# Patient Record
Sex: FEMALE | Race: WHITE | NOT HISPANIC OR LATINO | Employment: OTHER | URBAN - METROPOLITAN AREA
[De-identification: names, ages, dates, MRNs, and addresses within clinical notes are randomized per-mention and may not be internally consistent; named-entity substitution may affect disease eponyms.]

---

## 2017-03-07 ENCOUNTER — OFFICE VISIT (OUTPATIENT)
Dept: CARDIOLOGY | Facility: MEDICAL CENTER | Age: 77
End: 2017-03-07
Payer: MEDICARE

## 2017-03-07 VITALS
SYSTOLIC BLOOD PRESSURE: 110 MMHG | WEIGHT: 116 LBS | DIASTOLIC BLOOD PRESSURE: 70 MMHG | HEART RATE: 60 BPM | HEIGHT: 64 IN | OXYGEN SATURATION: 94 % | BODY MASS INDEX: 19.81 KG/M2

## 2017-03-07 DIAGNOSIS — I25.2 MI, OLD: Chronic | ICD-10-CM

## 2017-03-07 DIAGNOSIS — I48.20 CHRONIC ATRIAL FIBRILLATION (HCC): ICD-10-CM

## 2017-03-07 DIAGNOSIS — Z95.1 STATUS POST CORONARY ARTERY BYPASS GRAFTS X 5: ICD-10-CM

## 2017-03-07 DIAGNOSIS — I20.89 EFFORT ANGINA: ICD-10-CM

## 2017-03-07 DIAGNOSIS — I50.20 SYSTOLIC CHF WITH REDUCED LEFT VENTRICULAR FUNCTION, NYHA CLASS 3 (HCC): ICD-10-CM

## 2017-03-07 PROCEDURE — 1036F TOBACCO NON-USER: CPT | Performed by: INTERNAL MEDICINE

## 2017-03-07 PROCEDURE — 3017F COLORECTAL CA SCREEN DOC REV: CPT | Mod: 8P | Performed by: INTERNAL MEDICINE

## 2017-03-07 PROCEDURE — G8419 CALC BMI OUT NRM PARAM NOF/U: HCPCS | Performed by: INTERNAL MEDICINE

## 2017-03-07 PROCEDURE — 3014F SCREEN MAMMO DOC REV: CPT | Mod: 8P | Performed by: INTERNAL MEDICINE

## 2017-03-07 PROCEDURE — 99214 OFFICE O/P EST MOD 30 MIN: CPT | Performed by: INTERNAL MEDICINE

## 2017-03-07 PROCEDURE — 4040F PNEUMOC VAC/ADMIN/RCVD: CPT | Performed by: INTERNAL MEDICINE

## 2017-03-07 PROCEDURE — G8598 ASA/ANTIPLAT THER USED: HCPCS | Performed by: INTERNAL MEDICINE

## 2017-03-07 PROCEDURE — 1101F PT FALLS ASSESS-DOCD LE1/YR: CPT | Mod: 8P | Performed by: INTERNAL MEDICINE

## 2017-03-07 PROCEDURE — G8484 FLU IMMUNIZE NO ADMIN: HCPCS | Performed by: INTERNAL MEDICINE

## 2017-03-07 PROCEDURE — G8432 DEP SCR NOT DOC, RNG: HCPCS | Performed by: INTERNAL MEDICINE

## 2017-03-07 RX ORDER — LISINOPRIL 10 MG/1
10 TABLET ORAL 2 TIMES DAILY
COMMUNITY
End: 2017-06-06 | Stop reason: SDUPTHER

## 2017-03-07 ASSESSMENT — ENCOUNTER SYMPTOMS
CARDIOVASCULAR NEGATIVE: 1
HEADACHES: 1
BACK PAIN: 1
SHORTNESS OF BREATH: 1

## 2017-03-07 NOTE — PROGRESS NOTES
Subjective:   Daya Woods is a 76-year-old woman with a history of prior MI and 5-vessel CABG in 2002 now with 2/5 grafts open by cardiac catheterization 8/26/16 (LIMA-diag, SVG-OM patent all others occluded), severe disease of her native LAD status post PCI with drug-eluting stents at that time, and an occluded native right coronary artery filling via collaterals. She has atrial fibrillation, hypertension, dyslipidemia, and systolic congestive heart failure with EF now depressed at 35% as well as at moderate mitral regurgitation.    She is struggling a bit at this point. Her  recently had a stroke and she is still having quite a bit of difficulty adjusting to that with some adjustment disorder/depression related to it. She presently is on lisinopril at what sounds to be 10 mg by mouth twice a day increased from 5 mg by mouth twice a day at our last visit. She continues on Coreg 12.5 mg by mouth twice a day, and 12.5 mg of spironolactone with good control of her blood pressure.    She does relate some midsternal chest discomfort that sounds consistent with angina. It is infrequent, and does not seem to limit her ability to be physically active at this time.    She is having some bandlike headaches that sound tension in nature.    Again, she is having some depression related to the health of her /adjustment disorder.     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 Cardiac catheterization 12/2009  LIMA to D3 patent. Saphenous vein graft to the distal left anterior  "descending is occluded. Saphenous vein graft to D1 is occluded. Saphenous vein graft to obtuse marginal patent with distal to graft stenosis  Saphenous vein graft to right ventricular free wall branch patent filling Distal right coronary ar   • Spinal stenosis 8/20/2015   • Chronic atrial fibrillation (CMS-HCC) 2/24/2014     Managed with rate control and warfarin for anticoagulation    • Anesthesia      ponv   • Arthritis      osteo   • MI, old 2002   • Dental disorder      upper and lower dentures   • Cataract      denzel iols   • Pain      right knee, back     Past Surgical History   Procedure Laterality Date   • Appendectomy     • Multiple coronary artery bypass       2009 CABG x 5   • Tonsillectomy and adenoidectomy     • Tubal ligation     • Other Bilateral 08/2014     cataract extraction with iols   • Recovery  8/26/2016     Procedure: CATH LAB-DEVON W/LHC W/ENRIQUE/GRINSELL-LARGE GROUP;  Surgeon: Recoveryonly Surgery;  Location: SURGERY PRE-POST PROC UNIT [facility];  Service:      Family History   Problem Relation Age of Onset   • Heart Attack Brother 55     heart attack     History   Smoking status   • Former Smoker -- 0.50 packs/day for 40 years   • Quit date: 02/24/2002   Smokeless tobacco   • Never Used     Allergies   Allergen Reactions   • Codeine Unspecified     \"I just don't like it. It does weird things to me.\"   • Hydrochlorothiazide Unspecified     Unsure of reaction   • Isosorbide Mononitrate Rash     Rash   • Keflex Rash     Rash   • Sulfa Drugs Rash     rash   • Tape Unspecified     Tears skin off   • Xarelto [Rivaroxaban] Rash     rash     Outpatient Encounter Prescriptions as of 3/7/2017   Medication Sig Dispense Refill   • lisinopril (PRINIVIL) 10 MG Tab Take 10 mg by mouth every day.     • atorvastatin (LIPITOR) 40 MG Tab Take 1 Tab by mouth every day. 30 Tab 11   • spironolactone (ALDACTONE) 25 MG Tab Take 0.5 Tabs by mouth every day. 45 Tab 3   • levothyroxine (SYNTHROID) 75 MCG Tab   0 " "  • warfarin (COUMADIN) 5 MG Tab Per pt, pt takes 7 mg by mouth every Monday, Wednesday, and Friday.  5 mg rest of week.  0   • clopidogrel (PLAVIX) 75 MG TABS Take 1 Tab by mouth every day. 90 Tab 3   • furosemide (LASIX) 20 MG TABS Take 1 Tab by mouth every day. 90 Tab 3   • carvedilol (COREG) 25 MG Tab Take 0.5 Tabs by mouth 2 times a day, with meals. 180 Tab 3   • lisinopril (PRINIVIL) 5 MG Tab Take 1 Tab by mouth 2 times a day. (Patient not taking: Reported on 3/7/2017) 60 Tab 3   • aspirin (ASA) 81 MG Chew Tab chewable tablet Take 1 Tab by mouth every day. (Patient not taking: Reported on 11/30/2016) 100 Tab 11     No facility-administered encounter medications on file as of 3/7/2017.     Review of Systems   Respiratory: Positive for shortness of breath (minimal, infrequent VILLEGAS).    Cardiovascular: Negative.    Musculoskeletal: Positive for back pain (prior steroid injections were not effective in treating her spinal stenosis).   Neurological: Positive for headaches.   All other systems reviewed and are negative.       Objective:   /70 mmHg  Pulse 60  Ht 1.626 m (5' 4.02\")  Wt 52.617 kg (116 lb)  BMI 19.90 kg/m2  SpO2 94%  LMP  (LMP Unknown)    Physical Exam   Constitutional: She is oriented to person, place, and time. She appears well-developed and well-nourished. No distress.   Very pleasant elderly woman in no distress   HENT:   Head: Normocephalic and atraumatic.   Eyes: Conjunctivae and EOM are normal. Pupils are equal, round, and reactive to light. No scleral icterus.   Neck: Neck supple. No JVD present. No tracheal deviation present.   Cardiovascular: Normal rate, normal heart sounds and intact distal pulses.  An irregularly irregular rhythm present. Exam reveals no gallop and no friction rub.    No murmur heard.  Pulses:       Dorsalis pedis pulses are 2+ on the right side, and 2+ on the left side.   No carotid bruits. Prior healed sternotomy scar noted, unchanged.   Pulmonary/Chest: Effort " normal and breath sounds normal. No stridor. No respiratory distress. She has no wheezes. She has no rales.   Abdominal: Soft. Bowel sounds are normal. She exhibits no distension.   Musculoskeletal: She exhibits no edema.   Neurological: She is alert and oriented to person, place, and time.   Skin: Skin is warm and dry. No rash noted. She is not diaphoretic. No erythema. No pallor.   Psychiatric: She has a normal mood and affect. Judgment and thought content normal.   Vitals reviewed.    Labs, 5/3/2016.   CBC: WBC 4.7, hemoglobin 14.7, platelets 186  Chemistry panel: Sodium 132, potassium 4.6, chloride 97, CO2 30, BUN 8, creatinine 0.8, glucose 88, calcium 9.3, total protein 6.7, albumin 4.1, AST 28, ALT 22, alkaline phosphatase 70, total bilirubin 0.7.  Lipid panel: LDL 67, HDL 56, triglycerides 65, total cholesterol 131  TSH 1.97    Chem panel 9/12/16: Na 128 roughly unchanged from 5/2016, Cr normal, otherwise unremarkable    Assessment:     1. Systolic CHF with reduced left ventricular function, NYHA class 3 (CMS-[facility])  lisinopril (PRINIVIL) 10 MG Tab    Echocardiogram LTD w/o Cont   2. Chronic atrial fibrillation (CMS-[facility])     3. MI, old     4. Status post coronary artery bypass grafts x 5     5. Effort angina (CMS-HCC)         Medical Decision Making:  Today's Assessment / Status / Plan:     Medical Decision Making:    Her blood pressure is well controlled today at 110/70 mmHg on her heart failure therapy including lisinopril twice a day, Coreg 12.5 mg by mouth twice a day, and spironolactone 12.5 mg by mouth daily. I think that is probably as much as we can optimize her heart failure therapy, and I have repeated an echocardiogram to reevaluate her left ventricular ejection fraction, which previously was 35%. She continues on warfarin for stroke prevention, and atorvastatin for her dyslipidemia neither of which I have changed.    She is having a bit of depression/adjustment disorder, and I discussed with her  briefly the possibility of an SSRI, which I certainly will defer to the primary care setting.    Rah Anderson MD  Cardiologist, [facility] [place]

## 2017-03-07 NOTE — MR AVS SNAPSHOT
"        Daya Rodriguez Woods   3/7/2017 10:45 AM   Office Visit   MRN: [id number]    Department:  [facility]   Dept Phone:  [phone]    Description:  Female : 1940   Provider:  Rah Anderson M.D.           Reason for Visit     Follow-Up           Allergies as of 3/7/2017     Allergen Noted Reactions    Codeine 2016   Unspecified    \"I just don't like it. It does weird things to me.\"    Hydrochlorothiazide 2009   Unspecified    Unsure of reaction    Isosorbide Mononitrate 2009   Rash    Rash    Keflex 09/10/2009   Rash    Rash    Sulfa Drugs 2016   Rash    rash    Tape 2016   Unspecified    Tears skin off    Xarelto [Rivaroxaban] 2014   Rash    rash      You were diagnosed with     Systolic CHF with reduced left ventricular function, NYHA class 3 (CMS-HCC)   [0081265]       Chronic atrial fibrillation (CMS-HCC)   [337718]       MI, old   [274992]       Status post coronary artery bypass grafts x 5   [868694]       Effort angina (CMS-HCC)   [709428]         Vital Signs     Blood Pressure Pulse Height Weight Body Mass Index Oxygen Saturation    110/70 mmHg 60 1.626 m (5' 4.02\") 52.617 kg (116 lb) 19.90 kg/m2 94%    Last Menstrual Period Smoking Status                (LMP Unknown) Former Smoker          Basic Information     Date Of Birth Sex Race Ethnicity Preferred Language    1940 Female White Non- English      Problem List              ICD-10-CM Priority Class Noted - Resolved    Raynaud's disease (Chronic) I73.00   2012 - Present    MI, old (Chronic) I25.2   2012 - Present    Coronary atherosclerosis of native coronary artery (Chronic) I25.10   2012 - Present    Hypercholesteremia E78.00   2012 - Present    HTN (hypertension) (Chronic) I10   2012 - Present    Discoid lupus (Chronic) L93.0   2012 - Present    Stress (Chronic) F43.9   2012 - Present    Chronic atrial fibrillation (CMS-HCC) I48.2 Medium  2014 - " Present    Spinal stenosis M48.00   8/20/2015 - Present    Status post coronary artery bypass grafts x 5 Z95.1   7/8/2016 - Present    Coronary artery disease involving coronary bypass graft of native heart without angina pectoris I25.810   7/8/2016 - Present    Abnormal cardiac CT angiography R93.1   8/26/2016 - Present      Health Maintenance        Date Due Completion Dates    IMM DTaP/Tdap/Td Vaccine (1 - Tdap) 4/17/1961 ---    PAP SMEAR 4/17/1963 ---    MAMMOGRAM 4/17/1982 ---    COLONOSCOPY 4/17/1992 ---    IMM ZOSTER VACCINE 4/17/2002 ---    BONE DENSITY 4/17/2007 ---    IMM INFLUENZA (1) 9/1/2016 10/26/2015            Current Immunizations     13-VALENT PCV PREVNAR 10/26/2015    Influenza Vaccine Adult HD 10/26/2015    Pneumococcal polysaccharide vaccine (PPSV-23) 2/24/2012      Below and/or attached are the medications your provider expects you to take. Review all of your home medications and newly ordered medications with your provider and/or pharmacist. Follow medication instructions as directed by your provider and/or pharmacist. Please keep your medication list with you and share with your provider. Update the information when medications are discontinued, doses are changed, or new medications (including over-the-counter products) are added; and carry medication information at all times in the event of emergency situations     Allergies:  CODEINE - Unspecified     HYDROCHLOROTHIAZIDE - Unspecified     ISOSORBIDE MONONITRATE - Rash     KEFLEX - Rash     SULFA DRUGS - Rash     TAPE - Unspecified     XARELTO - Rash               Medications  Valid as of: March 07, 2017 - 11:19 AM    Generic Name Brand Name Tablet Size Instructions for use    Aspirin (Chew Tab) ASA 81 MG Take 1 Tab by mouth every day.        Atorvastatin Calcium (Tab) LIPITOR 40 MG Take 1 Tab by mouth every day.        Carvedilol (Tab) COREG 25 MG Take 0.5 Tabs by mouth 2 times a day, with meals.        Clopidogrel Bisulfate (Tab) PLAVIX 75  MG Take 1 Tab by mouth every day.        Furosemide (Tab) LASIX 20 MG Take 1 Tab by mouth every day.        Levothyroxine Sodium (Tab) SYNTHROID 75 MCG         Lisinopril (Tab) PRINIVIL 5 MG Take 1 Tab by mouth 2 times a day.        Lisinopril (Tab) PRINIVIL 10 MG Take 10 mg by mouth every day.        Spironolactone (Tab) ALDACTONE 25 MG Take 0.5 Tabs by mouth every day.        Warfarin Sodium (Tab) COUMADIN 5 MG Per pt, pt takes 7 mg by mouth every Monday, Wednesday, and Friday.  5 mg rest of week.        .                 Medicines prescribed today were sent to:     [address] [address]    Phone: [phone] Fax: [phone]    Open 24 Hours?: No      Medication refill instructions:       If your prescription bottle indicates you have medication refills left, it is not necessary to call your provider’s office. Please contact your pharmacy and they will refill your medication.    If your prescription bottle indicates you do not have any refills left, you may request refills at any time through one of the following ways: The online [employer] system (except Urgent Care), by calling your provider’s office, or by asking your pharmacy to contact your provider’s office with a refill request. Medication refills are processed only during regular business hours and may not be available until the next business day. Your provider may request additional information or to have a follow-up visit with you prior to refilling your medication.   *Please Note: Medication refills are assigned a new Rx number when refilled electronically. Your pharmacy may indicate that no refills were authorized even though a new prescription for the same medication is available at the pharmacy. Please request the medicine by name with the pharmacy before contacting your provider for a refill.        Your To Do List     Future Labs/Procedures Complete By Expires     Echocardiogram LTD w/o Cont  As directed 3/7/2018    Scheduling Instructions:    Evaluate LVEF on medical therapy         MyChart Access Code: Activation code not generated  Current MyChart Status: Active

## 2017-03-07 NOTE — PROGRESS NOTES
Name:          Daya Woods   YOB: 1940  Date:     3/7/2017      Sourav Swift M.D.  [address] [facility] [address]     Rah Anderson MD  [address] [facility], [facility] [address]  [place] [address]  Phone: [phone]  Back Line: [phone]  Fax: [phone]  E-mail: [email or web address]   Dear Dr. Swift,    We had the pleasure of seeing your patient, Daya Woods, in Cardiology Clinic at [facility] today.    As you know, she is a 76-year-old woman with a history of prior MI and 5-vessel CABG in 2002 now with 2/5 grafts open by cardiac catheterization 8/26/16 (LIMA-diag, SVG-OM patent all others occluded), severe disease of her native LAD status post PCI with drug-eluting stents at that time, and an occluded native right coronary artery filling via collaterals. She has atrial fibrillation, hypertension, dyslipidemia, and systolic congestive heart failure with EF now depressed at 35% as well as at moderate mitral regurgitation.    Her blood pressure is well controlled today at 110/70 mmHg on her heart failure therapy including lisinopril twice a day, Coreg 12.5 mg by mouth twice a day, and spironolactone 12.5 mg by mouth daily. I think that is probably as much as we can optimize her heart failure therapy, and I have repeated an echocardiogram to reevaluate her left ventricular ejection fraction, which previously was 35%. She continues on warfarin for stroke prevention, and atorvastatin for her dyslipidemia neither of which I have changed.    She is having a bit of depression/adjustment disorder, and I discussed with her briefly the possibility of an SSRI, which I certainly will defer to the primary care setting.    We will have the patient follow-up in 3 months.    Thank you for the referral and please do not hesitate to contact me at any time. My contact information is listed above.    This note was dictated using Dragon speech recognition software.     A full note including my  physical examination and a full list of rectified medications is available in our medical record, and can be faxed as well.    Rah Anderson MD  Cardiologist  [facility]

## 2017-03-07 NOTE — Clinical Note
Name:          Daya Woods   YOB: 1940  Date:     3/7/2017      Sourav Swift M.D.  [address] [facility] [address]     Rah Anderson MD  [address] [facility], [facility] [address]  [place] [address]  Phone: [phone]  Back Line: [phone]  Fax: [phone]  E-mail: [email or web address]   Dear Dr. Swift,    We had the pleasure of seeing your patient, Daya Woods, in Cardiology Clinic at [facility] today.    As you know, she is a 76-year-old woman with a history of prior MI and 5-vessel CABG in 2002 now with 2/5 grafts open by cardiac catheterization 8/26/16 (LIMA-diag, SVG-OM patent all others occluded), severe disease of her native LAD status post PCI with drug-eluting stents at that time, and an occluded native right coronary artery filling via collaterals. She has atrial fibrillation, hypertension, dyslipidemia, and systolic congestive heart failure with EF now depressed at 35% as well as at moderate mitral regurgitation.    Her blood pressure is well controlled today at 110/70 mmHg on her heart failure therapy including lisinopril twice a day, Coreg 12.5 mg by mouth twice a day, and spironolactone 12.5 mg by mouth daily. I think that is probably as much as we can optimize her heart failure therapy, and I have repeated an echocardiogram to reevaluate her left ventricular ejection fraction, which previously was 35%. She continues on warfarin for stroke prevention, and atorvastatin for her dyslipidemia neither of which I have changed.    She is having a bit of depression/adjustment disorder, and I discussed with her briefly the possibility of an SSRI, which I certainly will defer to the primary care setting.    We will have the patient follow-up in 3 months.    Thank you for the referral and please do not hesitate to contact me at any time. My contact information is listed above.    This note was dictated using Dragon speech recognition software.     A full note including my  physical examination and a full list of rectified medications is available in our medical record, and can be faxed as well.    Rah Anderson MD  Cardiologist  [facility]

## 2017-04-04 ENCOUNTER — HOSPITAL ENCOUNTER (OUTPATIENT)
Dept: CARDIOLOGY | Facility: MEDICAL CENTER | Age: 77
End: 2017-04-04
Attending: INTERNAL MEDICINE
Payer: MEDICARE

## 2017-04-04 DIAGNOSIS — I50.20 SYSTOLIC CHF WITH REDUCED LEFT VENTRICULAR FUNCTION, NYHA CLASS 3 (HCC): ICD-10-CM

## 2017-04-04 LAB
LV EJECT FRACT  99904: 35
LV EJECT FRACT MOD 2C 99903: 9.31
LV EJECT FRACT MOD 4C 99902: 38.71
LV EJECT FRACT MOD BP 99901: 26.39

## 2017-04-04 PROCEDURE — 93321 DOPPLER ECHO F-UP/LMTD STD: CPT

## 2017-04-04 PROCEDURE — 93325 DOPPLER ECHO COLOR FLOW MAPG: CPT

## 2017-04-04 PROCEDURE — 93308 TTE F-UP OR LMTD: CPT

## 2017-04-04 PROCEDURE — 93308 TTE F-UP OR LMTD: CPT | Mod: 26 | Performed by: INTERNAL MEDICINE

## 2017-06-06 ENCOUNTER — OFFICE VISIT (OUTPATIENT)
Dept: CARDIOLOGY | Facility: MEDICAL CENTER | Age: 77
End: 2017-06-06
Payer: MEDICARE

## 2017-06-06 VITALS
WEIGHT: 115 LBS | HEIGHT: 64 IN | DIASTOLIC BLOOD PRESSURE: 80 MMHG | OXYGEN SATURATION: 96 % | HEART RATE: 62 BPM | BODY MASS INDEX: 19.63 KG/M2 | SYSTOLIC BLOOD PRESSURE: 130 MMHG

## 2017-06-06 DIAGNOSIS — I25.2 MI, OLD: Chronic | ICD-10-CM

## 2017-06-06 DIAGNOSIS — I10 ESSENTIAL HYPERTENSION: ICD-10-CM

## 2017-06-06 DIAGNOSIS — I50.20 SYSTOLIC CHF WITH REDUCED LEFT VENTRICULAR FUNCTION, NYHA CLASS 2 (HCC): Primary | ICD-10-CM

## 2017-06-06 DIAGNOSIS — Z66 DNR (DO NOT RESUSCITATE): ICD-10-CM

## 2017-06-06 DIAGNOSIS — Z95.1 STATUS POST CORONARY ARTERY BYPASS GRAFTS X 5: ICD-10-CM

## 2017-06-06 PROCEDURE — G8432 DEP SCR NOT DOC, RNG: HCPCS | Performed by: INTERNAL MEDICINE

## 2017-06-06 PROCEDURE — 4040F PNEUMOC VAC/ADMIN/RCVD: CPT | Performed by: INTERNAL MEDICINE

## 2017-06-06 PROCEDURE — G8420 CALC BMI NORM PARAMETERS: HCPCS | Performed by: INTERNAL MEDICINE

## 2017-06-06 PROCEDURE — 1036F TOBACCO NON-USER: CPT | Performed by: INTERNAL MEDICINE

## 2017-06-06 PROCEDURE — 3017F COLORECTAL CA SCREEN DOC REV: CPT | Mod: 8P | Performed by: INTERNAL MEDICINE

## 2017-06-06 PROCEDURE — 1101F PT FALLS ASSESS-DOCD LE1/YR: CPT | Mod: 8P | Performed by: INTERNAL MEDICINE

## 2017-06-06 PROCEDURE — G8598 ASA/ANTIPLAT THER USED: HCPCS | Performed by: INTERNAL MEDICINE

## 2017-06-06 PROCEDURE — 99214 OFFICE O/P EST MOD 30 MIN: CPT | Performed by: INTERNAL MEDICINE

## 2017-06-06 RX ORDER — LISINOPRIL 10 MG/1
10 TABLET ORAL 2 TIMES DAILY
Qty: 180 TAB | Refills: 3 | Status: SHIPPED | OUTPATIENT
Start: 2017-06-06 | End: 2018-04-10

## 2017-06-06 RX ORDER — CARVEDILOL 25 MG/1
12.5 TABLET ORAL 2 TIMES DAILY WITH MEALS
Qty: 180 TAB | Refills: 3 | Status: SHIPPED | OUTPATIENT
Start: 2017-06-06 | End: 2018-09-04 | Stop reason: SDUPTHER

## 2017-06-06 ASSESSMENT — ENCOUNTER SYMPTOMS
CARDIOVASCULAR NEGATIVE: 1
BACK PAIN: 1
SHORTNESS OF BREATH: 1
HEADACHES: 1

## 2017-06-06 NOTE — MR AVS SNAPSHOT
"        Daya Woods   2017 10:15 AM   Office Visit   MRN: [id number]    Department:  [facility]   Dept Phone:  [phone]    Description:  Female : 1940   Provider:  Rah Anderson M.D.           Reason for Visit     Follow-Up           Allergies as of 2017     Allergen Noted Reactions    Codeine 2016   Unspecified    \"I just don't like it. It does weird things to me.\"    Hydrochlorothiazide 2009   Unspecified    Unsure of reaction    Isosorbide Mononitrate 2009   Rash    Rash    Keflex 09/10/2009   Rash    Rash    Sulfa Drugs 2016   Rash    rash    Tape 2016   Unspecified    Tears skin off    Xarelto [Rivaroxaban] 2014   Rash    rash      You were diagnosed with     Systolic CHF with reduced left ventricular function, NYHA class 3 (CMS-[facility])   [4237245]       Essential hypertension   [7314139]       Systolic CHF with reduced left ventricular function, NYHA class 2 (CMS-[facility])   [829939]       DNR (do not resuscitate)   [452532]         Vital Signs     Blood Pressure Pulse Height Weight Body Mass Index Oxygen Saturation    130/80 mmHg 62 1.626 m (5' 4.02\") 52.164 kg (115 lb) 19.73 kg/m2 96%    Last Menstrual Period Smoking Status                (LMP Unknown) Former Smoker          Basic Information     Date Of Birth Sex Race Ethnicity Preferred Language    1940 Female White Non- English      Problem List              ICD-10-CM Priority Class Noted - Resolved    Raynaud's disease (Chronic) I73.00   2012 - Present    MI, old (Chronic) I25.2   2012 - Present    Coronary atherosclerosis of native coronary artery (Chronic) I25.10   2012 - Present    Hypercholesteremia E78.00   2012 - Present    HTN (hypertension) (Chronic) I10   2012 - Present    Discoid lupus (Chronic) L93.0   2012 - Present    Stress (Chronic) F43.9   2012 - Present    Chronic atrial fibrillation (CMS-[facility]) I48.2 Medium  2014 - Present   " Spinal stenosis M48.00   8/20/2015 - Present    Status post coronary artery bypass grafts x 5 Z95.1   7/8/2016 - Present    Coronary artery disease involving coronary bypass graft of native heart without angina pectoris I25.810   7/8/2016 - Present    Abnormal cardiac CT angiography R93.1   8/26/2016 - Present    DNR (do not resuscitate) Z66   6/6/2017 - Present      Health Maintenance        Date Due Completion Dates    IMM DTaP/Tdap/Td Vaccine (1 - Tdap) 4/17/1961 ---    PAP SMEAR 4/17/1963 ---    MAMMOGRAM 4/17/1982 ---    COLONOSCOPY 4/17/1992 ---    IMM ZOSTER VACCINE 4/17/2002 ---    BONE DENSITY 4/17/2007 ---            Current Immunizations     13-VALENT PCV PREVNAR 10/26/2015    Influenza Vaccine Adult HD 10/26/2015    Pneumococcal polysaccharide vaccine (PPSV-23) 2/24/2012      Below and/or attached are the medications your provider expects you to take. Review all of your home medications and newly ordered medications with your provider and/or pharmacist. Follow medication instructions as directed by your provider and/or pharmacist. Please keep your medication list with you and share with your provider. Update the information when medications are discontinued, doses are changed, or new medications (including over-the-counter products) are added; and carry medication information at all times in the event of emergency situations     Allergies:  CODEINE - Unspecified     HYDROCHLOROTHIAZIDE - Unspecified     ISOSORBIDE MONONITRATE - Rash     KEFLEX - Rash     SULFA DRUGS - Rash     TAPE - Unspecified     XARELTO - Rash               Medications  Valid as of: June 06, 2017 - 11:05 AM    Generic Name Brand Name Tablet Size Instructions for use    Aspirin (Chew Tab) ASA 81 MG Take 1 Tab by mouth every day.        Atorvastatin Calcium (Tab) LIPITOR 40 MG Take 1 Tab by mouth every day.        Carvedilol (Tab) COREG 25 MG Take 0.5 Tabs by mouth 2 times a day, with meals.        Clopidogrel Bisulfate (Tab) PLAVIX 75  MG Take 1 Tab by mouth every day.        Furosemide (Tab) LASIX 20 MG Take 1 Tab by mouth every day.        Levothyroxine Sodium (Tab) SYNTHROID 75 MCG         Lisinopril (Tab) PRINIVIL 10 MG Take 1 Tab by mouth 2 times a day.        Spironolactone (Tab) ALDACTONE 25 MG Take 0.5 Tabs by mouth every day.        Warfarin Sodium (Tab) COUMADIN 5 MG Per pt, pt takes 7 mg by mouth every Monday, Wednesday, and Friday.  5 mg rest of week.        .                 Medicines prescribed today were sent to:     [address] [address]    Phone: [phone] Fax: [phone]    Open 24 Hours?: No      Medication refill instructions:       If your prescription bottle indicates you have medication refills left, it is not necessary to call your provider’s office. Please contact your pharmacy and they will refill your medication.    If your prescription bottle indicates you do not have any refills left, you may request refills at any time through one of the following ways: The online [employer] system (except Urgent Care), by calling your provider’s office, or by asking your pharmacy to contact your provider’s office with a refill request. Medication refills are processed only during regular business hours and may not be available until the next business day. Your provider may request additional information or to have a follow-up visit with you prior to refilling your medication.   *Please Note: Medication refills are assigned a new Rx number when refilled electronically. Your pharmacy may indicate that no refills were authorized even though a new prescription for the same medication is available at the pharmacy. Please request the medicine by name with the pharmacy before contacting your provider for a refill.           [employer] Access Code: Activation code not generated  Current [employer] Status: Active

## 2017-06-06 NOTE — PROGRESS NOTES
Subjective:   Daya Woods is a 77 -year-old woman with a history of prior MI and 5-vessel CABG in 2002 now with 2/5 grafts open by cardiac catheterization 8/26/16 (LIMA-diag, SVG-OM patent all others occluded), severe disease of her native LAD status post PCI with drug-eluting stents at that time, and an occluded native right coronary artery filling via collaterals. She has atrial fibrillation, hypertension, dyslipidemia, and systolic congestive heart failure with EF now depressed at 35% as well as at moderate mitral regurgitation.    She has no cardiovascular complaints today, and but does continue to suffer quite a bit related to the functional dependence of her . She comes in with her daughter as usual. She is tolerating her medications well.    She reminds me that she had previously been on lisinopril 20 mg by mouth twice a day, and I cut that to 5 mg by mouth twice a day related to hypotension in the past. She then had elevation in her blood pressure related to which her primary care physician put her on lisinopril 10 mg by mouth twice a day, which she seems to be tolerating fairly well at this time.    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 Cardiac catheterization 12/2009  LIMA to D3 patent. Saphenous vein graft to the distal left anterior descending is occluded. Saphenous vein graft to D1 is occluded. Saphenous vein graft to obtuse marginal patent with distal to graft stenosis  Saphenous vein graft to right ventricular free wall branch patent filling Distal  "right coronary ar   • Spinal stenosis 8/20/2015   • Chronic atrial fibrillation (CMS-HCC) 2/24/2014     Managed with rate control and warfarin for anticoagulation    • Anesthesia      ponv   • Arthritis      osteo   • MI, old 2002   • Dental disorder      upper and lower dentures   • Cataract      denzel iols   • Pain      right knee, back     Past Surgical History   Procedure Laterality Date   • Appendectomy     • Multiple coronary artery bypass       2009 CABG x 5   • Tonsillectomy and adenoidectomy     • Tubal ligation     • Other Bilateral 08/2014     cataract extraction with iols   • Recovery  8/26/2016     Procedure: CATH LAB-DEVON W/LHC W/MARLEY-JO-ANN/GRINSELL-LARGE GROUP;  Surgeon: Recoveryonly Surgery;  [place] [facility]  [place]:      Family History   Problem Relation Age of Onset   • Heart Attack Brother 55     heart attack     History   Smoking status   • Former Smoker -- 0.50 packs/day for 40 years   • Quit date: 02/24/2002   Smokeless tobacco   • Never Used     Allergies   Allergen Reactions   • Codeine Unspecified     \"I just don't like it. It does weird things to me.\"   • Hydrochlorothiazide Unspecified     Unsure of reaction   • Isosorbide Mononitrate Rash     Rash   • Keflex Rash     Rash   • Sulfa Drugs Rash     rash   • Tape Unspecified     Tears skin off   • Xarelto [Rivaroxaban] Rash     rash     Outpatient Encounter Prescriptions as of 6/6/2017   Medication Sig Dispense Refill   • lisinopril (PRINIVIL) 10 MG Tab Take 1 Tab by mouth 2 times a day. 180 Tab 3   • carvedilol (COREG) 25 MG Tab Take 0.5 Tabs by mouth 2 times a day, with meals. 180 Tab 3   • atorvastatin (LIPITOR) 40 MG Tab Take 1 Tab by mouth every day. 30 Tab 11   • spironolactone (ALDACTONE) 25 MG Tab Take 0.5 Tabs by mouth every day. 45 Tab 3   • levothyroxine (SYNTHROID) 75 MCG Tab   0   • warfarin (COUMADIN) 5 MG Tab Per pt, pt takes 7 mg by mouth every Monday, Wednesday, and Friday.  5 mg rest of " "week.  0   • clopidogrel (PLAVIX) 75 MG TABS Take 1 Tab by mouth every day. 90 Tab 3   • furosemide (LASIX) 20 MG TABS Take 1 Tab by mouth every day. 90 Tab 3   • [DISCONTINUED] lisinopril (PRINIVIL) 10 MG Tab Take 10 mg by mouth 2 times a day.     • [DISCONTINUED] carvedilol (COREG) 25 MG Tab Take 0.5 Tabs by mouth 2 times a day, with meals. 180 Tab 3   • [DISCONTINUED] lisinopril (PRINIVIL) 5 MG Tab Take 1 Tab by mouth 2 times a day. (Patient not taking: Reported on 3/7/2017) 60 Tab 3   • aspirin (ASA) 81 MG Chew Tab chewable tablet Take 1 Tab by mouth every day. (Patient not taking: Reported on 11/30/2016) 100 Tab 11     No facility-administered encounter medications on file as of 6/6/2017.     Review of Systems   Respiratory: Positive for shortness of breath (minimal, infrequent VILLEGAS).    Cardiovascular: Negative.    Musculoskeletal: Positive for back pain (prior steroid injections were not effective in treating her spinal stenosis).   Neurological: Positive for headaches.   All other systems reviewed and are negative.       Objective:   /80 mmHg  Pulse 62  Ht 1.626 m (5' 4.02\")  Wt 52.164 kg (115 lb)  BMI 19.73 kg/m2  SpO2 96%  LMP  (LMP Unknown)    Physical Exam   Constitutional: She is oriented to person, place, and time. She appears well-developed and well-nourished. No distress.   Very pleasant elderly woman in no distress   HENT:   Head: Normocephalic and atraumatic.   Eyes: Conjunctivae and EOM are normal. Pupils are equal, round, and reactive to light. No scleral icterus.   Neck: Neck supple. No JVD present. No tracheal deviation present.   Cardiovascular: Normal rate, normal heart sounds and intact distal pulses.  An irregularly irregular rhythm present. Exam reveals no gallop and no friction rub.    No murmur heard.  Pulses:       Dorsalis pedis pulses are 2+ on the right side, and 2+ on the left side.   No carotid bruits. Prior healed sternotomy scar noted, unchanged.   Pulmonary/Chest: " "Effort normal and breath sounds normal. No stridor. No respiratory distress. She has no wheezes. She has no rales.   Abdominal: Soft. Bowel sounds are normal. She exhibits no distension.   Musculoskeletal: She exhibits no edema.   Neurological: She is alert and oriented to person, place, and time.   Skin: Skin is warm and dry. No rash noted. She is not diaphoretic. No erythema. No pallor.   Psychiatric: She has a normal mood and affect. Judgment and thought content normal.   Vitals reviewed.    Labs, 5/3/2016.   CBC: WBC 4.7, hemoglobin 14.7, platelets 186  Chemistry panel: Sodium 132, potassium 4.6, chloride 97, CO2 30, BUN 8, creatinine 0.8, glucose 88, calcium 9.3, total protein 6.7, albumin 4.1, AST 28, ALT 22, alkaline phosphatase 70, total bilirubin 0.7.  Lipid panel: LDL 67, HDL 56, triglycerides 65, total cholesterol 131  TSH 1.97    Lab Results   Component Value Date/Time    SODIUM 132* 08/27/2016 04:45 AM    POTASSIUM 3.4* 08/27/2016 04:45 AM    CHLORIDE 100 08/27/2016 04:45 AM    CO2 24 08/27/2016 04:45 AM    GLUCOSE 123* 08/27/2016 04:45 AM    BUN 9 08/27/2016 04:45 AM    CREATININE 0.68 08/27/2016 04:45 AM      Echocardiogram, 4/4/2017:  \"CONCLUSIONS  Moderately reduced left ventricular systolic function.  Akinetic apex with apical aneurysm.  Moderate mitral regurgitation.  Compared to the images of the prior study done  7/27/16, ejection   fraction is unchanged.  Apical aneurysm is now present\"    Assessment:     1. Systolic CHF with reduced left ventricular function, NYHA class 2 (CMS-[facility])  carvedilol (COREG) 25 MG Tab   2. Essential hypertension  carvedilol (COREG) 25 MG Tab   3. DNR (do not resuscitate)     4. Status post coronary artery bypass grafts x 5     5. MI, old         Medical Decision Making:  Today's Assessment / Status / Plan:     Medical Decision Making:    She has no cardiovascular complaints today, and does seem to be tolerating fairly well lisinopril at an intermediate dose of 10 mg " by mouth twice a day recently increased from the primary care setting. I reviewed with her that I do not think she will tolerate additional heart failure therapy, and did her persistently depressed left ventricular ejection fraction of 35% does put her at risk for sudden cardiac death. I do not think that we can improve that with revascularization. In relation to that, I did recommend consideration of an implantable defibrillator. She and her daughter review with me that she in the hospital previously had a DO NOT RESUSCITATE order, which I have added to her problem list in the medical record. She also is struggling quite a bit with her  who seems quite functionally dependent. She feels as though the pacemaker that was implanted in him with what sounds to have been a bradycardic indication is keeping him alive. I reviewed with her again the risk of sudden cardiac death from ventricular fibrillation or tachycardia. We will defer implantation of a defibrillator course for now.     Rah Anderson MD  Cardiologist, [facility]

## 2017-06-06 NOTE — Clinical Note
Name:          Daya Woods   YOB: 1940  Date:     6/6/2017      Sourav Swift M.D.  [address] [address]     Rah Anderson MD  [address] [facility] [address]  [place] [address]  Phone: [phone]  Back Line: [phone]  Fax: [phone]  E-mail: [email or web address]   Dear Dr. Swift,    We had the pleasure of seeing your patient, Daya Woods, in Cardiology Clinic at [facility] today.    As you know, she is a 77-year-old woman with a history of prior MI and 5-vessel CABG in 2002 now with 2/5 grafts open by cardiac catheterization 8/26/16 (LIMA-diag, SVG-OM patent all others occluded), severe disease of her native LAD status post PCI with drug-eluting stents at that time, and an occluded native right coronary artery filling via collaterals. She has atrial fibrillation, hypertension, dyslipidemia, and systolic congestive heart failure with EF now depressed at 35% as well as at moderate mitral regurgitation.    She has no cardiovascular complaints today, and does seem to be tolerating fairly well lisinopril at an intermediate dose of 10 mg by mouth twice a day recently increased from the primary care setting. I reviewed with her that I do not think she will tolerate additional heart failure therapy, and did her persistently depressed left ventricular ejection fraction of 35% does put her at risk for sudden cardiac death. I do not think that we can improve that with revascularization. In relation to that, I did recommend consideration of an implantable defibrillator. She and her daughter review with me that she in the hospital previously had a DO NOT RESUSCITATE order, which I have added to her problem list in the medical record. She also is struggling quite a bit with her  who seems quite functionally dependent. She feels as though the pacemaker that was implanted in him with what sounds to have been a bradycardic indication is keeping him alive. I reviewed  with her again the risk of sudden cardiac death from ventricular fibrillation or tachycardia. We will defer implantation of a defibrillator course for now.    We will have the patient follow-up in 6 months.    Thank you for the referral and please do not hesitate to contact me at any time. My contact information is listed above.    This note was dictated using Dragon speech recognition software.     A full note including my physical examination and a full list of rectified medications is available in our medical record, and can be faxed as well.    Rah Anderson MD  Cardiologist  [facility]

## 2017-06-06 NOTE — PROGRESS NOTES
Name:          Daya Woods   YOB: 1940  Date:     6/6/2017      Sourav Swift M.D.  [address] [facility] [address]     Rah Anderson MD  [address] [facility] [address]  [place] [address]  Phone: [phone]  Back Line: [phone]  Fax: [phone]  E-mail: [email or web address]   Dear Dr. Swift,    We had the pleasure of seeing your patient, Daya Woods, in Cardiology Clinic at [facility] today.    As you know, she is a 77-year-old woman with a history of prior MI and 5-vessel CABG in 2002 now with 2/5 grafts open by cardiac catheterization 8/26/16 (LIMA-diag, SVG-OM patent all others occluded), severe disease of her native LAD status post PCI with drug-eluting stents at that time, and an occluded native right coronary artery filling via collaterals. She has atrial fibrillation, hypertension, dyslipidemia, and systolic congestive heart failure with EF now depressed at 35% as well as at moderate mitral regurgitation.    She has no cardiovascular complaints today, and does seem to be tolerating fairly well lisinopril at an intermediate dose of 10 mg by mouth twice a day recently increased from the primary care setting. I reviewed with her that I do not think she will tolerate additional heart failure therapy, and did her persistently depressed left ventricular ejection fraction of 35% does put her at risk for sudden cardiac death. I do not think that we can improve that with revascularization. In relation to that, I did recommend consideration of an implantable defibrillator. She and her daughter review with me that she in the hospital previously had a DO NOT RESUSCITATE order, which I have added to her problem list in the medical record. She also is struggling quite a bit with her  who seems quite functionally dependent. She feels as though the pacemaker that was implanted in him with what sounds to have been a bradycardic indication is keeping him alive. I reviewed  with her again the risk of sudden cardiac death from ventricular fibrillation or tachycardia. We will defer implantation of a defibrillator course for now.    We will have the patient follow-up in 6 months.    Thank you for the referral and please do not hesitate to contact me at any time. My contact information is listed above.    This note was dictated using Dragon speech recognition software.     A full note including my physical examination and a full list of rectified medications is available in our medical record, and can be faxed as well.    Rah Anderson MD  Cardiologist  [facility]

## 2017-10-19 ENCOUNTER — PATIENT MESSAGE (OUTPATIENT)
Dept: HEALTH INFORMATION MANAGEMENT | Facility: OTHER | Age: 77
End: 2017-10-19

## 2017-12-05 ENCOUNTER — OFFICE VISIT (OUTPATIENT)
Dept: CARDIOLOGY | Facility: MEDICAL CENTER | Age: 77
End: 2017-12-05
Payer: MEDICARE

## 2017-12-05 VITALS
WEIGHT: 119 LBS | DIASTOLIC BLOOD PRESSURE: 80 MMHG | SYSTOLIC BLOOD PRESSURE: 150 MMHG | HEART RATE: 64 BPM | OXYGEN SATURATION: 97 % | HEIGHT: 64 IN | BODY MASS INDEX: 20.32 KG/M2

## 2017-12-05 DIAGNOSIS — E78.5 DYSLIPIDEMIA: ICD-10-CM

## 2017-12-05 DIAGNOSIS — I50.20 SYSTOLIC CHF WITH REDUCED LEFT VENTRICULAR FUNCTION, NYHA CLASS 2 (HCC): Primary | ICD-10-CM

## 2017-12-05 DIAGNOSIS — I48.20 CHRONIC ATRIAL FIBRILLATION (HCC): ICD-10-CM

## 2017-12-05 DIAGNOSIS — I10 ESSENTIAL HYPERTENSION: ICD-10-CM

## 2017-12-05 DIAGNOSIS — I25.810 CORONARY ARTERY DISEASE INVOLVING CORONARY BYPASS GRAFT OF NATIVE HEART WITHOUT ANGINA PECTORIS: ICD-10-CM

## 2017-12-05 DIAGNOSIS — Z79.01 CHRONIC ANTICOAGULATION: ICD-10-CM

## 2017-12-05 DIAGNOSIS — R04.0 EPISTAXIS: ICD-10-CM

## 2017-12-05 DIAGNOSIS — I25.2 MI, OLD: Chronic | ICD-10-CM

## 2017-12-05 PROCEDURE — 99214 OFFICE O/P EST MOD 30 MIN: CPT | Performed by: INTERNAL MEDICINE

## 2017-12-05 RX ORDER — SPIRONOLACTONE 25 MG/1
25 TABLET ORAL DAILY
Qty: 90 TAB | Refills: 3 | Status: SHIPPED | OUTPATIENT
Start: 2017-12-05 | End: 2018-10-05

## 2017-12-05 NOTE — LETTER
Name:          Daya Woods   YOB: 1940  Date:     12/5/2017      Sourav Swift M.D.  [address] [facility] [address]     Rah Anderson MD  [address] [facility] [address]  [place] [address]  Phone: [phone]  Back Line: [phone]  Fax: [phone]  E-mail: [email or web address]   Dear Dr. Swift,    We had the pleasure of seeing your patient, Daya Woods, in Cardiology Clinic at [facility] today.    As you know, she is a 77-year-old woman with a history of prior MI and 5-vessel CABG in 2002 now with 2/5 grafts open by cardiac catheterization 8/26/16 (LIMA-diag, SVG-OM patent all others occluded), severe disease of her native LAD status post PCI with drug-eluting stents at that time, and an occluded native right coronary artery filling via collaterals. She has atrial fibrillation, hypertension, dyslipidemia, and systolic congestive heart failure with EF 35%, moderate mitral regurgitation and chronic atrial fibrillation.    She is overall doing well today. I do think she has additional room to titrate her heart failure therapy though I have only made the small step of increasing her spironolactone from 12.5 mg by mouth daily up to 25 mg by mouth daily. She is euvolemic on physical examination. Again, her other heart failure therapy includes lisinopril 10 mg by mouth twice a day, and Coreg 12.5 mg by mouth twice a day. She is on atorvastatin for her lipids. She is on warfarin, and Plavix in light of her previous stents though they are older than one year out. I have not change that, but may switch her to warfarin and aspirin in the future. After further consideration she continues to generally be against a defibrillator even if needed  including after review of the potential for fatal ventricular arrhythmias. I will plan to repeat her echocardiogram probably in 6 months. At that time I will likely change her Plavix to aspirin as well.    Return in about 6 months (around  6/5/2018).    Thank you for the referral and please do not hesitate to contact me at any time. My contact information is listed above.    This note was dictated using Dragon speech recognition software.     A full note including my physical examination and a full list of rectified medications is available in our medical record, and can be faxed as well.    Rah Anderson MD  Cardiologist  [facility]

## 2017-12-06 ASSESSMENT — ENCOUNTER SYMPTOMS
CARDIOVASCULAR NEGATIVE: 1
HEADACHES: 1
SHORTNESS OF BREATH: 1
BACK PAIN: 1

## 2017-12-06 NOTE — PROGRESS NOTES
Subjective:   Daya Woods is a 77 -year-old woman with a history of prior MI and 5-vessel CABG in 2002 now with 2/5 grafts open by cardiac catheterization 8/26/16 (LIMA-diag, SVG-OM patent all others occluded), severe disease of her native LAD status post PCI with drug-eluting stents at that time, and an occluded native right coronary artery filling via collaterals. She has atrial fibrillation, hypertension, dyslipidemia, and systolic congestive heart failure with EF 35%, moderate mitral regurgitation and chronic atrial fibrillation.    She is doing well today with no cardiovascular complaints, stable on her medical regimen.    She comes in with her daughter as usual. We reviewed again her 's steady decline with dementia and the fact that he previously had a pacemaker placed, which significantly impacts her decision as to whether to pursue a defibrillator. She still has quite a bit of emotional stress related to his health decline.    She tells me that at home her systolic blood pressures are typically in the one teens to 120s mmHg on her heart failure therapy regimen as below.    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 Cardiac catheterization 12/2009  LIMA to D3 patent. Saphenous vein graft to the distal left  "anterior descending is occluded. Saphenous vein graft to D1 is occluded. Saphenous vein graft to obtuse marginal patent with distal to graft stenosis  Saphenous vein graft to right ventricular free wall branch patent filling Distal right coronary ar   • Stress 1/12/2012   • Unstable angina (CMS-HCC) 2/24/2014     Past Surgical History:   Procedure Laterality Date   • APPENDECTOMY     • MULTIPLE CORONARY ARTERY BYPASS      2009 CABG x 5   • OTHER Bilateral 08/2014    cataract extraction with iols   • RECOVERY  8/26/2016    Procedure: CATH LAB-DEVON W/LHC W/MARLEY-JO-ANN/GRINSELL-LARGE GROUP;  Surgeon: Recoveryonly Surgery;  Location: SURGERY PRE-POST PROC UNIT [facility] [place];  Service:    • TONSILLECTOMY AND ADENOIDECTOMY     • TUBAL LIGATION       Family History   Problem Relation Age of Onset   • Heart Attack Brother 55     heart attack     History   Smoking Status   • Former Smoker   • Packs/day: 0.50   • Years: 40.00   • Quit date: 2/24/2002   Smokeless Tobacco   • Never Used     Allergies   Allergen Reactions   • Codeine Unspecified     \"I just don't like it. It does weird things to me.\"   • Hydrochlorothiazide Unspecified     Unsure of reaction   • Isosorbide Mononitrate Rash     Rash   • Keflex Rash     Rash   • Sulfa Drugs Rash     rash   • Tape Unspecified     Tears skin off   • Xarelto [Rivaroxaban] Rash     rash     Outpatient Encounter Prescriptions as of 12/5/2017   Medication Sig Dispense Refill   • spironolactone (ALDACTONE) 25 MG Tab Take 1 Tab by mouth every day. 90 Tab 3   • lisinopril (PRINIVIL) 10 MG Tab Take 1 Tab by mouth 2 times a day. 180 Tab 3   • carvedilol (COREG) 25 MG Tab Take 0.5 Tabs by mouth 2 times a day, with meals. 180 Tab 3   • atorvastatin (LIPITOR) 40 MG Tab Take 1 Tab by mouth every day. 30 Tab 11   • levothyroxine (SYNTHROID) 75 MCG Tab   0   • warfarin (COUMADIN) 5 MG Tab Per pt, pt takes 7 mg by mouth every Monday, Wednesday, and Friday.  5 mg rest of week.  0   • clopidogrel " "(PLAVIX) 75 MG TABS Take 1 Tab by mouth every day. 90 Tab 3   • furosemide (LASIX) 20 MG TABS Take 1 Tab by mouth every day. 90 Tab 3   • [DISCONTINUED] spironolactone (ALDACTONE) 25 MG Tab Take 0.5 Tabs by mouth every day. 45 Tab 3   • [DISCONTINUED] aspirin (ASA) 81 MG Chew Tab chewable tablet Take 1 Tab by mouth every day. (Patient not taking: Reported on 11/30/2016) 100 Tab 11     No facility-administered encounter medications on file as of 12/5/2017.      Review of Systems   Respiratory: Positive for shortness of breath (minimal, infrequent VILLEGAS).    Cardiovascular: Negative.    Musculoskeletal: Positive for back pain (prior steroid injections were not effective in treating her spinal stenosis).   Neurological: Positive for headaches.   All other systems reviewed and are negative.       Objective:   /80   Pulse 64   Ht 1.626 m (5' 4\")   Wt 54 kg (119 lb)   LMP  (LMP Unknown)   SpO2 97%   BMI 20.43 kg/m²     Physical Exam   Constitutional: She is oriented to person, place, and time. She appears well-developed and well-nourished. No distress.   Very pleasant elderly woman in no distress   HENT:   Head: Normocephalic and atraumatic.   Eyes: Conjunctivae and EOM are normal. Pupils are equal, round, and reactive to light. No scleral icterus.   Neck: Neck supple. No JVD present. No tracheal deviation present.   Cardiovascular: Normal rate, normal heart sounds and intact distal pulses.  An irregularly irregular rhythm present. Exam reveals no gallop and no friction rub.    No murmur heard.  Pulses:       Dorsalis pedis pulses are 2+ on the right side, and 2+ on the left side.   No carotid bruits. Prior healed sternotomy scar noted, unchanged.   Pulmonary/Chest: Effort normal and breath sounds normal. No stridor. No respiratory distress. She has no wheezes. She has no rales.   Abdominal: Soft. Bowel sounds are normal. She exhibits no distension.   Musculoskeletal: She exhibits no edema.   Neurological: She " is alert and oriented to person, place, and time.   Skin: Skin is warm and dry. No rash noted. She is not diaphoretic. No erythema. No pallor.   Psychiatric: She has a normal mood and affect. Judgment and thought content normal.   Vitals reviewed.    Lab Results   Component Value Date/Time    WBC 3.8 (L) 08/27/2016 04:45 AM    RBC 4.19 (L) 08/27/2016 04:45 AM    HEMOGLOBIN 13.6 08/27/2016 04:45 AM    HEMATOCRIT 41.1 08/27/2016 04:45 AM    MCV 98.1 (H) 08/27/2016 04:45 AM    MCH 32.5 08/27/2016 04:45 AM    MCHC 33.1 (L) 08/27/2016 04:45 AM    MPV 10.0 08/27/2016 04:45 AM        Lab Results   Component Value Date/Time    SODIUM 132 (L) 08/27/2016 04:45 AM    POTASSIUM 3.4 (L) 08/27/2016 04:45 AM    CHLORIDE 100 08/27/2016 04:45 AM    CO2 24 08/27/2016 04:45 AM    GLUCOSE 123 (H) 08/27/2016 04:45 AM    BUN 9 08/27/2016 04:45 AM    CREATININE 0.68 08/27/2016 04:45 AM        Lab Results   Component Value Date/Time    ASTSGOT 50 (H) 02/24/2014 03:05 AM    ALTSGPT 55 (H) 02/24/2014 03:05 AM        Lab Results   Component Value Date/Time    CHOLSTRLTOT 137 02/24/2014 03:05 AM    LDL 67 02/24/2014 03:05 AM    HDL 55 02/24/2014 03:05 AM    TRIGLYCERIDE 74 02/24/2014 03:05 AM       Labs, 5/3/2016.   CBC: WBC 4.7, hemoglobin 14.7, platelets 186  Chemistry panel: Sodium 132, potassium 4.6, chloride 97, CO2 30, BUN 8, creatinine 0.8, glucose 88, calcium 9.3, total protein 6.7, albumin 4.1, AST 28, ALT 22, alkaline phosphatase 70, total bilirubin 0.7.  Lipid panel: LDL 67, HDL 56, triglycerides 65, total cholesterol 131  TSH 1.97    Labs, 5/9/2017  CBC: To be BC 2.4, hemoglobin 14.0, platelets 173  Chemistry panel: Sodium 132, potassium 4.6, chloride 98, CO2 28, BUN 8, creatinine 0.9, glucose 89, calcium 8.9  LFTs: AST 36, ALT 38, alkaline phosphatase 61, albumin 4.3, total protein 6.5, globulins 2.2, total bilirubin 0.7  Lipids: LDL 45, HDL 58, triglycerides 52, total cholesterol 111  TSH: 1.31    Echocardiogram,  "4/4/2017:  \"CONCLUSIONS  Moderately reduced left ventricular systolic function.  Akinetic apex with apical aneurysm.  Moderate mitral regurgitation.  Compared to the images of the prior study done  7/27/16, ejection   fraction is unchanged.  Apical aneurysm is now present\"    Assessment:     1. Systolic CHF with reduced left ventricular function, NYHA class 2 (CMS-HCC)  spironolactone (ALDACTONE) 25 MG Tab   2. Essential hypertension  spironolactone (ALDACTONE) 25 MG Tab   3. Coronary artery disease involving coronary bypass graft of native heart without angina pectoris  spironolactone (ALDACTONE) 25 MG Tab   4. Chronic atrial fibrillation (CMS-HCC)  spironolactone (ALDACTONE) 25 MG Tab   5. Dyslipidemia     6. Chronic anticoagulation     7. Epistaxis     8. MI, old         Medical Decision Making:  Today's Assessment / Status / Plan:     She is overall doing well today. I do think she has additional room to titrate her heart failure therapy though I have only made the small step of increasing her spironolactone from 12.5 mg by mouth daily up to 25 mg by mouth daily. She is euvolemic on physical examination. Again, her other heart failure therapy includes lisinopril 10 mg by mouth twice a day, and Coreg 12.5 mg by mouth twice a day. She is on atorvastatin for her lipids. She is on warfarin, and Plavix in light of her previous stents though they are older than one year out. I have not change that, but may switch her to warfarin and aspirin in the future. After further consideration she continues to generally be against a defibrillator even if needed  including after review of the potential for fatal ventricular arrhythmias. I will plan to repeat her echocardiogram probably in 6 months. At that time I will likely change her Plavix to aspirin as well.    Rah Anderson MD  Cardiologist, [facility] [place]     Return in about 6 months (around 6/5/2018).    "

## 2018-03-19 ENCOUNTER — TELEPHONE (OUTPATIENT)
Dept: CARDIOLOGY | Facility: MEDICAL CENTER | Age: 78
End: 2018-03-19

## 2018-03-19 NOTE — TELEPHONE ENCOUNTER
----- Message from Carl Ayon, Med Ass't sent at 3/19/2018  1:35 PM PDT -----  Regarding: Patient Symptoms   ANT Lay pt is having chest heaviness with a history of stents. She did want to let IA know and to see if she should be watching out for anything or what she should do. I did schedule her for the soonest avail with him as she did not wish to sched with APN right now.     Thanks,  Carl [phone]    ===================================================================    Called pt, pt reports her BP last 2 weeks ranges between 140-167/90s, HR 80s, pt reports she's been having chest heaviness and pressure for the last 2 wks also, she called our office to schedule an appt with Dr Rah Anderson but no availability until 4/10/18, she is refusing to see APRN also, instructed pt to go ER at this time to be evaluated, pt agreed and verbalizes understanding     FYI to

## 2018-03-21 NOTE — TELEPHONE ENCOUNTER
Called pt, pt reports she did went to ER at [facility], per pt all test came back negative, they've adjusted her meds and was discharged, pt reports they've increased her Lisinopril from 10mg to 20mg, and she was started on Isosorbide 30mg. Pt reports BP been controlled w/ following BP readings:    133/66  104/51    Instructed pt to continue monitoring BP and report it back to us in 2 weeks, pt verbalizes understanding    Records from ER visit requested from [facility]     MARCOS to

## 2018-03-21 NOTE — TELEPHONE ENCOUNTER
Let's call the patient back to follow-up her trip to the emergency room. For blood pressures continue to be high, let's increase her Coreg to 25 mg by mouth twice a day and have her send us an additional blood pressures in 2 weeks.    Thanks so much!    MAXX MCLAUGHLIN

## 2018-04-10 ENCOUNTER — OFFICE VISIT (OUTPATIENT)
Dept: CARDIOLOGY | Facility: MEDICAL CENTER | Age: 78
End: 2018-04-10
Payer: MEDICARE

## 2018-04-10 VITALS
DIASTOLIC BLOOD PRESSURE: 82 MMHG | WEIGHT: 118 LBS | OXYGEN SATURATION: 95 % | SYSTOLIC BLOOD PRESSURE: 126 MMHG | HEART RATE: 54 BPM | HEIGHT: 64 IN | BODY MASS INDEX: 20.14 KG/M2

## 2018-04-10 DIAGNOSIS — Z79.01 CHRONIC ANTICOAGULATION: ICD-10-CM

## 2018-04-10 DIAGNOSIS — I50.20 SYSTOLIC CHF WITH REDUCED LEFT VENTRICULAR FUNCTION, NYHA CLASS 2 (HCC): ICD-10-CM

## 2018-04-10 DIAGNOSIS — I10 ESSENTIAL HYPERTENSION: ICD-10-CM

## 2018-04-10 DIAGNOSIS — Z95.1 STATUS POST CORONARY ARTERY BYPASS GRAFTS X 5: ICD-10-CM

## 2018-04-10 DIAGNOSIS — I25.810 CORONARY ARTERY DISEASE INVOLVING CORONARY BYPASS GRAFT OF NATIVE HEART WITHOUT ANGINA PECTORIS: Primary | ICD-10-CM

## 2018-04-10 PROCEDURE — 99214 OFFICE O/P EST MOD 30 MIN: CPT | Performed by: INTERNAL MEDICINE

## 2018-04-10 RX ORDER — LISINOPRIL 20 MG/1
20 TABLET ORAL 2 TIMES DAILY
Qty: 180 TAB | Refills: 3 | Status: SHIPPED | OUTPATIENT
Start: 2018-04-10 | End: 2018-07-17 | Stop reason: SDUPTHER

## 2018-04-10 RX ORDER — NITROGLYCERIN 0.4 MG/1
0.4 TABLET SUBLINGUAL PRN
Qty: 25 TAB | Refills: 11 | Status: ON HOLD | OUTPATIENT
Start: 2018-04-10 | End: 2018-10-15

## 2018-04-10 RX ORDER — ISOSORBIDE MONONITRATE 30 MG/1
30 TABLET, EXTENDED RELEASE ORAL EVERY MORNING
COMMUNITY
End: 2018-04-10

## 2018-04-10 ASSESSMENT — ENCOUNTER SYMPTOMS
SHORTNESS OF BREATH: 1
BACK PAIN: 1
HEADACHES: 1
CARDIOVASCULAR NEGATIVE: 1

## 2018-04-10 NOTE — PROGRESS NOTES
Subjective:   Daya Woods is a 77 -year-old woman with a history of prior MI and 5-vessel CABG in 2002 now with 2/5 grafts open by cardiac catheterization 8/26/16 (LIMA-diag, SVG-OM patent all others occluded), severe disease of her native LAD status post PCI with drug-eluting stents at that time, and an occluded native right coronary artery filling via collaterals. She has atrial fibrillation, hypertension, dyslipidemia, and systolic congestive heart failure with EF 35%, moderate mitral regurgitation and chronic atrial fibrillation.    She is doing much better and comes in the aftermath of recent hospitalization when she developed chest discomfort. She was ruled out for acute coronary syndrome with serial EKGs and biomarkers at [facility]. She was after discussing her case over the telephone with those increased in her dose of lisinopril from 10 mg by mouth daily up to 20 mg by mouth twice a day. In the setting of that, she has had resolution of her chest discomfort. She was also on a long-acting nitrate, but had drops in her blood pressure with some dizziness related to which she stopped her Imdur.    She has no cardiovascular complaints and minimal to no dyspnea that she notices with her current level of physical activity. She comes in with her daughter as usual, both are very pleasant.    She brings in her blood pressures, which are scanned into media. Again, the increase in her lisinopril her blood pressure has been in the 90s-110s mmHg range. She is not orthostatic with blood pressures in that range.    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 Cardiac catheterization 12/2009  LIMA to D3 patent. Saphenous vein graft to the distal left anterior descending is occluded. Saphenous vein graft to D1 is occluded. Saphenous vein graft to obtuse marginal patent with distal to graft stenosis  Saphenous vein graft to right ventricular free wall branch patent filling Distal right coronary ar   • Stress 1/12/2012   • Unstable angina (CMS-HCC) 2/24/2014     Past Surgical History:   Procedure Laterality Date   • APPENDECTOMY     • MULTIPLE CORONARY ARTERY BYPASS      2009 CABG x 5   • OTHER Bilateral 08/2014    cataract extraction with iols   • RECOVERY  8/26/2016    Procedure: CATH LAB-DEVNO W/LHC W/MARLEY-JO-ANN/GRINSELL-LARGE GROUP;  Surgeon: Recoveryonly Surgery;  Location: SURGERY PRE-POST PROC UNIT [facility] [place];  Service:    • TONSILLECTOMY AND ADENOIDECTOMY     • TUBAL LIGATION       Family History   Problem Relation Age of Onset   • Heart Attack Brother 55     heart attack     History   Smoking Status   • Former Smoker   • Packs/day: 0.50   • Years: 40.00   • Quit date: 2/24/2002   Smokeless Tobacco   • Never Used     Allergies   Allergen Reactions   • Codeine Unspecified     \"I just don't like it. It does weird things to me.\"   • Hydrochlorothiazide Unspecified     Unsure of reaction   • Isosorbide Mononitrate Rash     Rash   • Keflex Rash     Rash   • Sulfa Drugs Rash     rash   • Tape Unspecified     Tears skin off   • Xarelto [Rivaroxaban] Rash     rash     Outpatient Encounter Prescriptions as of 4/10/2018   Medication Sig Dispense Refill   • lisinopril (PRINIVIL) 20 MG Tab Take 1 Tab by mouth 2 times a day. 180 Tab 3   • " "nitroglycerin (NITROSTAT) 0.4 MG SL Tab Place 1 Tab under tongue as needed for Chest Pain. 25 Tab 11   • spironolactone (ALDACTONE) 25 MG Tab Take 1 Tab by mouth every day. 90 Tab 3   • carvedilol (COREG) 25 MG Tab Take 0.5 Tabs by mouth 2 times a day, with meals. 180 Tab 3   • atorvastatin (LIPITOR) 40 MG Tab Take 1 Tab by mouth every day. 30 Tab 11   • levothyroxine (SYNTHROID) 75 MCG Tab   0   • warfarin (COUMADIN) 5 MG Tab Per pt, pt takes 7 mg by mouth every Monday, Wednesday, and Friday.  5 mg rest of week.  0   • clopidogrel (PLAVIX) 75 MG TABS Take 1 Tab by mouth every day. 90 Tab 3   • furosemide (LASIX) 20 MG TABS Take 1 Tab by mouth every day. 90 Tab 3   • [DISCONTINUED] isosorbide mononitrate SR (IMDUR) 30 MG TABLET SR 24 HR Take 30 mg by mouth every morning.     • [DISCONTINUED] lisinopril (PRINIVIL) 10 MG Tab Take 1 Tab by mouth 2 times a day. (Patient taking differently: Take 20 mg by mouth 2 times a day.) 180 Tab 3     No facility-administered encounter medications on file as of 4/10/2018.      Review of Systems   Respiratory: Positive for shortness of breath (minimal, infrequent VILLEGAS).    Cardiovascular: Negative.    Musculoskeletal: Positive for back pain (prior steroid injections were not effective in treating her spinal stenosis).   Neurological: Positive for headaches.   All other systems reviewed and are negative.       Objective:   /82   Pulse (!) 54   Ht 1.626 m (5' 4\")   Wt 53.5 kg (118 lb)   LMP  (LMP Unknown)   SpO2 95%   BMI 20.25 kg/m²     Physical Exam   Constitutional: She is oriented to person, place, and time. She appears well-developed and well-nourished. No distress.   Very pleasant elderly woman in no distress   HENT:   Head: Normocephalic and atraumatic.   Eyes: Conjunctivae and EOM are normal. Pupils are equal, round, and reactive to light. No scleral icterus.   Neck: Neck supple. No JVD present. No tracheal deviation present.   Cardiovascular: Normal rate, normal " heart sounds and intact distal pulses.  An irregularly irregular rhythm present. Exam reveals no gallop and no friction rub.    No murmur heard.  Pulses:       Dorsalis pedis pulses are 2+ on the right side, and 2+ on the left side.   No carotid bruits. Prior healed sternotomy scar noted, unchanged.   Pulmonary/Chest: Effort normal and breath sounds normal. No stridor. No respiratory distress. She has no wheezes. She has no rales.   Abdominal: Soft. Bowel sounds are normal. She exhibits no distension.   Musculoskeletal: She exhibits no edema.   Neurological: She is alert and oriented to person, place, and time.   Skin: Skin is warm and dry. No rash noted. She is not diaphoretic. No erythema. No pallor.   Psychiatric: She has a normal mood and affect. Judgment and thought content normal.   Vitals reviewed.    Lab Results   Component Value Date/Time    WBC 3.8 (L) 08/27/2016 04:45 AM    RBC 4.19 (L) 08/27/2016 04:45 AM    HEMOGLOBIN 13.6 08/27/2016 04:45 AM    HEMATOCRIT 41.1 08/27/2016 04:45 AM    MCV 98.1 (H) 08/27/2016 04:45 AM    MCH 32.5 08/27/2016 04:45 AM    MCHC 33.1 (L) 08/27/2016 04:45 AM    MPV 10.0 08/27/2016 04:45 AM        Lab Results   Component Value Date/Time    SODIUM 132 (L) 08/27/2016 04:45 AM    POTASSIUM 3.4 (L) 08/27/2016 04:45 AM    CHLORIDE 100 08/27/2016 04:45 AM    CO2 24 08/27/2016 04:45 AM    GLUCOSE 123 (H) 08/27/2016 04:45 AM    BUN 9 08/27/2016 04:45 AM    CREATININE 0.68 08/27/2016 04:45 AM        Lab Results   Component Value Date/Time    ASTSGOT 50 (H) 02/24/2014 03:05 AM    ALTSGPT 55 (H) 02/24/2014 03:05 AM        Lab Results   Component Value Date/Time    CHOLSTRLTOT 137 02/24/2014 03:05 AM    LDL 67 02/24/2014 03:05 AM    HDL 55 02/24/2014 03:05 AM    TRIGLYCERIDE 74 02/24/2014 03:05 AM       Labs, 5/3/2016.   CBC: WBC 4.7, hemoglobin 14.7, platelets 186  Chemistry panel: Sodium 132, potassium 4.6, chloride 97, CO2 30, BUN 8, creatinine 0.8, glucose 88, calcium 9.3, total protein  "6.7, albumin 4.1, AST 28, ALT 22, alkaline phosphatase 70, total bilirubin 0.7.  Lipid panel: LDL 67, HDL 56, triglycerides 65, total cholesterol 131  TSH 1.97    Labs, 5/9/2017  CBC: WBC 2.4, hemoglobin 14.0, platelets 173  Chemistry panel: Sodium 132, potassium 4.6, chloride 98, CO2 28, BUN 8, creatinine 0.9, glucose 89, calcium 8.9  LFTs: AST 36, ALT 38, alkaline phosphatase 61, albumin 4.3, total protein 6.5, globulins 2.2, total bilirubin 0.7  Lipids: LDL 45, HDL 58, triglycerides 52, total cholesterol 111  TSH: 1.31    Echocardiogram, 4/4/2017:  \"CONCLUSIONS  Moderately reduced left ventricular systolic function.  Akinetic apex with apical aneurysm.  Moderate mitral regurgitation.  Compared to the images of the prior study done  7/27/16, ejection   fraction is unchanged.  Apical aneurysm is now present\"    Assessment:     1. Coronary artery disease involving coronary bypass graft of native heart without angina pectoris  nitroglycerin (NITROSTAT) 0.4 MG SL Tab   2. Status post coronary artery bypass grafts x 5     3. Essential hypertension     4. Systolic CHF with reduced left ventricular function, NYHA class 2 (CMS-[facility])  lisinopril (PRINIVIL) 20 MG Tab    Echocardiogram Comp w/o Cont   5. Chronic anticoagulation         Medical Decision Making:  Today's Assessment / Status / Plan:     She is improved today and has no chest discomfort consistent with angina after up titration of her antihypertensive regimen specifically increasing her lisinopril from 10 mg by mouth daily up to 20 mg by mouth twice a day. Her blood pressures are borderline, but she is tolerating that without orthostasis. Her other heart failure therapy includes Coreg 12.5 mg by mouth twice a day, and spironolactone 25 mg by mouth daily. She continues atorvastatin, warfarin, and Plavix. Plavix had been restarted with concern for an unstable plaque when she had presented to the outside hospital with chest discomfort. There is no evidence that she " is having unstable angina, but rather anginal chest pain from poor collateral flow related to which I think we can probably stop her Plavix. I have ordered a repeat echocardiogram in 3 months time. If her ejection fraction is improved or unchanged I will plan to stop her Plavix continuing only warfarin at that time.    We discussed again her risk for ventricular arrhythmias in the setting of her ischemic cardiomyopathy and systolic congestive heart failure. In conjunction with her decision to be DO NOT RESUSCITATE status she continues to be unchanged in her opinion to defer a defibrillator.    In light of her resolved angina, and the fact that she would be a very difficult revascularization candidate and I do not think she needs myocardial perfusion imaging at this time.    Rah Anderson MD  Cardiologist, [facility] [place]     Return in about 6 months (around 10/10/2018) for 3 months with NP, 6 months with me.    Physical Exam   Constitutional: She is oriented to person, place, and time. She appears well-developed and well-nourished. No distress.   Very pleasant elderly woman in no distress   HENT:   Head: Normocephalic and atraumatic.   Eyes: Conjunctivae and EOM are normal. Pupils are equal, round, and reactive to light. No scleral icterus.   Neck: Neck supple. No JVD present. No tracheal deviation present.   Cardiovascular: Normal rate, normal heart sounds and intact distal pulses.  An irregularly irregular rhythm present. Exam reveals no gallop and no friction rub.    No murmur heard.  Pulses:       Dorsalis pedis pulses are 2+ on the right side, and 2+ on the left side.   No carotid bruits. Prior healed sternotomy scar noted, unchanged.   Pulmonary/Chest: Effort normal and breath sounds normal. No stridor. No respiratory distress. She has no wheezes. She has no rales.   Abdominal: Soft. Bowel sounds are normal. She exhibits no distension.   Musculoskeletal: She exhibits no edema.    Neurological: She is alert and oriented to person, place, and time.   Skin: Skin is warm and dry. No rash noted. She is not diaphoretic. No erythema. No pallor.   Psychiatric: She has a normal mood and affect. Judgment and thought content normal.   Vitals reviewed.

## 2018-04-10 NOTE — LETTER
Name:          Daya Woods   YOB: 1940  Date:     04/10/2018      Sourav Swift M.D.  [address] [facility] [address]     Rah Anderson MD  [address] [facility] [address]  [place] [address]  Phone: [phone]  Back Line: [phone]  Fax: [phone]  E-mail: [email or web address]   Dear Dr. Swift,    We had the pleasure of seeing your patient, Daya Woods, in Cardiology Clinic at [facility] and Vascular today.    As you know, she is a 77-year-old woman with a history of prior MI and 5-vessel CABG in 2002 now with 2/5 grafts open by cardiac catheterization 8/26/16 (LIMA-diag, SVG-OM patent all others occluded), severe disease of her native LAD status post PCI with drug-eluting stents at that time, and an occluded native right coronary artery filling via collaterals. She has atrial fibrillation, hypertension, dyslipidemia, and systolic congestive heart failure with EF 35%, moderate mitral regurgitation and chronic atrial fibrillation.    She is improved today and has no chest discomfort consistent with angina after up titration of her antihypertensive regimen specifically increasing her lisinopril from 10 mg by mouth daily up to 20 mg by mouth twice a day. Her blood pressures are borderline, but she is tolerating that without orthostasis. Her other heart failure therapy includes Coreg 12.5 mg by mouth twice a day, and spironolactone 25 mg by mouth daily. She continues atorvastatin, warfarin, and Plavix. Plavix had been restarted with concern for an unstable plaque when she had presented to the outside hospital with chest discomfort. There is no evidence that she is having unstable angina, but rather anginal chest pain from poor collateral flow related to which I think we can probably stop her Plavix. I have ordered a repeat echocardiogram in 3 months time. If her ejection fraction is improved or unchanged I will plan to stop her Plavix continuing only warfarin at that time.    We  discussed again her risk for ventricular arrhythmias in the setting of her ischemic cardiomyopathy and systolic congestive heart failure. In conjunction with her decision to be DO NOT RESUSCITATE status she continues to be unchanged in her opinion to defer a defibrillator.    In light of her resolved angina, and the fact that she would be a very difficult revascularization candidate and I do not think she needs myocardial perfusion imaging at this time.    Return in about 6 months (around 10/10/2018) for 3 months with NP, 6 months with me.    Thank you for the referral and please do not hesitate to contact me at any time. My contact information is listed above.    This note was dictated using Dragon speech recognition software.     A full note including my physical examination and a full list of rectified medications is available in our medical record, and can be faxed as well.    Rah Anderson MD  Cardiologist  [facility]

## 2018-07-10 ENCOUNTER — HOSPITAL ENCOUNTER (OUTPATIENT)
Dept: CARDIOLOGY | Facility: MEDICAL CENTER | Age: 78
End: 2018-07-10
Attending: INTERNAL MEDICINE
Payer: MEDICARE

## 2018-07-10 DIAGNOSIS — I50.20 SYSTOLIC CHF WITH REDUCED LEFT VENTRICULAR FUNCTION, NYHA CLASS 2 (HCC): ICD-10-CM

## 2018-07-10 PROCEDURE — 93306 TTE W/DOPPLER COMPLETE: CPT

## 2018-07-12 LAB
LV EJECT FRACT  99904: 40
LV EJECT FRACT MOD 2C 99903: 40.32
LV EJECT FRACT MOD 4C 99902: 41.31
LV EJECT FRACT MOD BP 99901: 40.36

## 2018-07-16 ENCOUNTER — TELEPHONE (OUTPATIENT)
Dept: CARDIOLOGY | Facility: MEDICAL CENTER | Age: 78
End: 2018-07-16

## 2018-07-16 NOTE — TELEPHONE ENCOUNTER
----- Message from Rah Anderson M.D. sent at 7/16/2018 10:33 AM PDT -----  Mitral valve is now severely leaky.  This may improve with changes to her medications.  Certainly, this will need to be monitored.    IBA MD    =======================================================================    Called pt, s/w daughter (Lavinia), discussed Echo per  and his recommendations, daughter verbalizes understanding, pt have appt w/ AB tomorrow at 1040am they will discussed it further with her

## 2018-07-17 ENCOUNTER — TELEPHONE (OUTPATIENT)
Dept: CARDIOLOGY | Facility: MEDICAL CENTER | Age: 78
End: 2018-07-17

## 2018-07-17 ENCOUNTER — OFFICE VISIT (OUTPATIENT)
Dept: CARDIOLOGY | Facility: MEDICAL CENTER | Age: 78
End: 2018-07-17
Payer: MEDICARE

## 2018-07-17 VITALS
BODY MASS INDEX: 19.63 KG/M2 | OXYGEN SATURATION: 95 % | DIASTOLIC BLOOD PRESSURE: 72 MMHG | HEIGHT: 64 IN | WEIGHT: 115 LBS | SYSTOLIC BLOOD PRESSURE: 130 MMHG | HEART RATE: 88 BPM

## 2018-07-17 DIAGNOSIS — I25.810 CORONARY ARTERY DISEASE INVOLVING CORONARY BYPASS GRAFT OF NATIVE HEART WITHOUT ANGINA PECTORIS: ICD-10-CM

## 2018-07-17 DIAGNOSIS — Z95.1 STATUS POST CORONARY ARTERY BYPASS GRAFTS X 5: ICD-10-CM

## 2018-07-17 DIAGNOSIS — I50.20 SYSTOLIC CHF WITH REDUCED LEFT VENTRICULAR FUNCTION, NYHA CLASS 2 (HCC): ICD-10-CM

## 2018-07-17 DIAGNOSIS — I34.0 MITRAL VALVE INSUFFICIENCY, UNSPECIFIED ETIOLOGY: ICD-10-CM

## 2018-07-17 DIAGNOSIS — I10 ESSENTIAL HYPERTENSION: ICD-10-CM

## 2018-07-17 DIAGNOSIS — I25.5 ISCHEMIC CARDIOMYOPATHY: ICD-10-CM

## 2018-07-17 DIAGNOSIS — E78.5 DYSLIPIDEMIA: ICD-10-CM

## 2018-07-17 PROCEDURE — 99214 OFFICE O/P EST MOD 30 MIN: CPT | Performed by: NURSE PRACTITIONER

## 2018-07-17 RX ORDER — LISINOPRIL 20 MG/1
10 TABLET ORAL 2 TIMES DAILY
Qty: 30 TAB | Refills: 11 | Status: ON HOLD
Start: 2018-07-17 | End: 2018-10-15

## 2018-07-17 ASSESSMENT — ENCOUNTER SYMPTOMS
HEADACHES: 0
CHILLS: 0
LOSS OF CONSCIOUSNESS: 0
ORTHOPNEA: 0
SHORTNESS OF BREATH: 0
MYALGIAS: 0
ABDOMINAL PAIN: 0
PND: 0
DIZZINESS: 0
FEVER: 0
BRUISES/BLEEDS EASILY: 0
PALPITATIONS: 0

## 2018-07-17 NOTE — LETTER
[facility]   [address]   [address]   [place] [address]  Phone: [phone]  Fax: [phone]              Daya Woods  1940    Encounter Date: 7/17/2018    PHILLIP Hooper          PROGRESS NOTE:  Chief Complaint   Patient presents with   • Follow-Up   • Coronary Artery Disease   • Cardiomyopathy (Ischemic)   • CHF (Compensated)       Subjective:   Daya Woods is a 78 y.o. female who presents today for three month follow-up of CAD/ischemic cardiomyopathy, MR, chronic atrial fibrillation/chronic anticoagulation, hypertension and hyperlipidemia.    Daya is 78 year old female with history of CAD, status post remote CABG x 5 in 2002, with coronary angiogram in August 2016 showing 2/5 grafts patent (LIMA-D3 and SVG-OM), LVEF 35-40%, chronic atrial fibrillation, anticoagulation with Coumadin, hypertension and hyperlipidemia, normally followed by Dr. DANO Anderson, and last seen in April 2018.    At that time, her medications were titrated up, but she was not able to tolerate higher doses of Coreg or Lisinopril; she got very dizzy, lightheaded and fatigue, and BP dropped to 70-80 systolic. She is back to original doses, and feeling better.    She does have a lot of stress at home, taking care of her , who had a stroke in 2016, with limited mobility and mentation. Physically, she is stable: no chest pain, pressure or discomfort; no palpitations; no orthopnea or PND; no dizziness or syncope; no edema at all. She does get a little short of breath with moderate exertion. Weight is stable. BP has been stable since back on original doses of medications. She is tired a lot, because she doesn't sleep well.    Past Medical History:   Diagnosis Date   • Anesthesia     ponv   • Cataract     Bilateral IOL   • Chronic anticoagulation    • Chronic atrial fibrillation (HCC)     Managed with rate control and warfarin for  anticoagulation    • Coronary atherosclerosis of native coronary artery 12/2002    CABG x 5 (LIMA-D3, SVG-OM, SVG-RCA, SVG-distal LAD, SVG-D1). August 2016: LIMA-D3 and SVG-OM patent (interval occlusion of the SVG-RCA); chronic occluded SVG-LAD, SVG-D1.   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 Global hypokinesis. Grade III diastolic dysfunction.   • MI, old 2002   • Mitral regurgitation 07/2018    Echocardiogram with severe MR.   • Osteoarthritis    • Pain     right knee, back   • Raynaud's disease    • Spinal stenosis 8/20/2015   • Status post coronary artery bypass grafts x 5 08/2002    LIMA-D3, SVG-LAD, SVG-OM, SVG-RCA, SVG-distal LAD.    • Stress 1/12/2012   • Unstable angina (HCC) 2/24/2014     Past Surgical History:   Procedure Laterality Date   • RECOVERY  8/26/2016    Procedure: CATH LAB-DEVON W/LHC W/ENRIQUE/GRINSELL-LARGE GROUP;  Surgeon: Recoveryonly Surgery;  [place] [facility] [place]  [place]:    • OTHER Bilateral 08/2014    cataract extraction with iols   • APPENDECTOMY     • MULTIPLE CORONARY ARTERY BYPASS      2009 CABG x 5   • TONSILLECTOMY AND ADENOIDECTOMY     • TUBAL LIGATION       Family History   Problem Relation Age of Onset   • Heart Attack Brother 55     heart attack     Social History     Social History   • Marital status:      Spouse name: N/A   • Number of children: N/A   • Years of education: N/A     Occupational History   • Not on file.     Social History Main Topics   • Smoking status: Former Smoker     Packs/day: 0.50     Years: 40.00     Quit date: 2/24/2002   • Smokeless tobacco: Never Used   • Alcohol use Yes      Comment: varies   • Drug use: No   • Sexual activity: Not on file     Other Topics Concern   • Not on file     Social History Narrative   • No narrative on file  "    Allergies   Allergen Reactions   • Codeine Unspecified     \"I just don't like it. It does weird things to me.\"   • Hydrochlorothiazide Unspecified     Unsure of reaction   • Isosorbide Mononitrate Rash     Rash   • Keflex Rash     Rash   • Sulfa Drugs Rash     rash   • Tape Unspecified     Tears skin off   • Xarelto [Rivaroxaban] Rash     rash     Outpatient Encounter Prescriptions as of 7/17/2018   Medication Sig Dispense Refill   • lisinopril (PRINIVIL) 20 MG Tab Take 0.5 Tabs by mouth 2 times a day. 30 Tab 11   • nitroglycerin (NITROSTAT) 0.4 MG SL Tab Place 1 Tab under tongue as needed for Chest Pain. 25 Tab 11   • spironolactone (ALDACTONE) 25 MG Tab Take 1 Tab by mouth every day. 90 Tab 3   • carvedilol (COREG) 25 MG Tab Take 0.5 Tabs by mouth 2 times a day, with meals. 180 Tab 3   • atorvastatin (LIPITOR) 40 MG Tab Take 1 Tab by mouth every day. 30 Tab 11   • levothyroxine (SYNTHROID) 75 MCG Tab   0   • warfarin (COUMADIN) 5 MG Tab Per pt, pt takes 7 mg by mouth every Monday, Wednesday, and Friday.  5 mg rest of week.  0   • clopidogrel (PLAVIX) 75 MG TABS Take 1 Tab by mouth every day. 90 Tab 3   • furosemide (LASIX) 20 MG TABS Take 1 Tab by mouth every day. 90 Tab 3   • [DISCONTINUED] lisinopril (PRINIVIL) 20 MG Tab Take 1 Tab by mouth 2 times a day. (Patient taking differently: Take 10 mg by mouth 2 times a day.) 180 Tab 3     No facility-administered encounter medications on file as of 7/17/2018.      Review of Systems   Constitutional: Positive for malaise/fatigue. Negative for chills and fever.   Respiratory: Negative for shortness of breath.    Cardiovascular: Negative for chest pain, palpitations, orthopnea, leg swelling and PND.   Gastrointestinal: Negative for abdominal pain.   Musculoskeletal: Negative for myalgias.   Neurological: Negative for dizziness, loss of consciousness and headaches.   Endo/Heme/Allergies: Does not bruise/bleed easily.        Objective:   /72   Pulse 88   Ht " "1.626 m (5' 4\")   Wt 52.2 kg (115 lb)   LMP  (LMP Unknown)   SpO2 95%   BMI 19.74 kg/m²      Physical Exam   Constitutional: She is oriented to person, place, and time. She appears well-developed and well-nourished.   Petite, thin (BMI 19.74)   HENT:   Head: Normocephalic.   Eyes: EOM are normal.   Neck: Normal range of motion. Neck supple. No JVD present.   Cardiovascular: Normal rate.  An irregularly irregular rhythm present.   Murmur heard.   Systolic murmur is present with a grade of 3/6   Murmur at LLSB.   Pulmonary/Chest: Effort normal and breath sounds normal. No respiratory distress. She has no wheezes. She has no rales.   Well healed sternotomy scar.   Abdominal: Soft. Bowel sounds are normal. She exhibits no distension. There is no tenderness.   Musculoskeletal: Normal range of motion. She exhibits no edema.   Neurological: She is alert and oriented to person, place, and time.   Skin: Skin is warm and dry. No rash noted.   Psychiatric: She has a normal mood and affect.     CONCLUSIONS OF ECHOCARDIOGRAM OF 7/10/2018 - REVIEWED WITH PATIENT:  Moderately reduced left ventricular systolic function.  Mild concentric left ventricular hypertrophy.  Severely dilated left atrium.  Severe mitral regurgitation.  Estimated right ventricular systolic pressure  is 30 mmHg.  Compared to the images of the prior study done on 04/04/17, mitral   regurgitation is slightly worse and now severe, LVEF is unchanged.    Lab Results   Component Value Date/Time    CHOLSTRLTOT 137 02/24/2014 03:05 AM    LDL 67 02/24/2014 03:05 AM    HDL 55 02/24/2014 03:05 AM    TRIGLYCERIDE 74 02/24/2014 03:05 AM       Lab Results   Component Value Date/Time    SODIUM 132 (L) 08/27/2016 04:45 AM    POTASSIUM 3.4 (L) 08/27/2016 04:45 AM    CHLORIDE 100 08/27/2016 04:45 AM    CO2 24 08/27/2016 04:45 AM    GLUCOSE 123 (H) 08/27/2016 04:45 AM    BUN 9 08/27/2016 04:45 AM    CREATININE 0.68 08/27/2016 04:45 AM     Lab Results   Component Value " Date/Time    ALKPHOSPHAT 63 02/24/2014 03:05 AM    ASTSGOT 50 (H) 02/24/2014 03:05 AM    ALTSGPT 55 (H) 02/24/2014 03:05 AM    TBILIRUBIN 0.4 02/24/2014 03:05 AM        Assessment:     1. Mitral valve insufficiency, unspecified etiology  REFERRAL TO CARDIOTHORACIC SURGERY   2. Coronary artery disease involving coronary bypass graft of native heart without angina pectoris     3. Status post coronary artery bypass grafts x 5     4. Ischemic cardiomyopathy     5. Systolic CHF with reduced left ventricular function, NYHA class 2 (HCC)  lisinopril (PRINIVIL) 20 MG Tab   6. Essential hypertension     7. Dyslipidemia         Medical Decision Making:  Today's Assessment / Status / Plan:     1. Severe MR on echocardiogram, with minimal symptoms. Discussed continuing with medical therapy given LVEF 35-40% versus referral to surgeon to discuss options. She would like to see surgeon, just to discuss options for future decisions.    2. CAD/ischemic cardiomyopathy with LVEF 35-40%, on medical therapy including BB, ACEI, and diuretics. She is currently euvolemic.    3. Hypertension, treated and stable. BP is good today.    4. Hyperlipidemia, treated with Lipitor.    Plan as above, with same medications. Keep FU with Dr. DANO Anderson in October 2018; FU sooner if clinical condition changes.    Collaborating MD: Lacho Darden

## 2018-07-17 NOTE — PROGRESS NOTES
Chief Complaint   Patient presents with   • Follow-Up   • Coronary Artery Disease   • Cardiomyopathy (Ischemic)   • CHF (Compensated)       Subjective:   Daya Woods is a 78 y.o. female who presents today for three month follow-up of CAD/ischemic cardiomyopathy, MR, chronic atrial fibrillation/chronic anticoagulation, hypertension and hyperlipidemia.    Daya is 78 year old female with history of CAD, status post remote CABG x 5 in 2002, with coronary angiogram in August 2016 showing 2/5 grafts patent (LIMA-D3 and SVG-OM), LVEF 35-40%, chronic atrial fibrillation, anticoagulation with Coumadin, hypertension and hyperlipidemia, normally followed by Dr. DANO Anderson, and last seen in April 2018.    At that time, her medications were titrated up, but she was not able to tolerate higher doses of Coreg or Lisinopril; she got very dizzy, lightheaded and fatigue, and BP dropped to 70-80 systolic. She is back to original doses, and feeling better.    She does have a lot of stress at home, taking care of her , who had a stroke in 2016, with limited mobility and mentation. Physically, she is stable: no chest pain, pressure or discomfort; no palpitations; no orthopnea or PND; no dizziness or syncope; no edema at all. She does get a little short of breath with moderate exertion. Weight is stable. BP has been stable since back on original doses of medications. She is tired a lot, because she doesn't sleep well.    Past Medical History:   Diagnosis Date   • Anesthesia     ponv   • Cataract     Bilateral IOL   • Chronic anticoagulation    • Chronic atrial fibrillation (HCC)     Managed with rate control and warfarin for anticoagulation    • Coronary atherosclerosis of native coronary artery 12/2002    CABG x 5 (LIMA-D3, SVG-OM, SVG-RCA, SVG-distal LAD, SVG-D1). August 2016: LIMA-D3 and SVG-OM patent (interval occlusion of the SVG-RCA); chronic occluded SVG-LAD, SVG-D1.   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 Global hypokinesis. Grade III diastolic dysfunction.   • MI, old 2002   • Mitral regurgitation 07/2018    Echocardiogram with severe MR.   • Osteoarthritis    • Pain     right knee, back   • Raynaud's disease    • Spinal stenosis 8/20/2015   • Status post coronary artery bypass grafts x 5 08/2002    LIMA-D3, SVG-LAD, SVG-OM, SVG-RCA, SVG-distal LAD.    • Stress 1/12/2012   • Unstable angina (HCC) 2/24/2014     Past Surgical History:   Procedure Laterality Date   • RECOVERY  8/26/2016    Procedure: CATH LAB-DEVON W/LHC W/ENRIQUE/GRINSELL-LARGE GROUP;  Surgeon: Recoveryonly Surgery;  [place] [facility] [place]:    • OTHER Bilateral 08/2014    cataract extraction with iols   • APPENDECTOMY     • MULTIPLE CORONARY ARTERY BYPASS      2009 CABG x 5   • TONSILLECTOMY AND ADENOIDECTOMY     • TUBAL LIGATION       Family History   Problem Relation Age of Onset   • Heart Attack Brother 55     heart attack     Social History     Social History   • Marital status:      Spouse name: N/A   • Number of children: N/A   • Years of education: N/A     Occupational History   • Not on file.     Social History Main Topics   • Smoking status: Former Smoker     Packs/day: 0.50     Years: 40.00     Quit date: 2/24/2002   • Smokeless tobacco: Never Used   • Alcohol use Yes      Comment: varies   • Drug use: No   • Sexual activity: Not on file     Other Topics Concern   • Not on file     Social History Narrative   • No narrative on file     Allergies   Allergen Reactions   • Codeine Unspecified     \"I just don't like it. It does weird things to me.\"   • Hydrochlorothiazide Unspecified     Unsure of reaction   • Isosorbide Mononitrate Rash     Rash   • Keflex Rash     Rash   • Sulfa Drugs Rash     rash   • Tape Unspecified     Tears " "skin off   • Xarelto [Rivaroxaban] Rash     rash     Outpatient Encounter Prescriptions as of 7/17/2018   Medication Sig Dispense Refill   • lisinopril (PRINIVIL) 20 MG Tab Take 0.5 Tabs by mouth 2 times a day. 30 Tab 11   • nitroglycerin (NITROSTAT) 0.4 MG SL Tab Place 1 Tab under tongue as needed for Chest Pain. 25 Tab 11   • spironolactone (ALDACTONE) 25 MG Tab Take 1 Tab by mouth every day. 90 Tab 3   • carvedilol (COREG) 25 MG Tab Take 0.5 Tabs by mouth 2 times a day, with meals. 180 Tab 3   • atorvastatin (LIPITOR) 40 MG Tab Take 1 Tab by mouth every day. 30 Tab 11   • levothyroxine (SYNTHROID) 75 MCG Tab   0   • warfarin (COUMADIN) 5 MG Tab Per pt, pt takes 7 mg by mouth every Monday, Wednesday, and Friday.  5 mg rest of week.  0   • clopidogrel (PLAVIX) 75 MG TABS Take 1 Tab by mouth every day. 90 Tab 3   • furosemide (LASIX) 20 MG TABS Take 1 Tab by mouth every day. 90 Tab 3   • [DISCONTINUED] lisinopril (PRINIVIL) 20 MG Tab Take 1 Tab by mouth 2 times a day. (Patient taking differently: Take 10 mg by mouth 2 times a day.) 180 Tab 3     No facility-administered encounter medications on file as of 7/17/2018.      Review of Systems   Constitutional: Positive for malaise/fatigue. Negative for chills and fever.   Respiratory: Negative for shortness of breath.    Cardiovascular: Negative for chest pain, palpitations, orthopnea, leg swelling and PND.   Gastrointestinal: Negative for abdominal pain.   Musculoskeletal: Negative for myalgias.   Neurological: Negative for dizziness, loss of consciousness and headaches.   Endo/Heme/Allergies: Does not bruise/bleed easily.        Objective:   /72   Pulse 88   Ht 1.626 m (5' 4\")   Wt 52.2 kg (115 lb)   LMP  (LMP Unknown)   SpO2 95%   BMI 19.74 kg/m²     Physical Exam   Constitutional: She is oriented to person, place, and time. She appears well-developed and well-nourished.   Petite, thin (BMI 19.74)   HENT:   Head: Normocephalic.   Eyes: EOM are normal. "   Neck: Normal range of motion. Neck supple. No JVD present.   Cardiovascular: Normal rate.  An irregularly irregular rhythm present.   Murmur heard.   Systolic murmur is present with a grade of 3/6   Murmur at LLSB.   Pulmonary/Chest: Effort normal and breath sounds normal. No respiratory distress. She has no wheezes. She has no rales.   Well healed sternotomy scar.   Abdominal: Soft. Bowel sounds are normal. She exhibits no distension. There is no tenderness.   Musculoskeletal: Normal range of motion. She exhibits no edema.   Neurological: She is alert and oriented to person, place, and time.   Skin: Skin is warm and dry. No rash noted.   Psychiatric: She has a normal mood and affect.     CONCLUSIONS OF ECHOCARDIOGRAM OF 7/10/2018 - REVIEWED WITH PATIENT:  Moderately reduced left ventricular systolic function.  Mild concentric left ventricular hypertrophy.  Severely dilated left atrium.  Severe mitral regurgitation.  Estimated right ventricular systolic pressure  is 30 mmHg.  Compared to the images of the prior study done on 04/04/17, mitral   regurgitation is slightly worse and now severe, LVEF is unchanged.    Lab Results   Component Value Date/Time    CHOLSTRLTOT 137 02/24/2014 03:05 AM    LDL 67 02/24/2014 03:05 AM    HDL 55 02/24/2014 03:05 AM    TRIGLYCERIDE 74 02/24/2014 03:05 AM       Lab Results   Component Value Date/Time    SODIUM 132 (L) 08/27/2016 04:45 AM    POTASSIUM 3.4 (L) 08/27/2016 04:45 AM    CHLORIDE 100 08/27/2016 04:45 AM    CO2 24 08/27/2016 04:45 AM    GLUCOSE 123 (H) 08/27/2016 04:45 AM    BUN 9 08/27/2016 04:45 AM    CREATININE 0.68 08/27/2016 04:45 AM     Lab Results   Component Value Date/Time    ALKPHOSPHAT 63 02/24/2014 03:05 AM    ASTSGOT 50 (H) 02/24/2014 03:05 AM    ALTSGPT 55 (H) 02/24/2014 03:05 AM    TBILIRUBIN 0.4 02/24/2014 03:05 AM        Assessment:     1. Mitral valve insufficiency, unspecified etiology  REFERRAL TO CARDIOTHORACIC SURGERY   2. Coronary artery disease  involving coronary bypass graft of native heart without angina pectoris     3. Status post coronary artery bypass grafts x 5     4. Ischemic cardiomyopathy     5. Systolic CHF with reduced left ventricular function, NYHA class 2 (HCC)  lisinopril (PRINIVIL) 20 MG Tab   6. Essential hypertension     7. Dyslipidemia         Medical Decision Making:  Today's Assessment / Status / Plan:     1. Severe MR on echocardiogram, with minimal symptoms. Discussed continuing with medical therapy given LVEF 35-40% versus referral to surgeon to discuss options. She would like to see surgeon, just to discuss options for future decisions.    2. CAD/ischemic cardiomyopathy with LVEF 35-40%, on medical therapy including BB, ACEI, and diuretics. She is currently euvolemic.    3. Hypertension, treated and stable. BP is good today.    4. Hyperlipidemia, treated with Lipitor.    Plan as above, with same medications. Keep FU with Dr. DANO Anderson in October 2018; FU sooner if clinical condition changes.    Collaborating MD: Lacho

## 2018-07-17 NOTE — TELEPHONE ENCOUNTER
Noted       REFERRAL TO CARDIOTHORACIC SURGERY   Dayana Richardosn   Sent: Tue July 17, 2018 11:18 AM   To: Maddy Robles, EVIEP.R.N.; Luana Tabor R.N.                  Message     The referral to Cardiothoracic Surgery will be sent to [facility]. The contact number is [phone].

## 2018-07-23 DIAGNOSIS — I05.9 MITRAL VALVE DISORDER: ICD-10-CM

## 2018-07-31 ENCOUNTER — TELEPHONE (OUTPATIENT)
Dept: CARDIOLOGY | Facility: MEDICAL CENTER | Age: 78
End: 2018-07-31

## 2018-07-31 NOTE — TELEPHONE ENCOUNTER
CARLOS Carroll, Med Ass't; Susan Ba R.N. Cc: Luis Felipe Hays, Med Ass't; Bonilla Márquez, Med Ass't             VALVE NEW MITRAL     Name: Daya Woods   : [age] y/o F   MR: [id number]   Referring MD: Belinda CONTRERAS   Inpatient/Outpatient Referral: outpatient   Referral to Valve Program done: done   Referral to [facility] done: done   Echo Done: yes   Echo Reading: EF 35%, MD reading of regurgitation: severe MR   Symptoms of MR: dizzy, lightheaded, fatigued   Cardiology Consult: yes   Angiogram: not completed   Need to schedule angiogram/DEVON: needs angiogram and DEVON   Pertinent Hx: CAD with prior CABG X5, rEF of 35%, HTN, HLD, chronic afib, lupus   Allergy to metal or contrast: no   Cr level (within 30 days): no   Oral/IV hydration needed prior to CTA/cath: unknown   Social Concerns: lives in [place], [place]   Tentative Plan: MVR v. claudette clip?   STS: 2.6%, CCI of 5 , 21% 10 year survival     PATIENT CLASS: MVRepair/replacement v. Claudette clip   [facility] CONSULT WARRANTED AT THIS TIME: yes, please schedule this patient through valve program on  SLOT D     Malka, this patient will be on SLOT D for  for us only- [facility] will only see them on SLOT D on  since this patient is a claudette clip patient          Patient notified of all appointments and very thankful for the phone call.  I gave her directions to all appointments and I also sent her a detailed myBest[employer] message.

## 2018-08-13 DIAGNOSIS — R06.02 SHORTNESS OF BREATH: ICD-10-CM

## 2018-08-13 DIAGNOSIS — I34.0 NON-RHEUMATIC MITRAL REGURGITATION: ICD-10-CM

## 2018-08-13 DIAGNOSIS — R42 LIGHTHEADEDNESS: ICD-10-CM

## 2018-09-04 DIAGNOSIS — I50.20 SYSTOLIC CHF WITH REDUCED LEFT VENTRICULAR FUNCTION, NYHA CLASS 2 (HCC): ICD-10-CM

## 2018-09-04 DIAGNOSIS — I10 ESSENTIAL HYPERTENSION: ICD-10-CM

## 2018-09-04 RX ORDER — CARVEDILOL 25 MG/1
TABLET ORAL
Qty: 180 TAB | Refills: 3 | Status: ON HOLD | OUTPATIENT
Start: 2018-09-04 | End: 2018-10-16

## 2018-09-20 ENCOUNTER — HOSPITAL ENCOUNTER (OUTPATIENT)
Dept: RADIOLOGY | Facility: MEDICAL CENTER | Age: 78
End: 2018-09-20
Attending: INTERNAL MEDICINE
Payer: MEDICARE

## 2018-09-20 ENCOUNTER — HOSPITAL ENCOUNTER (OUTPATIENT)
Dept: CARDIOLOGY | Facility: MEDICAL CENTER | Age: 78
End: 2018-09-20
Attending: INTERNAL MEDICINE
Payer: MEDICARE

## 2018-09-20 DIAGNOSIS — R06.02 SHORTNESS OF BREATH: ICD-10-CM

## 2018-09-20 DIAGNOSIS — I35.0 SEVERE AORTIC STENOSIS: ICD-10-CM

## 2018-09-20 DIAGNOSIS — I34.0 NON-RHEUMATIC MITRAL REGURGITATION: ICD-10-CM

## 2018-09-20 DIAGNOSIS — Z01.810 PRE-OPERATIVE CARDIOVASCULAR EXAMINATION: ICD-10-CM

## 2018-09-20 DIAGNOSIS — R42 LIGHTHEADEDNESS: ICD-10-CM

## 2018-09-20 LAB
ANION GAP SERPL CALC-SCNC: 5 MMOL/L (ref 0–11.9)
BNP SERPL-MCNC: 561 PG/ML (ref 0–100)
BUN SERPL-MCNC: 12 MG/DL (ref 8–22)
CALCIUM SERPL-MCNC: 9.5 MG/DL (ref 8.5–10.5)
CHLORIDE SERPL-SCNC: 100 MMOL/L (ref 96–112)
CO2 SERPL-SCNC: 27 MMOL/L (ref 20–33)
CREAT SERPL-MCNC: 0.84 MG/DL (ref 0.5–1.4)
ERYTHROCYTE [DISTWIDTH] IN BLOOD BY AUTOMATED COUNT: 48.1 FL (ref 35.9–50)
GLUCOSE SERPL-MCNC: 90 MG/DL (ref 65–99)
HCT VFR BLD AUTO: 42.2 % (ref 37–47)
HGB BLD-MCNC: 13.7 G/DL (ref 12–16)
INR PPP: 2.17 (ref 0.87–1.13)
MCH RBC QN AUTO: 32.3 PG (ref 27–33)
MCHC RBC AUTO-ENTMCNC: 32.5 G/DL (ref 33.6–35)
MCV RBC AUTO: 99.5 FL (ref 81.4–97.8)
PLATELET # BLD AUTO: 150 K/UL (ref 164–446)
PMV BLD AUTO: 9.6 FL (ref 9–12.9)
POTASSIUM SERPL-SCNC: 3.9 MMOL/L (ref 3.6–5.5)
PROTHROMBIN TIME: 24.2 SEC (ref 12–14.6)
RBC # BLD AUTO: 4.24 M/UL (ref 4.2–5.4)
SODIUM SERPL-SCNC: 132 MMOL/L (ref 135–145)
WBC # BLD AUTO: 4.6 K/UL (ref 4.8–10.8)

## 2018-09-20 PROCEDURE — 93880 EXTRACRANIAL BILAT STUDY: CPT

## 2018-09-20 PROCEDURE — 85610 PROTHROMBIN TIME: CPT

## 2018-09-20 PROCEDURE — 83880 ASSAY OF NATRIURETIC PEPTIDE: CPT

## 2018-09-20 PROCEDURE — 36415 COLL VENOUS BLD VENIPUNCTURE: CPT

## 2018-09-20 PROCEDURE — 94010 BREATHING CAPACITY TEST: CPT

## 2018-09-20 PROCEDURE — 85027 COMPLETE CBC AUTOMATED: CPT

## 2018-09-20 PROCEDURE — 80048 BASIC METABOLIC PNL TOTAL CA: CPT

## 2018-09-20 RX ORDER — SODIUM CHLORIDE 9 MG/ML
INJECTION, SOLUTION INTRAVENOUS CONTINUOUS
Status: DISCONTINUED | OUTPATIENT
Start: 2018-09-20 | End: 2018-10-15

## 2018-09-20 ASSESSMENT — PULMONARY FUNCTION TESTS: PREDICTED_VC: 2.77L

## 2018-09-20 NOTE — RESPIRATORY CARE
Pulmonary Function Testing Data           Parameter Actual Predicted % Predicted Actual % Predicted % Change   Spirometry   FVC (L)  2.02L  2.77L  73%         FEV1 (L)  1.32L 2.08L 64%         FEV1/FVC (%)  65% 75% 87%                        Lung Volumes   Interpretation: Moderate Restriction

## 2018-09-27 ENCOUNTER — OFFICE VISIT (OUTPATIENT)
Dept: CARDIOLOGY | Facility: MEDICAL CENTER | Age: 78
End: 2018-09-27
Payer: MEDICARE

## 2018-09-27 VITALS
BODY MASS INDEX: 19.31 KG/M2 | DIASTOLIC BLOOD PRESSURE: 82 MMHG | HEIGHT: 64 IN | OXYGEN SATURATION: 96 % | SYSTOLIC BLOOD PRESSURE: 140 MMHG | HEART RATE: 66 BPM | WEIGHT: 113.1 LBS

## 2018-09-27 DIAGNOSIS — I25.5 ISCHEMIC CARDIOMYOPATHY: ICD-10-CM

## 2018-09-27 DIAGNOSIS — R06.02 SOB (SHORTNESS OF BREATH): ICD-10-CM

## 2018-09-27 DIAGNOSIS — Z95.1 STATUS POST CORONARY ARTERY BYPASS GRAFTS X 5: ICD-10-CM

## 2018-09-27 DIAGNOSIS — I25.810 CORONARY ARTERY DISEASE INVOLVING CORONARY BYPASS GRAFT OF NATIVE HEART WITHOUT ANGINA PECTORIS: ICD-10-CM

## 2018-09-27 DIAGNOSIS — I34.0 SEVERE MITRAL REGURGITATION: ICD-10-CM

## 2018-09-27 DIAGNOSIS — Z79.01 CHRONIC ANTICOAGULATION: ICD-10-CM

## 2018-09-27 DIAGNOSIS — I48.20 CHRONIC ATRIAL FIBRILLATION (HCC): ICD-10-CM

## 2018-09-27 DIAGNOSIS — I50.20 SYSTOLIC CHF WITH REDUCED LEFT VENTRICULAR FUNCTION, NYHA CLASS 2 (HCC): ICD-10-CM

## 2018-09-27 LAB — EKG IMPRESSION: NORMAL

## 2018-09-27 PROCEDURE — 93000 ELECTROCARDIOGRAM COMPLETE: CPT | Mod: 59 | Performed by: INTERNAL MEDICINE

## 2018-09-27 PROCEDURE — 94618 PULMONARY STRESS TESTING: CPT | Performed by: INTERNAL MEDICINE

## 2018-09-27 PROCEDURE — 99205 OFFICE O/P NEW HI 60 MIN: CPT | Mod: 25 | Performed by: INTERNAL MEDICINE

## 2018-09-27 ASSESSMENT — ENCOUNTER SYMPTOMS
NAUSEA: 0
CHILLS: 0
WEAKNESS: 0
FEVER: 0
BACK PAIN: 1
EYES NEGATIVE: 1
BLURRED VISION: 0
CONSTITUTIONAL NEGATIVE: 1
BRUISES/BLEEDS EASILY: 0
NEUROLOGICAL NEGATIVE: 1
HEADACHES: 0
COUGH: 0
CARDIOVASCULAR NEGATIVE: 1
SHORTNESS OF BREATH: 1
PALPITATIONS: 0
NERVOUS/ANXIOUS: 0
DEPRESSION: 0
MYALGIAS: 0
CLAUDICATION: 0
DIZZINESS: 0
WEIGHT LOSS: 0
PSYCHIATRIC NEGATIVE: 1
FOCAL WEAKNESS: 0
GASTROINTESTINAL NEGATIVE: 1
DOUBLE VISION: 0
ABDOMINAL PAIN: 0
VOMITING: 0

## 2018-09-27 NOTE — PROGRESS NOTES
APRN Psychosocial Assessment:    Mental Status Assessment:  Appearance: normal  Behavior: normal  Mood/Affect: normal  Thought process/content: normal  Cognition: normal  Functional ability: normal  Dental Concerns: full dentures, brushes teeth regularly, no aches in mouth  Dental Clearance warranted: no  Further mental assessment needed: no    Post-op Plan of Care:  Support systems: daughter  Patient understands discharge plan: yes, lives in [place], [place] (knows to stay in town until Friday)  DME: none  Home health warranted prior to procedure: no  Social concerns for discharge: no  PCP alerted of social concerns: n/a  POLST: completed in office  Advance directive: not present, will bring to DEVON to make copy  Flu/PNA vaccines completed: completed    Concerns prior to procedure: none    All patients questions and concerns were addressed during this visit. They understood pre-operative and post-operative plan of care.      Name: Daya Woods   : [age] y/o F   MR: [id number]   Referring MD: Belinda CONTRERAS   Inpatient/Outpatient Referral: outpatient   Referral to Valve Program done: done   Referral to [facility] done: done   Echo Done: yes   Echo Reading: EF 35%, MD reading of regurgitation: severe MR   Symptoms of MR: dizzy, lightheaded, fatigued   Cardiology Consult: yes   Angiogram: not completed   Need to schedule angiogram/DEVON: needs angiogram and DEVON   Pertinent Hx: CAD with prior CABG X5, rEF of 35%, HTN, HLD, chronic afib, lupus   Allergy to metal or contrast: no   Cr level (within 30 days): no   Oral/IV hydration needed prior to CTA/cath: unknown   Social Concerns: lives in [place], [place]   Tentative Plan: MVR v. claudette clip?   STS: 2.6%, CCI of 5 , 21% 10 year survival     PATIENT CLASS: MVRepair/replacement v. Claudette clip   [facility] CONSULT WARRANTED AT THIS TIME: yes, please schedule this patient through valve program on  SLOT D     Malka, this patient will be on SLOT D for  for  only- [facility]  will only see them on SLOT D on Sept 19th since this patient is a constance clip patient

## 2018-09-27 NOTE — LETTER
[facility]   [address], [address]  [place] [address]  Phone: [phone]  Fax: [phone]              Daya Woods  1940    Encounter Date: 9/27/2018    Moy Don M.D.          PROGRESS NOTE:  No chief complaint on file.      Subjective:   Daya Woods is a 78 y.o. female who presents today for interventional consult/MitraClip evaluation requested by Belinda Muñoz for severe symptomatic mitral regurgitation.    Thank you for allowing me to evaluate Mrs. Woods, who as you know is a 78 year old female with complex cardiovascular history including severe mitral regurgitation, congestive heart failure, ischemic cardiomyopathy, coronary artery disease with prior coronary artery bypass graft surgery, atrial fibrillation on chronic anticoagulation therapy.    Past Medical History:   Diagnosis Date   • Anesthesia     ponv   • Atrial fibrillation (HCC)    • CAD (coronary artery disease)    • Cataract     Bilateral IOL   • Chronic anticoagulation    • Chronic atrial fibrillation (HCC)     Managed with rate control and warfarin for anticoagulation    • Coronary atherosclerosis of native coronary artery 12/2002    CABG x 5 (LIMA-D3, SVG-OM, SVG-RCA, SVG-distal LAD, SVG-D1). August 2016: LIMA-D3 and SVG-OM patent (interval occlusion of the SVG-RCA); chronic occluded SVG-LAD, SVG-D1.   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 Global hypokinesis. Grade III diastolic dysfunction.   • MI, old 2002   • Mitral regurgitation 07/2018    Echocardiogram with severe MR.   • Osteoarthritis    • Pain     right knee, back   • Raynaud's disease    • Spinal stenosis 8/20/2015   • Status post coronary artery bypass grafts x 5 08/2002    LIMA-D3, SVG-LAD, SVG-OM, SVG-RCA, SVG-distal  "LAD.    • Stress 1/12/2012   • Unstable angina (HCC) 2/24/2014   • Valvular heart disease      Past Surgical History:   Procedure Laterality Date   • RECOVERY  8/26/2016    Procedure: CATH LAB-DEVON W/LHC W/MARLEY-JO-ANN/GAGAN-LARGE GROUP;  Surgeon: Recoveryonly Surgery;  [place] [facility] [place]  Service:    • OTHER Bilateral 08/2014    cataract extraction with iols   • APPENDECTOMY     • MULTIPLE CORONARY ARTERY BYPASS      2009 CABG x 5   • TONSILLECTOMY AND ADENOIDECTOMY     • TUBAL LIGATION       Family History   Problem Relation Age of Onset   • Heart Attack Brother 55        heart attack     Social History     Social History   • Marital status:      Spouse name: N/A   • Number of children: N/A   • Years of education: N/A     Occupational History   • Not on file.     Social History Main Topics   • Smoking status: Former Smoker     Packs/day: 0.50     Years: 40.00     Quit date: 2/24/2002   • Smokeless tobacco: Never Used   • Alcohol use Yes      Comment: varies   • Drug use: No   • Sexual activity: Not on file     Other Topics Concern   • Not on file     Social History Narrative   • No narrative on file     Allergies   Allergen Reactions   • Codeine Unspecified     \"I just don't like it. It does weird things to me.\"   • Hydrochlorothiazide Unspecified     Unsure of reaction   • Isosorbide Mononitrate Rash     Rash   • Keflex Rash     Rash   • Sulfa Drugs Rash     rash   • Tape Unspecified     Tears skin off   • Xarelto [Rivaroxaban] Rash     rash     Medications reviewed.    Outpatient Encounter Prescriptions as of 9/27/2018   Medication Sig Dispense Refill   • carvedilol (COREG) 25 MG Tab take 0.5 tablet by mouth twice a day with food 180 Tab 3   • lisinopril (PRINIVIL) 20 MG Tab Take 0.5 Tabs by mouth 2 times a day. 30 Tab 11   • nitroglycerin (NITROSTAT) 0.4 MG SL Tab Place 1 Tab under tongue as needed for Chest Pain. 25 Tab 11   • spironolactone (ALDACTONE) 25 MG Tab Take 1 " "Tab by mouth every day. 90 Tab 3   • atorvastatin (LIPITOR) 40 MG Tab Take 1 Tab by mouth every day. 30 Tab 11   • levothyroxine (SYNTHROID) 75 MCG Tab   0   • warfarin (COUMADIN) 5 MG Tab Per pt, pt takes 7 mg by mouth every Monday, Wednesday, and Friday.  5 mg rest of week.  0   • clopidogrel (PLAVIX) 75 MG TABS Take 1 Tab by mouth every day. 90 Tab 3   • furosemide (LASIX) 20 MG TABS Take 1 Tab by mouth every day. 90 Tab 3     Facility-Administered Encounter Medications as of 9/27/2018   Medication Dose Route Frequency Provider Last Rate Last Dose   • NS infusion   Intravenous Continuous Moy Don M.D.         Review of Systems   Constitutional: Negative.  Negative for chills, fever, malaise/fatigue and weight loss.   HENT: Negative.  Negative for hearing loss.    Eyes: Negative.  Negative for blurred vision and double vision.   Respiratory: Negative.  Negative for cough and shortness of breath.    Cardiovascular: Negative.  Negative for chest pain, palpitations, claudication and leg swelling.   Gastrointestinal: Negative.  Negative for abdominal pain, nausea and vomiting.   Genitourinary: Negative.  Negative for dysuria and urgency.   Musculoskeletal: Negative.  Negative for joint pain and myalgias.   Skin: Negative.  Negative for itching and rash.   Neurological: Negative.  Negative for dizziness, focal weakness, weakness and headaches.   Endo/Heme/Allergies: Negative.  Does not bruise/bleed easily.   Psychiatric/Behavioral: Negative.  Negative for depression. The patient is not nervous/anxious.         Objective:   /82 (BP Location: Left arm, Patient Position: Sitting, BP Cuff Size: Adult)   Pulse 66   Ht 1.626 m (5' 4\")   Wt 51.3 kg (113 lb 1.5 oz)   LMP  (LMP Unknown)   SpO2 96%   BMI 19.41 kg/m²      Physical Exam   Constitutional: She is oriented to person, place, and time. She appears well-developed and well-nourished.   HENT:   Head: Normocephalic and atraumatic.   Eyes: Pupils are " equal, round, and reactive to light. Conjunctivae are normal.   Neck: Normal range of motion. Neck supple.   Cardiovascular: Normal rate and regular rhythm.    Murmur heard.  Pulmonary/Chest: Effort normal and breath sounds normal.   Abdominal: Soft. Bowel sounds are normal.   Musculoskeletal: Normal range of motion.   Neurological: She is alert and oriented to person, place, and time.   Skin: Skin is warm and dry.   Psychiatric: She has a normal mood and affect.     CARDIAC STUDIES/PROCEDURES:    CARDIAC CATHETERIZATION CONCLUSIONS by Dr. Menard (08/26/16)  1. Complex heavily calcified high-grade disease of the proximal and mid left anterior descending.  2. Chronic total occlusion of the circumflex.  3. Codominant RCA with moderate native disease.  4. Totally occluded saphenous vein graft x3, (chronic).  5. Patent saphenous vein graft to an obtuse marginal with high-grade stenosis   of the distal anastomosis.  6. Patent left internal mammary artery to a diagonal vessel.  7. Technically difficult and demanding PCI with overlapping stent placement of the proximal and mid LAD.  (study result reviewed)    CAROTID ULTRASOUND (09/20/18)  Atherosclerosis without stenosis reaching 50% threshold.  (study result reviewed)    ECHOCARDIOGRAM CONCLUSIONS (07/10/18)  Moderately reduced left ventricular systolic function.  Mild concentric left ventricular hypertrophy.  Severely dilated left atrium.  Severe mitral regurgitation.  Estimated right ventricular systolic pressure  is 30 mmHg.  Compared to the images of the prior study done on 04/04/17, mitral   regurgitation is slightly worse and now severe, LVEF is unchanged.  (study result reviewed)    Laboratory results of (09/20/18) were reviewed. Bun of 12 mg/dl, creatinine levels of 0.84 mg/dl noted.    Assessment:     1. Severe mitral regurgitation  EKG   2. Systolic CHF with reduced left ventricular function, NYHA class 2 ([facility])     3. Ischemic cardiomyopathy     4. Coronary  artery disease involving coronary bypass graft of native heart without angina pectoris     5. Status post coronary artery bypass grafts x 5     6. Chronic atrial fibrillation (HCC)     7. Chronic anticoagulation         Medical Decision Making:  Today's Assessment / Status / Plan:     1. Mitral regurgitation: Her mitral regurgitation has reached severe status on her recent echocardiogram and she is symptomatic, NYHA class III. She is excessive risk per Dr. Stoner with STS. We will proceed with transesophageal echocardiogram, cardiac catheterization and follow up with MitraClip. She understands the risks and benefits and agrees with plan.  2. Ischemic cardiomyopathy with congestive heart failure: The overall volume status is elevated. We will start diuretic therapy.  3. Coronary artery disease with prior coronary bypass graft: Plan as above.  4. Atrial fibrillation on anticoagulation therapy (warfarin): She is doing well on anticoagulation and the ventricular rate is well controlled.         No Recipients

## 2018-09-27 NOTE — PROGRESS NOTES
No chief complaint on file.      Subjective:   Daya Woods is a 78 y.o. female who presents today for interventional consult/MitraClip evaluation requested by Belinda Muñoz for severe symptomatic mitral regurgitation.    Thank you for allowing me to evaluate Mrs. Woods, who as you know is a 78 year old female with complex cardiovascular history including severe mitral regurgitation, congestive heart failure, ischemic cardiomyopathy, coronary artery disease with prior coronary artery bypass graft surgery, atrial fibrillation on chronic anticoagulation therapy. She was well until 2 years ago when she began to experience mild shortness of breath and dyspnea on exertion She denies chest pain, palpitations, nausea/vomiting or diaphoresis.    Past Medical History:   Diagnosis Date   • Anesthesia     ponv   • Atrial fibrillation (HCC)    • CAD (coronary artery disease)    • Cataract     Bilateral IOL   • Chronic anticoagulation    • Chronic atrial fibrillation (HCC)     Managed with rate control and warfarin for anticoagulation    • Coronary atherosclerosis of native coronary artery 12/2002    CABG x 5 (LIMA-D3, SVG-OM, SVG-RCA, SVG-distal LAD, SVG-D1). August 2016: LIMA-D3 and SVG-OM patent (interval occlusion of the SVG-RCA); chronic occluded SVG-LAD, SVG-D1.   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 Global hypokinesis. Grade III diastolic dysfunction.   • MI, old 2002   • Mitral regurgitation 07/2018    Echocardiogram with severe MR.   • Osteoarthritis    • Pain     right knee, back   • Raynaud's disease    • Spinal stenosis 8/20/2015   • Status post coronary artery bypass grafts x 5 08/2002    LIMA-D3, SVG-LAD, SVG-OM, SVG-RCA, SVG-distal LAD.    • Stress 1/12/2012   • Unstable angina (HCC) 2/24/2014   • Valvular heart disease   "    Past Surgical History:   Procedure Laterality Date   • RECOVERY  8/26/2016    Procedure: CATH LAB-DEVON W/LHC W/POSSIBLE-JO-ANN/GAGAN-LARGE GROUP;  Surgeon: Recoveryonly Surgery;  [place] [facility] [place]:    • OTHER Bilateral 08/2014    cataract extraction with iols   • APPENDECTOMY     • MULTIPLE CORONARY ARTERY BYPASS      2009 CABG x 5   • TONSILLECTOMY AND ADENOIDECTOMY     • TUBAL LIGATION       Family History   Problem Relation Age of Onset   • Heart Attack Brother 55        heart attack     Social History     Social History   • Marital status:      Spouse name: N/A   • Number of children: N/A   • Years of education: N/A     Occupational History   • Not on file.     Social History Main Topics   • Smoking status: Former Smoker     Packs/day: 0.50     Years: 40.00     Quit date: 2/24/2002   • Smokeless tobacco: Never Used   • Alcohol use Yes      Comment: varies   • Drug use: No   • Sexual activity: Not on file     Other Topics Concern   • Not on file     Social History Narrative   • No narrative on file     Allergies   Allergen Reactions   • Codeine Unspecified     \"I just don't like it. It does weird things to me.\"   • Hydrochlorothiazide Unspecified     Unsure of reaction   • Isosorbide Mononitrate Rash     Rash   • Keflex Rash     Rash   • Sulfa Drugs Rash     rash   • Tape Unspecified     Tears skin off   • Xarelto [Rivaroxaban] Rash     rash     Medications reviewed.    Outpatient Encounter Prescriptions as of 9/27/2018   Medication Sig Dispense Refill   • carvedilol (COREG) 25 MG Tab take 0.5 tablet by mouth twice a day with food 180 Tab 3   • lisinopril (PRINIVIL) 20 MG Tab Take 0.5 Tabs by mouth 2 times a day. 30 Tab 11   • nitroglycerin (NITROSTAT) 0.4 MG SL Tab Place 1 Tab under tongue as needed for Chest Pain. 25 Tab 11   • spironolactone (ALDACTONE) 25 MG Tab Take 1 Tab by mouth every day. 90 Tab 3   • atorvastatin (LIPITOR) 40 MG Tab Take 1 Tab by mouth " "every day. 30 Tab 11   • levothyroxine (SYNTHROID) 75 MCG Tab   0   • warfarin (COUMADIN) 5 MG Tab Per pt, pt takes 7 mg by mouth every Monday, Wednesday, and Friday.  5 mg rest of week.  0   • clopidogrel (PLAVIX) 75 MG TABS Take 1 Tab by mouth every day. 90 Tab 3   • furosemide (LASIX) 20 MG TABS Take 1 Tab by mouth every day. 90 Tab 3     Facility-Administered Encounter Medications as of 9/27/2018   Medication Dose Route Frequency Provider Last Rate Last Dose   • NS infusion   Intravenous Continuous Moy Don M.D.         Review of Systems   Constitutional: Negative.  Negative for chills, fever, malaise/fatigue and weight loss.   HENT: Positive for nosebleeds. Negative for hearing loss.    Eyes: Negative.  Negative for blurred vision and double vision.   Respiratory: Positive for shortness of breath. Negative for cough.    Cardiovascular: Negative.  Negative for chest pain, palpitations, claudication and leg swelling.   Gastrointestinal: Negative.  Negative for abdominal pain, nausea and vomiting.   Genitourinary: Negative.  Negative for dysuria and urgency.   Musculoskeletal: Positive for back pain and joint pain. Negative for myalgias.   Skin: Positive for itching and rash.   Neurological: Negative.  Negative for dizziness, focal weakness, weakness and headaches.   Endo/Heme/Allergies: Negative.  Does not bruise/bleed easily.   Psychiatric/Behavioral: Negative.  Negative for depression. The patient is not nervous/anxious.         Objective:   /82 (BP Location: Left arm, Patient Position: Sitting, BP Cuff Size: Adult)   Pulse 66   Ht 1.626 m (5' 4\")   Wt 51.3 kg (113 lb 1.5 oz)   LMP  (LMP Unknown)   SpO2 96%   BMI 19.41 kg/m²     Physical Exam   Constitutional: She is oriented to person, place, and time. She appears well-developed and well-nourished.   HENT:   Head: Normocephalic and atraumatic.   Eyes: Pupils are equal, round, and reactive to light. Conjunctivae are normal.   Neck: Normal " range of motion. Neck supple.   Cardiovascular: Normal rate.  An irregularly irregular rhythm present.   Murmur heard.  Pulmonary/Chest: Effort normal and breath sounds normal.   Abdominal: Soft. Bowel sounds are normal.   Musculoskeletal: Normal range of motion.   Neurological: She is alert and oriented to person, place, and time.   Skin: Skin is warm and dry.   Psychiatric: She has a normal mood and affect.     CARDIAC STUDIES/PROCEDURES:    CARDIAC CATHETERIZATION CONCLUSIONS by Dr. Menard (08/26/16)  1. Complex heavily calcified high-grade disease of the proximal and mid left anterior descending.  2. Chronic total occlusion of the circumflex.  3. Codominant RCA with moderate native disease.  4. Totally occluded saphenous vein graft x3, (chronic).  5. Patent saphenous vein graft to an obtuse marginal with high-grade stenosis   of the distal anastomosis.  6. Patent left internal mammary artery to a diagonal vessel.  7. Technically difficult and demanding PCI with overlapping stent placement of the proximal and mid LAD with 3 overlapping stents were placed at the area of dissection and occlusion that is still heavily calcified those being 2.5x12, 2.5x12 and 2.5x8.   (study result reviewed)    CAROTID ULTRASOUND (09/20/18)  Atherosclerosis without stenosis reaching 50% threshold.  (study result reviewed)    ECHOCARDIOGRAM CONCLUSIONS (07/10/18)  Moderately reduced left ventricular systolic function.  Left ventricular ejection fraction is visually estimated to be 35-40%.   Mild concentric left ventricular hypertrophy.  Severely dilated left atrium.  Severe mitral regurgitation.  Estimated right ventricular systolic pressure  is 30 mmHg.  Compared to the images of the prior study done on 04/04/17, mitral   regurgitation is slightly worse and now severe, LVEF is unchanged.  (study result reviewed)    Laboratory results of (09/20/18) were reviewed. Bun of 12 mg/dl, creatinine levels of 0.84 mg/dl noted.    Assessment:      1. Severe mitral regurgitation  EKG   2. Systolic CHF with reduced left ventricular function, NYHA class 2 (HCC)     3. Ischemic cardiomyopathy     4. Coronary artery disease involving coronary bypass graft of native heart without angina pectoris     5. Status post coronary artery bypass grafts x 5     6. Chronic atrial fibrillation (HCC)     7. Chronic anticoagulation         Medical Decision Making:  Today's Assessment / Status / Plan:     1. Mitral regurgitation: Her mitral regurgitation has reached severe status on her recent echocardiogram and she is symptomatic, NYHA class III. She is excessive risk per Dr. Stoner with STS. We will proceed with transesophageal echocardiogram, cardiac catheterization and follow up with MitraClip. She understands the risks and benefits and agrees with plan.  2. Ischemic cardiomyopathy with congestive heart failure: The overall volume status is elevated. We will start diuretic therapy.  3. Coronary artery disease with prior coronary bypass graft (in 2002 and redo in 2016) and stent placements: Plan as above.  4. Atrial fibrillation on anticoagulation therapy (warfarin): She is doing well on anticoagulation and the ventricular rate is well controlled.  More than 45 minutes of time was spent to review all above information, discuss the option of cardiac catheterization and MitraClip including, alternative options, risk and benefits.    The risks, benefits, and alternatives to transesophageal echocardiogram (DEVON) with IV sedation were discussed with the patient in specific detail, including oropharyngeal and esophageal traumas including hoarseness and dysphagia after the procedure. Rare cases demonstrating serious or fatal complications associated with DEVON have been reported in the adult population, including cardiac, pulmonary and bleeding complications in less than 1% of people. Patients with an identified intracardiac thrombus are at increased risk for embolic events  (absolute risk uncertain, range 0%-38%), and this appears to be reduced with anticoagulation therapy (absolute risk reduction uncertain). The patient verbalized understanding about these possible complications, and wishes to proceed with this procedure.    The risks, benefits, and alternatives to coronary angiography with IV sedation were discussed in great detail. Specific risks mentioned include bleeding, infection, kidney damage, allergic reaction, cardiac perforation with possible tamponade requiring kriss-cardiocentesis or possible open heart surgery. In addition, we discussed that 10% of patients will experience small to moderate bruising at the side of the arterial puncture. Lastly the risks of heart attack, stroke, and death were discussed; the risks of major complications such as heart attack or stroke caused by the angiogram is less than 1%; the risk of death is approximately 1 in 1000. The patient verbalized understanding of these potential complications and wishes to proceed with this procedure.    The risks, benefits, and alternatives to MitraClip, general anesthesia and transesophageal echocardiogram were discussed in great detail. Specific risks mentioned include bleeding, infection, kidney damage, allergic reaction, cardiac perforation with possible tamponade requiring kriss-cardiocentesis or possible open heart surgery if the patient is a candidate. Lastly the risks of heart attack, stroke, and death were discussed; the risks of major complications including  all cause mortality of 2.2%, stroke 0.9%, major bleeding complication is 7.4, pericardial tamponade 1.9%, clip specific complications (embolization 0%, partial clip detachment was 1.9%). The patient verbalized understanding of these potential complications and wishes to proceed with this procedure. ([facility] registry 2015).  The procedure will be performed completely in collaboration with cardiac surgery.    We will follow up in 2 month in valve  clinic.    Thank you for this consult.    CC Rah Conti and Sourav Hogan

## 2018-10-01 ENCOUNTER — TELEPHONE (OUTPATIENT)
Dept: CARDIOLOGY | Facility: MEDICAL CENTER | Age: 78
End: 2018-10-01

## 2018-10-01 NOTE — TELEPHONE ENCOUNTER
----- Message from Susan Ba R.N. sent at 9/28/2018  1:55 PM PDT -----  Regarding: Yes pre mitral cath with DEVON  Ron Hinton,    We just got word back from Dr. Stoner. He does want the patient to have cath with her DEVON for pre-mitral clip work up. Please let me know if you have any questions.     Thanks,     Susan

## 2018-10-01 NOTE — TELEPHONE ENCOUNTER
Patient is scheduled on 10-9-18 for a DEVON w/ pre mitral clip R&L hrt cath w/poss with Dr. NINO Anderson to do DEVON and Dr. Don to do cath. Patient was told to hold coumadin for 5 days prior and to hold lasix am day of procedure.H&P was done on 9-27-18 by Dr. Don. Pre admit to call patient. Sandra with [facility] notified on 9-28-18.

## 2018-10-04 NOTE — TELEPHONE ENCOUNTER
Valve Program Functional Assessment: 18 initial assessment    KCCQ12   1a) Showering/bathin  1b) Walking 1 block on ground: 5  1c) Hurrying or jogging:3  2) Swelling: 3  3) Fatigue: 3  4) Shortness of breath: 3  5) Sleep sitting up: 5  6) Limited enjoyment of life: 3  7) Spend the rest of your life with HF: 3  8a) Hobbies, recreational activities: 2  8b) Working or doing household chores:2  8c) Visiting family or friends: 3    6 minute walk total distance: 329.2 meters     Strength   1) _15_____ kg  2) _15_____ kg  3) _15_____ kg    RESENDIZ ADLs  Patient independently preforms...   - Bathing: Yes   - Dressing: Yes   - Toileting: Yes   - Transferring: Yes   - Continence: Yes   - Feeding: Yes     Living Situation  Patient lives: with adult child     Mobility Aids   Patient uses:  none    Patients post operative goal:       Met with patient at TMVR consult with Dr. Don to preform procedure education. Provided education regarding advanced directives and the importance of having such on file to advocate patient's medical treatment preferences and/or dedicated medical  in fact. Verified status of advanced directives with patient, as it is our team's goal to assure each patient has advanced directives on file prior to any procedure or surgery. Reviewed TMVR patient guidelines packet as well as reviewed education/instucations for pre-procedural preparation. Patient is TENTATIVELY scheduled for TMVR 10/15/18 at this time. Provided printed pre-procedure instructions including medication instructions prior to TMVR specifically: stop coumadin x5 days prior to procedure, no eating or drinking including mediations after midnight prior to TMVR, no medications AM of TMVR, bring current list of medications to check in day and patient is to check in at 0530 [facility] Check-in. Patient states understanding and has no further questions at this time. Patient was provided with my contact information and  encouraged call with any questions or concerns. Walked patient  to check out to complete this encounter.

## 2018-10-05 ENCOUNTER — TELEPHONE (OUTPATIENT)
Dept: CARDIOLOGY | Facility: MEDICAL CENTER | Age: 78
End: 2018-10-05

## 2018-10-05 ENCOUNTER — APPOINTMENT (OUTPATIENT)
Dept: ADMISSIONS | Facility: MEDICAL CENTER | Age: 78
End: 2018-10-05
Attending: INTERNAL MEDICINE
Payer: MEDICARE

## 2018-10-05 RX ORDER — ATORVASTATIN CALCIUM 40 MG/1
40 TABLET, FILM COATED ORAL NIGHTLY
COMMUNITY
End: 2020-02-19

## 2018-10-09 ENCOUNTER — APPOINTMENT (OUTPATIENT)
Dept: RADIOLOGY | Facility: MEDICAL CENTER | Age: 78
End: 2018-10-09
Attending: INTERNAL MEDICINE
Payer: MEDICARE

## 2018-10-09 ENCOUNTER — HOSPITAL ENCOUNTER (OUTPATIENT)
Facility: MEDICAL CENTER | Age: 78
End: 2018-10-10
Attending: INTERNAL MEDICINE | Admitting: INTERNAL MEDICINE
Payer: MEDICARE

## 2018-10-09 ENCOUNTER — APPOINTMENT (OUTPATIENT)
Dept: CARDIOLOGY | Facility: MEDICAL CENTER | Age: 78
End: 2018-10-09
Attending: INTERNAL MEDICINE
Payer: MEDICARE

## 2018-10-09 LAB
ALBUMIN SERPL BCP-MCNC: 3.9 G/DL (ref 3.2–4.9)
ALBUMIN/GLOB SERPL: 1.5 G/DL
ALP SERPL-CCNC: 53 U/L (ref 30–99)
ALT SERPL-CCNC: 28 U/L (ref 2–50)
ANION GAP SERPL CALC-SCNC: 6 MMOL/L (ref 0–11.9)
ANION GAP SERPL CALC-SCNC: 6 MMOL/L (ref 0–11.9)
APTT PPP: 28.1 SEC (ref 24.7–36)
AST SERPL-CCNC: 28 U/L (ref 12–45)
BILIRUB SERPL-MCNC: 0.6 MG/DL (ref 0.1–1.5)
BUN SERPL-MCNC: 11 MG/DL (ref 8–22)
BUN SERPL-MCNC: 9 MG/DL (ref 8–22)
CALCIUM SERPL-MCNC: 9.1 MG/DL (ref 8.5–10.5)
CALCIUM SERPL-MCNC: 9.2 MG/DL (ref 8.5–10.5)
CHLORIDE SERPL-SCNC: 100 MMOL/L (ref 96–112)
CHLORIDE SERPL-SCNC: 100 MMOL/L (ref 96–112)
CO2 SERPL-SCNC: 25 MMOL/L (ref 20–33)
CO2 SERPL-SCNC: 28 MMOL/L (ref 20–33)
CREAT SERPL-MCNC: 0.72 MG/DL (ref 0.5–1.4)
CREAT SERPL-MCNC: 0.85 MG/DL (ref 0.5–1.4)
EKG IMPRESSION: NORMAL
ERYTHROCYTE [DISTWIDTH] IN BLOOD BY AUTOMATED COUNT: 45.1 FL (ref 35.9–50)
GLOBULIN SER CALC-MCNC: 2.6 G/DL (ref 1.9–3.5)
GLUCOSE SERPL-MCNC: 106 MG/DL (ref 65–99)
GLUCOSE SERPL-MCNC: 96 MG/DL (ref 65–99)
HCT VFR BLD AUTO: 40.6 % (ref 37–47)
HGB BLD-MCNC: 13.8 G/DL (ref 12–16)
INR PPP: 1.07 (ref 0.87–1.13)
LV EJECT FRACT  99904: 45
MAGNESIUM SERPL-MCNC: 1.8 MG/DL (ref 1.5–2.5)
MCH RBC QN AUTO: 33.1 PG (ref 27–33)
MCHC RBC AUTO-ENTMCNC: 34 G/DL (ref 33.6–35)
MCV RBC AUTO: 97.4 FL (ref 81.4–97.8)
PHOSPHATE SERPL-MCNC: 3.6 MG/DL (ref 2.5–4.5)
PLATELET # BLD AUTO: 132 K/UL (ref 164–446)
PMV BLD AUTO: 9.8 FL (ref 9–12.9)
POTASSIUM SERPL-SCNC: 3.6 MMOL/L (ref 3.6–5.5)
POTASSIUM SERPL-SCNC: 3.8 MMOL/L (ref 3.6–5.5)
PROT SERPL-MCNC: 6.5 G/DL (ref 6–8.2)
PROTHROMBIN TIME: 14 SEC (ref 12–14.6)
RBC # BLD AUTO: 4.17 M/UL (ref 4.2–5.4)
SODIUM SERPL-SCNC: 131 MMOL/L (ref 135–145)
SODIUM SERPL-SCNC: 134 MMOL/L (ref 135–145)
WBC # BLD AUTO: 2.9 K/UL (ref 4.8–10.8)

## 2018-10-09 PROCEDURE — 93325 DOPPLER ECHO COLOR FLOW MAPG: CPT | Mod: 26 | Performed by: INTERNAL MEDICINE

## 2018-10-09 PROCEDURE — 93005 ELECTROCARDIOGRAM TRACING: CPT | Performed by: INTERNAL MEDICINE

## 2018-10-09 PROCEDURE — 94760 N-INVAS EAR/PLS OXIMETRY 1: CPT | Mod: XU

## 2018-10-09 PROCEDURE — 85730 THROMBOPLASTIN TIME PARTIAL: CPT

## 2018-10-09 PROCEDURE — 93461 R&L HRT ART/VENTRICLE ANGIO: CPT | Mod: XU

## 2018-10-09 PROCEDURE — 700101 HCHG RX REV CODE 250

## 2018-10-09 PROCEDURE — 99153 MOD SED SAME PHYS/QHP EA: CPT

## 2018-10-09 PROCEDURE — 360979 HCHG DIAGNOSTIC CATH

## 2018-10-09 PROCEDURE — 99152 MOD SED SAME PHYS/QHP 5/>YRS: CPT

## 2018-10-09 PROCEDURE — 160002 HCHG RECOVERY MINUTES (STAT)

## 2018-10-09 PROCEDURE — 700117 HCHG RX CONTRAST REV CODE 255: Performed by: INTERNAL MEDICINE

## 2018-10-09 PROCEDURE — 80053 COMPREHEN METABOLIC PANEL: CPT

## 2018-10-09 PROCEDURE — C1894 INTRO/SHEATH, NON-LASER: HCPCS

## 2018-10-09 PROCEDURE — C1725 CATH, TRANSLUMIN NON-LASER: HCPCS

## 2018-10-09 PROCEDURE — 83735 ASSAY OF MAGNESIUM: CPT

## 2018-10-09 PROCEDURE — C1769 GUIDE WIRE: HCPCS

## 2018-10-09 PROCEDURE — 304952 HCHG R 2 PADS

## 2018-10-09 PROCEDURE — 71046 X-RAY EXAM CHEST 2 VIEWS: CPT

## 2018-10-09 PROCEDURE — 700111 HCHG RX REV CODE 636 W/ 250 OVERRIDE (IP)

## 2018-10-09 PROCEDURE — A9270 NON-COVERED ITEM OR SERVICE: HCPCS

## 2018-10-09 PROCEDURE — 92937 PRQ TRLUML REVSC CAB GRF 1: CPT | Mod: LC | Performed by: INTERNAL MEDICINE

## 2018-10-09 PROCEDURE — C1874 STENT, COATED/COV W/DEL SYS: HCPCS

## 2018-10-09 PROCEDURE — 93312 ECHO TRANSESOPHAGEAL: CPT | Mod: 26 | Performed by: INTERNAL MEDICINE

## 2018-10-09 PROCEDURE — 93010 ELECTROCARDIOGRAM REPORT: CPT | Performed by: INTERNAL MEDICINE

## 2018-10-09 PROCEDURE — C9604 PERC D-E COR REVASC T CABG S: HCPCS | Mod: LC

## 2018-10-09 PROCEDURE — 93461 R&L HRT ART/VENTRICLE ANGIO: CPT | Mod: 26,59 | Performed by: INTERNAL MEDICINE

## 2018-10-09 PROCEDURE — 84100 ASSAY OF PHOSPHORUS: CPT

## 2018-10-09 PROCEDURE — C1760 CLOSURE DEV, VASC: HCPCS

## 2018-10-09 PROCEDURE — G0378 HOSPITAL OBSERVATION PER HR: HCPCS

## 2018-10-09 PROCEDURE — 80048 BASIC METABOLIC PNL TOTAL CA: CPT

## 2018-10-09 PROCEDURE — C1887 CATHETER, GUIDING: HCPCS

## 2018-10-09 PROCEDURE — A9270 NON-COVERED ITEM OR SERVICE: HCPCS | Performed by: NURSE PRACTITIONER

## 2018-10-09 PROCEDURE — 93325 DOPPLER ECHO COLOR FLOW MAPG: CPT

## 2018-10-09 PROCEDURE — 305478 HCHG 7.5FR EDWARDS SWAN/THERMO

## 2018-10-09 PROCEDURE — 99152 MOD SED SAME PHYS/QHP 5/>YRS: CPT | Performed by: INTERNAL MEDICINE

## 2018-10-09 PROCEDURE — 36415 COLL VENOUS BLD VENIPUNCTURE: CPT

## 2018-10-09 PROCEDURE — 85610 PROTHROMBIN TIME: CPT

## 2018-10-09 PROCEDURE — 700102 HCHG RX REV CODE 250 W/ 637 OVERRIDE(OP): Performed by: NURSE PRACTITIONER

## 2018-10-09 PROCEDURE — 85027 COMPLETE CBC AUTOMATED: CPT

## 2018-10-09 PROCEDURE — 700102 HCHG RX REV CODE 250 W/ 637 OVERRIDE(OP)

## 2018-10-09 RX ORDER — HYDROMORPHONE HYDROCHLORIDE 2 MG/ML
0.1 INJECTION, SOLUTION INTRAMUSCULAR; INTRAVENOUS; SUBCUTANEOUS
Status: DISCONTINUED | OUTPATIENT
Start: 2018-10-09 | End: 2018-10-09 | Stop reason: HOSPADM

## 2018-10-09 RX ORDER — VALACYCLOVIR HYDROCHLORIDE 1 G/1
1000 TABLET, FILM COATED ORAL 3 TIMES DAILY
COMMUNITY
End: 2018-10-19

## 2018-10-09 RX ORDER — HYDROMORPHONE HYDROCHLORIDE 2 MG/ML
0.4 INJECTION, SOLUTION INTRAMUSCULAR; INTRAVENOUS; SUBCUTANEOUS
Status: DISCONTINUED | OUTPATIENT
Start: 2018-10-09 | End: 2018-10-09 | Stop reason: HOSPADM

## 2018-10-09 RX ORDER — BIVALIRUDIN 250 MG/5ML
INJECTION, POWDER, LYOPHILIZED, FOR SOLUTION INTRAVENOUS
Status: COMPLETED
Start: 2018-10-09 | End: 2018-10-09

## 2018-10-09 RX ORDER — WARFARIN SODIUM 5 MG/1
5 TABLET ORAL
Status: DISCONTINUED | OUTPATIENT
Start: 2018-10-10 | End: 2018-10-10 | Stop reason: HOSPADM

## 2018-10-09 RX ORDER — VALACYCLOVIR HYDROCHLORIDE 500 MG/1
1000 TABLET, FILM COATED ORAL 3 TIMES DAILY
Status: DISCONTINUED | OUTPATIENT
Start: 2018-10-09 | End: 2018-10-10 | Stop reason: HOSPADM

## 2018-10-09 RX ORDER — LISINOPRIL 10 MG/1
10 TABLET ORAL 2 TIMES DAILY
Status: DISCONTINUED | OUTPATIENT
Start: 2018-10-09 | End: 2018-10-10 | Stop reason: HOSPADM

## 2018-10-09 RX ORDER — POLYETHYLENE GLYCOL 3350 17 G/17G
1 POWDER, FOR SOLUTION ORAL
Status: DISCONTINUED | OUTPATIENT
Start: 2018-10-09 | End: 2018-10-10 | Stop reason: HOSPADM

## 2018-10-09 RX ORDER — ONDANSETRON 2 MG/ML
4 INJECTION INTRAMUSCULAR; INTRAVENOUS
Status: DISCONTINUED | OUTPATIENT
Start: 2018-10-09 | End: 2018-10-09 | Stop reason: HOSPADM

## 2018-10-09 RX ORDER — HYDROMORPHONE HYDROCHLORIDE 2 MG/ML
0.2 INJECTION, SOLUTION INTRAMUSCULAR; INTRAVENOUS; SUBCUTANEOUS
Status: DISCONTINUED | OUTPATIENT
Start: 2018-10-09 | End: 2018-10-09 | Stop reason: HOSPADM

## 2018-10-09 RX ORDER — HALOPERIDOL 5 MG/ML
1 INJECTION INTRAMUSCULAR
Status: DISCONTINUED | OUTPATIENT
Start: 2018-10-09 | End: 2018-10-09 | Stop reason: HOSPADM

## 2018-10-09 RX ORDER — SODIUM CHLORIDE 9 MG/ML
INJECTION, SOLUTION INTRAVENOUS CONTINUOUS
Status: DISCONTINUED | OUTPATIENT
Start: 2018-10-09 | End: 2018-10-09

## 2018-10-09 RX ORDER — SODIUM CHLORIDE 9 MG/ML
INJECTION, SOLUTION INTRAVENOUS CONTINUOUS
Status: CANCELLED | OUTPATIENT
Start: 2018-10-09 | End: 2018-10-09

## 2018-10-09 RX ORDER — AMOXICILLIN 250 MG
2 CAPSULE ORAL 2 TIMES DAILY
Status: DISCONTINUED | OUTPATIENT
Start: 2018-10-09 | End: 2018-10-10 | Stop reason: HOSPADM

## 2018-10-09 RX ORDER — BISACODYL 10 MG
10 SUPPOSITORY, RECTAL RECTAL
Status: DISCONTINUED | OUTPATIENT
Start: 2018-10-09 | End: 2018-10-10 | Stop reason: HOSPADM

## 2018-10-09 RX ORDER — ACETAMINOPHEN 325 MG/1
TABLET ORAL
Status: COMPLETED
Start: 2018-10-09 | End: 2018-10-09

## 2018-10-09 RX ORDER — ENALAPRILAT 1.25 MG/ML
1.25 INJECTION INTRAVENOUS EVERY 6 HOURS PRN
Status: DISCONTINUED | OUTPATIENT
Start: 2018-10-09 | End: 2018-10-10 | Stop reason: HOSPADM

## 2018-10-09 RX ORDER — CLOPIDOGREL BISULFATE 75 MG/1
75 TABLET ORAL DAILY
Status: CANCELLED | OUTPATIENT
Start: 2018-10-10

## 2018-10-09 RX ORDER — ACETAMINOPHEN 325 MG/1
650 TABLET ORAL EVERY 4 HOURS PRN
Status: DISCONTINUED | OUTPATIENT
Start: 2018-10-09 | End: 2018-10-10 | Stop reason: HOSPADM

## 2018-10-09 RX ORDER — LEVOTHYROXINE SODIUM 0.07 MG/1
75 TABLET ORAL
Status: DISCONTINUED | OUTPATIENT
Start: 2018-10-10 | End: 2018-10-10 | Stop reason: HOSPADM

## 2018-10-09 RX ORDER — WARFARIN SODIUM 10 MG/1
10 TABLET ORAL
Status: COMPLETED | OUTPATIENT
Start: 2018-10-09 | End: 2018-10-09

## 2018-10-09 RX ORDER — MEPERIDINE HYDROCHLORIDE 25 MG/ML
6.25 INJECTION INTRAMUSCULAR; INTRAVENOUS; SUBCUTANEOUS
Status: DISCONTINUED | OUTPATIENT
Start: 2018-10-09 | End: 2018-10-09 | Stop reason: HOSPADM

## 2018-10-09 RX ORDER — CARVEDILOL 12.5 MG/1
12.5 TABLET ORAL 2 TIMES DAILY WITH MEALS
Status: DISCONTINUED | OUTPATIENT
Start: 2018-10-09 | End: 2018-10-10 | Stop reason: HOSPADM

## 2018-10-09 RX ORDER — FUROSEMIDE 20 MG/1
20 TABLET ORAL DAILY
Status: DISCONTINUED | OUTPATIENT
Start: 2018-10-09 | End: 2018-10-10 | Stop reason: HOSPADM

## 2018-10-09 RX ORDER — ASPIRIN 81 MG/1
81 TABLET, CHEWABLE ORAL DAILY
Status: DISCONTINUED | OUTPATIENT
Start: 2018-10-10 | End: 2018-10-09

## 2018-10-09 RX ORDER — SODIUM CHLORIDE, SODIUM LACTATE, POTASSIUM CHLORIDE, CALCIUM CHLORIDE 600; 310; 30; 20 MG/100ML; MG/100ML; MG/100ML; MG/100ML
INJECTION, SOLUTION INTRAVENOUS CONTINUOUS
Status: DISCONTINUED | OUTPATIENT
Start: 2018-10-09 | End: 2018-10-09

## 2018-10-09 RX ORDER — CLOPIDOGREL 300 MG/1
TABLET, FILM COATED ORAL
Status: COMPLETED
Start: 2018-10-09 | End: 2018-10-09

## 2018-10-09 RX ORDER — CLOPIDOGREL BISULFATE 75 MG/1
75 TABLET ORAL DAILY
Status: DISCONTINUED | OUTPATIENT
Start: 2018-10-10 | End: 2018-10-10 | Stop reason: HOSPADM

## 2018-10-09 RX ORDER — NITROGLYCERIN 0.4 MG/1
0.4 TABLET SUBLINGUAL
Status: DISCONTINUED | OUTPATIENT
Start: 2018-10-09 | End: 2018-10-10 | Stop reason: HOSPADM

## 2018-10-09 RX ORDER — ASPIRIN 81 MG/1
81 TABLET, CHEWABLE ORAL DAILY
Status: DISCONTINUED | OUTPATIENT
Start: 2018-10-09 | End: 2018-10-10 | Stop reason: HOSPADM

## 2018-10-09 RX ORDER — MIDAZOLAM HYDROCHLORIDE 1 MG/ML
INJECTION INTRAMUSCULAR; INTRAVENOUS
Status: COMPLETED
Start: 2018-10-09 | End: 2018-10-09

## 2018-10-09 RX ORDER — LIDOCAINE HYDROCHLORIDE 10 MG/ML
INJECTION, SOLUTION INFILTRATION; PERINEURAL
Status: COMPLETED
Start: 2018-10-09 | End: 2018-10-09

## 2018-10-09 RX ORDER — WARFARIN SODIUM 5 MG/1
5 TABLET ORAL
Status: DISCONTINUED | OUTPATIENT
Start: 2018-10-09 | End: 2018-10-09

## 2018-10-09 RX ORDER — HEPARIN SODIUM,PORCINE 1000/ML
VIAL (ML) INJECTION
Status: COMPLETED
Start: 2018-10-09 | End: 2018-10-09

## 2018-10-09 RX ORDER — WARFARIN SODIUM 5 MG/1
5 TABLET ORAL
Status: DISCONTINUED | OUTPATIENT
Start: 2018-10-10 | End: 2018-10-09

## 2018-10-09 RX ORDER — ATORVASTATIN CALCIUM 40 MG/1
40 TABLET, FILM COATED ORAL NIGHTLY
Status: DISCONTINUED | OUTPATIENT
Start: 2018-10-09 | End: 2018-10-10 | Stop reason: HOSPADM

## 2018-10-09 RX ADMIN — FENTANYL CITRATE 100 MCG: 50 INJECTION, SOLUTION INTRAMUSCULAR; INTRAVENOUS at 10:38

## 2018-10-09 RX ADMIN — MIDAZOLAM HYDROCHLORIDE 2 MG: 1 INJECTION, SOLUTION INTRAMUSCULAR; INTRAVENOUS at 09:06

## 2018-10-09 RX ADMIN — WARFARIN SODIUM 10 MG: 10 TABLET ORAL at 17:58

## 2018-10-09 RX ADMIN — HEPARIN SODIUM 2000 UNITS: 200 INJECTION, SOLUTION INTRAVENOUS at 07:30

## 2018-10-09 RX ADMIN — ACETAMINOPHEN 650 MG: 325 TABLET ORAL at 12:25

## 2018-10-09 RX ADMIN — FENTANYL CITRATE 100 MCG: 50 INJECTION, SOLUTION INTRAMUSCULAR; INTRAVENOUS at 09:53

## 2018-10-09 RX ADMIN — LISINOPRIL 10 MG: 10 TABLET ORAL at 17:05

## 2018-10-09 RX ADMIN — CLOPIDOGREL BISULFATE 300 MG: 300 TABLET, FILM COATED ORAL at 10:40

## 2018-10-09 RX ADMIN — VALACYCLOVIR HYDROCHLORIDE 1000 MG: 500 TABLET, FILM COATED ORAL at 20:11

## 2018-10-09 RX ADMIN — HEPARIN SODIUM: 1000 INJECTION, SOLUTION INTRAVENOUS; SUBCUTANEOUS at 07:30

## 2018-10-09 RX ADMIN — BIVALIRUDIN 250 MG: 250 INJECTION, POWDER, LYOPHILIZED, FOR SOLUTION INTRAVENOUS at 09:22

## 2018-10-09 RX ADMIN — IOHEXOL 187 ML: 350 INJECTION, SOLUTION INTRAVENOUS at 10:39

## 2018-10-09 RX ADMIN — ATORVASTATIN CALCIUM 40 MG: 40 TABLET, FILM COATED ORAL at 20:11

## 2018-10-09 RX ADMIN — ACETAMINOPHEN 650 MG: 325 TABLET, FILM COATED ORAL at 12:25

## 2018-10-09 RX ADMIN — SODIUM CHLORIDE: 9 INJECTION, SOLUTION INTRAVENOUS at 06:32

## 2018-10-09 RX ADMIN — LIDOCAINE HYDROCHLORIDE: 10 INJECTION, SOLUTION INFILTRATION; PERINEURAL at 07:30

## 2018-10-09 RX ADMIN — CARVEDILOL 12.5 MG: 12.5 TABLET, FILM COATED ORAL at 17:04

## 2018-10-09 ASSESSMENT — COGNITIVE AND FUNCTIONAL STATUS - GENERAL
SUGGESTED CMS G CODE MODIFIER MOBILITY: CH
MOBILITY SCORE: 24
SUGGESTED CMS G CODE MODIFIER DAILY ACTIVITY: CH
DAILY ACTIVITIY SCORE: 24

## 2018-10-09 ASSESSMENT — COPD QUESTIONNAIRES
DURING THE PAST 4 WEEKS HOW MUCH DID YOU FEEL SHORT OF BREATH: SOME OF THE TIME
DO YOU EVER COUGH UP ANY MUCUS OR PHLEGM?: NO/ONLY WITH OCCASIONAL COLDS OR INFECTIONS
HAVE YOU SMOKED AT LEAST 100 CIGARETTES IN YOUR ENTIRE LIFE: YES
COPD SCREENING SCORE: 5

## 2018-10-09 ASSESSMENT — PAIN SCALES - GENERAL
PAINLEVEL_OUTOF10: 0
PAINLEVEL_OUTOF10: 5
PAINLEVEL_OUTOF10: 5
PAINLEVEL_OUTOF10: 3
PAINLEVEL_OUTOF10: 3
PAINLEVEL_OUTOF10: 0
PAINLEVEL_OUTOF10: 0
PAINLEVEL_OUTOF10: 3
PAINLEVEL_OUTOF10: 7
PAINLEVEL_OUTOF10: 0
PAINLEVEL_OUTOF10: 7
PAINLEVEL_OUTOF10: 0

## 2018-10-09 ASSESSMENT — PATIENT HEALTH QUESTIONNAIRE - PHQ9
2. FEELING DOWN, DEPRESSED, IRRITABLE, OR HOPELESS: NOT AT ALL
2. FEELING DOWN, DEPRESSED, IRRITABLE, OR HOPELESS: NOT AT ALL
SUM OF ALL RESPONSES TO PHQ9 QUESTIONS 1 AND 2: 0
SUM OF ALL RESPONSES TO PHQ9 QUESTIONS 1 AND 2: 0
1. LITTLE INTEREST OR PLEASURE IN DOING THINGS: NOT AT ALL
1. LITTLE INTEREST OR PLEASURE IN DOING THINGS: NOT AT ALL

## 2018-10-09 ASSESSMENT — LIFESTYLE VARIABLES
EVER_SMOKED: YES
TOTAL SCORE: 0
TOTAL SCORE: 0
HAVE PEOPLE ANNOYED YOU BY CRITICIZING YOUR DRINKING: NO
HOW MANY TIMES IN THE PAST YEAR HAVE YOU HAD 5 OR MORE DRINKS IN A DAY: 0
EVER FELT BAD OR GUILTY ABOUT YOUR DRINKING: NO
TOTAL SCORE: 0
CONSUMPTION TOTAL: NEGATIVE
EVER HAD A DRINK FIRST THING IN THE MORNING TO STEADY YOUR NERVES TO GET RID OF A HANGOVER: NO
HAVE YOU EVER FELT YOU SHOULD CUT DOWN ON YOUR DRINKING: NO
AVERAGE NUMBER OF DAYS PER WEEK YOU HAVE A DRINK CONTAINING ALCOHOL: 4
ON A TYPICAL DAY WHEN YOU DRINK ALCOHOL HOW MANY DRINKS DO YOU HAVE: 1
ALCOHOL_USE: YES
EVER_SMOKED: YES

## 2018-10-09 ASSESSMENT — CHA2DS2 SCORE
PRIOR STROKE OR TIA OR THROMBOEMBOLISM: NO
CHF OR LEFT VENTRICULAR DYSFUNCTION: YES
SEX: FEMALE
AGE 75 OR GREATER: YES
HYPERTENSION: YES
DIABETES: NO
AGE 65 TO 74: NO
CHA2DS2 VASC SCORE: 6
VASCULAR DISEASE: YES

## 2018-10-09 NOTE — OR NURSING
1101 Patient arrived to unit, awake and alert.  Right groin with small amount of sanguinous drainage, soft.  Patient denies pain at this time.  Updated on plan of care, verbalized understanding.  1130 Right groin unchanged.  1200 Shingles to patient's right side and back noted to be closed, charge RN on tele 7 notified.  1400 Patient head of bed elevated, no additional bleeding to groin.  1430 Patient states back pain has improved.  Right groin unchanged.  1450 Report to Mariann PATEL.  [facility] with RN.

## 2018-10-09 NOTE — PROGRESS NOTES
PT arrived to unit via gurney escorted by PPU RN. VSS Pt is in a-fib rate of 102. PT A&O x 4. Right groin cath site assessed with PPU RN. Dressing has dime sized drainage and bruising beneath dressing. Monitor leads in place. Monitor room notified. PT is orientated to the unit, call light, phone system, fall precautions in place, appropriate signs in place, bed in low and locked positions, bed alarm on. Pt educated regarded fall precautions and importance of calling staff for assistance. Pt denies further needs at the time. Will continue to monitor.

## 2018-10-09 NOTE — PROGRESS NOTES
Leonel done with anesthesia see cath lab report for all vitals, see anesthesia record for all medications

## 2018-10-09 NOTE — PROCEDURES
DATE OF SERVICE:  10/09/2018    REFERRING PHYSICIAN:  BASIA Arnold    PROCEDURES:  1.  Right heart catheterization.  2.  Left heart catheterization.  3.  Coronary angiography.  4.  Graft angiography.  5.  PTCA/stent placement of the saphenous vein graft to the obtuse marginal branch.  6.  Left ventriculogram.  7.  Ultrasound-guided arterial access.  8.  Angio-Seal closure.  9.  Monitor of conscious sedation.    PREPROCEDURE DIAGNOSIS:    1.  Severe mitral regurgitation under evaluation for MitraClip.    POSTPROCEDURE DIAGNOSES:  1.  3-4+ mitral regurgitation.  2.  Reduced left ventricular systolic function with ejection fraction of 35%,   mid to distal anterior and apical hypokinesis.  3.  Elevated left ventricular end-diastolic pressure.  4.  Coronary artery disease with known occluded saphenous vein graft to the   distal left anterior descending to the diagonal branch and to the right   coronary artery with patent left internal mammary artery to the mid to distal   left anterior descending artery, patent saphenous vein graft to a small obtuse   marginal branch with high-grade ostial proximal graft stenosis.  5.  Successful percutaneous transluminal coronary angioplasty/stent placement   of the ostial to proximal saphenous vein graft to the obtuse marginal branch   with 3.0x28 mm Synergy drug-eluting stent.  6.  Elevated left ventricular end-diastolic pressure.  7.  Elevated right heart pressure with 40 mmHg.    INDICATION:  The patient is a 78-year-old female with past medical history   significant for severe mitral regurgitation, chronic systolic congestive heart   failure, ischemic cardiomyopathy with ejection fraction of 35%, prior   5-vessel coronary bypass graft surgery in 2002 with redo in 2016 with known   occluded saphenous vein graft to the distal left anterior descending artery,   occluded saphenous vein graft to the diagonal branch and occluded saphenous   vein graft to the right coronary artery  with LAD stent placement by Dr. Yordy Menard on 04/26/2016 with 2.5x12, 2.5x12 and 2.5x8 mm stents from proximal   to mid portion in sequential manner, history of anticoagulation, on warfarin   therapy.  She is under consideration for MitraClip and was scheduled for   cardiac catheterization.    DESCRIPTION OF PROCEDURE:  After informed consent was signed by patient,   patient was brought to the cardiac catheterization laboratory.  She was   prepped and draped in the usual sterile manner.  The right inguinal area was   anesthetized with 2% Xylocaine using modified Seldinger technique under   ultrasound guidance.  A 4-Estonian pigtail catheter was positioned into the left   ventricle.  Left ventriculography was performed.  This was exchanged for a   JL4 catheter, which was positioned into the left main coronary artery.    Coronary angiography was performed.  This was exchanged for a JR4 catheter,   which was positioned into the right coronary artery.  Coronary angiography was   performed.  This catheter was directed into the saphenous vein graft to the   obtuse marginal and graft angiography was performed.  This catheter was   directed into the left internal mammary artery and graft angiography was   performed.  This catheter was removed and exchanged for an AL1 guide catheter,   which was positioned into the saphenous vein graft to the obtuse marginal   branch.   150 wire with the support of Iron Man wire and Guidezilla   catheter was used to cross the identified stenosis.  Stenosis was predilated   by 1.2x12 mm  balloon, 2.0x8 mm Emerge balloon, 2.5x20 mm Emerge   balloon, 3.0x8 mm Emerge balloon.  A 3.0x28 mm Synergy drug-eluting stent was   successfully positioned and deployed.  This stent was postdilated with 3.25x20   mm NC Emerge balloon.  The patient tolerated the procedure well.  At the end   of procedure, all wires, balloons, guide, and sheaths were removed.  The right   femoral arteriotomy  site was closed via an Angio-Seal system.  She was then   transferred to PPU in stable condition.    HEMODYNAMIC DATA:  Hemodynamic data shows right heart pressures of RA 8 mmHg,   RV 45/5 with RVEDP of 10 mmHg, PA 40/23 with mean of 30 mmHg, pulmonary wedge   20 mmHg.  Cardiac output by thermodilution 2.17, cardiac index 1.42.    Left-sided pressures, /90 with mean of 115 mmHg and /10 with LVEDP   of 20 mmHg.    LEFT VENTRICULOGRAM:  Ten mL of contrast was delivered for 33 seconds.    Ejection fraction was estimated to be 25%.  There was mid to distal anterior   and apical hypokinesis noted.    ANGIOGRAM:  Left main coronary artery:  Left main coronary artery is a short,   small-to-moderate caliber vessel free of disease.  Left anterior descending artery:  Left anterior descending artery is a long   moderate-caliber vessel, which wraps around the apex.  There is a proximal   concentric 40% stenosis prior to a patent stent.  Beyond this stent, it   becomes a small-caliber vessel with mid to distal small vessel high-grade   stenosis.  Left circumflex artery:  Left circumflex artery is a small-caliber vessel,   which is occluded at the mid portion.  Right coronary artery:  Right coronary artery is a dominant vessel, which is   occluded at the proximal portion.  There are no collaterals noted.    GRAFTS:  Left internal mammary artery to the diagonal branch patent, saphenous   vein graft to the distal left anterior descending artery known to be   occluded, saphenous vein graft to the diagonal branch known to be occluded,   saphenous vein graft to the obtuse marginal branch patent; however, with   ostial proximal complex high-grade stenosis of 99%.  Distally, there is a   small-caliber obtuse marginal branch noted, saphenous vein graft to the right   coronary artery known to be occluded.    PERCUTANEOUS INTERVENTION:  Ostial proximal saphenous vein graft to the obtuse   marginal branch, predilatation with  1.2x12 mm , 2.0x8 mm Emerge,   2.5x20 mm Emerge, 3.0x8 mm Emerge balloons.  Stent with 3.0x28 mm Synergy   drug-eluting stent, postdilatation with 3.0x25 mm NC Emerge balloon.    IMPRESSION:  1.  3-4+ mitral regurgitation.  2.  Reduced left ventricular systolic function with ejection fraction of 35%,   mid to distal anterior and apical hypokinesis.  3.  Elevated left ventricular end-diastolic pressure.  4.  Coronary artery disease with known occluded saphenous vein graft to the   distal left anterior descending to the diagonal branch and to the right   coronary artery with patent left internal mammary artery to the mid to distal   left anterior descending artery, patent saphenous vein graft to a small obtuse   marginal branch with high-grade ostial proximal graft stenosis.  5.  Successful percutaneous transluminal coronary angioplasty/stent placement   of the ostial to proximal saphenous vein graft to the obtuse marginal branch   with 3.0x28 mm Synergy drug-eluting stent.  6.  Elevated left ventricular end-diastolic pressure.  7.  Elevated right heart pressure with 40 mmHg.    RECOMMENDATIONS:  Recommend medical therapy with continuation of Plavix and   consider for MitraClip.    SEDATION: The patient's sedation was managed by myself with continuous   face to face time with the patient for 15 minutes from 08:35 to 08:50.       ____________________________________     MD REE CUNHA / IRAJ    DD:  10/09/2018 10:51:20  DT:  10/09/2018 11:36:32    D#:  [id number]  Job#:  [id number]

## 2018-10-09 NOTE — PROGRESS NOTES
Inpatient Anticoagulation Service Note    Date: 10/9/2018  Reason for Anticoagulation: Atrial Fibrillation   [id number] VASc Score: 6    Hemoglobin Value: 13.8  Hematocrit Value: 40.6  Lab Platelet Value: (!) 132  Target INR: 2.0 to 3.0    INR from last 7 days     Date/Time INR Value    10/09/18 0650  1.07        Dose from last 7 days     Date/Time Dose (mg)    10/09/18 1600  10        Average Dose (mg):  (Home dose: 7mg qMWF & 5mg AOD per medrec)  Significant Interactions: Aspirin, Antiplatelet Medications, Clopidogrel, Thyroid Medications, Statin  Bridge Therapy: No     Reversal Agent Administered: Not Applicable  Comments: Admit for scheduled heart cath. Continue home warfarin for A.fib. Home dose noted above. Give a one time bolus dose of 10mg followed by home dose. No bridge therapy. H/H appears stable with no indication of bleeding. DDI listed above. Pharmacy to [facility].     Plan:  Warfarin 10mg bolus x1 with INR check tomorrow  Education Material Provided?: No (Chronic tx)  Pharmacist suggested discharge dosing: Warfarin 7mg PO every Mon/Wed/Fri and 5mg on all other days with close f/u within 3 days         Bette Brasher, PharmD., BCPS

## 2018-10-10 ENCOUNTER — TELEPHONE (OUTPATIENT)
Dept: CARDIOLOGY | Facility: MEDICAL CENTER | Age: 78
End: 2018-10-10

## 2018-10-10 VITALS
TEMPERATURE: 98.9 F | OXYGEN SATURATION: 91 % | SYSTOLIC BLOOD PRESSURE: 121 MMHG | HEART RATE: 60 BPM | RESPIRATION RATE: 18 BRPM | HEIGHT: 64 IN | BODY MASS INDEX: 19.2 KG/M2 | WEIGHT: 112.43 LBS | DIASTOLIC BLOOD PRESSURE: 64 MMHG

## 2018-10-10 DIAGNOSIS — I49.3 PVC (PREMATURE VENTRICULAR CONTRACTION): ICD-10-CM

## 2018-10-10 DIAGNOSIS — I25.10 CORONARY ARTERY DISEASE INVOLVING NATIVE HEART WITHOUT ANGINA PECTORIS, UNSPECIFIED VESSEL OR LESION TYPE: ICD-10-CM

## 2018-10-10 PROBLEM — Z95.820 S/P ANGIOPLASTY WITH STENT: Status: ACTIVE | Noted: 2018-10-10

## 2018-10-10 LAB
ANION GAP SERPL CALC-SCNC: 8 MMOL/L (ref 0–11.9)
BUN SERPL-MCNC: 7 MG/DL (ref 8–22)
CALCIUM SERPL-MCNC: 8.5 MG/DL (ref 8.5–10.5)
CHLORIDE SERPL-SCNC: 101 MMOL/L (ref 96–112)
CO2 SERPL-SCNC: 24 MMOL/L (ref 20–33)
CREAT SERPL-MCNC: 0.67 MG/DL (ref 0.5–1.4)
ERYTHROCYTE [DISTWIDTH] IN BLOOD BY AUTOMATED COUNT: 44.5 FL (ref 35.9–50)
GLUCOSE SERPL-MCNC: 97 MG/DL (ref 65–99)
HCT VFR BLD AUTO: 36.2 % (ref 37–47)
HGB BLD-MCNC: 12.5 G/DL (ref 12–16)
INR PPP: 1.18 (ref 0.87–1.13)
MCH RBC QN AUTO: 33.2 PG (ref 27–33)
MCHC RBC AUTO-ENTMCNC: 34.5 G/DL (ref 33.6–35)
MCV RBC AUTO: 96 FL (ref 81.4–97.8)
PLATELET # BLD AUTO: 127 K/UL (ref 164–446)
PMV BLD AUTO: 10 FL (ref 9–12.9)
POTASSIUM SERPL-SCNC: 3.6 MMOL/L (ref 3.6–5.5)
PROTHROMBIN TIME: 15.1 SEC (ref 12–14.6)
RBC # BLD AUTO: 3.77 M/UL (ref 4.2–5.4)
SODIUM SERPL-SCNC: 133 MMOL/L (ref 135–145)
WBC # BLD AUTO: 5.4 K/UL (ref 4.8–10.8)

## 2018-10-10 PROCEDURE — 700102 HCHG RX REV CODE 250 W/ 637 OVERRIDE(OP): Performed by: NURSE PRACTITIONER

## 2018-10-10 PROCEDURE — 36415 COLL VENOUS BLD VENIPUNCTURE: CPT

## 2018-10-10 PROCEDURE — 85027 COMPLETE CBC AUTOMATED: CPT

## 2018-10-10 PROCEDURE — 85610 PROTHROMBIN TIME: CPT

## 2018-10-10 PROCEDURE — 96365 THER/PROPH/DIAG IV INF INIT: CPT

## 2018-10-10 PROCEDURE — G0378 HOSPITAL OBSERVATION PER HR: HCPCS

## 2018-10-10 PROCEDURE — A9270 NON-COVERED ITEM OR SERVICE: HCPCS | Performed by: NURSE PRACTITIONER

## 2018-10-10 PROCEDURE — 80048 BASIC METABOLIC PNL TOTAL CA: CPT

## 2018-10-10 PROCEDURE — 700111 HCHG RX REV CODE 636 W/ 250 OVERRIDE (IP): Performed by: NURSE PRACTITIONER

## 2018-10-10 RX ORDER — MAGNESIUM SULFATE 1 G/100ML
1 INJECTION INTRAVENOUS ONCE
Status: COMPLETED | OUTPATIENT
Start: 2018-10-10 | End: 2018-10-10

## 2018-10-10 RX ORDER — ASPIRIN 81 MG/1
81 TABLET, CHEWABLE ORAL DAILY
Qty: 100 TAB | Refills: 0 | Status: ON HOLD | OUTPATIENT
Start: 2018-10-11 | End: 2018-10-15

## 2018-10-10 RX ADMIN — MAGNESIUM SULFATE IN DEXTROSE 1 G: 10 INJECTION, SOLUTION INTRAVENOUS at 11:10

## 2018-10-10 RX ADMIN — CLOPIDOGREL 75 MG: 75 TABLET, FILM COATED ORAL at 05:41

## 2018-10-10 RX ADMIN — VALACYCLOVIR HYDROCHLORIDE 1000 MG: 500 TABLET, FILM COATED ORAL at 06:33

## 2018-10-10 RX ADMIN — LISINOPRIL 10 MG: 10 TABLET ORAL at 05:39

## 2018-10-10 RX ADMIN — VALACYCLOVIR HYDROCHLORIDE 1000 MG: 500 TABLET, FILM COATED ORAL at 13:29

## 2018-10-10 RX ADMIN — FUROSEMIDE 20 MG: 20 TABLET ORAL at 05:40

## 2018-10-10 RX ADMIN — LEVOTHYROXINE SODIUM 75 MCG: 75 TABLET ORAL at 05:39

## 2018-10-10 RX ADMIN — CARVEDILOL 12.5 MG: 12.5 TABLET, FILM COATED ORAL at 09:50

## 2018-10-10 ASSESSMENT — PAIN SCALES - GENERAL
PAINLEVEL_OUTOF10: 0

## 2018-10-10 NOTE — TELEPHONE ENCOUNTER
Valve Conference Heart Team Plan of Care: 10/10/18    TMVR candidate: Yes  With Possible open heart: No  Access: TF  Valve Size: mitral clip x1  Anesthesia: GA   Unit: Tele   Incidental findings: none  Clearance needed prior to procedure: none  Plan of care: Proceed with TMVR as scheduled 10/15.      Spoke with patient and made aware of heart team's recommendations. Reviewed medication instructions for preparing for upcoming procedure. Patient agrees with the plan and states no further questions at this time.

## 2018-10-10 NOTE — PROGRESS NOTES
Pt had a 10 beat run of Vtach, asymptomatic, vitals checked and WNL. Call placed to hospitalist, Callback received from Dr Scott, Dr Scott to place lab orders

## 2018-10-10 NOTE — DISCHARGE SUMMARY
Discharge Summary    CHIEF COMPLAINT ON ADMISSION  Post Elective Cardiac Catheterization     CODE STATUS  Full    HPI & HOSPITAL COURSE  This is a pleasant 78 year old female here for elective cardiac catheterization for preoperative workup for MitraClip placement. S past medical history significant for severe mitral regurgitation, congestive heart failure, ischemic cardiomyopathy, coronary artery disease S/P CABG, atrial fibrillation and chronic anticoagulation with warfarin.    The patient presented to the office with symptoms of mild shortness of breath and dyspnea on exertion. Previous echocardiogram completed on 7/10/18 showed severe mitral regurgitation. Patient agreed to proceed with MitraClip repair. They were therefore sent for elective cardiac catheterization and cardiac catheterization showed coronary artery disease with known occluded saphenous vein graft of the distal left anterior descending to the diagonal branch and to the right coronary artery with patent left internal mammary artery to the mid to distal left anterior descending artery, patent saphenous vein graft to a small obtuse marginal branch with high-grade ostial proximal graft stenosis.  Patient underwent successful percutaneous transluminal coronary angioplasty/stent placement of the ostial to proximal saphenous vein graft to the obtuse marginal branch with 3.0x28 mm Synergy drug-eluting stent.    Post-cath, the patient recovered well with no complications.Their right femoral catheterization site was clean, dry, and intact with no signs of hematoma, bleeding, or infection.  Patient was able to ambulate the halls without any exertional angina. Vital signs  and laboratory workup were unremarkable.The patient was given thorough discussion of his discharge instructions per nursing and myself. DAPT and statin adherence were discussed in detail. Patient was discharged to home with family with no further questions/concerns, their outpatient follow  up has been made by our office.    Patient was observed to have occasional 4-10 beat runs of V. tach on monitor overnight.  Mg was 1.8. 1 g of IV magnesium was given and 400 mg of PO magnesium daily was started. Patient will hold Coumadin 5 days prior to MitraClip repair which is scheduled for 10/15/18.  Instructed patient that if for some reason her procedure is postponed or canceled to please contact office immediately regarding anticoagulation.    PROCEDURES  LEFT CARDIAC CATH on 10/9/18  POST-OPERATIVE DIAGNOSES:      POSTPROCEDURE DIAGNOSES:  1.  3-4+ mitral regurgitation.  2.  Reduced left ventricular systolic function with ejection fraction of 35%, mid to distal anterior and apical hypokinesis.  3.  Elevated left ventricular end-diastolic pressure.  4.  Coronary artery disease with known occluded saphenous vein graft to the distal left anterior descending to the diagonal branch and to the right coronary artery with patent left internal mammary artery to the mid to distal   left anterior descending artery, patent saphenous vein graft to a small obtuse marginal branch with high-grade ostial proximal graft stenosis.  5.  Successful percutaneous transluminal coronary angioplasty/stent placement of the ostial to proximal saphenous vein graft to the obtuse marginal branch with 3.0x28 mm Synergy drug-eluting stent.  6.  Elevated left ventricular end-diastolic pressure.  7.  Elevated right heart pressure with 40 mmHg.    FOLLOW UP  Future Appointments  Date Time Provider Department Center   10/19/2018 1:20 PM CARLOS [facility] None   11/30/2018 11:30 AM Rah Anderson M.D. [facility] None   12/6/2018 1:40 PM Moy Don M.D. [facility] None     [facility]  [address]  [address]  [place] [id number] [phone]  Call        MEDICATIONS ON DISCHARGE     Medication List      START taking these medications      Instructions   aspirin 81 MG Chew chewable tablet  Commonly known  "as:  ASA   Doctor's comments:  Take for only one month.  Take 1 Tab by mouth every day.  Dose:  81 mg     magnesium oxide 400 (241.3 Mg) MG Tabs tablet  Commonly known as:  MAG-OX   Take 1 Tab by mouth every day.  Dose:  400 mg        CHANGE how you take these medications      Instructions   lisinopril 20 MG Tabs  What changed:  how much to take  Commonly known as:  PRINIVIL   Take 0.5 Tabs by mouth 2 times a day.  Dose:  10 mg        CONTINUE taking these medications      Instructions   carvedilol 25 MG Tabs  Commonly known as:  COREG   take 0.5 tablet by mouth twice a day with food     clopidogrel 75 MG Tabs  Commonly known as:  PLAVIX   Take 1 Tab by mouth every day.  Dose:  75 mg     furosemide 20 MG Tabs  Commonly known as:  LASIX   Take 1 Tab by mouth every day.  Dose:  20 mg     levothyroxine 75 MCG Tabs  Commonly known as:  SYNTHROID      LIPITOR 40 MG Tabs  Generic drug:  atorvastatin   Take 40 mg by mouth every evening.  Dose:  40 mg     nitroglycerin 0.4 MG Subl  Commonly known as:  NITROSTAT   Place 1 Tab under tongue as needed for Chest Pain.  Dose:  0.4 mg     valacyclovir 1 GM Tabs  Commonly known as:  VALTREX   Take 1,000 mg by mouth 3 times a day.  Dose:  1000 mg        STOP taking these medications    warfarin 5 MG Tabs  Commonly known as:  COUMADIN            Allergies  Allergies   Allergen Reactions   • Codeine Unspecified     \"I just don't like it. It does weird things to me.\"   • Hydrochlorothiazide Unspecified     Unsure of reaction   • Isosorbide Mononitrate Rash     Rash   • Keflex Rash     Rash   • Sulfa Drugs Rash     rash   • Tape Unspecified     Tears skin off   • Xarelto [Rivaroxaban] Rash     rash       DIET  Orders Placed This Encounter   Procedures   • Diet Order Cardiac     Standing Status:   Standing     Number of Occurrences:   1     Order Specific Question:   Diet:     Answer:   Cardiac [6]   • Discontinue Diet Tray     Standing Status:   Standing     Number of Occurrences:   1 "         LABORATORY  Lab Results   Component Value Date    SODIUM 133 (L) 10/10/2018    POTASSIUM 3.6 10/10/2018    CHLORIDE 101 10/10/2018    CO2 24 10/10/2018    GLUCOSE 97 10/10/2018    BUN 7 (L) 10/10/2018    CREATININE 0.67 10/10/2018        Lab Results   Component Value Date    WBC 5.4 10/10/2018    HEMOGLOBIN 12.5 10/10/2018    HEMATOCRIT 36.2 (L) 10/10/2018    PLATELETCT 127 (L) 10/10/2018

## 2018-10-10 NOTE — DISCHARGE INSTRUCTIONS
Discharge Instructions    Discharged to home by car with relative. Discharged via wheelchair, hospital escort: Yes.  Special equipment needed: Not Applicable    Be sure to schedule a follow-up appointment with your primary care doctor or any specialists as instructed.     Discharge Plan:   Diet Plan: Discussed  Activity Level: Discussed  Confirmed Follow up Appointment: Appointment Scheduled  Medication Reconciliation Updated: Yes  Influenza Vaccine Indication: Patient Refuses    I understand that a diet low in cholesterol, fat, and sodium is recommended for good health. Unless I have been given specific instructions below for another diet, I accept this instruction as my diet prescription.   Other diet: cardiac, heart healthy    Special Instructions:   Transcatheter Mitral Valve Repair (TMVR)    General Instructions:  · Take medication as directed. Do not ever stop taking any medications without talking to your doctor first.     Incision Care Instructions:  · Look and feel the site(s) of your TMVR TODAY, so you can recognize changes that should be called to your doctor (see below).  · It's normal for your incision to be bruised, itchy, or sore while it's healing. Your incision may take a week or more to heal.   · Bruising may occur.  Some of the discoloration may travel down the leg. As it resolves, the color should change from blue to green to yellow-brown.  · A small, round lump under the TMVR site may remain for up to 6 weeks  · Wash your incision site every day with warm water and soap. Gently pat it dry. Don't put powder, lotion, or ointment on the incision until it's healed.     Activity:  · No driving for one week.  · No lifting or pulling objects over 10 pounds for 5 days.  · Warm showers are permitted after the bandage is removed.  · Walk regularly. One of the best ways to get stronger is to walk. Walk a little more each day.      When to call your health care provider:  · You develop a  fever.  · Redness, swelling, bleeding, warmth, or fluid draining at the incision site.  · Bruising appears to be new or does not look like it is getting better.  · The small, round lump in the groin in the area of the TMVR site increases in size.     Seek immediate medical help if you have any of the following symptoms:  · You experience any leg numbness, aching, or discomfort.  · Chest pain or trouble breathing (call 911).  · Sudden numbness or weakness in your face, arms, or legs (call 911).  · Bowel movement that is bright red.   · Dizziness or fainting.  · Weight gain of more than 2 pounds in 24 hours or more than 5 pounds in 1 week.  · Swelling in your hands, feet, or ankles.  · Shortness of breath that doesn't get better when you rest.  · Pain that gets worse or doesn't go away.  · Fast or irregular pulse.      · Is patient discharged on Warfarin / Coumadin?   Yes    You are receiving the drug warfarin. Please understand the importance of monitoring warfarin with scheduled PT/INR blood draws.  Follow-up with a call to your personal Doctor's office in 3 days to schedule a PT/INR. .    IMPORTANT: HOW TO USE THIS INFORMATION:  This is a summary and does NOT have all possible information about this product. This information does not assure that this product is safe, effective, or appropriate for you. This information is not individual medical advice and does not substitute for the advice of your health care professional. Always ask your health care professional for complete information about this product and your specific health needs.      WARFARIN - ORAL (WARF-uh-rin)      COMMON BRAND NAME(S): Coumadin      WARNING:  Warfarin can cause very serious (possibly fatal) bleeding. This is more likely to occur when you first start taking this medication or if you take too much warfarin. To decrease your risk for bleeding, your doctor or other health care provider will monitor you closely and check your lab results  "(INR test) to make sure you are not taking too much warfarin. Keep all medical and laboratory appointments. Tell your doctor right away if you notice any signs of serious bleeding. See also Side Effects section.      USES:  This medication is used to treat blood clots (such as in deep vein thrombosis-DVT or pulmonary embolus-PE) and/or to prevent new clots from forming in your body. Preventing harmful blood clots helps to reduce the risk of a stroke or heart attack. Conditions that increase your risk of developing blood clots include a certain type of irregular heart rhythm (atrial fibrillation), heart valve replacement, recent heart attack, and certain surgeries (such as hip/knee replacement). Warfarin is commonly called a \"blood thinner,\" but the more correct term is \"anticoagulant.\" It helps to keep blood flowing smoothly in your body by decreasing the amount of certain substances (clotting proteins) in your blood.      HOW TO USE:  Read the Medication Guide provided by your pharmacist before you start taking warfarin and each time you get a refill. If you have any questions, ask your doctor or pharmacist. Take this medication by mouth with or without food as directed by your doctor or other health care professional, usually once a day. It is very important to take it exactly as directed. Do not increase the dose, take it more frequently, or stop using it unless directed by your doctor. Dosage is based on your medical condition, laboratory tests (such as INR), and response to treatment. Your doctor or other health care provider will monitor you closely while you are taking this medication to determine the right dose for you. Use this medication regularly to get the most benefit from it. To help you remember, take it at the same time each day. It is important to eat a balanced, consistent diet while taking warfarin. Some foods can affect how warfarin works in your body and may affect your treatment and dose. Avoid " sudden large increases or decreases in your intake of foods high in vitamin K (such as broccoli, cauliflower, cabbage, brussels sprouts, kale, spinach, and other green leafy vegetables, liver, green tea, certain vitamin supplements). If you are trying to lose weight, check with your doctor before you try to go on a diet. Cranberry products may also affect how your warfarin works. Limit the amount of cranberry juice (16 ounces/480 milliliters a day) or other cranberry products you may drink or eat.      SIDE EFFECTS:  Nausea, loss of appetite, or stomach/abdominal pain may occur. If any of these effects persist or worsen, tell your doctor or pharmacist promptly. Remember that your doctor has prescribed this medication because he or she has judged that the benefit to you is greater than the risk of side effects. Many people using this medication do not have serious side effects. This medication can cause serious bleeding if it affects your blood clotting proteins too much (shown by unusually high INR lab results). Even if your doctor stops your medication, this risk of bleeding can continue for up to a week. Tell your doctor right away if you have any signs of serious bleeding, including: unusual pain/swelling/discomfort, unusual/easy bruising, prolonged bleeding from cuts or gums, persistent/frequent nosebleeds, unusually heavy/prolonged menstrual flow, pink/dark urine, coughing up blood, vomit that is bloody or looks like coffee grounds, severe headache, dizziness/fainting, unusual or persistent tiredness/weakness, bloody/black/tarry stools, chest pain, shortness of breath, difficulty swallowing. Tell your doctor right away if any of these unlikely but serious side effects occur: persistent nausea/vomiting, severe stomach/abdominal pain, yellowing eyes/skin. This drug rarely has caused very serious (possibly fatal) problems if its effects lead to small blood clots (usually at the beginning of treatment). This can  lead to severe skin/tissue damage that may require surgery or amputation if left untreated. Patients with certain blood conditions (protein C or S deficiency) may be at greater risk. Get medical help right away if any of these rare but serious side effects occur: painful/red/purplish patches on the skin (such as on the toe, breast, abdomen), change in the amount of urine, vision changes, confusion, slurred speech, weakness on one side of the body. A very serious allergic reaction to this drug is rare. However, get medical help right away if you notice any symptoms of a serious allergic reaction, including: rash, itching/swelling (especially of the face/tongue/throat), severe dizziness, trouble breathing. This is not a complete list of possible side effects. If you notice other effects not listed above, contact your doctor or pharmacist. In the [place] - Call your doctor for medical advice about side effects. You may report side effects to FDA at 3-545-IEG-8919. In [place] - Call your doctor for medical advice about side effects. You may report side effects to [facility] [place] at [phone].      PRECAUTIONS:  Before taking warfarin, tell your doctor or pharmacist if you are allergic to it; or if you have any other allergies. This product may contain inactive ingredients, which can cause allergic reactions or other problems. Talk to your pharmacist for more details. Before using this medication, tell your doctor or pharmacist your medical history, especially of: blood disorders (such as anemia, hemophilia), bleeding problems (such as bleeding of the stomach/intestines, bleeding in the brain), blood vessel disorders (such as aneurysms), recent major injury/surgery, liver disease, alcohol use, mental/mood disorders (including memory problems), frequent falls/injuries. It is important that all your doctors and dentists know that you take warfarin. Before having surgery or any medical/dental procedures, tell your doctor or  dentist that you are taking this medication and about all the products you use (including prescription drugs, nonprescription drugs, and herbal products). Avoid getting injections into the muscles. If you must have an injection into a muscle (for example, a flu shot), it should be given in the arm. This way, it will be easier to check for bleeding and/or apply pressure bandages. This medication may cause stomach bleeding. Daily use of alcohol while using this medicine will increase your risk for stomach bleeding and may also affect how this medication works. Limit or avoid alcoholic beverages. If you have not been eating well, if you have an illness or infection that causes fever, vomiting, or diarrhea for more than 2 days, or if you start using any antibiotic medications, contact your doctor or pharmacist immediately because these conditions can affect how warfarin works. This medication can cause heavy bleeding. To lower the chance of getting cut, bruised, or injured, use great caution with sharp objects like safety razors and nail cutters. Use an electric razor when shaving and a soft toothbrush when brushing your teeth. Avoid activities such as contact sports. If you fall or injure yourself, especially if you hit your head, call your doctor immediately. Your doctor may need to check you. The Food & Drug Administration has stated that generic warfarin products are interchangeable. However, consult your doctor or pharmacist before switching warfarin products. Be careful not to take more than one medication that contains warfarin unless specifically directed by the doctor or health care provider who is monitoring your warfarin treatment. Older adults may be at greater risk for bleeding while using this drug. This medication is not recommended for use during pregnancy because of serious (possibly fatal) harm to an unborn baby. Discuss the use of reliable forms of birth control with your doctor. If you become  "pregnant or think you may be pregnant, tell your doctor immediately. If you are planning pregnancy, discuss a plan for managing your condition with your doctor before you become pregnant. Your doctor may switch the type of medication you use during pregnancy. Very small amounts of this medication may pass into breast milk but is unlikely to harm a nursing infant. Consult your doctor before breast-feeding.      DRUG INTERACTIONS:  Drug interactions may change how your medications work or increase your risk for serious side effects. This document does not contain all possible drug interactions. Keep a list of all the products you use (including prescription/nonprescription drugs and herbal products) and share it with your doctor and pharmacist. Do not start, stop, or change the dosage of any medicines without your doctor's approval. Warfarin interacts with many prescription, nonprescription, vitamin, and herbal products. This includes medications that are applied to the skin or inside the vagina or rectum. The interactions with warfarin usually result in an increase or decrease in the \"blood-thinning\" (anticoagulant) effect. Your doctor or other health care professional should closely monitor you to prevent serious bleeding or clotting problems. While taking warfarin, it is very important to tell your doctor or pharmacist of any changes in medications, vitamins, or herbal products that you are taking. Some products that may interact with this drug include: capecitabine, imatinib, mifepristone. Aspirin, aspirin-like drugs (salicylates), and nonsteroidal anti-inflammatory drugs (NSAIDs such as ibuprofen, naproxen, celecoxib) may have effects similar to warfarin. These drugs may increase the risk of bleeding problems if taken during treatment with warfarin. Carefully check all prescription/nonprescription product labels (including drugs applied to the skin such as pain-relieving creams) since the products may contain " NSAIDs or salicylates. Talk to your doctor about using a different medication (such as acetaminophen) to treat pain/fever. Low-dose aspirin and related drugs (such as clopidogrel, ticlopidine) should be continued if prescribed by your doctor for specific medical reasons such as heart attack or stroke prevention. Consult your doctor or pharmacist for more details. Many herbal products interact with warfarin. Tell your doctor before taking any herbal products, especially bromelains, coenzyme Q10, cranberry, danshen, dong quai, fenugreek, garlic, ginkgo biloba, ginseng, and [place]'s wort, among others. This medication may interfere with a certain laboratory test to measure theophylline levels, possibly causing false test results. Make sure laboratory personnel and all your doctors know you use this drug.      OVERDOSE:  If overdose is suspected, contact a poison control center or emergency room immediately. [place] residents can call the [employer] Poison Hotline at 1-496.657.6385. [place] residents can call a provincial poison control center. Symptoms of overdose may include: bloody/black/tarry stools, pink/dark urine, unusual/prolonged bleeding.      NOTES:  Do not share this medication with others. Laboratory and/or medical tests (such as INR, complete blood count) must be performed periodically to monitor your progress or check for side effects. Consult your doctor for more details.      MISSED DOSE:  For the best possible benefit, do not miss any doses. If you do miss a dose and remember on the same day, take it as soon as you remember. If you remember on the next day, skip the missed dose and resume your usual dosing schedule. Do not double the dose to catch up because this could increase your risk for bleeding. Keep a record of missed doses to give to your doctor or pharmacist. Contact your doctor or pharmacist if you miss 2 or more doses in a row.      STORAGE:  Store at room temperature away from light and  moisture. Do not store in the bathroom. Keep all medications away from children and pets. Do not flush medications down the toilet or pour them into a drain unless instructed to do so. Properly discard this product when it is  or no longer needed. Consult your pharmacist or local waste disposal company for more details about how to safely discard your product.      MEDICAL ALERT:  Your condition and medication can cause complications in a medical emergency. For information about enrolling in MedicAlert, call 1-379.975.4829 ([place]) or 1-787.227.3530 ([place]).      Information last revised 2010 Copyright(c)  [employer]        Angiogram, Care After  Refer to this sheet in the next few weeks. These instructions provide you with information about caring for yourself after your procedure. Your health care provider may also give you more specific instructions. Your treatment has been planned according to current medical practices, but problems sometimes occur. Call your health care provider if you have any problems or questions after your procedure.  WHAT TO EXPECT AFTER THE PROCEDURE  After your procedure, it is typical to have the following:  · Bruising at the catheter insertion site that usually fades within 1-2 weeks.  · Blood collecting in the tissue (hematoma) that may be painful to the touch. It should usually decrease in size and tenderness within 1-2 weeks.  HOME CARE INSTRUCTIONS  · Take medicines only as directed by your health care provider.  · You may shower 24-48 hours after the procedure or as directed by your health care provider. Remove the bandage (dressing) and gently wash the site with plain soap and water. Pat the area dry with a clean towel. Do not rub the site, because this may cause bleeding.  · Do not take baths, swim, or use a hot tub until your health care provider approves.  · Check your insertion site every day for redness, swelling, or drainage.  · Do not apply powder  or lotion to the site.  · Do not lift over 10 lb (4.5 kg) for 5 days after your procedure or as directed by your health care provider.  · Ask your health care provider when it is okay to:  ¨ Return to work or school.  ¨ Resume usual physical activities or sports.  ¨ Resume sexual activity.  · Do not drive home if you are discharged the same day as the procedure. Have someone else drive you.  · You may drive 24 hours after the procedure unless otherwise instructed by your health care provider.  · Do not operate machinery or power tools for 24 hours after the procedure or as directed by your health care provider.  · If your procedure was done as an outpatient procedure, which means that you went home the same day as your procedure, a responsible adult should be with you for the first 24 hours after you arrive home.  · Keep all follow-up visits as directed by your health care provider. This is important.  SEEK MEDICAL CARE IF:  · You have a fever.  · You have chills.  · You have increased bleeding from the catheter insertion site. Hold pressure on the site.  SEEK IMMEDIATE MEDICAL CARE IF:  · You have unusual pain at the catheter insertion site.  · You have redness, warmth, or swelling at the catheter insertion site.  · You have drainage (other than a small amount of blood on the dressing) from the catheter insertion site.  · The catheter insertion site is bleeding, and the bleeding does not stop after 30 minutes of holding steady pressure on the site.  · The area near or just beyond the catheter insertion site becomes pale, cool, tingly, or numb.     This information is not intended to replace advice given to you by your health care provider. Make sure you discuss any questions you have with your health care provider.     Document Released: 07/06/2006 Document Revised: 01/08/2016 Document Reviewed: 07/06/2015  [employer] Interactive Patient Education ©2016 [employer]         Shingles  Shingles is an infection that  causes a painful skin rash and fluid-filled blisters. Shingles is caused by the same virus that causes chickenpox.  Shingles only develops in people who:  · Have had chickenpox.  · Have gotten the chickenpox vaccine. (This is rare.)  The first symptoms of shingles may be itching, tingling, or pain in an area on your skin. A rash will follow in a few days or weeks. The rash is usually on one side of the body in a bandlike or beltlike pattern. Over time, the rash turns into fluid-filled blisters that break open, scab over, and dry up. Medicines may:  · Help you manage pain.  · Help you recover more quickly.  · Help to prevent long-term problems.  Follow these instructions at home:  Medicines  · Take medicines only as told by your doctor.  · Apply an anti-itch or numbing cream to the affected area as told by your doctor.  Blister and Rash Care  · Take a cool bath or put cool compresses on the area of the rash or blisters as told by your doctor. This may help with pain and itching.  · Keep your rash covered with a loose bandage (dressing). Wear loose-fitting clothing.  · Keep your rash and blisters clean with mild soap and cool water or as told by your doctor.  · Check your rash every day for signs of infection. These include redness, swelling, and pain that lasts or gets worse.  · Do not pick your blisters.  · Do not scratch your rash.  General instructions  · Rest as told by your doctor.  · Keep all follow-up visits as told by your doctor. This is important.  · Until your blisters scab over, your infection can cause chickenpox in people who have never had it or been vaccinated against it. To prevent this from happening, avoid touching other people or being around other people, especially:  ¨ Babies.  ¨ Pregnant women.  ¨ Children who have eczema.  ¨ Elderly people who have transplants.  ¨ People who have chronic illnesses, such as leukemia or AIDS.  Contact a doctor if:  · Your pain does not get better with  medicine.  · Your pain does not get better after the rash heals.  · Your rash looks infected. Signs of infection include:  ¨ Redness.  ¨ Swelling.  ¨ Pain that lasts or gets worse.  Get help right away if:  · The rash is on your face or nose.  · You have pain in your face, pain around your eye area, or loss of feeling on one side of your face.  · You have ear pain or you have ringing in your ear.  · You have loss of taste.  · Your condition gets worse.  This information is not intended to replace advice given to you by your health care provider. Make sure you discuss any questions you have with your health care provider.  Document Released: 06/05/2009 Document Revised: 08/13/2017 Document Reviewed: 09/29/2015  [employer] Interactive Patient Education © 2017 [employer]      Depression / Suicide Risk    As you are discharged from this formerly [facility] facility, it is important to learn how to keep safe from harming yourself.    Recognize the warning signs:  · Abrupt changes in personality, positive or negative- including increase in energy   · Giving away possessions  · Change in eating patterns- significant weight changes-  positive or negative  · Change in sleeping patterns- unable to sleep or sleeping all the time   · Unwillingness or inability to communicate  · Depression  · Unusual sadness, discouragement and loneliness  · Talk of wanting to die  · Neglect of personal appearance   · Rebelliousness- reckless behavior  · Withdrawal from people/activities they love  · Confusion- inability to concentrate     If you or a loved one observes any of these behaviors or has concerns about self-harm, here's what you can do:  · Talk about it- your feelings and reasons for harming yourself  · Remove any means that you might use to hurt yourself (examples: pills, rope, extension cords, firearm)  · Get professional help from the community (Mental Health, Substance Abuse, psychological counseling)  · Do not be alone:Call your Safe  Contact- someone whom you trust who will be there for you.  · Call your local CRISIS HOTLINE 480-3914 or 925-537-4935  · Call your local Children's Mobile Crisis Response Team [place] (969) 451-6602 or www.[employer]  · Call the toll free National Suicide Prevention Hotlines   · National Suicide Prevention Lifeline 314-269-YOSJ (5017)  · National [employer] | [employer] 800-SUICIDE (921-4580)          STOP ASPIRIN AFTER ONE MONTH (STOP on 11/9/18)

## 2018-10-10 NOTE — PROGRESS NOTES
Bedside report received from night nurse. Assumed care of pt. Pt awake, laying in bed. A/Ox4, VSS. Pt states she is going home today and wants to know when. Pt educated the physician will have to round this morning and be the one to make that decision. No other complaints at this time. POC reviewed and white board updated. Tele box on. Call light in reach. Bed locked in lowest position with 2 upper bed rails up.

## 2018-10-10 NOTE — CARE PLAN
Problem: Venous Thromboembolism (VTW)/Deep Vein Thrombosis (DVT) Prevention:  Goal: Patient will participate in Venous Thrombosis (VTE)/Deep Vein Thrombosis (DVT)Prevention Measures    Intervention: Assess and monitor for anticoagulation complications  Right groin cath site is clean, dry, soft and intact with a small bruise. IV sites, gums, and generalized skin are all intact without signs of bleeding.       Problem: Knowledge Deficit  Goal: Knowledge of disease process/condition, treatment plan, diagnostic tests, and medications will improve    Intervention: Explain information regarding disease process/condition, treatment plan, diagnostic tests, and medications and document in education  Pt educated on plan of care, medications, pain management, and disease processes. Pt verbalized understanding and was encouraged to ask questions. All questions answered.

## 2018-10-10 NOTE — PROGRESS NOTES
Discharge instructions given and discussed, signed copy in chart. Pt verbalized understanding and all questions answered. All prescriptions reviewed. Patient is Alert & Oriented and discharged home in stable condition on room air via car escorted by staff. Personal belongings with patient. IV removed and tolerated well. Tele box removed, monitor room notified.

## 2018-10-10 NOTE — PROGRESS NOTES
"Pt refusing the 2 RN skin check stating she is \" leaving tomorrow and does not want this RN to remove the dressing covering her shingles\". Pt was educated on the importance of the skin check and continues to refuse.   "

## 2018-10-15 ENCOUNTER — APPOINTMENT (OUTPATIENT)
Dept: RADIOLOGY | Facility: MEDICAL CENTER | Age: 78
DRG: 229 | End: 2018-10-15
Attending: NURSE PRACTITIONER
Payer: MEDICARE

## 2018-10-15 ENCOUNTER — APPOINTMENT (OUTPATIENT)
Dept: CARDIOLOGY | Facility: MEDICAL CENTER | Age: 78
DRG: 229 | End: 2018-10-15
Attending: INTERNAL MEDICINE
Payer: MEDICARE

## 2018-10-15 ENCOUNTER — HOSPITAL ENCOUNTER (INPATIENT)
Facility: MEDICAL CENTER | Age: 78
LOS: 1 days | DRG: 229 | End: 2018-10-16
Attending: INTERNAL MEDICINE | Admitting: INTERNAL MEDICINE
Payer: MEDICARE

## 2018-10-15 DIAGNOSIS — I10 ESSENTIAL HYPERTENSION: ICD-10-CM

## 2018-10-15 DIAGNOSIS — I50.20 SYSTOLIC CHF WITH REDUCED LEFT VENTRICULAR FUNCTION, NYHA CLASS 2 (HCC): ICD-10-CM

## 2018-10-15 PROBLEM — Z98.890 S/P MITRAL VALVE REPAIR: Status: ACTIVE | Noted: 2018-10-15

## 2018-10-15 PROBLEM — I34.0 SEVERE MITRAL REGURGITATION: Status: RESOLVED | Noted: 2018-09-27 | Resolved: 2018-10-15

## 2018-10-15 LAB
ABO GROUP BLD: NORMAL
ABO GROUP BLD: NORMAL
ACT BLD: 125 SEC (ref 74–137)
ACT BLD: 191 SEC (ref 74–137)
ACT BLD: 252 SEC (ref 74–137)
ACT BLD: 340 SEC (ref 74–137)
ANION GAP SERPL CALC-SCNC: 6 MMOL/L (ref 0–11.9)
BLD GP AB SCN SERPL QL: NORMAL
BNP SERPL-MCNC: 978 PG/ML (ref 0–100)
BUN SERPL-MCNC: 9 MG/DL (ref 8–22)
CALCIUM SERPL-MCNC: 8.9 MG/DL (ref 8.4–10.2)
CHLORIDE SERPL-SCNC: 101 MMOL/L (ref 96–112)
CO2 SERPL-SCNC: 27 MMOL/L (ref 20–33)
CREAT SERPL-MCNC: 0.84 MG/DL (ref 0.5–1.4)
EKG IMPRESSION: NORMAL
ERYTHROCYTE [DISTWIDTH] IN BLOOD BY AUTOMATED COUNT: 46.4 FL (ref 35.9–50)
ERYTHROCYTE [DISTWIDTH] IN BLOOD BY AUTOMATED COUNT: 48.3 FL (ref 35.9–50)
GLUCOSE SERPL-MCNC: 103 MG/DL (ref 65–99)
HCT VFR BLD AUTO: 34.7 % (ref 37–47)
HCT VFR BLD AUTO: 39.9 % (ref 37–47)
HGB BLD-MCNC: 11.4 G/DL (ref 12–16)
HGB BLD-MCNC: 13.3 G/DL (ref 12–16)
INR PPP: 1.03 (ref 0.87–1.13)
MCH RBC QN AUTO: 32.7 PG (ref 27–33)
MCH RBC QN AUTO: 33.5 PG (ref 27–33)
MCHC RBC AUTO-ENTMCNC: 32.9 G/DL (ref 33.6–35)
MCHC RBC AUTO-ENTMCNC: 33.3 G/DL (ref 33.6–35)
MCV RBC AUTO: 100.5 FL (ref 81.4–97.8)
MCV RBC AUTO: 99.4 FL (ref 81.4–97.8)
PLATELET # BLD AUTO: 131 K/UL (ref 164–446)
PLATELET # BLD AUTO: 138 K/UL (ref 164–446)
PMV BLD AUTO: 9.5 FL (ref 9–12.9)
PMV BLD AUTO: 9.6 FL (ref 9–12.9)
POTASSIUM SERPL-SCNC: 4.6 MMOL/L (ref 3.6–5.5)
PROTHROMBIN TIME: 13.6 SEC (ref 12–14.6)
RBC # BLD AUTO: 3.49 M/UL (ref 4.2–5.4)
RBC # BLD AUTO: 3.97 M/UL (ref 4.2–5.4)
RH BLD: NORMAL
RH BLD: NORMAL
SODIUM SERPL-SCNC: 134 MMOL/L (ref 135–145)
WBC # BLD AUTO: 4.4 K/UL (ref 4.8–10.8)
WBC # BLD AUTO: 4.7 K/UL (ref 4.8–10.8)

## 2018-10-15 PROCEDURE — 700102 HCHG RX REV CODE 250 W/ 637 OVERRIDE(OP): Performed by: NURSE PRACTITIONER

## 2018-10-15 PROCEDURE — 71045 X-RAY EXAM CHEST 1 VIEW: CPT

## 2018-10-15 PROCEDURE — 503000 HCHG SUTURE, OHS: Performed by: INTERNAL MEDICINE

## 2018-10-15 PROCEDURE — 160036 HCHG PACU - EA ADDL 30 MINS PHASE I: Performed by: INTERNAL MEDICINE

## 2018-10-15 PROCEDURE — 500002 HCHG ADHESIVE, DERMABOND: Performed by: INTERNAL MEDICINE

## 2018-10-15 PROCEDURE — 700101 HCHG RX REV CODE 250

## 2018-10-15 PROCEDURE — C1725 CATH, TRANSLUMIN NON-LASER: HCPCS | Performed by: INTERNAL MEDICINE

## 2018-10-15 PROCEDURE — 160002 HCHG RECOVERY MINUTES (STAT): Performed by: INTERNAL MEDICINE

## 2018-10-15 PROCEDURE — A9270 NON-COVERED ITEM OR SERVICE: HCPCS | Performed by: NURSE PRACTITIONER

## 2018-10-15 PROCEDURE — 160042 HCHG SURGERY MINUTES - EA ADDL 1 MIN LEVEL 5: Performed by: INTERNAL MEDICINE

## 2018-10-15 PROCEDURE — 700102 HCHG RX REV CODE 250 W/ 637 OVERRIDE(OP)

## 2018-10-15 PROCEDURE — C1894 INTRO/SHEATH, NON-LASER: HCPCS | Performed by: INTERNAL MEDICINE

## 2018-10-15 PROCEDURE — 02UG3JZ SUPPLEMENT MITRAL VALVE WITH SYNTHETIC SUBSTITUTE, PERCUTANEOUS APPROACH: ICD-10-PCS | Performed by: THORACIC SURGERY (CARDIOTHORACIC VASCULAR SURGERY)

## 2018-10-15 PROCEDURE — A9270 NON-COVERED ITEM OR SERVICE: HCPCS

## 2018-10-15 PROCEDURE — 501789 HCHG NRG RF TRANSSEPTAL NEEDLE BAYLIS: Performed by: INTERNAL MEDICINE

## 2018-10-15 PROCEDURE — 93325 DOPPLER ECHO COLOR FLOW MAPG: CPT

## 2018-10-15 PROCEDURE — 160048 HCHG OR STATISTICAL LEVEL 1-5: Performed by: INTERNAL MEDICINE

## 2018-10-15 PROCEDURE — 33418 REPAIR TCAT MITRAL VALVE: CPT | Mod: 62,Q0 | Performed by: INTERNAL MEDICINE

## 2018-10-15 PROCEDURE — 700111 HCHG RX REV CODE 636 W/ 250 OVERRIDE (IP): Performed by: NURSE PRACTITIONER

## 2018-10-15 PROCEDURE — 86900 BLOOD TYPING SEROLOGIC ABO: CPT

## 2018-10-15 PROCEDURE — 502240 HCHG MISC OR SUPPLY RC 0272: Performed by: INTERNAL MEDICINE

## 2018-10-15 PROCEDURE — C1893 INTRO/SHEATH, FIXED,NON-PEEL: HCPCS | Performed by: INTERNAL MEDICINE

## 2018-10-15 PROCEDURE — 93010 ELECTROCARDIOGRAM REPORT: CPT | Performed by: INTERNAL MEDICINE

## 2018-10-15 PROCEDURE — 85347 COAGULATION TIME ACTIVATED: CPT | Mod: 91

## 2018-10-15 PROCEDURE — 700102 HCHG RX REV CODE 250 W/ 637 OVERRIDE(OP): Performed by: INTERNAL MEDICINE

## 2018-10-15 PROCEDURE — 85610 PROTHROMBIN TIME: CPT

## 2018-10-15 PROCEDURE — 700111 HCHG RX REV CODE 636 W/ 250 OVERRIDE (IP): Performed by: ANESTHESIOLOGY

## 2018-10-15 PROCEDURE — 86901 BLOOD TYPING SEROLOGIC RH(D): CPT

## 2018-10-15 PROCEDURE — 80048 BASIC METABOLIC PNL TOTAL CA: CPT

## 2018-10-15 PROCEDURE — A9270 NON-COVERED ITEM OR SERVICE: HCPCS | Performed by: INTERNAL MEDICINE

## 2018-10-15 PROCEDURE — 85027 COMPLETE CBC AUTOMATED: CPT

## 2018-10-15 PROCEDURE — 700105 HCHG RX REV CODE 258: Performed by: NURSE PRACTITIONER

## 2018-10-15 PROCEDURE — 770020 HCHG ROOM/CARE - TELE (206)

## 2018-10-15 PROCEDURE — 700111 HCHG RX REV CODE 636 W/ 250 OVERRIDE (IP)

## 2018-10-15 PROCEDURE — 83880 ASSAY OF NATRIURETIC PEPTIDE: CPT

## 2018-10-15 PROCEDURE — 93005 ELECTROCARDIOGRAM TRACING: CPT | Performed by: NURSE PRACTITIONER

## 2018-10-15 PROCEDURE — 86850 RBC ANTIBODY SCREEN: CPT

## 2018-10-15 PROCEDURE — 36415 COLL VENOUS BLD VENIPUNCTURE: CPT

## 2018-10-15 PROCEDURE — 160031 HCHG SURGERY MINUTES - 1ST 30 MINS LEVEL 5: Performed by: INTERNAL MEDICINE

## 2018-10-15 PROCEDURE — 160009 HCHG ANES TIME/MIN: Performed by: INTERNAL MEDICINE

## 2018-10-15 PROCEDURE — 501787: Performed by: INTERNAL MEDICINE

## 2018-10-15 PROCEDURE — 160035 HCHG PACU - 1ST 60 MINS PHASE I: Performed by: INTERNAL MEDICINE

## 2018-10-15 PROCEDURE — A6402 STERILE GAUZE <= 16 SQ IN: HCPCS | Performed by: INTERNAL MEDICINE

## 2018-10-15 DEVICE — IMPLANTABLE DEVICE: Type: IMPLANTABLE DEVICE | Status: FUNCTIONAL

## 2018-10-15 RX ORDER — HALOPERIDOL 5 MG/ML
1 INJECTION INTRAMUSCULAR
Status: DISCONTINUED | OUTPATIENT
Start: 2018-10-15 | End: 2018-10-15 | Stop reason: HOSPADM

## 2018-10-15 RX ORDER — LISINOPRIL 20 MG/1
20 TABLET ORAL 2 TIMES DAILY
Status: DISCONTINUED | OUTPATIENT
Start: 2018-10-15 | End: 2018-10-16

## 2018-10-15 RX ORDER — LISINOPRIL 20 MG/1
20 TABLET ORAL 2 TIMES DAILY
Status: ON HOLD | COMMUNITY
End: 2018-10-16

## 2018-10-15 RX ORDER — OXYCODONE HYDROCHLORIDE 5 MG/1
10 TABLET ORAL
Status: DISCONTINUED | OUTPATIENT
Start: 2018-10-15 | End: 2018-10-15 | Stop reason: HOSPADM

## 2018-10-15 RX ORDER — WARFARIN SODIUM 5 MG/1
5-7.5 TABLET ORAL DAILY
Status: ON HOLD | COMMUNITY
End: 2023-10-11

## 2018-10-15 RX ORDER — HYDROMORPHONE HYDROCHLORIDE 2 MG/ML
0.1 INJECTION, SOLUTION INTRAMUSCULAR; INTRAVENOUS; SUBCUTANEOUS
Status: DISCONTINUED | OUTPATIENT
Start: 2018-10-15 | End: 2018-10-15 | Stop reason: HOSPADM

## 2018-10-15 RX ORDER — OXYCODONE HCL 5 MG/5 ML
5 SOLUTION, ORAL ORAL
Status: COMPLETED | OUTPATIENT
Start: 2018-10-15 | End: 2018-10-15

## 2018-10-15 RX ORDER — POTASSIUM CHLORIDE 20 MEQ/1
20 TABLET, EXTENDED RELEASE ORAL DAILY
Status: DISCONTINUED | OUTPATIENT
Start: 2018-10-15 | End: 2018-10-16 | Stop reason: HOSPADM

## 2018-10-15 RX ORDER — WARFARIN SODIUM 5 MG/1
5-7.5 TABLET ORAL DAILY
Status: DISCONTINUED | OUTPATIENT
Start: 2018-10-15 | End: 2018-10-15

## 2018-10-15 RX ORDER — ACETAMINOPHEN 325 MG/1
650 TABLET ORAL EVERY 6 HOURS PRN
Status: DISCONTINUED | OUTPATIENT
Start: 2018-10-15 | End: 2018-10-16 | Stop reason: HOSPADM

## 2018-10-15 RX ORDER — MEPERIDINE HYDROCHLORIDE 25 MG/ML
6.25 INJECTION INTRAMUSCULAR; INTRAVENOUS; SUBCUTANEOUS
Status: DISCONTINUED | OUTPATIENT
Start: 2018-10-15 | End: 2018-10-15 | Stop reason: HOSPADM

## 2018-10-15 RX ORDER — HYDRALAZINE HYDROCHLORIDE 20 MG/ML
5 INJECTION INTRAMUSCULAR; INTRAVENOUS
Status: DISCONTINUED | OUTPATIENT
Start: 2018-10-15 | End: 2018-10-15 | Stop reason: HOSPADM

## 2018-10-15 RX ORDER — CLOPIDOGREL BISULFATE 75 MG/1
75 TABLET ORAL DAILY
Status: DISCONTINUED | OUTPATIENT
Start: 2018-10-15 | End: 2018-10-16 | Stop reason: HOSPADM

## 2018-10-15 RX ORDER — ONDANSETRON 2 MG/ML
4 INJECTION INTRAMUSCULAR; INTRAVENOUS EVERY 4 HOURS PRN
Status: DISCONTINUED | OUTPATIENT
Start: 2018-10-15 | End: 2018-10-16 | Stop reason: HOSPADM

## 2018-10-15 RX ORDER — FUROSEMIDE 10 MG/ML
40 INJECTION INTRAMUSCULAR; INTRAVENOUS
Status: DISCONTINUED | OUTPATIENT
Start: 2018-10-15 | End: 2018-10-16

## 2018-10-15 RX ORDER — WARFARIN SODIUM 5 MG/1
5 TABLET ORAL
Status: DISCONTINUED | OUTPATIENT
Start: 2018-10-16 | End: 2018-10-16 | Stop reason: HOSPADM

## 2018-10-15 RX ORDER — HYDROMORPHONE HYDROCHLORIDE 2 MG/ML
0.4 INJECTION, SOLUTION INTRAMUSCULAR; INTRAVENOUS; SUBCUTANEOUS
Status: DISCONTINUED | OUTPATIENT
Start: 2018-10-15 | End: 2018-10-15 | Stop reason: HOSPADM

## 2018-10-15 RX ORDER — SODIUM CHLORIDE, SODIUM LACTATE, POTASSIUM CHLORIDE, CALCIUM CHLORIDE 600; 310; 30; 20 MG/100ML; MG/100ML; MG/100ML; MG/100ML
INJECTION, SOLUTION INTRAVENOUS CONTINUOUS
Status: DISCONTINUED | OUTPATIENT
Start: 2018-10-15 | End: 2018-10-15 | Stop reason: HOSPADM

## 2018-10-15 RX ORDER — ONDANSETRON 2 MG/ML
4 INJECTION INTRAMUSCULAR; INTRAVENOUS
Status: DISCONTINUED | OUTPATIENT
Start: 2018-10-15 | End: 2018-10-15 | Stop reason: HOSPADM

## 2018-10-15 RX ORDER — OXYCODONE HCL 5 MG/5 ML
10 SOLUTION, ORAL ORAL
Status: COMPLETED | OUTPATIENT
Start: 2018-10-15 | End: 2018-10-15

## 2018-10-15 RX ORDER — LEVOTHYROXINE SODIUM 0.05 MG/1
75 TABLET ORAL
Status: DISCONTINUED | OUTPATIENT
Start: 2018-10-16 | End: 2018-10-16 | Stop reason: HOSPADM

## 2018-10-15 RX ORDER — DIPHENHYDRAMINE HCL 25 MG
25 TABLET ORAL NIGHTLY PRN
Status: DISCONTINUED | OUTPATIENT
Start: 2018-10-15 | End: 2018-10-16 | Stop reason: HOSPADM

## 2018-10-15 RX ORDER — SODIUM CHLORIDE 9 MG/ML
INJECTION, SOLUTION INTRAVENOUS CONTINUOUS
Status: DISPENSED | OUTPATIENT
Start: 2018-10-15 | End: 2018-10-15

## 2018-10-15 RX ORDER — OXYCODONE HYDROCHLORIDE 5 MG/1
5 TABLET ORAL
Status: DISCONTINUED | OUTPATIENT
Start: 2018-10-15 | End: 2018-10-15 | Stop reason: HOSPADM

## 2018-10-15 RX ORDER — ATORVASTATIN CALCIUM 20 MG/1
40 TABLET, FILM COATED ORAL NIGHTLY
Status: DISCONTINUED | OUTPATIENT
Start: 2018-10-15 | End: 2018-10-16 | Stop reason: HOSPADM

## 2018-10-15 RX ORDER — WARFARIN SODIUM 7.5 MG/1
7.5 TABLET ORAL
Status: DISCONTINUED | OUTPATIENT
Start: 2018-10-15 | End: 2018-10-16 | Stop reason: HOSPADM

## 2018-10-15 RX ORDER — OXYCODONE HCL 5 MG/5 ML
SOLUTION, ORAL ORAL
Status: COMPLETED
Start: 2018-10-15 | End: 2018-10-15

## 2018-10-15 RX ORDER — DIPHENHYDRAMINE HCL 25 MG
25 TABLET ORAL EVERY 6 HOURS PRN
Status: DISCONTINUED | OUTPATIENT
Start: 2018-10-15 | End: 2018-10-16 | Stop reason: HOSPADM

## 2018-10-15 RX ORDER — HYDROMORPHONE HYDROCHLORIDE 2 MG/ML
0.2 INJECTION, SOLUTION INTRAMUSCULAR; INTRAVENOUS; SUBCUTANEOUS
Status: DISCONTINUED | OUTPATIENT
Start: 2018-10-15 | End: 2018-10-15 | Stop reason: HOSPADM

## 2018-10-15 RX ORDER — VALACYCLOVIR HYDROCHLORIDE 500 MG/1
1000 TABLET, FILM COATED ORAL 2 TIMES DAILY
Status: DISCONTINUED | OUTPATIENT
Start: 2018-10-15 | End: 2018-10-16 | Stop reason: HOSPADM

## 2018-10-15 RX ORDER — CARVEDILOL 12.5 MG/1
12.5 TABLET ORAL 2 TIMES DAILY WITH MEALS
Status: DISCONTINUED | OUTPATIENT
Start: 2018-10-15 | End: 2018-10-16 | Stop reason: HOSPADM

## 2018-10-15 RX ADMIN — FUROSEMIDE 40 MG: 10 INJECTION, SOLUTION INTRAMUSCULAR; INTRAVENOUS at 17:45

## 2018-10-15 RX ADMIN — ASPIRIN 81 MG: 81 TABLET, COATED ORAL at 17:45

## 2018-10-15 RX ADMIN — POTASSIUM CHLORIDE 20 MEQ: 1500 TABLET, EXTENDED RELEASE ORAL at 17:45

## 2018-10-15 RX ADMIN — OXYCODONE HYDROCHLORIDE 10 MG: 5 SOLUTION ORAL at 12:40

## 2018-10-15 RX ADMIN — WARFARIN SODIUM 7.5 MG: 7.5 TABLET ORAL at 17:44

## 2018-10-15 RX ADMIN — FENTANYL CITRATE 50 MCG: 50 INJECTION, SOLUTION INTRAMUSCULAR; INTRAVENOUS at 12:40

## 2018-10-15 RX ADMIN — CLOPIDOGREL 75 MG: 75 TABLET, FILM COATED ORAL at 17:45

## 2018-10-15 RX ADMIN — FENTANYL CITRATE 50 MCG: 50 INJECTION, SOLUTION INTRAMUSCULAR; INTRAVENOUS at 12:55

## 2018-10-15 RX ADMIN — DIPHENHYDRAMINE HCL 25 MG: 25 TABLET ORAL at 20:21

## 2018-10-15 RX ADMIN — FENTANYL CITRATE 50 MCG: 50 INJECTION, SOLUTION INTRAMUSCULAR; INTRAVENOUS at 13:46

## 2018-10-15 RX ADMIN — CARVEDILOL 12.5 MG: 12.5 TABLET, FILM COATED ORAL at 17:45

## 2018-10-15 RX ADMIN — ACETAMINOPHEN 650 MG: 325 TABLET, FILM COATED ORAL at 17:58

## 2018-10-15 RX ADMIN — ATORVASTATIN CALCIUM 40 MG: 20 TABLET, FILM COATED ORAL at 20:21

## 2018-10-15 RX ADMIN — SODIUM CHLORIDE: 9 INJECTION, SOLUTION INTRAVENOUS at 14:59

## 2018-10-15 RX ADMIN — VALACYCLOVIR HYDROCHLORIDE 1000 MG: 500 TABLET, FILM COATED ORAL at 17:44

## 2018-10-15 RX ADMIN — Medication 10 MG: at 12:40

## 2018-10-15 RX ADMIN — LISINOPRIL 20 MG: 20 TABLET ORAL at 17:46

## 2018-10-15 ASSESSMENT — PAIN SCALES - GENERAL
PAINLEVEL_OUTOF10: 10
PAINLEVEL_OUTOF10: 3
PAINLEVEL_OUTOF10: 7
PAINLEVEL_OUTOF10: 0
PAINLEVEL_OUTOF10: 4
PAINLEVEL_OUTOF10: 7
PAINLEVEL_OUTOF10: 7
PAINLEVEL_OUTOF10: 4
PAINLEVEL_OUTOF10: 4
PAINLEVEL_OUTOF10: 7
PAINLEVEL_OUTOF10: 5
PAINLEVEL_OUTOF10: 10
PAINLEVEL_OUTOF10: 3

## 2018-10-15 ASSESSMENT — ENCOUNTER SYMPTOMS
CHILLS: 0
FEVER: 0
WEIGHT LOSS: 0
CLAUDICATION: 0
CONSTITUTIONAL NEGATIVE: 1
WEAKNESS: 1
COUGH: 0
DOUBLE VISION: 0
DEPRESSION: 0
RESPIRATORY NEGATIVE: 1
EYES NEGATIVE: 1
PALPITATIONS: 0
HEADACHES: 0
NERVOUS/ANXIOUS: 0
NEUROLOGICAL NEGATIVE: 1
SHORTNESS OF BREATH: 0
FOCAL WEAKNESS: 0
NAUSEA: 0
CARDIOVASCULAR NEGATIVE: 1
BLURRED VISION: 0
BRUISES/BLEEDS EASILY: 0
SHORTNESS OF BREATH: 1
WEAKNESS: 0
DIZZINESS: 0
MYALGIAS: 0
VOMITING: 0
ABDOMINAL PAIN: 0
GASTROINTESTINAL NEGATIVE: 1
MUSCULOSKELETAL NEGATIVE: 1
PSYCHIATRIC NEGATIVE: 1

## 2018-10-15 ASSESSMENT — PATIENT HEALTH QUESTIONNAIRE - PHQ9
SUM OF ALL RESPONSES TO PHQ9 QUESTIONS 1 AND 2: 0
2. FEELING DOWN, DEPRESSED, IRRITABLE, OR HOPELESS: NOT AT ALL
1. LITTLE INTEREST OR PLEASURE IN DOING THINGS: NOT AT ALL

## 2018-10-15 ASSESSMENT — CHA2DS2 SCORE
AGE 65 TO 74: NO
DIABETES: NO
HYPERTENSION: YES
CHA2DS2 VASC SCORE: 6
CHF OR LEFT VENTRICULAR DYSFUNCTION: YES
PRIOR STROKE OR TIA OR THROMBOEMBOLISM: NO
VASCULAR DISEASE: YES
SEX: FEMALE
AGE 75 OR GREATER: YES

## 2018-10-15 NOTE — H&P
Physician H&P    Patient ID:  Daya Woods  [id number]  78 y.o. female  1940    History:  Primary Diagnosis: Outpatient MitraClip    HPI:  78 year old female with complex cardiovascular history including severe mitral regurgitation, congestive heart failure, ischemic cardiomyopathy, coronary artery disease with prior coronary artery bypass graft surgery, atrial fibrillation on chronic anticoagulation therapy.    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  Past Surgical History:  has a past surgical history that includes appendectomy; multiple coronary artery bypass; tonsillectomy and adenoidectomy; tubal ligation; other (Bilateral, 08/2014); recovery (8/26/2016); and gyn surgery.  Past Social History:  reports that she quit smoking about 16 years ago. She has a 20.00 pack-year smoking history. She has never used smokeless tobacco. She reports that she drinks alcohol. She reports that she does not use drugs.  Past Family History:   Family History   Problem Relation Age of Onset   • Heart Attack Brother 55        heart attack     Allergies: Codeine; Hydrochlorothiazide; Isosorbide mononitrate; Keflex; Sulfa drugs; Tape; and Xarelto [rivaroxaban]    Medications reviewed.    Current Medications:  Prior to Admission medications    Medication Sig Start Date End Date  Taking? Authorizing Provider   aspirin (ASA) 81 MG Chew Tab chewable tablet Take 1 Tab by mouth every day. 10/11/18   PEACE DrewRMEE   magnesium oxide (MAG-OX) 400 (241.3 Mg) MG Tab tablet Take 1 Tab by mouth every day. 10/11/18   PEACE DrewRMARIUM.   valacyclovir (VALTREX) 1 GM Tab Take 1,000 mg by mouth 3 times a day.    Physician Outpatient   atorvastatin (LIPITOR) 40 MG Tab Take 40 mg by mouth every evening.    Physician Outpatient   carvedilol (COREG) 25 MG Tab take 0.5 tablet by mouth twice a day with food 9/4/18   Rah Anderson M.D.   lisinopril (PRINIVIL) 20 MG Tab Take 0.5 Tabs by mouth 2 times a day.  Patient taking differently: Take  by mouth 2 times a day. 7/17/18   PHILLIP Hooper   nitroglycerin (NITROSTAT) 0.4 MG SL Tab Place 1 Tab under tongue as needed for Chest Pain. 4/10/18   Rah Anderson M.D.   levothyroxine (SYNTHROID) 75 MCG Tab  7/2/15   Physician Outpatient   clopidogrel (PLAVIX) 75 MG TABS Take 1 Tab by mouth every day. 3/13/14   EVIE SanabriaP.NEdward   furosemide (LASIX) 20 MG TABS Take 1 Tab by mouth every day. 3/13/14   MALIKA Sanabria.P.NEdward       Review of Systems:  Review of Systems   Constitutional: Positive for malaise/fatigue. Negative for chills, fever and weight loss.   HENT: Negative.  Negative for hearing loss.    Eyes: Negative.  Negative for blurred vision and double vision.   Respiratory: Positive for shortness of breath. Negative for cough.    Cardiovascular: Negative.  Negative for chest pain, palpitations, claudication and leg swelling.   Gastrointestinal: Negative.  Negative for abdominal pain, nausea and vomiting.   Genitourinary: Negative.  Negative for dysuria and urgency.   Musculoskeletal: Negative.  Negative for joint pain and myalgias.   Skin: Negative.  Negative for itching and rash.   Neurological: Positive for weakness. Negative for dizziness, focal weakness and headaches.   Endo/Heme/Allergies: Negative.  Does not  "bruise/bleed easily.   Psychiatric/Behavioral: Negative.  Negative for depression. The patient is not nervous/anxious.      Blood pressure (!) 169/81, pulse 80, temperature 36.4 °C (97.5 °F), resp. rate 14, height 1.626 m (5' 4\"), weight 51.7 kg (113 lb 15.7 oz), SpO2 97 %, not currently breastfeeding.    Physical Examination:  Physical Exam   Constitutional: She is oriented to person, place, and time. She appears well-developed and well-nourished.   HENT:   Head: Normocephalic and atraumatic.   Eyes: Pupils are equal, round, and reactive to light. Conjunctivae are normal.   Neck: Normal range of motion. Neck supple.   Cardiovascular: Normal rate.  An irregularly irregular rhythm present.   Murmur heard.  Pulmonary/Chest: Effort normal and breath sounds normal.   Abdominal: Soft. Bowel sounds are normal.   Musculoskeletal: Normal range of motion. She exhibits no edema.   Neurological: She is alert and oriented to person, place, and time.   Skin: Skin is warm and dry.   Psychiatric: She has a normal mood and affect.     CARDIAC STUDIES/PROCEDURES:     CARDIAC CATHETERIZATION CONCLUSIONS by Dr. Menard (08/26/16)  1. Complex heavily calcified high-grade disease of the proximal and mid left anterior descending.  2. Chronic total occlusion of the circumflex.  3. Codominant RCA with moderate native disease.  4. Totally occluded saphenous vein graft x3, (chronic).  5. Patent saphenous vein graft to an obtuse marginal with high-grade stenosis   of the distal anastomosis.  6. Patent left internal mammary artery to a diagonal vessel.  7. Technically difficult and demanding PCI with overlapping stent placement of the proximal and mid LAD with 3 overlapping stents were placed at the area of dissection and occlusion that is still heavily calcified those being 2.5x12, 2.5x12 and 2.5x8.     CARDIAC CATHETERIZATION CONCLUSIONS3 (10/09/18)  1.  3-4+ mitral regurgitation.  2.  Reduced left ventricular systolic function with " ejection fraction of 35%,   mid to distal anterior and apical hypokinesis.  3.  Elevated left ventricular end-diastolic pressure.  4.  Coronary artery disease with known occluded saphenous vein graft to the   distal left anterior descending to the diagonal branch and to the right   coronary artery with patent left internal mammary artery to the mid to distal   left anterior descending artery, patent saphenous vein graft to a small obtuse   marginal branch with high-grade ostial proximal graft stenosis.  5.  Successful percutaneous transluminal coronary angioplasty/stent placement   of the ostial to proximal saphenous vein graft to the obtuse marginal branch   with 3.0x28 mm Synergy drug-eluting stent.  6.  Elevated left ventricular end-diastolic pressure.  7.  Elevated right heart pressure with 40 mmHg.  (study result reviewed)     CAROTID ULTRASOUND (09/20/18)  Atherosclerosis without stenosis reaching 50% threshold.     ECHOCARDIOGRAM CONCLUSIONS (07/10/18)  Moderately reduced left ventricular systolic function.  Left ventricular ejection fraction is visually estimated to be 35-40%.   Mild concentric left ventricular hypertrophy.  Severely dilated left atrium.  Severe mitral regurgitation.  Estimated right ventricular systolic pressure  is 30 mmHg.  Compared to the images of the prior study done on 04/04/17, mitral   regurgitation is slightly worse and now severe, LVEF is unchanged.    EKG performed on (10/09/18) was reviewed: EKG shows atrial fibrillation.     Laboratory results of (10/100/18) were reviewed. Bun of 7 mg/dl, creatinine levels of 0.67 mg/dl noted.    TRANSESOPHAGEAL ECHOCARDIOGRAM CONCLUSIONS by  (10/09/18)  Mildly reduced LV systolic function, EF 40%.  Small apical aneurysm noted.  Mildly reduced RV systolic function.  Severe LAE.  SUDHAKRA is large, spontaneous contrast present, low emptying velocities.  Severe mitral regurgitaiton due to prolapse and functional MR,   predominantly  A2-A3.  Compared to TTE 7-27-18, EF similar, unable to est RVSP, severe MR.  Impression/Plan:    1. Mitral regurgitation: Her mitral regurgitation has reached severe status on her recent echocardiogram and she is symptomatic, NYHA class III. She is excessive risk per Dr. tSoner with STS. We will proceed with transesophageal echocardiogram, cardiac catheterization and follow up with MitraClip. She understands the risks and benefits and agrees with plan.  2. Ischemic cardiomyopathy with congestive heart failure: The overall volume status is elevated. We will start diuretic therapy.  3. Coronary artery disease with prior coronary bypass graft (in 2002 and redo in 2016) and stent placements: Plan as above.  4. Atrial fibrillation on anticoagulation therapy (warfarin): She is doing well on anticoagulation and the ventricular rate is well controlled.    CC Belinda Muñoz, Rah Conti and Sourav Hogan    Pre Procedure Assessment:  <EXAMSHORT2>      Pre Procedure update:   No changes.    Moy Don  10/15/2018

## 2018-10-15 NOTE — OR SURGEON
Immediate Post OP Note    PreOp Diagnosis: MR    PostOp Diagnosis: MR    Procedure(s):  TRANSCATHETER MITRAL VALVE REPAIR (MitraClip x1)  TRANSSEPTAL PUNCTURE  ASPIRATION OF RIGHT ATRIAL THROMBUS  DEVON    Surgeon(s):  JERARDO Santo M.D.    Anesthesiologist/Type of Anesthesia:  Anesthesiologist: Temo Ohara M.D.; Drew Rai M.D./General    Surgical Staff:  Circulator: Jacque Olivares R.N.; Jonathan Medina RMEE; Claudia Henderson R.N.  Scrub Person: Cathy Kiser; Dar Anguiano  Cath Lab Tech: Isaías Blanco; Isidro Rankin    Specimens removed if any: None    Estimated Blood Loss: Approx. 30    Findings: MR, RA thrombus    Complications: None        10/15/2018 12:03 PM Brain Stoner M.D.

## 2018-10-15 NOTE — OP REPORT
DATE OF SERVICE:  10/15/2018    PREOPERATIVE DIAGNOSIS:  Severe mitral regurgitation.    POSTOPERATIVE DIAGNOSIS:  Severe mitral regurgitation.    PROCEDURES:  Transcatheter mitral valve repair (MitraClip x1), transseptal   puncture, aspiration of right atrial thrombus and intraoperative   transesophageal echocardiography.    SURGEON:  Moy Don MD and Brain Stoner MD    ANESTHESIOLOGIST:  Drew Rai MD    ANESTHESIA:  General endotracheal.    ESTIMATED BLOOD LOSS:  Approximately 30 mL.    COMPLICATIONS:  None.    INDICATIONS:  The patient is a 78-year-old white female with symptomatic   severe mitral regurgitation.  Echocardiography showed severe mitral   regurgitation, mitral valve leaflet prolapse.  Her left ventricular ejection   fraction was visually estimated to be approximately 40%.    DESCRIPTION OF PROCEDURE:  The patient was brought to the operating room and   placed on the operating room table in the supine position.  After successful   induction of general anesthesia and endotracheal intubation, the patient was   prepped and draped in the usual sterile fashion.  In collaboration with Dr. Don, left femoral arterial access and right femoral venous access was   obtained under ultrasound guidance.  A small 1 cm incision was made in the   right groin and 2 Perclose sutures were deployed on the common femoral artery.    Dr. Don advanced a wire into the superior vena cava and the [place]   transseptal puncture catheter was advanced over the wire into a superior   posterior position in the interatrial septum.  The tenting of the septum was   approximately 4.2 cm from the mitral valve and Dr. Don then performed a   transseptal puncture.  I advanced a coiled tip wire into the left atrium.  A   small thrombus was noticed in the right atrium and Dr. Don aspirated it   through the [place] sheath with a syringe.  The [place] sheath was placed into   its proper position over the coil tip wire.   The [place] sheath was removed, I   advanced the steerable guide over the wire into a proper position across the   interatrial septum.  The clip delivery system was then placed through the   sheath.  Dr. Don positioned the clip in the A2/P2 area where most of the   regurgitant jet of the mitral valve was present.  The leaflets were grasped.    Intraoperative transesophageal echocardiography showed a significant reduction   of the regurgitant jet, the clip was in the middle of the jet.  The clip was   released in the usual fashion.  Intraoperative transesophageal   echocardiography showed reduction of the mitral regurgitant jet from 3+ to 1+.    We felt this was a good outcome.  The clipped liver system and steerable   guide sheath were removed.  The 2 Perclose sutures were tied down.  When   adequate hemostasis had been obtained, the small right groin incision was   closed with a single Vicryl stitch.  The remainder of the operation will be   dictated by Dr. Don.  There were no apparent complications.  The patient   tolerated the procedure well and left the operating room in stable condition.       ____________________________________     MD RICA SANTO / IRAJ    DD:  10/15/2018 12:18:14  DT:  10/15/2018 12:53:47    D#:  [id number]  Job#:  [id number]

## 2018-10-15 NOTE — NON-PROVIDER
[facility] Registry ID for Claudette Clip Procedure  ID: [id number]

## 2018-10-15 NOTE — PROGRESS NOTES
Inpatient Anticoagulation Service Note    Date: 10/15/2018  Reason for Anticoagulation: Atrial Fibrillation   [id number] VASc Score: 6    Hemoglobin Value: (!) 11.4  Hematocrit Value: (!) 34.7  Lab Platelet Value: (!) 131  Target INR: 2.0 to 3.0    INR from last 7 days     Date/Time INR Value    10/15/18 0702  1.03        Dose from last 7 days     Date/Time Dose (mg)    10/15/18 1440  7.5        Average Dose (mg): 7.5 mg M/W/F, 5 mg all other days  Significant Interactions: Aspirin, Statin, Thyroid Medications, Antiplatelet Medications  Bridge Therapy: No     Comments: Pt here for MitraClip on 10/15. Restarting warfarin for afib. CBC and INR ordered for tomorrow AM.    Plan: Ordered home warfarin dose of 7.5 mg on Mon/Wed/Fri with 5 mg all other days. INR tomorrow  Education Material Provided?: No (chronic warfarin pt)  Pharmacist suggested discharge dosing: Home dosing of 7.5 mg M/W/F, 5 mg all other days     Shanice Ornelas, KenaD, BCPS

## 2018-10-15 NOTE — PROGRESS NOTES
Cardiology Follow Up Progress Note    Date of Service  10/15/2018    Attending Physician  Moy Don M.D.    Chief Complaint   Outpatient [facility]VR    HPI  Daya Woods is a 78 y.o. female admitted 10/15/2018 with complex cardiovascular history including severe mitral regurgitation, congestive heart failure, ischemic cardiomyopathy, coronary artery disease with prior coronary artery bypass graft surgery, atrial fibrillation on chronic anticoagulation therapy. Due to her symptoms she was scheduled for MitraClip.    Interim Events  Successful MitraClip.    Review of Systems  Review of Systems   Constitutional: Negative.  Negative for chills and fever.   HENT: Negative.  Negative for hearing loss.    Eyes: Negative.    Respiratory: Negative.  Negative for cough and shortness of breath.    Cardiovascular: Negative.  Negative for chest pain, palpitations and leg swelling.   Gastrointestinal: Negative.  Negative for abdominal pain, nausea and vomiting.   Genitourinary: Negative.  Negative for dysuria and urgency.   Musculoskeletal: Negative.  Negative for myalgias.   Skin: Negative.  Negative for rash.   Neurological: Negative.  Negative for dizziness, weakness and headaches.   Hematological: Does not bruise/bleed easily.   Psychiatric/Behavioral: Negative.  The patient is not nervous/anxious.        Vital signs in last 24 hours  Temp:  [36.4 °C (97.5 °F)-36.4 °C (97.6 °F)] 36.4 °C (97.6 °F)  Pulse:  [80] 80  Resp:  [10-17] 13  BP: (169)/(81) 169/81    Physical Exam  Physical Exam   Constitutional: She is oriented to person, place, and time. She appears well-developed and well-nourished.   HENT:   Head: Normocephalic and atraumatic.   Eyes: Pupils are equal, round, and reactive to light. Conjunctivae are normal.   Neck: Normal range of motion. Neck supple.   Cardiovascular: Normal rate.  An irregularly irregular rhythm present.   Pulmonary/Chest: Effort normal and breath sounds normal.   Abdominal: Soft. Bowel  sounds are normal.   Musculoskeletal: Normal range of motion. She exhibits no edema.   Neurological: She is alert and oriented to person, place, and time.   Skin: Skin is warm and dry.   Psychiatric: She has a normal mood and affect.       Lab Review  Lab Results   Component Value Date/Time    WBC 4.7 (L) 10/15/2018 01:16 PM    RBC 3.49 (L) 10/15/2018 01:16 PM    HEMOGLOBIN 11.4 (L) 10/15/2018 01:16 PM    HEMATOCRIT 34.7 (L) 10/15/2018 01:16 PM    MCV 99.4 (H) 10/15/2018 01:16 PM    MCH 32.7 10/15/2018 01:16 PM    MCHC 32.9 (L) 10/15/2018 01:16 PM    MPV 9.5 10/15/2018 01:16 PM      Lab Results   Component Value Date/Time    SODIUM 134 (L) 10/15/2018 07:02 AM    POTASSIUM 4.6 10/15/2018 07:02 AM    CHLORIDE 101 10/15/2018 07:02 AM    CO2 27 10/15/2018 07:02 AM    GLUCOSE 103 (H) 10/15/2018 07:02 AM    BUN 9 10/15/2018 07:02 AM    CREATININE 0.84 10/15/2018 07:02 AM      Lab Results   Component Value Date/Time    ASTSGOT 28 10/09/2018 06:50 AM    ALTSGPT 28 10/09/2018 06:50 AM     Lab Results   Component Value Date/Time    CHOLSTRLTOT 137 02/24/2014 03:05 AM    LDL 67 02/24/2014 03:05 AM    HDL 55 02/24/2014 03:05 AM    TRIGLYCERIDE 74 02/24/2014 03:05 AM    TROPONINI 1.27 (H) 02/24/2014 06:03 PM           Medications reviewed.    Medications reviewed.      Current Facility-Administered Medications:   •  lactated ringers infusion, , Intravenous, Continuous, Drew Rai M.D.  •  fentaNYL (SUBLIMAZE) injection 25 mcg, 25 mcg, Intravenous, Q2 MIN PRN **OR** fentaNYL (SUBLIMAZE) injection 50 mcg, 50 mcg, Intravenous, Q2 MIN PRN, Drew Rai M.D., 50 mcg at 10/15/18 1255  •  meperidine (DEMEROL) injection 6.5 mg, 6.5 mg, Intravenous, Q5 MIN PRN, Drew Rai M.D.  •  ondansetron (ZOFRAN) syringe/vial injection 4 mg, 4 mg, Intravenous, Once PRN, Drew Rai M.D.  •  haloperidol lactate (HALDOL) injection 1 mg, 1 mg, Intravenous, Q15 MIN PRN, Drew Rai M.D.  •  albuterol (PROVENTIL) 2.5mg/0.5ml nebulizer solution  2.5 mg, 2.5 mg, Inhalation, Q10 MIN PRN, Drew Rai M.D.  •  HYDROmorphone (DILAUDID) injection 0.1 mg, 0.1 mg, Intravenous, Q5 MIN PRN **OR** HYDROmorphone (DILAUDID) injection 0.2 mg, 0.2 mg, Intravenous, Q5 MIN PRN **OR** HYDROmorphone (DILAUDID) injection 0.4 mg, 0.4 mg, Intravenous, Q5 MIN PRN, Drew Rai M.D.  •  oxyCODONE immediate-release (ROXICODONE) tablet 5 mg, 5 mg, Oral, Once PRN **OR** oxyCODONE immediate-release (ROXICODONE) tablet 10 mg, 10 mg, Oral, Once PRN, Drew Rai M.D.  •  hydrALAZINE (APRESOLINE) injection 5 mg, 5 mg, Intravenous, Q5 MIN PRN, Drew Rai M.D.  •  aspirin EC (ECOTRIN) tablet 81 mg, 81 mg, Oral, DAILY, Maddy Robles, A.P.R.N.  •  atorvastatin (LIPITOR) tablet 40 mg, 40 mg, Oral, Nightly, Maddy Robles, A.P.R.N.  •  carvedilol (COREG) tablet 12.5 mg, 12.5 mg, Oral, BID WITH MEALS, Maddy Robles, A.P.R.N.  •  clopidogrel (PLAVIX) tablet 75 mg, 75 mg, Oral, DAILY, Maddy Robles, A.P.R.N.  •  levothyroxine (SYNTHROID) tablet 75 mcg, 75 mcg, Oral, AM ES, Maddy Robles, A.P.R.N.  •  lisinopril (PRINIVIL) tablet 20 mg, 20 mg, Oral, BID, Maddy Robles, A.P.R.N.  •  magnesium oxide (MAG-OX) tablet 400 mg, 400 mg, Oral, DAILY, Maddy Robles, A.P.R.N.  •  valACYclovir (VALTREX) caplet 1,000 mg, 1,000 mg, Oral, TID, Maddy Robles, A.P.R.N.  •  warfarin (COUMADIN) tablet 5-7.5 mg, 5-7.5 mg, Oral, DAILY, EVIE CarrollPEdwardREdwardN.  •  NS infusion, , Intravenous, Continuous, Moy Don M.D.    Cardiac Imaging and Procedures Review/Imaging  CARDIAC STUDIES/PROCEDURES:     CARDIAC CATHETERIZATION CONCLUSIONS by Dr. Menard (08/26/16)  1. Complex heavily calcified high-grade disease of the proximal and mid left anterior descending.  2. Chronic total occlusion of the circumflex.  3. Codominant RCA with moderate native disease.  4. Totally occluded saphenous vein graft x3, (chronic).  5. Patent saphenous vein graft to an obtuse  marginal with high-grade stenosis   of the distal anastomosis.  6. Patent left internal mammary artery to a diagonal vessel.  7. Technically difficult and demanding PCI with overlapping stent placement of the proximal and mid LAD with 3 overlapping stents were placed at the area of dissection and occlusion that is still heavily calcified those being 2.5x12, 2.5x12 and 2.5x8.      CARDIAC CATHETERIZATION CONCLUSIONS3 (10/09/18)  1.  3-4+ mitral regurgitation.  2.  Reduced left ventricular systolic function with ejection fraction of 35%,   mid to distal anterior and apical hypokinesis.  3.  Elevated left ventricular end-diastolic pressure.  4.  Coronary artery disease with known occluded saphenous vein graft to the   distal left anterior descending to the diagonal branch and to the right   coronary artery with patent left internal mammary artery to the mid to distal   left anterior descending artery, patent saphenous vein graft to a small obtuse   marginal branch with high-grade ostial proximal graft stenosis.  5.  Successful percutaneous transluminal coronary angioplasty/stent placement   of the ostial to proximal saphenous vein graft to the obtuse marginal branch   with 3.0x28 mm Synergy drug-eluting stent.  6.  Elevated left ventricular end-diastolic pressure.  7.  Elevated right heart pressure with 40 mmHg.     CAROTID ULTRASOUND (09/20/18)  Atherosclerosis without stenosis reaching 50% threshold.     ECHOCARDIOGRAM CONCLUSIONS (07/10/18)  Moderately reduced left ventricular systolic function.  Left ventricular ejection fraction is visually estimated to be 35-40%.   Mild concentric left ventricular hypertrophy.  Severely dilated left atrium.  Severe mitral regurgitation.  Estimated right ventricular systolic pressure  is 30 mmHg.  Compared to the images of the prior study done on 04/04/17, mitral   regurgitation is slightly worse and now severe, LVEF is unchanged.     EKG performed on (10/15/18) was reviewed: EKG  shows atrial fibrillation.  EKG performed on (10/09/18) EKG shows atrial fibrillation.     Laboratory results of (10/15/18) were reviewed. Bun of 9 mg/dl, creatinine levels of 0.84 mg/dl noted.  Laboratory results of (10/10/18) Bun of 7 mg/dl, creatinine levels of 0.67 mg/dl noted.    TRANSESOPHAGEAL ECHOCARDIOGRAM CONCLUSIONS by Dr. Ohara (10/15/18)  Results pending.     TRANSESOPHAGEAL ECHOCARDIOGRAM CONCLUSIONS by  (10/09/18)  Mildly reduced LV systolic function, EF 40%.  Small apical aneurysm noted.  Mildly reduced RV systolic function.  Severe LAE.  SUDHAKAR is large, spontaneous contrast present, low emptying velocities.  Severe mitral regurgitaiton due to prolapse and functional MR,   predominantly A2-A3.  Compared to TTE 7-27-18, EF similar, unable to est RVSP, severe MR.    Assessment/Plan  No new Assessment & Plan notes have been filed under this hospital service since the last note was generated.  Service: Cardiology    1. Successful transcatheter percutaneous mitral valve repair (MitraClip) with 1clip, under general anesthesia (10/15/18)  2. Ischemic cardiomyopathy with congestive heart failure: The overall volume status is elevated. We will start diuretic therapy.  3. Coronary artery disease with prior coronary bypass graft (in 2002 and redo in 2016) and stent placements: Stable  4. Atrial fibrillation off anticoagulation therapy (warfarin): She is doing well off anticoagulation and the ventricular rate is well controlled. We will restart warfarin today.     CC Belinda Muñoz, Rah Conti and Sourav Hogan      Thank you for allowing me to participate in the care of this patient.  I will continue to follow this patient    Please contact me with any questions.    Moy Don M.D.   Cardiologist, [facility]  [phone]

## 2018-10-15 NOTE — OP REPORT
DATE OF PROCEDURE: 10/15/18    REFERRING PHYSICIAN: Belinda Muñoz    PROCEDURES:  1. Transcatheter mitral valve repair (MitraClip) with 1 clip.  2. Transseptal puncture.  3. Ultrasound guided femoral vein access.  4. PerClose closure.    PRE PROCEDURAL DIAGNOSIS:  1. Severe symptomatic mitral regurgitation, NYHA II-III.    POST PROCEDURAL DIAGNOSIS:  1. Successful transcatheter percutaneous mitral valve repair (MitraClip) with 1clip, under general anesthesia.  2. Successful Preclose closure.    INDICATION:    78 year old female with complex cardiovascular history including severe mitral regurgitation, congestive heart failure, ischemic cardiomyopathy, coronary artery disease with prior coronary artery bypass graft surgery, atrial fibrillation on chronic anticoagulation therapy. Due to her symptoms she was scheduled for MitraClip.    DESCRIPTION OF PROCEDURE:  After informed consent was signed by the   patient, the patient was brought into the OR 19.  She was prepped and draped in   usual sterile manner.  Prior to the procedure, right radial arterial insertion, intubation   and transesophageal echocardiogram were performed by Temo Burns.    The initial timeout was performed.     A 6-Sami arterial sheath was placed in the right femoral vein by Modified Seldinger   Technique under ultrasound guidance by myself. A PerClose devices was delivered.   An 8.5-Sami TorFlex arterial sheath was advanced. IV heparin was given. A [place]   needle was inserted into the catheter and both the needle and the sheath was placed in   the right atrium. Under transesophageal echocardiogram guidance, the [place] needle   was used to puncture the septum and the catheter was advanced into the left ventricle.   IV Heparin was readjusted. A Protrack pigtail wire was positioned into the left upper   pulmonary vein. The sheath was removed and the femoral incision site was dilated with   multiple dialators. At this point visible  thrombus was noted in the right atrium. The thrombus   was removed by aspiration with a 7 [place] Priority device and Michaelle sheath.The 22/24-  [place] steerable guide catheter handle was inserted into the left atrium under transesophageal   echocardiogram guidance and the dilator was removed. The Clip attached to the delivery catheter   handle was inserted in to the left atrium. Again, under transesophageal echocardiogram   guidance, the clip was opened, advanced into the left ventricle and pulled up to the   mitral valve leaflets. The leaflets were grasped and the clip was partially closed. The   severity of mitral regurgitation and the mitral valve gradient was measured. The clip was   then closed fully and released by myself. The delivery catheter was removed.    The mitral regurgitation was reduced to mild, the mean gradient was 2 mmHg.    The patient tolerated the procedure well. At the end of procedure hemodynamics were   again obtained. The steerable guide catheter was removed and the right femoral venous   access site was closed via PerClose closure.  The patient was then extubated and transferred to PACU in stable condition.    The entire procedure was performed with collaboration with Brain Freeman.    IMPRESSION:  1. Successful transcatheter percutaneous mitral valve repair (MitraClip) with 1clip, under general anesthesia.  2. Successful Preclose closure.    RECOMMENDATION: Medical therapy with continuation of aspirin.

## 2018-10-16 ENCOUNTER — APPOINTMENT (OUTPATIENT)
Dept: CARDIOLOGY | Facility: MEDICAL CENTER | Age: 78
DRG: 229 | End: 2018-10-16
Attending: NURSE PRACTITIONER
Payer: MEDICARE

## 2018-10-16 VITALS
WEIGHT: 113.76 LBS | HEIGHT: 64 IN | OXYGEN SATURATION: 95 % | RESPIRATION RATE: 16 BRPM | BODY MASS INDEX: 19.42 KG/M2 | HEART RATE: 82 BPM | SYSTOLIC BLOOD PRESSURE: 131 MMHG | TEMPERATURE: 97.7 F | DIASTOLIC BLOOD PRESSURE: 73 MMHG

## 2018-10-16 PROBLEM — Z95.820 S/P ANGIOPLASTY WITH STENT: Status: RESOLVED | Noted: 2018-10-10 | Resolved: 2018-10-16

## 2018-10-16 LAB
ANION GAP SERPL CALC-SCNC: 10 MMOL/L (ref 0–11.9)
ANION GAP SERPL CALC-SCNC: 10 MMOL/L (ref 0–11.9)
BUN SERPL-MCNC: 10 MG/DL (ref 8–22)
BUN SERPL-MCNC: 17 MG/DL (ref 8–22)
CALCIUM SERPL-MCNC: 7.9 MG/DL (ref 8.5–10.5)
CALCIUM SERPL-MCNC: 8.1 MG/DL (ref 8.5–10.5)
CHLORIDE SERPL-SCNC: 101 MMOL/L (ref 96–112)
CHLORIDE SERPL-SCNC: 103 MMOL/L (ref 96–112)
CO2 SERPL-SCNC: 23 MMOL/L (ref 20–33)
CO2 SERPL-SCNC: 25 MMOL/L (ref 20–33)
CREAT SERPL-MCNC: 0.71 MG/DL (ref 0.5–1.4)
CREAT SERPL-MCNC: 0.78 MG/DL (ref 0.5–1.4)
EKG IMPRESSION: NORMAL
ERYTHROCYTE [DISTWIDTH] IN BLOOD BY AUTOMATED COUNT: 45.8 FL (ref 35.9–50)
GLUCOSE SERPL-MCNC: 137 MG/DL (ref 65–99)
GLUCOSE SERPL-MCNC: 152 MG/DL (ref 65–99)
HCT VFR BLD AUTO: 30.1 % (ref 37–47)
HGB BLD-MCNC: 10.1 G/DL (ref 12–16)
INR PPP: 1.13 (ref 0.87–1.13)
LV EJECT FRACT  99904: 40
LV EJECT FRACT MOD 4C 99902: 43.02
MCH RBC QN AUTO: 33 PG (ref 27–33)
MCHC RBC AUTO-ENTMCNC: 33.6 G/DL (ref 33.6–35)
MCV RBC AUTO: 98.4 FL (ref 81.4–97.8)
PLATELET # BLD AUTO: 131 K/UL (ref 164–446)
PMV BLD AUTO: 9.9 FL (ref 9–12.9)
POTASSIUM SERPL-SCNC: 3.9 MMOL/L (ref 3.6–5.5)
POTASSIUM SERPL-SCNC: 4 MMOL/L (ref 3.6–5.5)
PROTHROMBIN TIME: 14.7 SEC (ref 12–14.6)
RBC # BLD AUTO: 3.06 M/UL (ref 4.2–5.4)
SODIUM SERPL-SCNC: 134 MMOL/L (ref 135–145)
SODIUM SERPL-SCNC: 138 MMOL/L (ref 135–145)
WBC # BLD AUTO: 5.5 K/UL (ref 4.8–10.8)

## 2018-10-16 PROCEDURE — 93010 ELECTROCARDIOGRAM REPORT: CPT | Performed by: INTERNAL MEDICINE

## 2018-10-16 PROCEDURE — A9270 NON-COVERED ITEM OR SERVICE: HCPCS | Performed by: NURSE PRACTITIONER

## 2018-10-16 PROCEDURE — A9270 NON-COVERED ITEM OR SERVICE: HCPCS | Performed by: INTERNAL MEDICINE

## 2018-10-16 PROCEDURE — 93308 TTE F-UP OR LMTD: CPT | Mod: 26 | Performed by: INTERNAL MEDICINE

## 2018-10-16 PROCEDURE — 700102 HCHG RX REV CODE 250 W/ 637 OVERRIDE(OP): Performed by: NURSE PRACTITIONER

## 2018-10-16 PROCEDURE — 85610 PROTHROMBIN TIME: CPT

## 2018-10-16 PROCEDURE — 99024 POSTOP FOLLOW-UP VISIT: CPT | Performed by: INTERNAL MEDICINE

## 2018-10-16 PROCEDURE — 85027 COMPLETE CBC AUTOMATED: CPT

## 2018-10-16 PROCEDURE — 700111 HCHG RX REV CODE 636 W/ 250 OVERRIDE (IP): Performed by: NURSE PRACTITIONER

## 2018-10-16 PROCEDURE — 93005 ELECTROCARDIOGRAM TRACING: CPT | Performed by: NURSE PRACTITIONER

## 2018-10-16 PROCEDURE — 36415 COLL VENOUS BLD VENIPUNCTURE: CPT

## 2018-10-16 PROCEDURE — 93306 TTE W/DOPPLER COMPLETE: CPT

## 2018-10-16 PROCEDURE — 80048 BASIC METABOLIC PNL TOTAL CA: CPT

## 2018-10-16 PROCEDURE — 700102 HCHG RX REV CODE 250 W/ 637 OVERRIDE(OP): Performed by: INTERNAL MEDICINE

## 2018-10-16 RX ORDER — LISINOPRIL 10 MG/1
10 TABLET ORAL
Status: DISCONTINUED | OUTPATIENT
Start: 2018-10-17 | End: 2018-10-16 | Stop reason: HOSPADM

## 2018-10-16 RX ORDER — CARVEDILOL 12.5 MG/1
12.5 TABLET ORAL 2 TIMES DAILY WITH MEALS
Qty: 60 TAB | Refills: 11 | Status: SHIPPED | OUTPATIENT
Start: 2018-10-16 | End: 2018-11-30

## 2018-10-16 RX ORDER — POTASSIUM CHLORIDE 20 MEQ/1
20 TABLET, EXTENDED RELEASE ORAL DAILY
Qty: 30 TAB | Refills: 11 | Status: SHIPPED | OUTPATIENT
Start: 2018-10-17 | End: 2018-10-19 | Stop reason: SDUPTHER

## 2018-10-16 RX ORDER — FUROSEMIDE 20 MG/1
20 TABLET ORAL
Status: DISCONTINUED | OUTPATIENT
Start: 2018-10-17 | End: 2018-10-16 | Stop reason: HOSPADM

## 2018-10-16 RX ORDER — LISINOPRIL 10 MG/1
10 TABLET ORAL DAILY
Qty: 30 TAB | Refills: 11 | Status: SHIPPED | OUTPATIENT
Start: 2018-10-17 | End: 2018-11-30 | Stop reason: SDUPTHER

## 2018-10-16 RX ADMIN — FUROSEMIDE 40 MG: 10 INJECTION, SOLUTION INTRAMUSCULAR; INTRAVENOUS at 12:45

## 2018-10-16 RX ADMIN — POTASSIUM CHLORIDE 20 MEQ: 1500 TABLET, EXTENDED RELEASE ORAL at 06:35

## 2018-10-16 RX ADMIN — MAGNESIUM GLUCONATE 500 MG ORAL TABLET 400 MG: 500 TABLET ORAL at 06:35

## 2018-10-16 RX ADMIN — ASPIRIN 81 MG: 81 TABLET, COATED ORAL at 06:35

## 2018-10-16 RX ADMIN — WARFARIN SODIUM 5 MG: 5 TABLET ORAL at 17:16

## 2018-10-16 RX ADMIN — LEVOTHYROXINE SODIUM 75 MCG: 0.05 TABLET ORAL at 06:35

## 2018-10-16 RX ADMIN — CLOPIDOGREL 75 MG: 75 TABLET, FILM COATED ORAL at 06:35

## 2018-10-16 RX ADMIN — FUROSEMIDE 40 MG: 10 INJECTION, SOLUTION INTRAMUSCULAR; INTRAVENOUS at 06:35

## 2018-10-16 RX ADMIN — LISINOPRIL 20 MG: 20 TABLET ORAL at 06:35

## 2018-10-16 RX ADMIN — CARVEDILOL 12.5 MG: 12.5 TABLET, FILM COATED ORAL at 17:16

## 2018-10-16 ASSESSMENT — ENCOUNTER SYMPTOMS
COUGH: 0
DIZZINESS: 0
NERVOUS/ANXIOUS: 0
RESPIRATORY NEGATIVE: 1
HEADACHES: 0
VOMITING: 0
MUSCULOSKELETAL NEGATIVE: 1
ABDOMINAL PAIN: 0
CONSTITUTIONAL NEGATIVE: 1
MYALGIAS: 0
BRUISES/BLEEDS EASILY: 0
FEVER: 0
EYES NEGATIVE: 1
GASTROINTESTINAL NEGATIVE: 1
WEAKNESS: 0
CHILLS: 0
PSYCHIATRIC NEGATIVE: 1
SHORTNESS OF BREATH: 0
CARDIOVASCULAR NEGATIVE: 1
NEUROLOGICAL NEGATIVE: 1
NAUSEA: 0
PALPITATIONS: 0

## 2018-10-16 ASSESSMENT — LIFESTYLE VARIABLES
TOTAL SCORE: 0
AVERAGE NUMBER OF DAYS PER WEEK YOU HAVE A DRINK CONTAINING ALCOHOL: 4
TOTAL SCORE: 0
HOW MANY TIMES IN THE PAST YEAR HAVE YOU HAD 5 OR MORE DRINKS IN A DAY: 0
ON A TYPICAL DAY WHEN YOU DRINK ALCOHOL HOW MANY DRINKS DO YOU HAVE: 1
ALCOHOL_USE: YES
HAVE YOU EVER FELT YOU SHOULD CUT DOWN ON YOUR DRINKING: NO
EVER HAD A DRINK FIRST THING IN THE MORNING TO STEADY YOUR NERVES TO GET RID OF A HANGOVER: NO
TOTAL SCORE: 0
CONSUMPTION TOTAL: NEGATIVE
HAVE PEOPLE ANNOYED YOU BY CRITICIZING YOUR DRINKING: NO
EVER FELT BAD OR GUILTY ABOUT YOUR DRINKING: NO

## 2018-10-16 ASSESSMENT — PAIN SCALES - GENERAL
PAINLEVEL_OUTOF10: 0

## 2018-10-16 ASSESSMENT — COGNITIVE AND FUNCTIONAL STATUS - GENERAL
SUGGESTED CMS G CODE MODIFIER MOBILITY: CH
MOBILITY SCORE: 24
DAILY ACTIVITIY SCORE: 24
SUGGESTED CMS G CODE MODIFIER DAILY ACTIVITY: CH

## 2018-10-16 NOTE — PROGRESS NOTES
Dr. Don at bedside at 1600 to check pt. At that time 4 hours of bedrest had been completed. Updated Dr. Don that left groin had some swelling/oozing and right groin had some swelling. Per Dr. Don it was okay to ambulate pt to the bathroom. Pt was assisted to bathroom, manual pressure held on sites for support. Rechecked bilateral groin sites after returning to bed and left groin had new blood on dressing and a large oval hematoma was noted to the right groin. Manual pressure was held to both sides, left side for approx 10 mins, right side for approx 20 mins. 10lb sand bags placed on bilateral groins. VS stable, unlabored breathing on O2 via NC. Pt reports some tenderness at sites but otherwise no complaints of pain/discomfort. Maddy Robles NP updated. Will continue to monitor closely.

## 2018-10-16 NOTE — PROGRESS NOTES
Pt received to bed 728-2 post mitral clip. Tele monitor placed and verified with monitor tech. VSS. Bilateral groin sites soft/dry/intact, right side with large amount of bruising, left side with small amount of bruising. Pt reports some back discomfort r/t laying flat on her back, otherwise denies pain/discomfort. Pt is eager to get OOB, educated on bedrest and importance r/t risk of bleeding. Pt and daughter verbalized understanding. Continuing to monitor closely.

## 2018-10-16 NOTE — PROGRESS NOTES
Pt resting in bed at this time with family at bedside. Pt reports dizziness has resolved, BP now 116/63. No pain or further needs at this time. Call light within reach, bed locked and in lowest position. Hourly rounding in place.

## 2018-10-16 NOTE — PROGRESS NOTES
Received report and assumed care of patient. Patient is alert and oriented x4. Patient is in no signs of distress. Patient post mitral clip procedure has bilateral groin sites. Left side still oozing, will need to replace dressing and apply pressure. Right groin site, soft according to day RN softer than earlier. Hard on outer edge toward pubic area but otherwise soft. Patient using bedpan at this time as we do not want pressure from movement to cause more bleeding. Patient agreeable. Patient was updated on the plan of care for the day. Call light within reach, bed in low position, 2 side rails up. Educated on fall risk, verbalizes understanding. Will continue to monitor.

## 2018-10-16 NOTE — PROGRESS NOTES
New gauze and tegaderm placed on bilateral groin sites. Right groin hematoma is softer but some edges can be palpated. Left groin continues to have a slow ooze. Sand bags replaced on bilateral groins. VS remain stable. Pt has tenderness to groins when palpated and medicated with tylenol for back discomfort. Otherwise no complaints. Will recheck sites with NOC RN at shift change.

## 2018-10-16 NOTE — PROGRESS NOTES
Cardiology Follow Up Progress Note    Date of Service  10/16/2018    Attending Physician  Moy Don M.D.    Chief Complaint   Outpatient TMVR    HPI  Daya Woods is a 78 y.o. female admitted 10/15/2018 with complex cardiovascular history including severe mitral regurgitation, congestive heart failure, ischemic cardiomyopathy, coronary artery disease with prior coronary artery bypass graft surgery, atrial fibrillation on chronic anticoagulation therapy. Due to her symptoms she was scheduled for MitraClip.    Interim Events  Successful MitraClip.    Review of Systems  Review of Systems   Constitutional: Negative.  Negative for chills and fever.   HENT: Negative.  Negative for hearing loss.    Eyes: Negative.    Respiratory: Negative.  Negative for cough and shortness of breath.    Cardiovascular: Negative.  Negative for chest pain, palpitations and leg swelling.   Gastrointestinal: Negative.  Negative for abdominal pain, nausea and vomiting.   Genitourinary: Negative.  Negative for dysuria and urgency.   Musculoskeletal: Negative.  Negative for myalgias.   Skin: Negative.  Negative for rash.   Neurological: Negative.  Negative for dizziness, weakness and headaches.   Hematological: Does not bruise/bleed easily.   Psychiatric/Behavioral: Negative.  The patient is not nervous/anxious.    (ROS essentially unchanged from 10/15/18)10/15/18    Vital signs in last 24 hours  Temp:  [36.2 °C (97.2 °F)-37 °C (98.6 °F)] 36.4 °C (97.5 °F)  Pulse:  [] 75  Resp:  [10-18] 18  BP: ()/(55-96) 106/60    Physical Exam  Physical Exam   Constitutional: She is oriented to person, place, and time. She appears well-developed and well-nourished.   HENT:   Head: Normocephalic and atraumatic.   Eyes: Pupils are equal, round, and reactive to light. Conjunctivae are normal.   Neck: Normal range of motion. Neck supple.   Cardiovascular: Normal rate.  An irregularly irregular rhythm present.   Pulmonary/Chest: Effort normal  and breath sounds normal.   Abdominal: Soft. Bowel sounds are normal.   Musculoskeletal: Normal range of motion. She exhibits no edema.   Cath sites are within normal limits.   Neurological: She is alert and oriented to person, place, and time.   Skin: Skin is warm and dry.   Psychiatric: She has a normal mood and affect.   (Physical examination essentially unchanged from 10/15/18)    Lab Review  Lab Results   Component Value Date/Time    WBC 5.5 10/16/2018 03:00 AM    RBC 3.06 (L) 10/16/2018 03:00 AM    HEMOGLOBIN 10.1 (L) 10/16/2018 03:00 AM    HEMATOCRIT 30.1 (L) 10/16/2018 03:00 AM    MCV 98.4 (H) 10/16/2018 03:00 AM    MCH 33.0 10/16/2018 03:00 AM    MCHC 33.6 10/16/2018 03:00 AM    MPV 9.9 10/16/2018 03:00 AM      Lab Results   Component Value Date/Time    SODIUM 134 (L) 10/16/2018 02:58 AM    POTASSIUM 3.9 10/16/2018 02:58 AM    CHLORIDE 101 10/16/2018 02:58 AM    CO2 23 10/16/2018 02:58 AM    GLUCOSE 152 (H) 10/16/2018 02:58 AM    BUN 17 10/16/2018 02:58 AM    CREATININE 0.78 10/16/2018 02:58 AM      Lab Results   Component Value Date/Time    ASTSGOT 28 10/09/2018 06:50 AM    ALTSGPT 28 10/09/2018 06:50 AM     Lab Results   Component Value Date/Time    CHOLSTRLTOT 137 02/24/2014 03:05 AM    LDL 67 02/24/2014 03:05 AM    HDL 55 02/24/2014 03:05 AM    TRIGLYCERIDE 74 02/24/2014 03:05 AM    TROPONINI 1.27 (H) 02/24/2014 06:03 PM       Recent Labs      10/15/18   1316   BNPBTYPENAT  978*     Medications reviewed.    Medications reviewed.      Current Facility-Administered Medications:   •  aspirin EC (ECOTRIN) tablet 81 mg, 81 mg, Oral, DAILY, Maddy Robles, A.P.R.N., 81 mg at 10/16/18 0635  •  atorvastatin (LIPITOR) tablet 40 mg, 40 mg, Oral, Nightly, Maddy Robles, A.P.R.N., 40 mg at 10/15/18 2021  •  carvedilol (COREG) tablet 12.5 mg, 12.5 mg, Oral, BID WITH MEALS, EVIE CarrollP.R.N., 12.5 mg at 10/15/18 1745  •  clopidogrel (PLAVIX) tablet 75 mg, 75 mg, Oral, DAILY, Maddy DALY  Margaret, A.P.R.N., 75 mg at 10/16/18 0635  •  levothyroxine (SYNTHROID) tablet 75 mcg, 75 mcg, Oral, AM ES, Maddy Robles A.P.R.N., 75 mcg at 10/16/18 0635  •  lisinopril (PRINIVIL) tablet 20 mg, 20 mg, Oral, BID, Maddy Robles A.P.R.N., 20 mg at 10/16/18 0635  •  magnesium oxide (MAG-OX) tablet 400 mg, 400 mg, Oral, DAILY, Maddy Robles A.P.R.N., 400 mg at 10/16/18 0635  •  valACYclovir (VALTREX) caplet 1,000 mg, 1,000 mg, Oral, BID, Maddy Robles A.P.R.N., 1,000 mg at 10/15/18 1744  •  ondansetron (ZOFRAN) syringe/vial injection 4 mg, 4 mg, Intravenous, Q4HRS PRN, MALIKA Carroll.P.R.N.  •  acetaminophen (TYLENOL) tablet 650 mg, 650 mg, Oral, Q6HRS PRN, Maddy Robles A.P.R.N., 650 mg at 10/15/18 1758  •  diphenhydrAMINE (BENADRYL) tablet/capsule 25 mg, 25 mg, Oral, HS PRN, Maddy Robles A.P.R.N., 25 mg at 10/15/18 2021  •  diphenhydrAMINE (BENADRYL) tablet/capsule 25 mg, 25 mg, Oral, Q6HRS PRN, Maddy Robles A.P.R.N.  •  MD Alert...Warfarin per Pharmacy, , Other, pharmacy to dose, MALIKA Carroll.P.R.N.  •  furosemide (LASIX) injection 40 mg, 40 mg, Intravenous, BID DIURETIC, MALIKA Carroll.P.R.N., 40 mg at 10/16/18 0635  •  potassium chloride SA (Kdur) tablet 20 mEq, 20 mEq, Oral, DAILY, Maddy Robles A.P.REdwardN., 20 mEq at 10/16/18 0635  •  warfarin (COUMADIN) tablet 7.5 mg, 7.5 mg, Oral, Once per day on Mon Wed Fri, 7.5 mg at 10/15/18 1744 **AND** warfarin (COUMADIN) tablet 5 mg, 5 mg, Oral, Once per day on Sun Tue Moy Velez M.D.    Cardiac Imaging and Procedures Review/Imaging  CARDIAC STUDIES/PROCEDURES:     CARDIAC CATHETERIZATION CONCLUSIONS by Dr. Menard (08/26/16)  1. Complex heavily calcified high-grade disease of the proximal and mid left anterior descending.  2. Chronic total occlusion of the circumflex.  3. Codominant RCA with moderate native disease.  4. Totally occluded saphenous vein graft x3, (chronic).  5.  Patent saphenous vein graft to an obtuse marginal with high-grade stenosis   of the distal anastomosis.  6. Patent left internal mammary artery to a diagonal vessel.  7. Technically difficult and demanding PCI with overlapping stent placement of the proximal and mid LAD with 3 overlapping stents were placed at the area of dissection and occlusion that is still heavily calcified those being 2.5x12, 2.5x12 and 2.5x8.      CARDIAC CATHETERIZATION CONCLUSIONS3 (10/09/18)  1.  3-4+ mitral regurgitation.  2.  Reduced left ventricular systolic function with ejection fraction of 35%,   mid to distal anterior and apical hypokinesis.  3.  Elevated left ventricular end-diastolic pressure.  4.  Coronary artery disease with known occluded saphenous vein graft to the   distal left anterior descending to the diagonal branch and to the right   coronary artery with patent left internal mammary artery to the mid to distal   left anterior descending artery, patent saphenous vein graft to a small obtuse   marginal branch with high-grade ostial proximal graft stenosis.  5.  Successful percutaneous transluminal coronary angioplasty/stent placement   of the ostial to proximal saphenous vein graft to the obtuse marginal branch   with 3.0x28 mm Synergy drug-eluting stent.  6.  Elevated left ventricular end-diastolic pressure.  7.  Elevated right heart pressure with 40 mmHg.     CAROTID ULTRASOUND (09/20/18)  Atherosclerosis without stenosis reaching 50% threshold.     ECHOCARDIOGRAM CONCLUSIONS (07/10/18)  Moderately reduced left ventricular systolic function.  Left ventricular ejection fraction is visually estimated to be 35-40%.   Mild concentric left ventricular hypertrophy.  Severely dilated left atrium.  Severe mitral regurgitation.  Estimated right ventricular systolic pressure  is 30 mmHg.  Compared to the images of the prior study done on 04/04/17, mitral   regurgitation is slightly worse and now severe, LVEF is unchanged.     EKG  performed on (10/16/18) was reviewed: EKG shows atrial fibrillation with premature ventricular contractions.  EKG performed on (10/15/18) EKG shows atrial fibrillation.  EKG performed on (10/09/18) EKG shows atrial fibrillation.     Laboratory results of (10/16/18) were reviewed. Bun of 17 mg/dl, creatinine levels of 0.78 mg/dl noted.  Laboratory results of (10/15/18) Bun of 9 mg/dl, creatinine levels of 0.84 mg/dl noted.  Laboratory results of (10/10/18) Bun of 7 mg/dl, creatinine levels of 0.67 mg/dl noted.    TRANSESOPHAGEAL ECHOCARDIOGRAM CONCLUSIONS by Dr. Ohara (10/15/18)  Results pending.     TRANSESOPHAGEAL ECHOCARDIOGRAM CONCLUSIONS by  (10/09/18)  Mildly reduced LV systolic function, EF 40%.  Small apical aneurysm noted.  Mildly reduced RV systolic function.  Severe LAE.  SUDHAKAR is large, spontaneous contrast present, low emptying velocities.  Severe mitral regurgitaiton due to prolapse and functional MR,   predominantly A2-A3.  Compared to TTE 7-27-18, EF similar, unable to est RVSP, severe MR.    Assessment/Plan  No new Assessment & Plan notes have been filed under this hospital service since the last note was generated.  Service: Cardiology    1. Successful transcatheter percutaneous mitral valve repair (MitraClip) with 1clip, under general anesthesia (10/15/18): She is clinically doing well. We will repeat an echocardiogram.  2. Ischemic cardiomyopathy with congestive heart failure: The overall volume status is improving on diuretic therapy.  3. Coronary artery disease with prior coronary bypass graft (in 2002 and redo in 2016) and stent placements: Stable  4. Atrial fibrillation off anticoagulation therapy (warfarin): She is doing well off anticoagulation and the ventricular rate is well controlled.  5. Disposition: We will plan for discharge today..     CC Belinda Muñoz, Rah Conti and Sourav Hogan      Thank you for allowing me to participate in the care of this  patient.  I will continue to follow this patient    Please contact me with any questions.    oMy Don M.D.   Cardiologist, [facility]  [phone]

## 2018-10-16 NOTE — PROGRESS NOTES
"Pt c/o dizziness and weakness, reports \"seeing floaters\". BP 92/48 this morning. Per Maddy Robles NP, will hold carvedilol this morning and recheck BP prior to lasix dose this afternoon. Updated pt on POC. Continuing to monitor closely.   "

## 2018-10-16 NOTE — CARE PLAN
Problem: Safety  Goal: Will remain free from injury  Non slip socks on, bed in low position, 2 side rails up, call light within reach, educated on fall risk, verbalizes understanding, will continue to monitor.    Problem: Knowledge Deficit  Goal: Knowledge of disease process/condition, treatment plan, diagnostic tests, and medications will improve  Patient updated on plan of care for the day, patient verbalized understanding. All questions and concerns addressed at this time.    Problem: Pain Management  Goal: Pain level will decrease to patient's comfort goal  Patient assessed per unit policy. Medicated per MAR. Patient verbalized understanding to call when needing pain relief.

## 2018-10-16 NOTE — PROGRESS NOTES
Report received from night shift RN, assumed care of pt. Pt A&OX4, in no apparent distress. Left and right groin sites assessed, large bruising on R and smaller bruising on left. No bleeding bilaterally, dressings are clean, dry and intact. Plan of care discussed with pt, no questions or needs at this time. Call light within reach, bed locked and in lowest position. All fall precautions and hourly rounding in place.

## 2018-10-16 NOTE — PROGRESS NOTES
Inpatient Anticoagulation Service Note    Date: 10/16/2018  Reason for Anticoagulation: Atrial Fibrillation   [id number] VASc Score: 6    Hemoglobin Value: (!) 10.1  Hematocrit Value: (!) 30.1  Lab Platelet Value: (!) 131  Target INR: 2.0 to 3.0    INR from last 7 days     Date/Time INR Value    10/16/18 0258  1.13    10/15/18 0702  1.03        Dose from last 7 days     Date/Time Dose (mg)    10/16/18 1607  5    10/15/18 1440  7.5        Average Dose (mg): 7.5 mg M/W/F, 5 mg all other days  Significant Interactions: Aspirin, Statin, Thyroid Medications, Antiplatelet Medications  Bridge Therapy: No   Reversal Agent Administered: Not Applicable    Comments: Pt here for MitraClip on 10/15. She will likely discharge home today. H/H has trended down slightly which is to be expected after yesterday's procedure. There was oozing noted from her groin sites yesterday, but no bleeding noted today.    Plan:  Continue with her home regimen and give warfarin 5 mg tonight with INR tomorrow AM if she is still here  Education Material Provided?: No (chronic warfarin pt)  Pharmacist suggested discharge dosin.5 mg M/W/F, 5 mg all other days     Shanice Ornelas, PharmD, BCPS

## 2018-10-16 NOTE — PROGRESS NOTES
Applied 17 minutes of manual pressure to left groin site with new gauze and dressing in place. Replaced 10lb sandbag to left groin will continue to recheck site.

## 2018-10-16 NOTE — HEART FAILURE PROGRAM
.Cardiovascular Nurse Navigator () Advanced Heart Failure Program Inpatient Progress Note:     S/p Claudette-Clip. Per Dr. Don's note 10/16:     2. Ischemic cardiomyopathy with congestive heart failure: The overall volume status is improving on diuretic therapy.     Patient has the below f/u appointment which is appropriate for now. If, after the TAVR, HF continues to be an issue, the valve program will refer to the FP clinic.    Oct 19, 2018  1:20 PM PDT  Valve Established Patient with CARLOS Carroll  [facility] (--) [address], [address]  [facility] [address]  [phone]     Thank you and please call with questions, Rupali

## 2018-10-17 NOTE — DISCHARGE INSTRUCTIONS
Discharge Instructions    Discharged to home by car with relative. Discharged via wheelchair, hospital escort: Yes.  Special equipment needed: Not Applicable    Be sure to schedule a follow-up appointment with your primary care doctor or any specialists as instructed.     Discharge Plan:   Diet Plan: Discussed  Activity Level: Discussed  Confirmed Follow up Appointment: Appointment Scheduled  Confirmed Symptoms Management: Discussed  Medication Reconciliation Updated: Yes  Influenza Vaccine Indication: Patient Refuses    I understand that a diet low in cholesterol, fat, and sodium is recommended for good health. Unless I have been given specific instructions below for another diet, I accept this instruction as my diet prescription.   Other diet: Heart Healthy as tolerated.     Special Instructions:   Transcatheter Mitral Valve Repair (TMVR)    General Instructions:  · Take medication as directed. Do not ever stop taking any medications without talking to your doctor first.     Incision Care Instructions:  · Look and feel the site(s) of your TMVR TODAY, so you can recognize changes that should be called to your doctor (see below).  · It's normal for your incision to be bruised, itchy, or sore while it's healing. Your incision may take a week or more to heal.   · Bruising may occur.  Some of the discoloration may travel down the leg. As it resolves, the color should change from blue to green to yellow-brown.  · A small, round lump under the TMVR site may remain for up to 6 weeks  · Wash your incision site every day with warm water and soap. Gently pat it dry. Don't put powder, lotion, or ointment on the incision until it's healed.     Activity:  · No driving for one week.  · No lifting or pulling objects over 10 pounds for 5 days.  · Warm showers are permitted after the bandage is removed.  · Walk regularly. One of the best ways to get stronger is to walk. Walk a little more each day.      When to call your health  care provider:  · You develop a fever.  · Redness, swelling, bleeding, warmth, or fluid draining at the incision site.  · Bruising appears to be new or does not look like it is getting better.  · The small, round lump in the groin in the area of the TMVR site increases in size.     Seek immediate medical help if you have any of the following symptoms:  · You experience any leg numbness, aching, or discomfort.  · Chest pain or trouble breathing (call 911).  · Sudden numbness or weakness in your face, arms, or legs (call 911).  · Bowel movement that is bright red.   · Dizziness or fainting.  · Weight gain of more than 2 pounds in 24 hours or more than 5 pounds in 1 week.  · Swelling in your hands, feet, or ankles.  · Shortness of breath that doesn't get better when you rest.  · Pain that gets worse or doesn't go away.  · Fast or irregular pulse.      · Is patient discharged on Warfarin / Coumadin?   Yes    You are receiving the drug warfarin. Please understand the importance of monitoring warfarin with scheduled PT/INR blood draws.  Follow-up with the [facility] in one week for INR lab.    IMPORTANT: HOW TO USE THIS INFORMATION:  This is a summary and does NOT have all possible information about this product. This information does not assure that this product is safe, effective, or appropriate for you. This information is not individual medical advice and does not substitute for the advice of your health care professional. Always ask your health care professional for complete information about this product and your specific health needs.      WARFARIN - ORAL (WARF-uh-rin)      COMMON BRAND NAME(S): Coumadin      WARNING:  Warfarin can cause very serious (possibly fatal) bleeding. This is more likely to occur when you first start taking this medication or if you take too much warfarin. To decrease your risk for bleeding, your doctor or other health care provider will monitor you closely and check your lab results  "(INR test) to make sure you are not taking too much warfarin. Keep all medical and laboratory appointments. Tell your doctor right away if you notice any signs of serious bleeding. See also Side Effects section.      USES:  This medication is used to treat blood clots (such as in deep vein thrombosis-DVT or pulmonary embolus-PE) and/or to prevent new clots from forming in your body. Preventing harmful blood clots helps to reduce the risk of a stroke or heart attack. Conditions that increase your risk of developing blood clots include a certain type of irregular heart rhythm (atrial fibrillation), heart valve replacement, recent heart attack, and certain surgeries (such as hip/knee replacement). Warfarin is commonly called a \"blood thinner,\" but the more correct term is \"anticoagulant.\" It helps to keep blood flowing smoothly in your body by decreasing the amount of certain substances (clotting proteins) in your blood.      HOW TO USE:  Read the Medication Guide provided by your pharmacist before you start taking warfarin and each time you get a refill. If you have any questions, ask your doctor or pharmacist. Take this medication by mouth with or without food as directed by your doctor or other health care professional, usually once a day. It is very important to take it exactly as directed. Do not increase the dose, take it more frequently, or stop using it unless directed by your doctor. Dosage is based on your medical condition, laboratory tests (such as INR), and response to treatment. Your doctor or other health care provider will monitor you closely while you are taking this medication to determine the right dose for you. Use this medication regularly to get the most benefit from it. To help you remember, take it at the same time each day. It is important to eat a balanced, consistent diet while taking warfarin. Some foods can affect how warfarin works in your body and may affect your treatment and dose. Avoid " sudden large increases or decreases in your intake of foods high in vitamin K (such as broccoli, cauliflower, cabbage, brussels sprouts, kale, spinach, and other green leafy vegetables, liver, green tea, certain vitamin supplements). If you are trying to lose weight, check with your doctor before you try to go on a diet. Cranberry products may also affect how your warfarin works. Limit the amount of cranberry juice (16 ounces/480 milliliters a day) or other cranberry products you may drink or eat.      SIDE EFFECTS:  Nausea, loss of appetite, or stomach/abdominal pain may occur. If any of these effects persist or worsen, tell your doctor or pharmacist promptly. Remember that your doctor has prescribed this medication because he or she has judged that the benefit to you is greater than the risk of side effects. Many people using this medication do not have serious side effects. This medication can cause serious bleeding if it affects your blood clotting proteins too much (shown by unusually high INR lab results). Even if your doctor stops your medication, this risk of bleeding can continue for up to a week. Tell your doctor right away if you have any signs of serious bleeding, including: unusual pain/swelling/discomfort, unusual/easy bruising, prolonged bleeding from cuts or gums, persistent/frequent nosebleeds, unusually heavy/prolonged menstrual flow, pink/dark urine, coughing up blood, vomit that is bloody or looks like coffee grounds, severe headache, dizziness/fainting, unusual or persistent tiredness/weakness, bloody/black/tarry stools, chest pain, shortness of breath, difficulty swallowing. Tell your doctor right away if any of these unlikely but serious side effects occur: persistent nausea/vomiting, severe stomach/abdominal pain, yellowing eyes/skin. This drug rarely has caused very serious (possibly fatal) problems if its effects lead to small blood clots (usually at the beginning of treatment). This can  lead to severe skin/tissue damage that may require surgery or amputation if left untreated. Patients with certain blood conditions (protein C or S deficiency) may be at greater risk. Get medical help right away if any of these rare but serious side effects occur: painful/red/purplish patches on the skin (such as on the toe, breast, abdomen), change in the amount of urine, vision changes, confusion, slurred speech, weakness on one side of the body. A very serious allergic reaction to this drug is rare. However, get medical help right away if you notice any symptoms of a serious allergic reaction, including: rash, itching/swelling (especially of the face/tongue/throat), severe dizziness, trouble breathing. This is not a complete list of possible side effects. If you notice other effects not listed above, contact your doctor or pharmacist. In the [place] - Call your doctor for medical advice about side effects. You may report side effects to FDA at 5-688-NXD-7201. In [place] - Call your doctor for medical advice about side effects. You may report side effects to [facility] [place] at 1-486.172.2185.      PRECAUTIONS:  Before taking warfarin, tell your doctor or pharmacist if you are allergic to it; or if you have any other allergies. This product may contain inactive ingredients, which can cause allergic reactions or other problems. Talk to your pharmacist for more details. Before using this medication, tell your doctor or pharmacist your medical history, especially of: blood disorders (such as anemia, hemophilia), bleeding problems (such as bleeding of the stomach/intestines, bleeding in the brain), blood vessel disorders (such as aneurysms), recent major injury/surgery, liver disease, alcohol use, mental/mood disorders (including memory problems), frequent falls/injuries. It is important that all your doctors and dentists know that you take warfarin. Before having surgery or any medical/dental procedures, tell your doctor or  dentist that you are taking this medication and about all the products you use (including prescription drugs, nonprescription drugs, and herbal products). Avoid getting injections into the muscles. If you must have an injection into a muscle (for example, a flu shot), it should be given in the arm. This way, it will be easier to check for bleeding and/or apply pressure bandages. This medication may cause stomach bleeding. Daily use of alcohol while using this medicine will increase your risk for stomach bleeding and may also affect how this medication works. Limit or avoid alcoholic beverages. If you have not been eating well, if you have an illness or infection that causes fever, vomiting, or diarrhea for more than 2 days, or if you start using any antibiotic medications, contact your doctor or pharmacist immediately because these conditions can affect how warfarin works. This medication can cause heavy bleeding. To lower the chance of getting cut, bruised, or injured, use great caution with sharp objects like safety razors and nail cutters. Use an electric razor when shaving and a soft toothbrush when brushing your teeth. Avoid activities such as contact sports. If you fall or injure yourself, especially if you hit your head, call your doctor immediately. Your doctor may need to check you. The Food & Drug Administration has stated that generic warfarin products are interchangeable. However, consult your doctor or pharmacist before switching warfarin products. Be careful not to take more than one medication that contains warfarin unless specifically directed by the doctor or health care provider who is monitoring your warfarin treatment. Older adults may be at greater risk for bleeding while using this drug. This medication is not recommended for use during pregnancy because of serious (possibly fatal) harm to an unborn baby. Discuss the use of reliable forms of birth control with your doctor. If you become  "pregnant or think you may be pregnant, tell your doctor immediately. If you are planning pregnancy, discuss a plan for managing your condition with your doctor before you become pregnant. Your doctor may switch the type of medication you use during pregnancy. Very small amounts of this medication may pass into breast milk but is unlikely to harm a nursing infant. Consult your doctor before breast-feeding.      DRUG INTERACTIONS:  Drug interactions may change how your medications work or increase your risk for serious side effects. This document does not contain all possible drug interactions. Keep a list of all the products you use (including prescription/nonprescription drugs and herbal products) and share it with your doctor and pharmacist. Do not start, stop, or change the dosage of any medicines without your doctor's approval. Warfarin interacts with many prescription, nonprescription, vitamin, and herbal products. This includes medications that are applied to the skin or inside the vagina or rectum. The interactions with warfarin usually result in an increase or decrease in the \"blood-thinning\" (anticoagulant) effect. Your doctor or other health care professional should closely monitor you to prevent serious bleeding or clotting problems. While taking warfarin, it is very important to tell your doctor or pharmacist of any changes in medications, vitamins, or herbal products that you are taking. Some products that may interact with this drug include: capecitabine, imatinib, mifepristone. Aspirin, aspirin-like drugs (salicylates), and nonsteroidal anti-inflammatory drugs (NSAIDs such as ibuprofen, naproxen, celecoxib) may have effects similar to warfarin. These drugs may increase the risk of bleeding problems if taken during treatment with warfarin. Carefully check all prescription/nonprescription product labels (including drugs applied to the skin such as pain-relieving creams) since the products may contain " NSAIDs or salicylates. Talk to your doctor about using a different medication (such as acetaminophen) to treat pain/fever. Low-dose aspirin and related drugs (such as clopidogrel, ticlopidine) should be continued if prescribed by your doctor for specific medical reasons such as heart attack or stroke prevention. Consult your doctor or pharmacist for more details. Many herbal products interact with warfarin. Tell your doctor before taking any herbal products, especially bromelains, coenzyme Q10, cranberry, danshen, dong quai, fenugreek, garlic, ginkgo biloba, ginseng, and [place]'s wort, among others. This medication may interfere with a certain laboratory test to measure theophylline levels, possibly causing false test results. Make sure laboratory personnel and all your doctors know you use this drug.      OVERDOSE:  If overdose is suspected, contact a poison control center or emergency room immediately. [place] residents can call the [employer] Poison Hotline at 1-703.541.4852. [place] residents can call a provincial poison control center. Symptoms of overdose may include: bloody/black/tarry stools, pink/dark urine, unusual/prolonged bleeding.      NOTES:  Do not share this medication with others. Laboratory and/or medical tests (such as INR, complete blood count) must be performed periodically to monitor your progress or check for side effects. Consult your doctor for more details.      MISSED DOSE:  For the best possible benefit, do not miss any doses. If you do miss a dose and remember on the same day, take it as soon as you remember. If you remember on the next day, skip the missed dose and resume your usual dosing schedule. Do not double the dose to catch up because this could increase your risk for bleeding. Keep a record of missed doses to give to your doctor or pharmacist. Contact your doctor or pharmacist if you miss 2 or more doses in a row.      STORAGE:  Store at room temperature away from light and  moisture. Do not store in the bathroom. Keep all medications away from children and pets. Do not flush medications down the toilet or pour them into a drain unless instructed to do so. Properly discard this product when it is  or no longer needed. Consult your pharmacist or local waste disposal company for more details about how to safely discard your product.      MEDICAL ALERT:  Your condition and medication can cause complications in a medical emergency. For information about enrolling in MedicAlert, call 1-919.279.7647 ([place]) or 1-917.647.6888 ([place]).      Information last revised 2010 Copyright(c)  [employer]             Depression / Suicide Risk    As you are discharged from this [facility] facility, it is important to learn how to keep safe from harming yourself.    Recognize the warning signs:  · Abrupt changes in personality, positive or negative- including increase in energy   · Giving away possessions  · Change in eating patterns- significant weight changes-  positive or negative  · Change in sleeping patterns- unable to sleep or sleeping all the time   · Unwillingness or inability to communicate  · Depression  · Unusual sadness, discouragement and loneliness  · Talk of wanting to die  · Neglect of personal appearance   · Rebelliousness- reckless behavior  · Withdrawal from people/activities they love  · Confusion- inability to concentrate     If you or a loved one observes any of these behaviors or has concerns about self-harm, here's what you can do:  · Talk about it- your feelings and reasons for harming yourself  · Remove any means that you might use to hurt yourself (examples: pills, rope, extension cords, firearm)  · Get professional help from the community (Mental Health, Substance Abuse, psychological counseling)  · Do not be alone:Call your Safe Contact- someone whom you trust who will be there for you.  · Call your local CRISIS HOTLINE 062-1365 or  583.391.5788  · Call your local Children's Mobile Crisis Response Team [place] (997) 804-9380 or www.[employer]  · Call the toll free National Suicide Prevention Hotlines   · National Suicide Prevention Lifeline 425-120-ZNZA (6201)  · National Hope Line Network 800-SUICIDE (767-3799)    Remove all dressings on discharge. Follow up in our office on Friday. Resume medications per medication reconciliation. Lab work not needed. Groin site care. Watch for oozing, green/yellow discharge from groin site, or abnormal bruising or swelling. Watch for dizziness/lightheadedness at home. No driving for one week. Take it easy for 2 weeks with no heavy lifting or heavy exertional activities. Please call our office for any questions/concerns [phone].       Special Instructions:   [facility] Patient Discharge Instructions  · Monitor your weight daily, and maintain a weight chart, to track your weight changes.   · Activity as tolerated, unless your Doctor has ordered otherwise. Other activity order: As tolerated, per [employer] instructions.  · Follow a low fat, low cholesterol, low salt diet unless instructed otherwise by your Doctor. Read the labels on the back of food products and track your intake of fat, cholesterol and salt.   · Fluid Restriction No. If a Fluid Restriction has been ordered by your Doctor, measure fluids with a measuring cup to ensure that you are not exceeding the restriction.   · No smoking.  · Oxygen No. If your Doctor has ordered that you wear Oxygen at home, it is important to wear it as ordered.  · Did you receive an explanation from staff on the importance of taking each of your medications and why it is necessary to keep taking them unless your doctor says to stop? Yes  · Were all of your questions answered about how to manage your heart failure and what to do if you have increased signs and symptoms after you go home? Yes  · Do you feel like your heart failure care team involved you in the care  treatment plan and allowed you to make decisions regarding your care while in the hospital and addressed any discharge needs you might have? Yes    See the educational handout provided at discharge for more information on monitoring your daily weight, activity and diet. This also explains more about Heart Failure, symptoms of a flare-up and some of the tests that you have undergone.     Warning Signs of a Flare-Up include:  · Swelling in the ankles or lower legs.  · Shortness of breath, while at rest, or while doing normal activities.   · Shortness of breath at night when in bed, or coughing in bed.   · Requiring more pillows to sleep at night, or needing to sit up at night to sleep.  · Feeling weak, dizzy or fatigued.     When to call your Doctor:  · Call [facility] seven days a week from 8:00 a.m. to 8:00 p.m. for medical questions [phone].  · Call your Primary Care Physician or Cardiologist if:   1. You experience any pain radiating to your jaw or neck.  2. You have any difficulty breathing.  3. You experience weight gain of 3 lbs in a day or 5 lbs in a week.   4. You feel any palpitations or irregular heartbeats.  5. You become dizzy or lose consciousness.   If you have had an angiogram or had a pacemaker or AICD placed, and experience:  1. Bleeding, drainage or swelling at the surgical / puncture site.  2. Fever greater than 100.0 F  3. Shock from internal defibrillator.  4. Cool and / or numb extremities.

## 2018-10-18 NOTE — DISCHARGE SUMMARY
PRIMARY DISCHARGE DIAGNOSIS: Status post MitraClip    PROCEDURES:    1. Successful MitraClip with one clip , transfemoral approach under general anesthesia on 10/15/18  2. Intraoperative transesophageal echocardiogram showing results pending  3. Echocardiogram on 10/16/18 showing:  Moderately reduced left ventricular systolic function.  Left ventricular ejection fraction is visually estimated to be 40%.  Known transcatheter mitral valve repair which is functioning normally   with appropriate transvalvular gradient.  Mild mitral regurgitation.  Aortic sclerosis without stenosis.  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  4. CXR on 10/16/18 showing:  No pneumothorax.  Mild lung base interstitial edema.  5. EKG on 10/16/18 showing:  ATRIAL FIBRILLATION rate of 78  VENTRICULAR BIGEMINY   LOW VOLTAGE, EXTREMITY LEADS   PROBABLE ANTEROSEPTAL INFARCT, OLD   NONSPECIFIC T ABNORMALITIES, LATERAL LEADS     HOSPITAL COURSE: The patient is a pleasant 78 year old female with severe symptomatic mitral regurgitation, ischemic cardiomyopathy with congestive heart failure, coronary artery disease with prior coronary bypass graft in '02 and re-do in '06, chronic atrial fibrillation on coumadin. Due to the patient's symptoms, the patient underwent successful MitraClip described as above. Post-procedure, the patient did well. They did require IV diuresis during their stay and will continue on oral diuretics post-operatively. She had some intermittent hypotension and lightheadedness, her blood pressure medications were adjusted and her symptoms resolved. They were able to ambulate without difficulty. No further events were noted during their stay. They are now off oxygen and are to be discharged to home with her daughter, she is staying in town with her until her follow up appointment.    DISCHARGE MEDICATIONS:       Medication List      START taking these medications      Instructions   potassium chloride  SA 20 MEQ Tbcr  Commonly known as:  Kdur   Take 1 Tab by mouth every day.  Dose:  20 mEq        CHANGE how you take these medications      Instructions   carvedilol 12.5 MG Tabs  What changed:  · medication strength  · See the new instructions.  Commonly known as:  COREG   Take 1 Tab by mouth 2 times a day, with meals.  Dose:  12.5 mg     lisinopril 10 MG Tabs  What changed:  · medication strength  · how much to take  · when to take this  Commonly known as:  PRINIVIL   Take 1 Tab by mouth every day.  Dose:  10 mg        CONTINUE taking these medications      Instructions   aspirin EC 81 MG Tbec  Commonly known as:  ECOTRIN   Take 81 mg by mouth every day.  Dose:  81 mg     clopidogrel 75 MG Tabs  Commonly known as:  PLAVIX   Take 1 Tab by mouth every day.  Dose:  75 mg     furosemide 20 MG Tabs  Commonly known as:  LASIX   Take 1 Tab by mouth every day.  Dose:  20 mg     levothyroxine 75 MCG Tabs  Commonly known as:  SYNTHROID   Take 75 mcg by mouth Every morning on an empty stomach.  Dose:  75 mcg     LIPITOR 40 MG Tabs  Generic drug:  atorvastatin   Take 40 mg by mouth every evening.  Dose:  40 mg     magnesium oxide 400 (241.3 Mg) MG Tabs tablet  Commonly known as:  MAG-OX   Take 1 Tab by mouth every day.  Dose:  400 mg     valacyclovir 1 GM Tabs  Commonly known as:  VALTREX   Take 1,000 mg by mouth 3 times a day.  Dose:  1000 mg     warfarin 5 MG Tabs  Commonly known as:  COUMADIN   Take 5-7.5 mg by mouth every day. 7.5 mg on Mondays, wednesdays, and Fridays. All other days 5 mg  Dose:  5-7.5 mg          DISCHARGE INSTRUCTIONS: They are given discharge instructions on potential post-operative complications and symptoms to watch out for. Their groin sites were checked and were clean, dry, and intact. Patient or family to notify us for any complications noted on the discharge instructions. They will follow up with myself, Maddy CONTRERAS, on Friday in our cardiology office. They will not get labs before  their follow up appointment. They will then follow up with Dr. Don with a repeat echocardiogram in one month for post MitraClip assessment.     FOLLOW UP  Future Appointments  Date Time Provider Department Center   10/19/2018 1:20 PM JACQUES Carroll. [facility] None   11/30/2018 11:30 AM Rah Anderson M.D. [facility] None   12/6/2018 1:40 PM Moy Don M.D. [facility] None        Calm

## 2018-10-19 ENCOUNTER — OFFICE VISIT (OUTPATIENT)
Dept: CARDIOLOGY | Facility: MEDICAL CENTER | Age: 78
End: 2018-10-19
Payer: MEDICARE

## 2018-10-19 VITALS
DIASTOLIC BLOOD PRESSURE: 80 MMHG | HEIGHT: 64 IN | BODY MASS INDEX: 18.78 KG/M2 | HEART RATE: 60 BPM | WEIGHT: 110 LBS | SYSTOLIC BLOOD PRESSURE: 110 MMHG | OXYGEN SATURATION: 95 %

## 2018-10-19 DIAGNOSIS — I25.10 ATHEROSCLEROSIS OF NATIVE CORONARY ARTERY OF NATIVE HEART WITHOUT ANGINA PECTORIS: ICD-10-CM

## 2018-10-19 DIAGNOSIS — I10 ESSENTIAL HYPERTENSION: ICD-10-CM

## 2018-10-19 DIAGNOSIS — I05.9 MITRAL VALVE DISORDER: ICD-10-CM

## 2018-10-19 DIAGNOSIS — I50.20 SYSTOLIC CHF WITH REDUCED LEFT VENTRICULAR FUNCTION, NYHA CLASS 2 (HCC): ICD-10-CM

## 2018-10-19 DIAGNOSIS — E78.5 DYSLIPIDEMIA: ICD-10-CM

## 2018-10-19 DIAGNOSIS — Z79.01 CHRONIC ANTICOAGULATION: ICD-10-CM

## 2018-10-19 DIAGNOSIS — Z98.890 S/P MITRAL VALVE REPAIR: ICD-10-CM

## 2018-10-19 DIAGNOSIS — I25.810 CORONARY ARTERY DISEASE INVOLVING CORONARY BYPASS GRAFT OF NATIVE HEART WITHOUT ANGINA PECTORIS: ICD-10-CM

## 2018-10-19 DIAGNOSIS — I25.5 ISCHEMIC CARDIOMYOPATHY: ICD-10-CM

## 2018-10-19 DIAGNOSIS — I48.20 CHRONIC ATRIAL FIBRILLATION (HCC): ICD-10-CM

## 2018-10-19 DIAGNOSIS — Z95.1 STATUS POST CORONARY ARTERY BYPASS GRAFTS X 5: ICD-10-CM

## 2018-10-19 LAB — EKG IMPRESSION: NORMAL

## 2018-10-19 PROCEDURE — 99214 OFFICE O/P EST MOD 30 MIN: CPT | Performed by: NURSE PRACTITIONER

## 2018-10-19 PROCEDURE — 93000 ELECTROCARDIOGRAM COMPLETE: CPT | Performed by: INTERNAL MEDICINE

## 2018-10-19 RX ORDER — POTASSIUM CHLORIDE 20 MEQ/1
20 TABLET, EXTENDED RELEASE ORAL PRN
COMMUNITY
Start: 2018-10-19 | End: 2018-11-30

## 2018-10-19 RX ORDER — FUROSEMIDE 20 MG/1
20 TABLET ORAL PRN
Qty: 30 TAB | Refills: 11 | COMMUNITY
Start: 2018-10-19 | End: 2018-11-30 | Stop reason: SDUPTHER

## 2018-10-19 ASSESSMENT — ENCOUNTER SYMPTOMS
SHORTNESS OF BREATH: 0
FEVER: 0
ABDOMINAL PAIN: 0
CLAUDICATION: 0
PND: 0
MYALGIAS: 0
ORTHOPNEA: 0
COUGH: 0
PALPITATIONS: 0
DIZZINESS: 0

## 2018-10-19 NOTE — PROGRESS NOTES
Chief Complaint   Patient presents with   • Mitral Valve Prolapse     follow up     Subjective:   Daya Woods is a 78 y.o. female who presents today for hospital follow up S/P Claudette Clip X1.    She is a patient of Dr. Rah Anderson in our office.    Hx of CAD with prior CABG (new stent placement to RVG-OM), HLD, ischemic cardiomyopathy with rEF of 40%, HTN, and chronic afib on anticoagulation.    She lives in [place], [place]. She is doing very well with just mild fatigue post-operatively. Her daughter is in the apt with her today and is worried once she gets home, she will push herself too much. She is to continue with no heavy lifting for 2 weeks.    She is very sensitive to her medications and gets lightheaded if she doesn't eat with her pills.    Her groins are CDI but diffuse bruising. She has no pain at the sites but two small bumps at each angio site. She knows to watch for symptoms of vascular complications.    She  has had no episodes of chest pain, palpitations, dizziness/lightheadedness, shortness of breath, orthopnea, or peripheral edema.    Past Medical History:   Diagnosis Date   • Anesthesia     ponv   • Anticoagulation monitoring, special range    • Atrial fibrillation (HCC)    • CAD (coronary artery disease)     stent placement   • Cataract     Bilateral IOL   • Chronic anticoagulation    • Chronic atrial fibrillation (HCC)     Managed with rate control and warfarin for anticoagulation    • Coronary atherosclerosis of native coronary artery 12/2002    CABG x 5 (LIMA-D3, SVG-OM, SVG-RCA, SVG-distal LAD, SVG-D1). August 2016: LIMA-D3 and SVG-OM patent (interval occlusion of the SVG-RCA); chronic occluded SVG-LAD, SVG-D1.   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 Global hypokinesis. Grade III diastolic dysfunction.   • MI, old 2002   • Mitral regurgitation 07/2018    Echocardiogram with severe MR.   • Osteoarthritis    • Pain     right knee, back   • Raynaud's disease    • Spinal stenosis 8/20/2015   • Status post coronary artery bypass grafts x 5 08/2002    LIMA-D3, SVG-LAD, SVG-OM, SVG-RCA, SVG-distal LAD.    • Stress 1/12/2012   • Unstable angina (HCC) 2/24/2014   • Valvular heart disease     s/p constance clip X1     Past Surgical History:   Procedure Laterality Date   • TRANSCATHETER MITRAL VALVE REPAIR  10/15/2018    Procedure: TRANSCATHETER MITRAL VALVE REPAIR- WITH POSSIBLE OPEN HEART AND ANY ASSOCIATED PROCEDURES;  Surgeon: Moy Don M.D.;  Location: SURGERY [facility];  Service: Cardiac   • DEVON  10/15/2018    Procedure: DEVON;  Surgeon: Moy Don M.D.;  Location: SURGERY [facility];  Service: Cardiac   • RECOVERY  8/26/2016    Procedure: CATH LAB-DEVON W/LHC W/MARLEY-JO-ANN/GRINSELL-LARGE GROUP;  Surgeon: Recoveryonly Surgery;  Location: SURGERY PRE-POST PROC UNIT [facility];  Service:    • OTHER Bilateral 08/2014    cataract extraction with iols   • APPENDECTOMY     • CARDIAC CATH     • GYN SURGERY      c section    • MITRAL VALVE REPLACE      MV clip X1   • MULTIPLE CORONARY ARTERY BYPASS      2009 CABG x 5   • TONSILLECTOMY AND ADENOIDECTOMY     • TUBAL LIGATION       Family History   Problem Relation Age of Onset   • Heart Attack Brother 55        heart attack     Social History     Social History   • Marital status:      Spouse name: N/A   • Number of children: N/A   • Years of education: N/A     Occupational History   • Not on file.     Social History Main Topics   • Smoking status: Former Smoker     Packs/day: 0.50     Years: 40.00     Quit date: 2/24/2002   • Smokeless tobacco: Never Used   • Alcohol use Yes      Comment: varies   • Drug use: No   • Sexual activity: Not on file     Other Topics Concern   •  "Not on file     Social History Narrative   • No narrative on file     Allergies   Allergen Reactions   • Codeine Unspecified     \"I just don't like it. It does weird things to me.\"   • Hydrochlorothiazide Unspecified     Unsure of reaction   • Isosorbide Mononitrate Rash     Rash   • Keflex Rash     Rash   • Sulfa Drugs Rash     rash   • Tape Unspecified     Tears skin off   • Xarelto [Rivaroxaban] Rash     rash     Outpatient Encounter Prescriptions as of 10/19/2018   Medication Sig Dispense Refill   • furosemide (LASIX) 20 MG Tab Take 1 Tab by mouth as needed (weight gain >3 lbs overnight or 5 lbs one week). 30 Tab 11   • potassium chloride SA (KDUR) 20 MEQ Tab CR Take 1 Tab by mouth as needed (to take with lasix).     • lisinopril (PRINIVIL) 10 MG Tab Take 1 Tab by mouth every day. 30 Tab 11   • carvedilol (COREG) 12.5 MG Tab Take 1 Tab by mouth 2 times a day, with meals. 60 Tab 11   • aspirin EC (ECOTRIN) 81 MG Tablet Delayed Response Take 81 mg by mouth every day.     • magnesium oxide (MAG-OX) 400 (241.3 Mg) MG Tab tablet Take 1 Tab by mouth every day. 30 Tab 11   • atorvastatin (LIPITOR) 40 MG Tab Take 40 mg by mouth every evening.     • levothyroxine (SYNTHROID) 75 MCG Tab Take 75 mcg by mouth Every morning on an empty stomach.  0   • clopidogrel (PLAVIX) 75 MG TABS Take 1 Tab by mouth every day. 90 Tab 3   • [DISCONTINUED] potassium chloride SA (KDUR) 20 MEQ Tab CR Take 1 Tab by mouth every day. 30 Tab 11   • warfarin (COUMADIN) 5 MG Tab Take 5-7.5 mg by mouth every day. 7.5 mg on Mondays, wednesdays, and Fridays. All other days 5 mg     • [DISCONTINUED] valacyclovir (VALTREX) 1 GM Tab Take 1,000 mg by mouth 3 times a day.     • [DISCONTINUED] furosemide (LASIX) 20 MG TABS Take 1 Tab by mouth every day. 90 Tab 3     No facility-administered encounter medications on file as of 10/19/2018.      Review of Systems   Constitutional: Positive for malaise/fatigue. Negative for fever.        Mild fatigue post-op " "  Respiratory: Negative for cough and shortness of breath.    Cardiovascular: Negative for chest pain, palpitations, orthopnea, claudication, leg swelling and PND.   Gastrointestinal: Negative for abdominal pain.   Musculoskeletal: Negative for myalgias.   Neurological: Negative for dizziness.        Objective:   /80 (BP Location: Left arm, Patient Position: Sitting, BP Cuff Size: Adult)   Pulse 60   Ht 1.626 m (5' 4\")   Wt 49.9 kg (110 lb)   LMP  (LMP Unknown)   SpO2 95%   BMI 18.88 kg/m²     Physical Exam   Constitutional: She is oriented to person, place, and time. She appears well-developed and well-nourished.   HENT:   Head: Normocephalic and atraumatic.   Eyes: EOM are normal.   Neck: No JVD present.   Cardiovascular: Normal rate, regular rhythm, normal heart sounds and intact distal pulses.    Pulmonary/Chest: Effort normal and breath sounds normal.   Musculoskeletal: Normal range of motion. She exhibits no edema.   Neurological: She is alert and oriented to person, place, and time.   Skin: Skin is warm and dry.   Nursing note and vitals reviewed.      Assessment:     1. Mitral valve disorder  EKG   2. S/P mitral valve repair     3. Chronic anticoagulation     4. Dyslipidemia     5. Essential hypertension     6. Ischemic cardiomyopathy     7. Systolic CHF with reduced left ventricular function, NYHA class 2 (HCC)     8. Chronic atrial fibrillation (HCC)     9. Coronary artery disease involving coronary bypass graft of native heart without angina pectoris     10. Status post coronary artery bypass grafts x 5     11. Atherosclerosis of native coronary artery of native heart without angina pectoris  furosemide (LASIX) 20 MG Tab     Medical Decision Making:  Today's Assessment / Status / Plan:     1. Claudette Clip X1  -doing well post-op  -SBE card discussed and understands  -coumadin for afib, ASA lifelong once off triple therapy  -she will stop ASA on 11/9/18 due to triple therapy with recent coronary " stent placement, she will remain on plavix for 1 year  -echo repeat in 1 month with follow up     2. Ischemic cardiomyopathy with rEF of 40%  -euvolemic on exam  -ok to stop lasix/K and use PRN for weight gain  -cont bb, ace  -hypotensive sometimes at home, she will call if has lightheadedness with hypotension for medication adjustments    3. HTN  -see above    4. HLD  -statin  -LDL goal <70 with CAD  -yearly cmp and lipid    5. CAD with recent stent to RVG-OM  -no angina or VILLEGAS  -cont asa for one month only (stop 11/9/18), cont plavix for one year then re-start ASA for valve disease at one year jodi, cont statin and bb    FU in clinic in 1 months with JI with echo; IA as planned    Patient verbalizes understanding and agrees with the plan of care.     Collaborating MD: Gisel MCLAUGHLIN

## 2018-10-23 ENCOUNTER — TELEPHONE (OUTPATIENT)
Dept: CARDIOLOGY | Facility: MEDICAL CENTER | Age: 78
End: 2018-10-23

## 2018-10-23 NOTE — TELEPHONE ENCOUNTER
Called and spoke with pt's daughter regarding flu shot recommendations. Pt's daughter states understanding.

## 2018-10-23 NOTE — TELEPHONE ENCOUNTER
----- Message from CARLOS Carroll sent at 10/23/2018  8:43 AM PDT -----  Regarding: FW: can pt get a flu shot?  Contact: [phone]  See below. SC  ----- Message -----  From: CARLOS Carroll  Sent: 10/23/2018   6:52 AM  To: Payal Sarah R.N.  Subject: RE: can pt get a flu shot?                       I would wait a month after procedure to get them. I thought I asked her if she got it and she said yes at the pre op. I will make sure to be more diligent about that question now that’s it’s flu season. SC  ----- Message -----  From: Payal Sarah R.N.  Sent: 10/22/2018   5:23 PM  To: CARLOS Carroll  Subject: FW: can pt get a flu shot?                       How long are we having pts wait post valve for flu shot this season? Pt had her TMVR last Monday.   ----- Message -----  From: Jazmyn Wu  Sent: 10/22/2018   2:48 PM  To: Payal Sarah R.N.  Subject: can pt get a flu shot?                           SC/tequila    Pt calling to ask if she can get a flu shot, please call Daya .

## 2018-11-25 NOTE — PROGRESS NOTES
Discharge instructions given and discussed, signed copy in chart. Pt verbalized understanding and all questions answered. All prescriptions sent to pharmacy. Pt discharged home in stable condition via wheelchair escorted by RN. Personal belongings with patient. IV removed and tolerated well. Tele box removed, monitor room notified.    Name band;

## 2018-11-30 ENCOUNTER — HOSPITAL ENCOUNTER (OUTPATIENT)
Dept: CARDIOLOGY | Facility: MEDICAL CENTER | Age: 78
End: 2018-11-30
Attending: INTERNAL MEDICINE
Payer: MEDICARE

## 2018-11-30 ENCOUNTER — OFFICE VISIT (OUTPATIENT)
Dept: CARDIOLOGY | Facility: MEDICAL CENTER | Age: 78
End: 2018-11-30
Payer: MEDICARE

## 2018-11-30 VITALS
HEART RATE: 84 BPM | OXYGEN SATURATION: 94 % | BODY MASS INDEX: 19.12 KG/M2 | DIASTOLIC BLOOD PRESSURE: 60 MMHG | SYSTOLIC BLOOD PRESSURE: 122 MMHG | HEIGHT: 64 IN | WEIGHT: 112 LBS

## 2018-11-30 DIAGNOSIS — I25.10 ATHEROSCLEROSIS OF NATIVE CORONARY ARTERY OF NATIVE HEART WITHOUT ANGINA PECTORIS: ICD-10-CM

## 2018-11-30 DIAGNOSIS — I50.23 ACUTE ON CHRONIC SYSTOLIC CHF (CONGESTIVE HEART FAILURE) (HCC): ICD-10-CM

## 2018-11-30 DIAGNOSIS — Z98.890 S/P MITRAL VALVE REPAIR: ICD-10-CM

## 2018-11-30 DIAGNOSIS — I34.0 MODERATE MITRAL REGURGITATION: ICD-10-CM

## 2018-11-30 LAB
LV EJECT FRACT  99904: 40
LV EJECT FRACT MOD 2C 99903: 57.05
LV EJECT FRACT MOD 4C 99902: 36.59
LV EJECT FRACT MOD BP 99901: 36.59

## 2018-11-30 PROCEDURE — 99215 OFFICE O/P EST HI 40 MIN: CPT | Performed by: INTERNAL MEDICINE

## 2018-11-30 PROCEDURE — 93306 TTE W/DOPPLER COMPLETE: CPT | Mod: 26 | Performed by: INTERNAL MEDICINE

## 2018-11-30 PROCEDURE — 93306 TTE W/DOPPLER COMPLETE: CPT

## 2018-11-30 RX ORDER — CARVEDILOL 6.25 MG/1
6.25 TABLET ORAL 2 TIMES DAILY WITH MEALS
Qty: 180 TAB | Refills: 3 | Status: SHIPPED | OUTPATIENT
Start: 2018-11-30 | End: 2019-07-19

## 2018-11-30 RX ORDER — LISINOPRIL 10 MG/1
10 TABLET ORAL 2 TIMES DAILY
Qty: 180 TAB | Refills: 3 | Status: SHIPPED | OUTPATIENT
Start: 2018-11-30 | End: 2019-12-01 | Stop reason: SDUPTHER

## 2018-11-30 RX ORDER — SPIRONOLACTONE 25 MG/1
12.5 TABLET ORAL DAILY
Qty: 30 TAB | Refills: 11 | Status: SHIPPED | OUTPATIENT
Start: 2018-11-30 | End: 2020-01-14 | Stop reason: SDUPTHER

## 2018-11-30 RX ORDER — FUROSEMIDE 20 MG/1
20 TABLET ORAL DAILY
Qty: 90 TAB | Refills: 3 | Status: SHIPPED | OUTPATIENT
Start: 2018-11-30 | End: 2020-02-19

## 2018-11-30 RX ORDER — POTASSIUM CHLORIDE 750 MG/1
10 TABLET, EXTENDED RELEASE ORAL DAILY
Qty: 90 TAB | Refills: 3 | Status: SHIPPED | OUTPATIENT
Start: 2018-11-30 | End: 2020-02-19

## 2018-11-30 ASSESSMENT — ENCOUNTER SYMPTOMS
CARDIOVASCULAR NEGATIVE: 1
HEADACHES: 1
SHORTNESS OF BREATH: 1
BACK PAIN: 1

## 2018-11-30 NOTE — PROGRESS NOTES
Subjective:   Daya Woods is a 77 -year-old woman with a history of prior MI and 5-vessel CABG in 2002 now with now 2/5 grafts open by cardiac catheterization 8/26/16 (LIMA-diag patent, SVG-OM status post PCI 10/9/2018, all others occluded), severe disease of her native LAD status post PCI with drug-eluting prior stents, and an occluded native right coronary artery filling via collaterals. She has chronic atrial fibrillation, hypertension, dyslipidemia, and systolic congestive heart failure with EF 40%. She has functional moderate mitral regurgitation recently severe and now status post Mitraclip procedure on 10/15/2018.    She tells me today about some abdominal fullness that was worse with exertion and consistent with previous volume overload related to which she resumed again her Lasix at 20 mg p.o. daily with improvement of those symptoms.  She has been on Lasix now for about 10 days.  She has not been consistently taking her potassium supplementation.    She also describes elevation in her blood pressures up to the 150's mmHg range related to which she increased her lisinopril from 10 mg p.o. daily to 10 mg p.o. twice daily.    In the aftermath of all that with her highest level of recent exertion including raking and vacuuming she has no dyspnea.    She recounts with me previous use of spironolactone in addition to lisinopril at higher dose, 20 mg, and Coreg at higher dose she had in the past symptomatic hypotension with blood pressures as low as the 70s mmHg range.    She comes in with her daughter, both are wonderfully pleasant as usual.    She does tell me about fairly sizable hematomas on her thighs bilaterally with some weakness in her thigh muscles.    Past Medical History:   Diagnosis Date   • Anesthesia     ponv   • Anticoagulation monitoring, special range    • Atrial fibrillation (HCC)    • CAD (coronary artery disease)     stent placement   • Cataract     Bilateral IOL   • Chronic anticoagulation     • Chronic atrial fibrillation (HCC)     Managed with rate control and warfarin for anticoagulation    • Coronary atherosclerosis of native coronary artery 12/2002    CABG x 5 (LIMA-D3, SVG-OM, SVG-RCA, SVG-distal LAD, SVG-D1). August 2016: LIMA-D3 and SVG-OM patent (interval occlusion of the SVG-RCA); chronic occluded SVG-LAD, SVG-D1.   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 Global hypokinesis. Grade III diastolic dysfunction.   • MI, old 2002   • Mitral regurgitation 07/2018    Echocardiogram with severe MR.   • Osteoarthritis    • Pain     right knee, back   • Raynaud's disease    • Spinal stenosis 8/20/2015   • Status post coronary artery bypass grafts x 5 08/2002    LIMA-D3, SVG-LAD, SVG-OM, SVG-RCA, SVG-distal LAD.    • Stress 1/12/2012   • Unstable angina (HCC) 2/24/2014   • Valvular heart disease     s/p constance clip X1     Past Surgical History:   Procedure Laterality Date   • TRANSCATHETER MITRAL VALVE REPAIR  10/15/2018    Procedure: TRANSCATHETER MITRAL VALVE REPAIR- WITH POSSIBLE OPEN HEART AND ANY ASSOCIATED PROCEDURES;  Surgeon: Moy Don M.D.;  Location: [facility];  Service: Cardiac   • DEVON  10/15/2018    Procedure: DEVON;  Surgeon: Moy Don M.D.;  Location: [facility];  Service: Cardiac   • RECOVERY  8/26/2016    Procedure: CATH LAB-DEVON W/LHC W/POSSIBLE-JO-ANN/GRINSELL-LARGE GROUP;  Surgeon: Recoveryon Surgery;  Location: SURGERY PRE-POST PROC UNIT [facility];  Service:    • OTHER Bilateral 08/2014    cataract extraction with iols   • APPENDECTOMY     • CARDIAC CATH     • GYN SURGERY      c section    • MITRAL VALVE REPLACE      MV clip X1   • MULTIPLE CORONARY ARTERY BYPASS      2009 CABG x 5   •  "TONSILLECTOMY AND ADENOIDECTOMY     • TUBAL LIGATION       Family History   Problem Relation Age of Onset   • Heart Attack Brother 55        heart attack     History   Smoking Status   • Former Smoker   • Packs/day: 0.50   • Years: 40.00   • Quit date: 2/24/2002   Smokeless Tobacco   • Never Used     Allergies   Allergen Reactions   • Codeine Unspecified     \"I just don't like it. It does weird things to me.\"   • Hydrochlorothiazide Unspecified     Unsure of reaction   • Isosorbide Mononitrate Rash     Rash   • Keflex Rash     Rash   • Sulfa Drugs Rash     rash   • Tape Unspecified     Tears skin off   • Xarelto [Rivaroxaban] Rash     rash     Outpatient Encounter Prescriptions as of 11/30/2018   Medication Sig Dispense Refill   • furosemide (LASIX) 20 MG Tab Take 1 Tab by mouth every day. 90 Tab 3   • lisinopril (PRINIVIL) 10 MG Tab Take 1 Tab by mouth 2 times a day. 180 Tab 3   • carvedilol (COREG) 6.25 MG Tab Take 1 Tab by mouth 2 times a day, with meals. 180 Tab 3   • spironolactone (ALDACTONE) 25 MG Tab Take 0.5 Tabs by mouth every day. 30 Tab 11   • potassium chloride SA (K-DUR) 10 MEQ Tab CR Take 1 Tab by mouth every day. 90 Tab 3   • warfarin (COUMADIN) 5 MG Tab Take 5-7.5 mg by mouth every day. 7.5 mg on Mondays, wednesdays, and Fridays. All other days 5 mg     • magnesium oxide (MAG-OX) 400 (241.3 Mg) MG Tab tablet Take 1 Tab by mouth every day. 30 Tab 11   • atorvastatin (LIPITOR) 40 MG Tab Take 40 mg by mouth every evening.     • levothyroxine (SYNTHROID) 75 MCG Tab Take 75 mcg by mouth Every morning on an empty stomach.  0   • clopidogrel (PLAVIX) 75 MG TABS Take 1 Tab by mouth every day. 90 Tab 3   • [DISCONTINUED] furosemide (LASIX) 20 MG Tab Take 1 Tab by mouth as needed (weight gain >3 lbs overnight or 5 lbs one week). 30 Tab 11   • [DISCONTINUED] potassium chloride SA (KDUR) 20 MEQ Tab CR Take 1 Tab by mouth as needed (to take with lasix).     • [DISCONTINUED] lisinopril (PRINIVIL) 10 MG Tab Take 1 " "Tab by mouth every day. 30 Tab 11   • [DISCONTINUED] carvedilol (COREG) 12.5 MG Tab Take 1 Tab by mouth 2 times a day, with meals. 60 Tab 11   • aspirin EC (ECOTRIN) 81 MG Tablet Delayed Response Take 81 mg by mouth every day.       No facility-administered encounter medications on file as of 11/30/2018.      Review of Systems   Eyes:        Visual changes consistent with floaters   Respiratory: Positive for shortness of breath (minimal, infrequent VILLEGAS).    Cardiovascular: Negative.         Recent abdominal fullness consistent with volume overload   Musculoskeletal: Positive for back pain (prior steroid injections were not effective in treating her spinal stenosis).   Neurological: Positive for headaches.   All other systems reviewed and are negative.       Objective:   /60 (BP Location: Left arm, Patient Position: Sitting)   Pulse 84   Ht 1.626 m (5' 4\")   Wt 50.8 kg (112 lb)   LMP  (LMP Unknown)   SpO2 94%   BMI 19.22 kg/m²     Physical Exam   Constitutional: She is oriented to person, place, and time. She appears well-developed and well-nourished. No distress.   Very pleasant elderly woman in no distress.  Physical examination is unchanged except as specified on my previous on 4/10/2018.  He comes in with her daughter today as usual.   HENT:   Head: Normocephalic and atraumatic.   Eyes: Pupils are equal, round, and reactive to light. Conjunctivae and EOM are normal. No scleral icterus.   Neck: Neck supple. No JVD present. No tracheal deviation present.   Cardiovascular: Normal rate, normal heart sounds and intact distal pulses.  An irregularly irregular rhythm present. Exam reveals no gallop and no friction rub.    No murmur heard.  Pulses:       Dorsalis pedis pulses are 2+ on the right side, and 2+ on the left side.   No carotid bruits. Prior healed sternotomy scar noted, unchanged.   Pulmonary/Chest: Effort normal and breath sounds normal. No stridor. No respiratory distress. She has no wheezes. " She has no rales.   Abdominal: Soft. Bowel sounds are normal. She exhibits no distension.   Musculoskeletal: She exhibits no edema (Trace bilateral lower extremity edema).   She was able to perform the get up and go test with her arms crossed rising from a seated position   Neurological: She is alert and oriented to person, place, and time.   Skin: Skin is warm and dry. No rash noted. She is not diaphoretic. No erythema. No pallor.   Psychiatric: She has a normal mood and affect. Judgment and thought content normal.   Vitals reviewed.    Lab Results   Component Value Date/Time    WBC 5.5 10/16/2018 03:00 AM    RBC 3.06 (L) 10/16/2018 03:00 AM    HEMOGLOBIN 10.1 (L) 10/16/2018 03:00 AM    HEMATOCRIT 30.1 (L) 10/16/2018 03:00 AM    MCV 98.4 (H) 10/16/2018 03:00 AM    MCH 33.0 10/16/2018 03:00 AM    MCHC 33.6 10/16/2018 03:00 AM    MPV 9.9 10/16/2018 03:00 AM        Lab Results   Component Value Date/Time    SODIUM 134 (L) 10/16/2018 02:58 AM    POTASSIUM 3.9 10/16/2018 02:58 AM    CHLORIDE 101 10/16/2018 02:58 AM    CO2 23 10/16/2018 02:58 AM    GLUCOSE 152 (H) 10/16/2018 02:58 AM    BUN 17 10/16/2018 02:58 AM    CREATININE 0.78 10/16/2018 02:58 AM        Lab Results   Component Value Date/Time    ASTSGOT 28 10/09/2018 06:50 AM    ALTSGPT 28 10/09/2018 06:50 AM        Lab Results   Component Value Date/Time    CHOLSTRLTOT 137 02/24/2014 03:05 AM    LDL 67 02/24/2014 03:05 AM    HDL 55 02/24/2014 03:05 AM    TRIGLYCERIDE 74 02/24/2014 03:05 AM       Labs, 5/3/2016.   CBC: WBC 4.7, hemoglobin 14.7, platelets 186  Chemistry panel: Sodium 132, potassium 4.6, chloride 97, CO2 30, BUN 8, creatinine 0.8, glucose 88, calcium 9.3, total protein 6.7, albumin 4.1, AST 28, ALT 22, alkaline phosphatase 70, total bilirubin 0.7.  Lipid panel: LDL 67, HDL 56, triglycerides 65, total cholesterol 131  TSH 1.97    Labs, 5/9/2017  CBC: WBC 2.4, hemoglobin 14.0, platelets 173  Chemistry panel: Sodium 132, potassium 4.6, chloride 98, CO2  "28, BUN 8, creatinine 0.9, glucose 89, calcium 8.9  LFTs: AST 36, ALT 38, alkaline phosphatase 61, albumin 4.3, total protein 6.5, globulins 2.2, total bilirubin 0.7  Lipids: LDL 45, HDL 58, triglycerides 52, total cholesterol 111  TSH: 1.31    Cardiac catheterization, 10/9/2018:  \"IMPRESSION:  1.  3-4+ mitral regurgitation.  2.  Reduced left ventricular systolic function with ejection fraction of 35%,   mid to distal anterior and apical hypokinesis.  3.  Elevated left ventricular end-diastolic pressure.  4.  Coronary artery disease with known occluded saphenous vein graft to the   distal left anterior descending to the diagonal branch and to the right   coronary artery with patent left internal mammary artery to the mid to distal   left anterior descending artery, patent saphenous vein graft to a small obtuse   marginal branch with high-grade ostial proximal graft stenosis.  5.  Successful percutaneous transluminal coronary angioplasty/stent placement   of the ostial to proximal saphenous vein graft to the obtuse marginal branch   with 3.0x28 mm Synergy drug-eluting stent.  6.  Elevated left ventricular end-diastolic pressure.  7.  Elevated right heart pressure with 40 mmHg\"    Mitraclip procedure note, 10/15/2018:  \"PROCEDURES:  Transcatheter mitral valve repair (MitraClip x1), transseptal   puncture, aspiration of right atrial thrombus and intraoperative   transesophageal echocardiography\"    Echocardiogram, 4/4/2017:  \"CONCLUSIONS  Moderately reduced left ventricular systolic function.  Akinetic apex with apical aneurysm.  Moderate mitral regurgitation.  Compared to the images of the prior study done  7/27/16, ejection   fraction is unchanged.  Apical aneurysm is now present\"    Serial echocardiograms done in between demonstrated severe mitral regurgitation, then a transesophageal echocardiogram with the same also done at the time of mitral clip with mild mitral regurgitation afterwards on echocardiogram " 10/16/2018.    Echocardiogram, 11/30/2018, images and report reviewed, my interpretation: Shows a left ventricular ejection fraction of 40%, moderate mitral regurgitation with no stenosis around her mitral clip.  She has moderate elevation in her estimated right-sided pressures with an RVSP of 40 mmHg, and aortic sclerosis.    Assessment:     1. Atherosclerosis of native coronary artery of native heart without angina pectoris     2. Acute on chronic systolic CHF (congestive heart failure) ([facility])  furosemide (LASIX) 20 MG Tab    lisinopril (PRINIVIL) 10 MG Tab    carvedilol (COREG) 6.25 MG Tab    spironolactone (ALDACTONE) 25 MG Tab    potassium chloride SA (K-DUR) 10 MEQ Tab CR    EC-ECHOCARDIOGRAM COMPLETE W/O CONT    BASIC METABOLIC PANEL   3. Moderate mitral regurgitation  EC-ECHOCARDIOGRAM COMPLETE W/O CONT       Medical Decision Making:  Today's Assessment / Status / Plan:     She comes in today in the aftermath of PCI to her vein graft to her OM as well as mitral clip for severe mitral regurgitation with moderate improvement in her symptoms.    However, she has had recent volume retention related to which she started herself back on Lasix 20 mg p.o. daily, and increase her lisinopril from 10 mg p.o. daily to 10 mg p.o. twice daily in conjunction with elevated blood pressures in the 140s 150s mmHg range.  Today, her blood pressure is 122/60 mmHg a bit higher than the measurements she gets at home.  In conjunction with all of that and given previous hypotension with spironolactone at 25 mg at the time of higher doses of other antihypertensives I continued her Lasix and lisinopril at those doses.  I decreased her Coreg from previously 12.5 mg p.o. twice daily to now 6.5 mg p.o. twice daily, and added spironolactone at half of the previous trial dose, 12.5 mg p.o. daily.  I asked her to take potassium at 10 mEq p.o. daily.    I ordered a repeat nonfasting chemistry panel to be done in 1 month after all of those  medication changes and I ordered a repeat transthoracic echocardiogram to be done in 6 months.    We will follow her closely in light of all of these changes.    Rah Anderson MD  Cardiologist, [facility] [place]     Return in about 3 months (around 2/28/2019) for NP in 1 month, me in 3 months.    Physical Exam   Constitutional: She is oriented to person, place, and time. She appears well-developed and well-nourished. No distress.   Very pleasant elderly woman in no distress.  Physical examination is unchanged except as specified on my previous on 4/10/2018.  He comes in with her daughter today as usual.   HENT:   Head: Normocephalic and atraumatic.   Eyes: Pupils are equal, round, and reactive to light. Conjunctivae and EOM are normal. No scleral icterus.   Neck: Neck supple. No JVD present. No tracheal deviation present.   Cardiovascular: Normal rate, normal heart sounds and intact distal pulses.  An irregularly irregular rhythm present. Exam reveals no gallop and no friction rub.    No murmur heard.  Pulses:       Dorsalis pedis pulses are 2+ on the right side, and 2+ on the left side.   No carotid bruits. Prior healed sternotomy scar noted, unchanged.   Pulmonary/Chest: Effort normal and breath sounds normal. No stridor. No respiratory distress. She has no wheezes. She has no rales.   Abdominal: Soft. Bowel sounds are normal. She exhibits no distension.   Musculoskeletal: She exhibits no edema (Trace bilateral lower extremity edema).   She was able to perform the get up and go test with her arms crossed rising from a seated position   Neurological: She is alert and oriented to person, place, and time.   Skin: Skin is warm and dry. No rash noted. She is not diaphoretic. No erythema. No pallor.   Psychiatric: She has a normal mood and affect. Judgment and thought content normal.   Vitals reviewed.

## 2018-11-30 NOTE — LETTER
Name:          Daya Woods   YOB: 1940  Date:     11/30/2018      Sourav Swift M.D.  [address] [address]     Rah Anderson MD  [address], [facility] [address]  [place] [address]  Phone: [phone]  Back Line: [phone]  Fax: [phone]  E-mail: [email or web address]   Dear Dr. Swift,    We had the pleasure of seeing your patient, Daya Woods, in Cardiology Clinic at [facility] and [facility] today.    As you know, she is a 78-year-old woman with a history of prior MI and 5-vessel CABG in 2002 now with now 2/5 grafts open by cardiac catheterization 8/26/16 (LIMA-diag patent, SVG-OM status post PCI 10/9/2018, all others occluded), severe disease of her native LAD status post PCI with drug-eluting prior stents, and an occluded native right coronary artery filling via collaterals. She has chronic atrial fibrillation, hypertension, dyslipidemia, and systolic congestive heart failure with EF 40%. She has functional moderate mitral regurgitation recently severe and now status post Mitraclip procedure on 10/15/2018.    She comes in today in the aftermath of PCI to her vein graft to her OM as well as mitral clip for severe mitral regurgitation with moderate improvement in her symptoms.     However, she has had recent volume retention related to which she started herself back on Lasix 20 mg p.o. daily, and increase her lisinopril from 10 mg p.o. daily to 10 mg p.o. twice daily in conjunction with elevated blood pressures in the 140s 150s mmHg range.  Today, her blood pressure is 122/60 mmHg a bit higher than the measurements she gets at home.  In conjunction with all of that and given previous hypotension with spironolactone at 25 mg at the time of higher doses of other antihypertensives I continued her Lasix and lisinopril at those doses.  I decreased her Coreg from previously 12.5 mg p.o. twice daily to now 6.5 mg p.o. twice daily, and added spironolactone at half of the previous  trial dose, 12.5 mg p.o. daily.  I asked her to take potassium at 10 mEq p.o. daily.     I ordered a repeat nonfasting chemistry panel to be done in 1 month after all of those medication changes and I ordered a repeat transthoracic echocardiogram to be done in 6 months.     We will follow her closely in light of all of these changes.    Return in about 3 months (around 2/28/2019) for NP in 1 month, me in 3 months.    Thank you for the referral and please do not hesitate to contact me at any time. My contact information is listed above.    This note was dictated using Dragon speech recognition software.     A full note including my physical examination and a full list of rectified medications is available in our medical record, and can be faxed as well.    Rah Anderson MD  Cardiologist  [facility]

## 2018-12-06 ENCOUNTER — OFFICE VISIT (OUTPATIENT)
Dept: CARDIOLOGY | Facility: MEDICAL CENTER | Age: 78
End: 2018-12-06
Payer: MEDICARE

## 2018-12-06 VITALS
HEIGHT: 64 IN | DIASTOLIC BLOOD PRESSURE: 60 MMHG | SYSTOLIC BLOOD PRESSURE: 112 MMHG | BODY MASS INDEX: 19.23 KG/M2 | WEIGHT: 112.66 LBS | HEART RATE: 80 BPM | OXYGEN SATURATION: 93 %

## 2018-12-06 DIAGNOSIS — Z95.5 STENTED CORONARY ARTERY: ICD-10-CM

## 2018-12-06 DIAGNOSIS — Z79.01 CHRONIC ANTICOAGULATION: ICD-10-CM

## 2018-12-06 DIAGNOSIS — I48.20 CHRONIC ATRIAL FIBRILLATION (HCC): ICD-10-CM

## 2018-12-06 DIAGNOSIS — I25.5 ISCHEMIC CARDIOMYOPATHY: ICD-10-CM

## 2018-12-06 DIAGNOSIS — Z95.1 HX OF CABG: ICD-10-CM

## 2018-12-06 DIAGNOSIS — I25.810 CORONARY ARTERY DISEASE INVOLVING CORONARY BYPASS GRAFT OF NATIVE HEART WITHOUT ANGINA PECTORIS: ICD-10-CM

## 2018-12-06 DIAGNOSIS — Z98.890 S/P MITRAL VALVE REPAIR: ICD-10-CM

## 2018-12-06 PROCEDURE — 99024 POSTOP FOLLOW-UP VISIT: CPT | Performed by: INTERNAL MEDICINE

## 2018-12-06 ASSESSMENT — ENCOUNTER SYMPTOMS
MUSCULOSKELETAL NEGATIVE: 1
FEVER: 0
NEUROLOGICAL NEGATIVE: 1
CLAUDICATION: 0
NERVOUS/ANXIOUS: 0
VOMITING: 0
CARDIOVASCULAR NEGATIVE: 1
COUGH: 0
PALPITATIONS: 0
RESPIRATORY NEGATIVE: 1
NAUSEA: 0
GASTROINTESTINAL NEGATIVE: 1
MYALGIAS: 0
BLURRED VISION: 0
DEPRESSION: 0
WEIGHT LOSS: 0
FOCAL WEAKNESS: 0
BRUISES/BLEEDS EASILY: 0
SHORTNESS OF BREATH: 0
DOUBLE VISION: 0
WEAKNESS: 0
CONSTITUTIONAL NEGATIVE: 1
HEADACHES: 0
EYES NEGATIVE: 1
ABDOMINAL PAIN: 0
PSYCHIATRIC NEGATIVE: 1
CHILLS: 0
DIZZINESS: 0

## 2018-12-06 NOTE — PROGRESS NOTES
No chief complaint on file.      Subjective:   Daya Woods is a 78 y.o. female who presents today for hospital follow up.    Since the discharge from [facility] on 10/16/18 status post MitraClip, she has been doing well. She denies fatigue, shortness of breath, dyspnea on exertion, chest pain, dizziness or syncope. She is active doing house and yard work and is doing well.    Past Medical History:   Diagnosis Date   • Anesthesia     ponv   • Anticoagulation monitoring, special range    • Atrial fibrillation (HCC)    • CAD (coronary artery disease)     stent placement   • Cataract     Bilateral IOL   • Chronic anticoagulation    • Chronic atrial fibrillation (HCC)     Managed with rate control and warfarin for anticoagulation    • Coronary atherosclerosis of native coronary artery 12/2002    CABG x 5 (LIMA-D3, SVG-OM, SVG-RCA, SVG-distal LAD, SVG-D1). August 2016: LIMA-D3 and SVG-OM patent (interval occlusion of the SVG-RCA); chronic occluded SVG-LAD, SVG-D1.   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 Global hypokinesis. Grade III diastolic dysfunction.   • MI, old 2002   • Mitral regurgitation 07/2018    Echocardiogram with severe MR.   • Osteoarthritis    • Pain     right knee, back   • Raynaud's disease    • Spinal stenosis 8/20/2015   • Status post coronary artery bypass grafts x 5 08/2002    LIMA-D3, SVG-LAD, SVG-OM, SVG-RCA, SVG-distal LAD.    • Stress 1/12/2012   • Unstable angina (HCC) 2/24/2014   • Valvular heart disease     s/p constance clip X1     Past Surgical History:   Procedure Laterality Date   • TRANSCATHETER MITRAL VALVE REPAIR  10/15/2018    Procedure: TRANSCATHETER MITRAL VALVE REPAIR- WITH POSSIBLE OPEN HEART AND ANY  "ASSOCIATED PROCEDURES;  Surgeon: Moy Don M.D.;  Location: SURGERY [facility];  Service: Cardiac   • DEVON  10/15/2018    Procedure: DEVON;  Surgeon: Moy Don M.D.;  Location: SURGERY [facility];  Service: Cardiac   • RECOVERY  8/26/2016    Procedure: CATH LAB-DEVON W/LHC W/POSSIBLE-JO-ANN/GRINSELL-LARGE GROUP;  Surgeon: [facility];  [place] [facility] [place]  Service:    • OTHER Bilateral 08/2014    cataract extraction with iols   • APPENDECTOMY     • CARDIAC CATH     • GYN SURGERY      c section    • MITRAL VALVE REPLACE      MV clip X1   • MULTIPLE CORONARY ARTERY BYPASS      2009 CABG x 5   • TONSILLECTOMY AND ADENOIDECTOMY     • TUBAL LIGATION       Family History   Problem Relation Age of Onset   • Heart Attack Brother 55        heart attack     Social History     Social History   • Marital status:      Spouse name: N/A   • Number of children: N/A   • Years of education: N/A     Occupational History   • Not on file.     Social History Main Topics   • Smoking status: Former Smoker     Packs/day: 0.50     Years: 40.00     Quit date: 2/24/2002   • Smokeless tobacco: Never Used   • Alcohol use Yes      Comment: varies   • Drug use: No   • Sexual activity: Not on file     Other Topics Concern   • Not on file     Social History Narrative   • No narrative on file     Allergies   Allergen Reactions   • Codeine Unspecified     \"I just don't like it. It does weird things to me.\"   • Hydrochlorothiazide Unspecified     Unsure of reaction   • Isosorbide Mononitrate Rash     Rash   • Keflex Rash     Rash   • Sulfa Drugs Rash     rash   • Tape Unspecified     Tears skin off   • Xarelto [Rivaroxaban] Rash     rash     Medications reviewed.    Outpatient Encounter Prescriptions as of 12/6/2018   Medication Sig Dispense Refill   • furosemide (LASIX) 20 MG Tab Take 1 Tab by mouth every day. 90 Tab 3   • lisinopril (PRINIVIL) 10 MG Tab Take 1 Tab by mouth 2 times a day. 180 " Tab 3   • carvedilol (COREG) 6.25 MG Tab Take 1 Tab by mouth 2 times a day, with meals. 180 Tab 3   • spironolactone (ALDACTONE) 25 MG Tab Take 0.5 Tabs by mouth every day. 30 Tab 11   • potassium chloride SA (K-DUR) 10 MEQ Tab CR Take 1 Tab by mouth every day. 90 Tab 3   • warfarin (COUMADIN) 5 MG Tab Take 5-7.5 mg by mouth every day. 7.5 mg on Mondays, wednesdays, and Fridays. All other days 5 mg     • magnesium oxide (MAG-OX) 400 (241.3 Mg) MG Tab tablet Take 1 Tab by mouth every day. 30 Tab 11   • atorvastatin (LIPITOR) 40 MG Tab Take 40 mg by mouth every evening.     • levothyroxine (SYNTHROID) 75 MCG Tab Take 75 mcg by mouth Every morning on an empty stomach.  0   • clopidogrel (PLAVIX) 75 MG TABS Take 1 Tab by mouth every day. 90 Tab 3   • aspirin EC (ECOTRIN) 81 MG Tablet Delayed Response Take 81 mg by mouth every day.       No facility-administered encounter medications on file as of 12/6/2018.      Review of Systems   Constitutional: Negative.  Negative for chills, fever, malaise/fatigue and weight loss.   HENT: Positive for nosebleeds. Negative for hearing loss.    Eyes: Negative.  Negative for blurred vision and double vision.   Respiratory: Negative.  Negative for cough and shortness of breath.    Cardiovascular: Negative.  Negative for chest pain, palpitations, claudication and leg swelling.   Gastrointestinal: Negative.  Negative for abdominal pain, nausea and vomiting.   Genitourinary: Negative.  Negative for dysuria and urgency.   Musculoskeletal: Negative.  Negative for joint pain and myalgias.   Skin: Negative.  Negative for itching and rash.   Neurological: Negative.  Negative for dizziness, focal weakness, weakness and headaches.   Endo/Heme/Allergies: Negative.  Does not bruise/bleed easily.   Psychiatric/Behavioral: Negative.  Negative for depression. The patient is not nervous/anxious.         Objective:   /60 (BP Location: Left arm, Patient Position: Sitting, BP Cuff Size: Adult)    "Pulse 80   Ht 1.626 m (5' 4\")   Wt 51.1 kg (112 lb 10.5 oz)   LMP  (LMP Unknown)   SpO2 93%   BMI 19.34 kg/m²     Physical Exam   Constitutional: She is oriented to person, place, and time. She appears well-developed and well-nourished.   HENT:   Head: Normocephalic and atraumatic.   Eyes: Pupils are equal, round, and reactive to light. Conjunctivae are normal.   Neck: Normal range of motion. Neck supple.   Cardiovascular: Normal rate and regular rhythm.    Pulmonary/Chest: Effort normal and breath sounds normal.   Abdominal: Soft. Bowel sounds are normal.   Musculoskeletal: Normal range of motion.   Neurological: She is alert and oriented to person, place, and time.   Skin: Skin is warm and dry.   Psychiatric: She has a normal mood and affect.     CARDIAC STUDIES/PROCEDURES:    CARDIAC CATHETERIZATION CONCLUSIONS (10/09/18)  1.  3-4+ mitral regurgitation.  2.  Reduced left ventricular systolic function with ejection fraction of 35%,   mid to distal anterior and apical hypokinesis.  3.  Elevated left ventricular end-diastolic pressure.  4.  Coronary artery disease with known occluded saphenous vein graft to the   distal left anterior descending to the diagonal branch and to the right   coronary artery with patent left internal mammary artery to the mid to distal   left anterior descending artery, patent saphenous vein graft to a small obtuse   marginal branch with high-grade ostial proximal graft stenosis.  5.  Successful percutaneous transluminal coronary angioplasty/stent placement   of the ostial to proximal saphenous vein graft to the obtuse marginal branch   with 3.0x28 mm Synergy drug-eluting stent.  6.  Elevated left ventricular end-diastolic pressure.  7.  Elevated right heart pressure with 40 mmHg.     CARDIAC CATHETERIZATION CONCLUSIONS by Dr. Menard (08/26/16)  1. Complex heavily calcified high-grade disease of the proximal and mid left anterior descending.  2. Chronic total occlusion of the " circumflex.  3. Codominant RCA with moderate native disease.  4. Totally occluded saphenous vein graft x3, (chronic).  5. Patent saphenous vein graft to an obtuse marginal with high-grade stenosis of the distal anastomosis.  6. Patent left internal mammary artery to a diagonal vessel.  7. Technically difficult and demanding PCI with overlapping stent placement of the proximal and mid LAD with 3 overlapping stents were placed at the area of dissection and occlusion that is still heavily calcified those being 2.5x12, 2.5x12 and 2.5x8.      CAROTID ULTRASOUND (09/20/18)  Atherosclerosis without stenosis reaching 50% threshold.    ECHOCARDIOGRAM CONCLUSIONS (11/30/18)  Moderately reduced left ventricular systolic function.  Moderately dilated left atrium.  Known transcatheter mitral valve repair (1 clips in place).  Moderate mitral regurgitation.  Aortic sclerosis without stenosis.  Estimated right ventricular systolic pressure is 40 mmHg.  Compared to the images of the prior study done on 10/16/18, mitral regurgitation is slightly worse.  (study result reviewed)     ECHOCARDIOGRAM CONCLUSIONS (07/10/18)  Moderately reduced left ventricular systolic function.  Left ventricular ejection fraction is visually estimated to be 35-40%.   Mild concentric left ventricular hypertrophy.  Severely dilated left atrium.  Severe mitral regurgitation.  Estimated right ventricular systolic pressure  is 30 mmHg.  Compared to the images of the prior study done on 04/04/17, mitral   regurgitation is slightly worse and now severe, LVEF is unchanged.     EKG performed on (10/16/18) was reviewed: EKG shows atrial fibrillation with premature ventricular contractions.  EKG performed on (10/15/18) EKG shows atrial fibrillation.  EKG performed on (10/09/18) EKG shows atrial fibrillation.     Laboratory results of (10/16/18) were reviewed. Bun of 17 mg/dl, creatinine levels of 0.78 mg/dl noted.  Laboratory results of (10/15/18) Bun of 9 mg/dl,  creatinine levels of 0.84 mg/dl noted.  Laboratory results of (10/10/18) Bun of 7 mg/dl, creatinine levels of 0.67 mg/dl noted.     TRANSESOPHAGEAL ECHOCARDIOGRAM CONCLUSIONS by Dr. Ohara (10/15/18)  Results pending.     TRANSESOPHAGEAL ECHOCARDIOGRAM CONCLUSIONS by  (10/09/18)  Mildly reduced LV systolic function, EF 40%.  Small apical aneurysm noted.  Mildly reduced RV systolic function.  Severe LAE.  SUDHAKAR is large, spontaneous contrast present, low emptying velocities.  Severe mitral regurgitaiton due to prolapse and functional MR,   predominantly A2-A3.  Compared to TTE 7-27-18, EF similar, unable to est RVSP, severe MR.    Assessment:     1. S/P mitral valve repair     2. Ischemic cardiomyopathy     3. Coronary artery disease involving coronary bypass graft of native heart without angina pectoris     4. Hx of CABG     5. Stented coronary artery     6. Chronic atrial fibrillation (HCC)     7. Chronic anticoagulation       Medical Decision Making:  Today's Assessment / Status / Plan:     1. Successful transcatheter percutaneous mitral valve repair (MitraClip) with 1clip, under general anesthesia (10/15/18): She is clinically doing well. We will repeat an echocardiogram.  2. Ischemic cardiomyopathy with chronic congestive heart failure: The overall volume status is adequate on diuretic therapy (10/09/18) .  3. Coronary artery disease with prior coronary bypass graft (in 2002 and redo in 2016) and stent placements: Stable  4. Atrial fibrillation off anticoagulation therapy (warfarin): She is doing well off anticoagulation and the ventricular rate is well controlled.    We will follow up with her in one year from MitraClip standpoint and also have her follow up with her primary cardiologist, .     CC Belinda Muñoz, Rah Conti and Sourav Hogan

## 2018-12-11 ENCOUNTER — TELEPHONE (OUTPATIENT)
Dept: CARDIOLOGY | Facility: MEDICAL CENTER | Age: 78
End: 2018-12-11

## 2018-12-11 NOTE — TELEPHONE ENCOUNTER
Valve Program Functional Assessment:     [id number]   1a) Showering/bathin  1b) Walking 1 block on ground: 3  1c) Hurrying or joggin  2) Swellin  3) Fatigue: 7  4) Shortness of breath: 7  5) Sleep sitting up: 5  6) Limited enjoyment of life: 5  7) Spend the rest of your life with HF: 4  8a) Hobbies, recreational activities:5  8b) Working or doing household chores:5  8c) Visiting family or friends: 5    5 meter walk test  1) _3.69_____ s/5m  2) _6.01_____ s/5m  3) _6.31_____ s/5m     Strength   1) _10_____ kg  2) _12_____ kg  3) _12_____ kg    RESENDIZ ADLs  Patient independently preforms...   - Bathing: Yes   - Dressing: Yes   - Toileting: Yes   - Transferring: Yes   - Continence: Yes   - Feeding: Yes     Living Situation  Patient lives:  with spouse    Mobility Aids   Patient uses:  none

## 2018-12-28 DIAGNOSIS — I50.23 ACUTE ON CHRONIC SYSTOLIC CHF (CONGESTIVE HEART FAILURE) (HCC): ICD-10-CM

## 2019-01-08 ENCOUNTER — OFFICE VISIT (OUTPATIENT)
Dept: CARDIOLOGY | Facility: MEDICAL CENTER | Age: 79
End: 2019-01-08
Payer: MEDICARE

## 2019-01-08 VITALS
WEIGHT: 115 LBS | OXYGEN SATURATION: 96 % | DIASTOLIC BLOOD PRESSURE: 82 MMHG | SYSTOLIC BLOOD PRESSURE: 136 MMHG | HEART RATE: 84 BPM | BODY MASS INDEX: 19.63 KG/M2 | HEIGHT: 64 IN

## 2019-01-08 DIAGNOSIS — I10 ESSENTIAL HYPERTENSION: ICD-10-CM

## 2019-01-08 DIAGNOSIS — I50.20 SYSTOLIC CHF WITH REDUCED LEFT VENTRICULAR FUNCTION, NYHA CLASS 2 (HCC): ICD-10-CM

## 2019-01-08 DIAGNOSIS — Z95.1 STATUS POST CORONARY ARTERY BYPASS GRAFTS X 5: ICD-10-CM

## 2019-01-08 DIAGNOSIS — Z95.5 STENTED CORONARY ARTERY: ICD-10-CM

## 2019-01-08 DIAGNOSIS — I25.810 CORONARY ARTERY DISEASE INVOLVING CORONARY BYPASS GRAFT OF NATIVE HEART WITHOUT ANGINA PECTORIS: ICD-10-CM

## 2019-01-08 DIAGNOSIS — I25.5 ISCHEMIC CARDIOMYOPATHY: ICD-10-CM

## 2019-01-08 DIAGNOSIS — Z98.890 S/P MITRAL VALVE REPAIR: ICD-10-CM

## 2019-01-08 DIAGNOSIS — I48.20 CHRONIC ATRIAL FIBRILLATION (HCC): ICD-10-CM

## 2019-01-08 DIAGNOSIS — E78.5 DYSLIPIDEMIA: ICD-10-CM

## 2019-01-08 DIAGNOSIS — Z79.01 CHRONIC ANTICOAGULATION: ICD-10-CM

## 2019-01-08 PROCEDURE — 99214 OFFICE O/P EST MOD 30 MIN: CPT | Performed by: NURSE PRACTITIONER

## 2019-01-08 ASSESSMENT — ENCOUNTER SYMPTOMS
DIZZINESS: 0
PND: 0
ORTHOPNEA: 0
SHORTNESS OF BREATH: 0
MYALGIAS: 0
ABDOMINAL PAIN: 0
HEADACHES: 0
FEVER: 0
PALPITATIONS: 0
INSOMNIA: 0
BLURRED VISION: 0
LOSS OF CONSCIOUSNESS: 0
CHILLS: 0
BRUISES/BLEEDS EASILY: 0

## 2019-01-08 NOTE — PROGRESS NOTES
Chief Complaint   Patient presents with   • Follow-Up   • Coronary Artery Disease   • Cardiomyopathy (Ischemic)   • Atrial Fibrillation   • Anticoagulation   • CHF (Compensated)   • HTN (Controlled)   • Hyperlipidemia       Subjective:   Daya Woods is a 78 y.o. female who presents today for two month follow-up of CAD, CHF, MV MitraClip, AFib, anticoagulation, HTN and hyperlipidemia.    Daya is 78 year old female with history of CAD, status post remote CABG x 5 in 2002, with coronary angiogram in August 2016 showing 2/5 grafts patent (LIMA-D3 and SVG-OM), with PCI/ANA to the OM in October 2018, and MitraClip also in October 2018 for severe MR. She also has chronic atrial fibrillation, anticoagulation with Coumadin, hypertension and hyperlipidemia, normally followed by Dr. DANO Anderson.      At last follow-up, Coreg was decreased from 12.5mg BID to 6.25mg BID, and Aldactone 12.5mg was readded, and she is maintained on KCl 10mEq once daily.    She is here today for follow-up. She is feeling very good. No chest pain, pressure or discomfort; no palpitations; no orthopnea or PND; no dizziness or syncope; no edema at all. BP has been stable. Her energy level is good. She does occasionally have some familial stress, but seems to be managed well these days.    Past Medical History:   Diagnosis Date   • Anesthesia     ponv   • Anticoagulation monitoring, special range    • Atrial fibrillation (HCC)    • CAD (coronary artery disease) 10/09/2018    PCI/ANA (Synergy 3.0 x 28mm) to the OM.   • Cataract     Bilateral IOL   • Chronic anticoagulation    • Chronic atrial fibrillation (HCC)     Managed with rate control and warfarin for anticoagulation    • Coronary atherosclerosis of native coronary artery 12/2002    CABG x 5 (LIMA-D3, SVG-OM, SVG-RCA, SVG-distal LAD, SVG-D1). August 2016: LIMA-D3 and SVG-OM patent (interval occlusion of the SVG-RCA); chronic occluded SVG-LAD, SVG-D1.   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    • MI, old 2002   • Mitral regurgitation 10/2018    Status post MV repair with MitraClip.   • Osteoarthritis    • Pain     right knee, back   • Raynaud's disease    • Spinal stenosis 8/20/2015   • Status post coronary artery bypass grafts x 5 08/2002    LIMA-D3, SVG-LAD, SVG-OM, SVG-RCA, SVG-distal LAD.    • Valvular heart disease     s/p constance clip X1     Past Surgical History:   Procedure Laterality Date   • TRANSCATHETER MITRAL VALVE REPAIR  10/15/2018    Procedure: TRANSCATHETER MITRAL VALVE REPAIR- WITH POSSIBLE OPEN HEART AND ANY ASSOCIATED PROCEDURES;  Surgeon: Moy Don M.D.;  Location: SURGERY [facility];  Service: Cardiac   • DEVON  10/15/2018    Procedure: DEVON;  Surgeon: Moy Don M.D.;  Location: SURGERY [facility];  Service: Cardiac   • RECOVERY  8/26/2016    Procedure: CATH LAB-DEVON W/LHC W/POSSIBLE-JO-ANN/GRINSELL-LARGE GROUP;  Surgeon: [facility];  Location: SURGERY PRE-[place] PROC UN[facility] [place];  Service:    • OTHER Bilateral 08/2014    cataract extraction with iols   • APPENDECTOMY     • CARDIAC CATH     • GYN SURGERY      c section    • MITRAL VALVE REPLACE      MV clip X1   • MULTIPLE CORONARY ARTERY BYPASS      2009 CABG x 5   • TONSILLECTOMY AND ADENOIDECTOMY     • TUBAL LIGATION       Family History   Problem Relation Age of Onset   • Heart Attack Brother 55        heart attack     Social History     Social History   • Marital status:      Spouse name: N/A   • Number of children: N/A   • Years of education: N/A     Occupational History   • Not on file.     Social History Main Topics   • Smoking status: Former Smoker     Packs/day: 0.50     Years: 40.00     Quit date: 2/24/2002   • Smokeless tobacco: Never Used   • Alcohol  "use Yes      Comment: varies   • Drug use: No   • Sexual activity: Not on file     Other Topics Concern   • Not on file     Social History Narrative   • No narrative on file     Allergies   Allergen Reactions   • Codeine Unspecified     \"I just don't like it. It does weird things to me.\"   • Hydrochlorothiazide Unspecified     Unsure of reaction   • Isosorbide Mononitrate Rash     Rash   • Keflex Rash     Rash   • Sulfa Drugs Rash     rash   • Tape Unspecified     Tears skin off   • Xarelto [Rivaroxaban] Rash     rash     Outpatient Encounter Prescriptions as of 1/8/2019   Medication Sig Dispense Refill   • furosemide (LASIX) 20 MG Tab Take 1 Tab by mouth every day. 90 Tab 3   • lisinopril (PRINIVIL) 10 MG Tab Take 1 Tab by mouth 2 times a day. 180 Tab 3   • carvedilol (COREG) 6.25 MG Tab Take 1 Tab by mouth 2 times a day, with meals. 180 Tab 3   • spironolactone (ALDACTONE) 25 MG Tab Take 0.5 Tabs by mouth every day. 30 Tab 11   • potassium chloride SA (K-DUR) 10 MEQ Tab CR Take 1 Tab by mouth every day. 90 Tab 3   • warfarin (COUMADIN) 5 MG Tab Take 5-7.5 mg by mouth every day. 7.5 mg on Mondays, wednesdays, and Fridays. All other days 5 mg     • magnesium oxide (MAG-OX) 400 (241.3 Mg) MG Tab tablet Take 1 Tab by mouth every day. 30 Tab 11   • atorvastatin (LIPITOR) 40 MG Tab Take 40 mg by mouth every evening.     • levothyroxine (SYNTHROID) 75 MCG Tab Take 75 mcg by mouth Every morning on an empty stomach.  0   • clopidogrel (PLAVIX) 75 MG TABS Take 1 Tab by mouth every day. 90 Tab 3   • [DISCONTINUED] aspirin EC (ECOTRIN) 81 MG Tablet Delayed Response Take 81 mg by mouth every day.       No facility-administered encounter medications on file as of 1/8/2019.      Review of Systems   Constitutional: Negative for chills, fever and malaise/fatigue.   HENT: Negative for congestion.    Eyes: Negative for blurred vision. Photophobia:    Eye pain:      Respiratory: Negative for shortness of breath.    Cardiovascular: " "Negative for chest pain, palpitations, orthopnea, leg swelling and PND.   Gastrointestinal: Negative for abdominal pain. Melena:      Genitourinary: Negative for dysuria.   Musculoskeletal: Negative for joint pain and myalgias.   Skin: Negative for rash.   Neurological: Negative for dizziness, loss of consciousness and headaches.   Endo/Heme/Allergies: Does not bruise/bleed easily.   Psychiatric/Behavioral: The patient does not have insomnia.         Objective:   /82 (BP Location: Left arm, Patient Position: Sitting, BP Cuff Size: Adult)   Pulse 84   Ht 1.626 m (5' 4\")   Wt 52.2 kg (115 lb)   LMP  (LMP Unknown)   SpO2 96%   BMI 19.74 kg/m²     Physical Exam   Constitutional: She is oriented to person, place, and time. She appears well-developed and well-nourished.   Petite, thin (BMI 19.74)   HENT:   Head: Normocephalic.   Eyes: EOM are normal.   Neck: Normal range of motion. Neck supple. No JVD present.   Cardiovascular: Normal rate.  An irregularly irregular rhythm present.   Murmur heard.   Systolic murmur is present with a grade of 1/6   Murmur at LLSB.   Pulmonary/Chest: Effort normal and breath sounds normal. No respiratory distress. She has no wheezes. She has no rales.   Sternotomy scar present.   Abdominal: Soft. Bowel sounds are normal. She exhibits no distension. There is no tenderness.   Musculoskeletal: Normal range of motion. She exhibits no edema.   Neurological: She is alert and oriented to person, place, and time.   Skin: Skin is warm and dry. No rash noted.   Psychiatric: She has a normal mood and affect.     CONCLUSIONS OF ECHOCARDIOGRAM OF 11/30/2018:  Moderately reduced left ventricular systolic function.  Moderately dilated left atrium.  Known transcatheter mitral valve repair (1 clips in place).  Moderate mitral regurgitation.  Aortic sclerosis without stenosis.  Estimated right ventricular systolic pressure is 40 mmHg.  Compared to the images of the prior study done on 10/16/18, " mitral   regurgitation is slightly worse.    LABS AS OF 12/28/2018:  Glucose 77  BUN 11  Creatinine 0.8  Sodium 135  Potassium 4.2  GFR >60    Assessment:     1. Coronary artery disease involving coronary bypass graft of native heart without angina pectoris     2. Ischemic cardiomyopathy     3. Status post coronary artery bypass grafts x 5     4. Stented coronary artery     5. S/P mitral valve repair     6. Systolic CHF with reduced left ventricular function, NYHA class 2 (HCC)     7. Chronic atrial fibrillation (HCC)     8. Chronic anticoagulation     9. Essential hypertension     10. Dyslipidemia         Medical Decision Making:  Today's Assessment / Status / Plan:     1. CAD, status post remote CABG x 5, with more recent PCI/ANA of the OM in October 2018. She is OFF of ASA, and remains on Plavix; she will remain on Plavix until October 2019.    2. Ischemic cardiomyopathy with LVEF 40% on echocardiogram in November 2018.    3. History of severe MR, status post MV repair with MitraClip in October 2018, stable. She is doing well.    4. History of CHF, currently stable. No dyspnea, orthopnea or LE edema. Recent BMP showed normal renal function and potassium.    5. Chronic atrial fibrillation, rate controlled.    6. Chronic anticoagulation with Coumadin.    7. Hypertension, treated and stable. BP is good today.    8. Hyperlipidemia, treated with Lipitor.    She is doing very well. Same medications for now. Keep May 2019 FU for echocardiogram as well as FU with Dr. DANO Anderson. FU sooner if clinical condition changes.    Collaborating MD: Daysi

## 2019-01-08 NOTE — LETTER
[facility]   [address]   [address]  [place] [address]  Phone: [phone]  Fax: [phone]              Daya Woods  1940    Encounter Date: 1/8/2019    PHILLIP Hooper          PROGRESS NOTE:  Chief Complaint   Patient presents with   • Follow-Up   • Coronary Artery Disease   • Cardiomyopathy (Ischemic)   • Atrial Fibrillation   • Anticoagulation   • CHF (Compensated)   • HTN (Controlled)   • Hyperlipidemia       Subjective:   Daya Woods is a 78 y.o. female who presents today for two month follow-up of CAD, CHF, MV MitraClip, AFib, anticoagulation, HTN and hyperlipidemia.    Daya is 78 year old female with history of CAD, status post remote CABG x 5 in 2002, with coronary angiogram in August 2016 showing 2/5 grafts patent (LIMA-D3 and SVG-OM), with PCI/ANA to the OM in October 2018, and MitraClip also in October 2018 for severe MR. She also has chronic atrial fibrillation, anticoagulation with Coumadin, hypertension and hyperlipidemia, normally followed by Dr. DANO Anderson.      At last follow-up, Coreg was decreased from 12.5mg BID to 6.25mg BID, and Aldactone 12.5mg was readded, and she is maintained on KCl 10mEq once daily.    She is here today for follow-up. She is feeling very good. No chest pain, pressure or discomfort; no palpitations; no orthopnea or PND; no dizziness or syncope; no edema at all. BP has been stable. Her energy level is good. She does occasionally have some familial stress, but seems to be managed well these days.    Past Medical History:   Diagnosis Date   • Anesthesia     ponv   • Anticoagulation monitoring, special range    • Atrial fibrillation (HCC)    • CAD (coronary artery disease) 10/09/2018    PCI/ANA (Synergy 3.0 x 28mm) to the OM.   • Cataract     Bilateral IOL   • Chronic anticoagulation    • Chronic atrial fibrillation (HCC)     Managed with rate control and warfarin for  anticoagulation    • Coronary atherosclerosis of native coronary artery 12/2002    CABG x 5 (LIMA-D3, SVG-OM, SVG-RCA, SVG-distal LAD, SVG-D1). August 2016: LIMA-D3 and SVG-OM patent (interval occlusion of the SVG-RCA); chronic occluded SVG-LAD, SVG-D1.   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    • MI, old 2002   • Mitral regurgitation 10/2018    Status post MV repair with MitraClip.   • Osteoarthritis    • Pain     right knee, back   • Raynaud's disease    • Spinal stenosis 8/20/2015   • Status post coronary artery bypass grafts x 5 08/2002    LIMA-D3, SVG-LAD, SVG-OM, SVG-RCA, SVG-distal LAD.    • Valvular heart disease     s/p constance clip X1     Past Surgical History:   Procedure Laterality Date   • TRANSCATHETER MITRAL VALVE REPAIR  10/15/2018    Procedure: TRANSCATHETER MITRAL VALVE REPAIR- WITH POSSIBLE OPEN HEART AND ANY ASSOCIATED PROCEDURES;  Surgeon: Moy Don M.D.;  Location: SURGERY [facility];  Service: Cardiac   • DEVON  10/15/2018    Procedure: DEVON;  Surgeon: Moy Don M.D.;  Location: SURGERY [facility];  Service: Cardiac   • RECOVERY  8/26/2016    Procedure: CATH LAB-DEVON W/LHC W/POSSIBLE-JO-ANN/GRINSELL-LARGE GROUP;  Surgeon: [facility];  [place] [facility] [place]  Service:    • OTHER Bilateral 08/2014    cataract extraction with iols   • APPENDECTOMY     • CARDIAC CATH     • GYN SURGERY      c section    • MITRAL VALVE REPLACE      MV clip X1   • MULTIPLE CORONARY ARTERY BYPASS      2009 CABG x 5   • TONSILLECTOMY AND ADENOIDECTOMY     • TUBAL LIGATION       Family History   Problem Relation Age of Onset   • Heart Attack Brother 55        heart attack     Social History     Social History   • Marital status:    "    Spouse name: N/A   • Number of children: N/A   • Years of education: N/A     Occupational History   • Not on file.     Social History Main Topics   • Smoking status: Former Smoker     Packs/day: 0.50     Years: 40.00     Quit date: 2/24/2002   • Smokeless tobacco: Never Used   • Alcohol use Yes      Comment: varies   • Drug use: No   • Sexual activity: Not on file     Other Topics Concern   • Not on file     Social History Narrative   • No narrative on file     Allergies   Allergen Reactions   • Codeine Unspecified     \"I just don't like it. It does weird things to me.\"   • Hydrochlorothiazide Unspecified     Unsure of reaction   • Isosorbide Mononitrate Rash     Rash   • Keflex Rash     Rash   • Sulfa Drugs Rash     rash   • Tape Unspecified     Tears skin off   • Xarelto [Rivaroxaban] Rash     rash     Outpatient Encounter Prescriptions as of 1/8/2019   Medication Sig Dispense Refill   • furosemide (LASIX) 20 MG Tab Take 1 Tab by mouth every day. 90 Tab 3   • lisinopril (PRINIVIL) 10 MG Tab Take 1 Tab by mouth 2 times a day. 180 Tab 3   • carvedilol (COREG) 6.25 MG Tab Take 1 Tab by mouth 2 times a day, with meals. 180 Tab 3   • spironolactone (ALDACTONE) 25 MG Tab Take 0.5 Tabs by mouth every day. 30 Tab 11   • potassium chloride SA (K-DUR) 10 MEQ Tab CR Take 1 Tab by mouth every day. 90 Tab 3   • warfarin (COUMADIN) 5 MG Tab Take 5-7.5 mg by mouth every day. 7.5 mg on Mondays, wednesdays, and Fridays. All other days 5 mg     • magnesium oxide (MAG-OX) 400 (241.3 Mg) MG Tab tablet Take 1 Tab by mouth every day. 30 Tab 11   • atorvastatin (LIPITOR) 40 MG Tab Take 40 mg by mouth every evening.     • levothyroxine (SYNTHROID) 75 MCG Tab Take 75 mcg by mouth Every morning on an empty stomach.  0   • clopidogrel (PLAVIX) 75 MG TABS Take 1 Tab by mouth every day. 90 Tab 3   • [DISCONTINUED] aspirin EC (ECOTRIN) 81 MG Tablet Delayed Response Take 81 mg by mouth every day.       No facility-administered encounter " "medications on file as of 1/8/2019.      Review of Systems   Constitutional: Negative for chills, fever and malaise/fatigue.   HENT: Negative for congestion.    Eyes: Negative for blurred vision. Photophobia:    Eye pain:      Respiratory: Negative for shortness of breath.    Cardiovascular: Negative for chest pain, palpitations, orthopnea, leg swelling and PND.   Gastrointestinal: Negative for abdominal pain. Melena:      Genitourinary: Negative for dysuria.   Musculoskeletal: Negative for joint pain and myalgias.   Skin: Negative for rash.   Neurological: Negative for dizziness, loss of consciousness and headaches.   Endo/Heme/Allergies: Does not bruise/bleed easily.   Psychiatric/Behavioral: The patient does not have insomnia.         Objective:   /82 (BP Location: Left arm, Patient Position: Sitting, BP Cuff Size: Adult)   Pulse 84   Ht 1.626 m (5' 4\")   Wt 52.2 kg (115 lb)   LMP  (LMP Unknown)   SpO2 96%   BMI 19.74 kg/m²      Physical Exam   Constitutional: She is oriented to person, place, and time. She appears well-developed and well-nourished.   Petite, thin (BMI 19.74)   HENT:   Head: Normocephalic.   Eyes: EOM are normal.   Neck: Normal range of motion. Neck supple. No JVD present.   Cardiovascular: Normal rate.  An irregularly irregular rhythm present.   Murmur heard.   Systolic murmur is present with a grade of 1/6   Murmur at LLSB.   Pulmonary/Chest: Effort normal and breath sounds normal. No respiratory distress. She has no wheezes. She has no rales.   Sternotomy scar present.   Abdominal: Soft. Bowel sounds are normal. She exhibits no distension. There is no tenderness.   Musculoskeletal: Normal range of motion. She exhibits no edema.   Neurological: She is alert and oriented to person, place, and time.   Skin: Skin is warm and dry. No rash noted.   Psychiatric: She has a normal mood and affect.     CONCLUSIONS OF ECHOCARDIOGRAM OF 11/30/2018:  Moderately reduced left ventricular systolic " function.  Moderately dilated left atrium.  Known transcatheter mitral valve repair (1 clips in place).  Moderate mitral regurgitation.  Aortic sclerosis without stenosis.  Estimated right ventricular systolic pressure is 40 mmHg.  Compared to the images of the prior study done on 10/16/18, mitral   regurgitation is slightly worse.    LABS AS OF 12/28/2018:  Glucose 77  BUN 11  Creatinine 0.8  Sodium 135  Potassium 4.2  GFR >60    Assessment:     1. Coronary artery disease involving coronary bypass graft of native heart without angina pectoris     2. Ischemic cardiomyopathy     3. Status post coronary artery bypass grafts x 5     4. Stented coronary artery     5. S/P mitral valve repair     6. Systolic CHF with reduced left ventricular function, NYHA class 2 (HCC)     7. Chronic atrial fibrillation (HCC)     8. Chronic anticoagulation     9. Essential hypertension     10. Dyslipidemia         Medical Decision Making:  Today's Assessment / Status / Plan:     1. CAD, status post remote CABG x 5, with more recent PCI/ANA of the OM in October 2018. She is OFF of ASA, and remains on Plavix; she will remain on Plavix until October 2019.    2. Ischemic cardiomyopathy with LVEF 40% on echocardiogram in November 2018.    3. History of severe MR, status post MV repair with MitraClip in October 2018, stable. She is doing well.    4. History of CHF, currently stable. No dyspnea, orthopnea or LE edema. Recent BMP showed normal renal function and potassium.    5. Chronic atrial fibrillation, rate controlled.    6. Chronic anticoagulation with Coumadin.    7. Hypertension, treated and stable. BP is good today.    8. Hyperlipidemia, treated with Lipitor.    She is doing very well. Same medications for now. Keep May 2019 FU for echocardiogram as well as FU with Dr. DANO Anderson. FU sooner if clinical condition changes.    Collaborating MD: Daysi Darden

## 2019-01-09 ENCOUNTER — TELEPHONE (OUTPATIENT)
Dept: VASCULAR LAB | Facility: MEDICAL CENTER | Age: 79
End: 2019-01-09

## 2019-01-09 NOTE — TELEPHONE ENCOUNTER
Received INR results from [facility].  Patient states that anticoagulation continues to be managed through their clinic with Sharee Cao.  Results forwarded to their office for review.  Wade Sanchez, KenaD

## 2019-02-28 NOTE — PATIENT INSTRUCTIONS
We made the following changes to your medications today:    1. Continue lisinopril at 10 mg by mouth twice daily     2. Continue lasix 20 mg by mouth once daily     3. Take potassium 1/2 tab of your current pills daily, for 10 mEq by mouth daily.  The 10 mEq dosage is at your pharmacy.    4. Decrease your coreg (carvedilol) to 1/2 tab, 6.25 mg by mouth twice daily (cut in 1/2). The 6.25 mg strength is at your pharmacy.    5. Take spironolactone 1/2 tab or 12.5 mg by mouth once daily (sent to your pharmacy)  
normal...

## 2019-04-15 ENCOUNTER — OFFICE VISIT (OUTPATIENT)
Dept: CARDIOLOGY | Facility: MEDICAL CENTER | Age: 79
End: 2019-04-15
Payer: MEDICARE

## 2019-04-15 VITALS
SYSTOLIC BLOOD PRESSURE: 130 MMHG | HEART RATE: 70 BPM | WEIGHT: 110 LBS | BODY MASS INDEX: 18.78 KG/M2 | OXYGEN SATURATION: 94 % | DIASTOLIC BLOOD PRESSURE: 74 MMHG | HEIGHT: 64 IN

## 2019-04-15 DIAGNOSIS — Z79.01 CHRONIC ANTICOAGULATION: ICD-10-CM

## 2019-04-15 DIAGNOSIS — Z95.1 STATUS POST CORONARY ARTERY BYPASS GRAFTS X 5: ICD-10-CM

## 2019-04-15 DIAGNOSIS — E78.5 DYSLIPIDEMIA: ICD-10-CM

## 2019-04-15 DIAGNOSIS — I25.5 ISCHEMIC CARDIOMYOPATHY: ICD-10-CM

## 2019-04-15 DIAGNOSIS — I48.20 CHRONIC ATRIAL FIBRILLATION (HCC): ICD-10-CM

## 2019-04-15 DIAGNOSIS — I25.810 CORONARY ARTERY DISEASE INVOLVING CORONARY BYPASS GRAFT OF NATIVE HEART WITHOUT ANGINA PECTORIS: ICD-10-CM

## 2019-04-15 DIAGNOSIS — Z95.5 STENTED CORONARY ARTERY: ICD-10-CM

## 2019-04-15 DIAGNOSIS — I50.20 SYSTOLIC CHF WITH REDUCED LEFT VENTRICULAR FUNCTION, NYHA CLASS 2 (HCC): ICD-10-CM

## 2019-04-15 DIAGNOSIS — Z98.890 S/P MITRAL VALVE REPAIR: ICD-10-CM

## 2019-04-15 PROCEDURE — 99214 OFFICE O/P EST MOD 30 MIN: CPT | Performed by: INTERNAL MEDICINE

## 2019-04-15 RX ORDER — MAGNESIUM OXIDE 400 MG/1
TABLET ORAL
Refills: 1 | COMMUNITY
Start: 2019-03-04 | End: 2019-04-15

## 2019-04-15 ASSESSMENT — ENCOUNTER SYMPTOMS
SHORTNESS OF BREATH: 1
CARDIOVASCULAR NEGATIVE: 1
HEADACHES: 1
BACK PAIN: 1

## 2019-04-15 NOTE — PROGRESS NOTES
Subjective:   Daya Woods is a 78 -year-old woman with a history of prior MI and 5-vessel CABG in 2002 now with now 2/5 grafts open by cardiac catheterization 8/26/16 (LIMA-diag patent, SVG-OM status post PCI 10/9/2018, all others occluded), severe disease of her native LAD status post PCI with drug-eluting prior stents, and an occluded native right coronary artery filling via collaterals. She has chronic atrial fibrillation, hypertension, dyslipidemia, and systolic congestive heart failure with EF 40%. She has functional moderate mitral regurgitation recently severe and now status post Mitraclip procedure on 10/15/2018.    She is doing overall well today, and has minimal cardiovascular complaints with minimal, chronic exertional dyspnea.    She tells me that her blood pressure at home is in the 110s-120s mmHg systolic after updates to her medications including decreasing her Coreg and adding back spironolactone at 12.5 mg p.o. daily.  She has had no significant episodes of orthostasis consistent with hypertension from her heart failure therapy.    She comes in with her daughter as usual, and both are wonderfully pleasant as usual.    Past Medical History:   Diagnosis Date   • Anesthesia     ponv   • Anticoagulation monitoring, special range    • Atrial fibrillation (HCC)    • CAD (coronary artery disease) 10/09/2018    PCI/ANA (Synergy 3.0 x 28mm) to the OM.   • Cataract     Bilateral IOL   • Chronic anticoagulation    • Chronic atrial fibrillation (HCC)     Managed with rate control and warfarin for anticoagulation    • Coronary atherosclerosis of native coronary artery 12/2002    CABG x 5 (LIMA-D3, SVG-OM, SVG-RCA, SVG-distal LAD, SVG-D1). August 2016: LIMA-D3 and SVG-OM patent (interval occlusion of the SVG-RCA); chronic occluded SVG-LAD, SVG-D1.   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    • MI, old 2002   • Mitral regurgitation 10/2018    Status post MV repair with MitraClip.   • Osteoarthritis    • Pain     right knee, back   • Raynaud's disease    • Spinal stenosis 8/20/2015   • Status post coronary artery bypass grafts x 5 08/2002    LIMA-D3, SVG-LAD, SVG-OM, SVG-RCA, SVG-distal LAD.    • Valvular heart disease     s/p constance clip X1     Past Surgical History:   Procedure Laterality Date   • TRANSCATHETER MITRAL VALVE REPAIR  10/15/2018    Procedure: TRANSCATHETER MITRAL VALVE REPAIR- WITH POSSIBLE OPEN HEART AND ANY ASSOCIATED PROCEDURES;  Surgeon: Moy Don M.D.;  Location: SURGERY [facility];  Service: Cardiac   • DEVON  10/15/2018    Procedure: DEVON;  Surgeon: Moy Don M.D.;  Location: SURGERY [facility];  Service: Cardiac   • RECOVERY  8/26/2016    Procedure: CATH LAB-DEVON W/LHC W/MARLEY-JO-ANN/GRINSELL-LARGE GROUP;  Surgeon: Recoveryonly Surgery;  Location: SURGERY PRE-POST PROC UNIT [facility];  Service:    • OTHER Bilateral 08/2014    cataract extraction with iols   • APPENDECTOMY     • GYN SURGERY      c section    • MITRAL VALVE REPLACE      MV clip X1   • MULTIPLE CORONARY ARTERY BYPASS      2009 CABG x 5   • TONSILLECTOMY AND ADENOIDECTOMY     • TUBAL LIGATION     • ZZZ CARDIAC CATH       Family History   Problem Relation Age of Onset   • Heart Attack Brother 55        heart attack     History   Smoking Status   • Former Smoker   • Packs/day: 0.50   • Years: 40.00   • Quit date: 2/24/2002   Smokeless Tobacco   • Never Used     Allergies   Allergen Reactions   • Codeine Unspecified     \"I just don't like it. It does weird things to me.\"   • Hydrochlorothiazide Unspecified     Unsure of reaction   • Isosorbide Mononitrate Rash     Rash   • Keflex Rash     Rash   • Sulfa Drugs Rash     rash   • Tape Unspecified     Tears skin off   • Xarelto [Rivaroxaban] " "Rash     rash     Outpatient Encounter Prescriptions as of 4/15/2019   Medication Sig Dispense Refill   • furosemide (LASIX) 20 MG Tab Take 1 Tab by mouth every day. 90 Tab 3   • lisinopril (PRINIVIL) 10 MG Tab Take 1 Tab by mouth 2 times a day. 180 Tab 3   • spironolactone (ALDACTONE) 25 MG Tab Take 0.5 Tabs by mouth every day. 30 Tab 11   • potassium chloride SA (K-DUR) 10 MEQ Tab CR Take 1 Tab by mouth every day. 90 Tab 3   • warfarin (COUMADIN) 5 MG Tab Take 5-7.5 mg by mouth every day. 7.5 mg on Mondays, wednesdays, and Fridays. All other days 5 mg     • magnesium oxide (MAG-OX) 400 (241.3 Mg) MG Tab tablet Take 1 Tab by mouth every day. 30 Tab 11   • atorvastatin (LIPITOR) 40 MG Tab Take 40 mg by mouth every evening.     • levothyroxine (SYNTHROID) 75 MCG Tab Take 75 mcg by mouth Every morning on an empty stomach.  0   • clopidogrel (PLAVIX) 75 MG TABS Take 1 Tab by mouth every day. (Patient taking differently: Take 75 mg by mouth. 1/2 tab daily) 90 Tab 3   • [DISCONTINUED] magnesium oxide (MAG-OX) 400 MG Tab   1   • carvedilol (COREG) 6.25 MG Tab Take 1 Tab by mouth 2 times a day, with meals. 180 Tab 3     No facility-administered encounter medications on file as of 4/15/2019.      Review of Systems   Eyes:        Visual changes consistent with floaters   Respiratory: Positive for shortness of breath (minimal, infrequent VILLEGAS).    Cardiovascular: Negative.         Recent abdominal fullness consistent with volume overload   Musculoskeletal: Positive for back pain (prior steroid injections were not effective in treating her spinal stenosis).   Neurological: Positive for headaches.   All other systems reviewed and are negative.       Objective:   /74 (BP Location: Right arm, Patient Position: Sitting)   Pulse 70   Ht 1.626 m (5' 4\")   Wt 49.9 kg (110 lb)   LMP  (LMP Unknown)   SpO2 94%   BMI 18.88 kg/m²     Physical Exam   Constitutional: She is oriented to person, place, and time. She appears " well-developed and well-nourished. No distress.   Very pleasant elderly woman in no distress.  Physical examination is unchanged except as specified on my previous on 4/10/2018.  He comes in with her daughter today as usual.   HENT:   Head: Normocephalic and atraumatic.   Eyes: Pupils are equal, round, and reactive to light. Conjunctivae and EOM are normal. No scleral icterus.   Neck: Neck supple. No JVD present. No tracheal deviation present.   Cardiovascular: Normal rate, normal heart sounds and intact distal pulses.  An irregularly irregular rhythm present. Exam reveals no gallop and no friction rub.    No murmur heard.  Pulses:       Dorsalis pedis pulses are 2+ on the right side, and 2+ on the left side.   No carotid bruits. Prior healed sternotomy scar noted, unchanged.   Pulmonary/Chest: Effort normal and breath sounds normal. No stridor. No respiratory distress. She has no wheezes. She has no rales.   Abdominal: Soft. Bowel sounds are normal. She exhibits no distension.   Musculoskeletal: She exhibits no edema (Trace bilateral lower extremity edema).   She was able to perform the get up and go test with her arms crossed rising from a seated position   Neurological: She is alert and oriented to person, place, and time.   Skin: Skin is warm and dry. No rash noted. She is not diaphoretic. No erythema. No pallor.   Psychiatric: She has a normal mood and affect. Judgment and thought content normal.   Vitals reviewed.    Lab Results   Component Value Date/Time    WBC 5.5 10/16/2018 03:00 AM    RBC 3.06 (L) 10/16/2018 03:00 AM    HEMOGLOBIN 10.1 (L) 10/16/2018 03:00 AM    HEMATOCRIT 30.1 (L) 10/16/2018 03:00 AM    MCV 98.4 (H) 10/16/2018 03:00 AM    MCH 33.0 10/16/2018 03:00 AM    MCHC 33.6 10/16/2018 03:00 AM    MPV 9.9 10/16/2018 03:00 AM        Lab Results   Component Value Date/Time    SODIUM 134 (L) 10/16/2018 02:58 AM    POTASSIUM 3.9 10/16/2018 02:58 AM    CHLORIDE 101 10/16/2018 02:58 AM    CO2 23  "10/16/2018 02:58 AM    GLUCOSE 152 (H) 10/16/2018 02:58 AM    BUN 17 10/16/2018 02:58 AM    CREATININE 0.78 10/16/2018 02:58 AM        Lab Results   Component Value Date/Time    ASTSGOT 28 10/09/2018 06:50 AM    ALTSGPT 28 10/09/2018 06:50 AM        Lab Results   Component Value Date/Time    CHOLSTRLTOT 137 02/24/2014 03:05 AM    LDL 67 02/24/2014 03:05 AM    HDL 55 02/24/2014 03:05 AM    TRIGLYCERIDE 74 02/24/2014 03:05 AM       Labs, 5/3/2016.   CBC: WBC 4.7, hemoglobin 14.7, platelets 186  Chemistry panel: Sodium 132, potassium 4.6, chloride 97, CO2 30, BUN 8, creatinine 0.8, glucose 88, calcium 9.3, total protein 6.7, albumin 4.1, AST 28, ALT 22, alkaline phosphatase 70, total bilirubin 0.7.  Lipid panel: LDL 67, HDL 56, triglycerides 65, total cholesterol 131  TSH 1.97    Labs, 5/9/2017  CBC: WBC 2.4, hemoglobin 14.0, platelets 173  Chemistry panel: Sodium 132, potassium 4.6, chloride 98, CO2 28, BUN 8, creatinine 0.9, glucose 89, calcium 8.9  LFTs: AST 36, ALT 38, alkaline phosphatase 61, albumin 4.3, total protein 6.5, globulins 2.2, total bilirubin 0.7  Lipids: LDL 45, HDL 58, triglycerides 52, total cholesterol 111  TSH: 1.31    Cardiac catheterization, 10/9/2018:  \"IMPRESSION:  1.  3-4+ mitral regurgitation.  2.  Reduced left ventricular systolic function with ejection fraction of 35%,   mid to distal anterior and apical hypokinesis.  3.  Elevated left ventricular end-diastolic pressure.  4.  Coronary artery disease with known occluded saphenous vein graft to the   distal left anterior descending to the diagonal branch and to the right   coronary artery with patent left internal mammary artery to the mid to distal   left anterior descending artery, patent saphenous vein graft to a small obtuse   marginal branch with high-grade ostial proximal graft stenosis.  5.  Successful percutaneous transluminal coronary angioplasty/stent placement   of the ostial to proximal saphenous vein graft to the obtuse marginal " "branch   with 3.0x28 mm Synergy drug-eluting stent.  6.  Elevated left ventricular end-diastolic pressure.  7.  Elevated right heart pressure with 40 mmHg\"    Mitraclip procedure note, 10/15/2018:  \"PROCEDURES:  Transcatheter mitral valve repair (MitraClip x1), transseptal   puncture, aspiration of right atrial thrombus and intraoperative   transesophageal echocardiography\"    Echocardiogram, 4/4/2017:  \"CONCLUSIONS  Moderately reduced left ventricular systolic function.  Akinetic apex with apical aneurysm.  Moderate mitral regurgitation.  Compared to the images of the prior study done  7/27/16, ejection   fraction is unchanged.  Apical aneurysm is now present\"    Serial echocardiograms done in between demonstrated severe mitral regurgitation, then a transesophageal echocardiogram with the same also done at the time of mitral clip with mild mitral regurgitation afterwards on echocardiogram 10/16/2018.    Echocardiogram, 11/30/2018, images and report reviewed, my interpretation: Shows a left ventricular ejection fraction of 40%, moderate mitral regurgitation with no stenosis around her mitral clip.  She has moderate elevation in her estimated right-sided pressures with an RVSP of 40 mmHg, and aortic sclerosis.    Assessment:     1. Chronic atrial fibrillation (HCC)     2. Status post coronary artery bypass grafts x 5     3. Coronary artery disease involving coronary bypass graft of native heart without angina pectoris     4. Chronic anticoagulation     5. Systolic CHF with reduced left ventricular function, NYHA class 2 ([facility], [facility])     6. Ischemic cardiomyopathy     7. Dyslipidemia     8. Stented coronary artery     9. S/P mitral valve repair         Medical Decision Making:  Today's Assessment / Status / Plan:     She is doing very well today, and has no significant cardiovascular complaints.  She has no bleeding complications with warfarin and Plavix.  Furthermore, she is tolerating her heart failure therapy including " Coreg 6.25 mg p.o. twice daily, Lasix 20 mg p.o. daily, lisinopril 10 mg p.o. twice daily, and spironolactone 12.5 mg p.o. daily.  She tells me that she will get labs in about a few weeks from now including a full panel as she usually does.  I made no changes to her medications nor ordered additional testing at this time.    Rah Anderson MD  Cardiologist, [facility] [place]     Return in about 3 months (around 7/15/2019).    Physical Exam   Constitutional: She is oriented to person, place, and time. She appears well-developed and well-nourished. No distress.   Very pleasant elderly woman in no distress.  Physical examination is unchanged except as specified on my previous on 4/10/2018.  He comes in with her daughter today as usual.   HENT:   Head: Normocephalic and atraumatic.   Eyes: Pupils are equal, round, and reactive to light. Conjunctivae and EOM are normal. No scleral icterus.   Neck: Neck supple. No JVD present. No tracheal deviation present.   Cardiovascular: Normal rate, normal heart sounds and intact distal pulses.  An irregularly irregular rhythm present. Exam reveals no gallop and no friction rub.    No murmur heard.  Pulses:       Dorsalis pedis pulses are 2+ on the right side, and 2+ on the left side.   No carotid bruits. Prior healed sternotomy scar noted, unchanged.   Pulmonary/Chest: Effort normal and breath sounds normal. No stridor. No respiratory distress. She has no wheezes. She has no rales.   Abdominal: Soft. Bowel sounds are normal. She exhibits no distension.   Musculoskeletal: She exhibits no edema (Trace bilateral lower extremity edema).   She was able to perform the get up and go test with her arms crossed rising from a seated position   Neurological: She is alert and oriented to person, place, and time.   Skin: Skin is warm and dry. No rash noted. She is not diaphoretic. No erythema. No pallor.   Psychiatric: She has a normal mood and affect. Judgment and thought  content normal.   Vitals reviewed.

## 2019-04-15 NOTE — LETTER
Name:          Daya Woods   YOB: 1940  Date:     04/15/2019      Sourav Swift M.D.  [address] [facility] [address]     Rah Anderson MD  [address] [facility] [address]  [place] [address]  Phone: [phone]  Back Line: [phone]  Fax: [phone]  E-mail: [email or web address]   Dear Dr. Swift,    We had the pleasure of seeing your patient, Daya Woods, in Cardiology Clinic at [facility] today.    As you know, she is a 78-year-old woman with a history of prior MI and 5-vessel CABG in 2002 now with now 2/5 grafts open by cardiac catheterization 8/26/16 (LIMA-diag patent, SVG-OM status post PCI 10/9/2018, all others occluded), severe disease of her native LAD status post PCI with drug-eluting prior stents, and an occluded native right coronary artery filling via collaterals. She has chronic atrial fibrillation, hypertension, dyslipidemia, and systolic congestive heart failure with EF 40%. She has functional moderate mitral regurgitation recently severe and now status post Mitraclip procedure on 10/15/2018.    She is doing very well today, and has no significant cardiovascular complaints.  She has no bleeding complications with warfarin and Plavix.  Furthermore, she is tolerating her heart failure therapy including Coreg 6.25 mg p.o. twice daily, Lasix 20 mg p.o. daily, lisinopril 10 mg p.o. twice daily, and spironolactone 12.5 mg p.o. daily.  She tells me that she will get labs in about a few weeks from now including a full panel as she usually does.  I made no changes to her medications nor ordered additional testing at this time.    Return in about 3 months (around 7/15/2019).    Thank you for the referral and please do not hesitate to contact me at any time. My contact information is listed above.    This note was dictated using Dragon speech recognition software.     A full note including my physical examination and a full list of rectified medications is available in  our medical record, and can be faxed as well.    Rah Anderson MD  Cardiologist  [facility]

## 2019-05-28 ENCOUNTER — HOSPITAL ENCOUNTER (OUTPATIENT)
Dept: CARDIOLOGY | Facility: MEDICAL CENTER | Age: 79
End: 2019-05-28
Attending: INTERNAL MEDICINE
Payer: MEDICARE

## 2019-05-28 DIAGNOSIS — I34.0 MODERATE MITRAL REGURGITATION: ICD-10-CM

## 2019-05-28 DIAGNOSIS — I50.23 ACUTE ON CHRONIC SYSTOLIC CHF (CONGESTIVE HEART FAILURE) (HCC): ICD-10-CM

## 2019-05-28 PROCEDURE — 93306 TTE W/DOPPLER COMPLETE: CPT

## 2019-05-28 PROCEDURE — 93306 TTE W/DOPPLER COMPLETE: CPT | Mod: 26 | Performed by: INTERNAL MEDICINE

## 2019-05-29 LAB
LV EJECT FRACT  99904: 35
LV EJECT FRACT MOD 2C 99903: 26.42
LV EJECT FRACT MOD 4C 99902: 24.13
LV EJECT FRACT MOD BP 99901: 21.52

## 2019-06-03 ENCOUNTER — TELEPHONE (OUTPATIENT)
Dept: CARDIOLOGY | Facility: MEDICAL CENTER | Age: 79
End: 2019-06-03

## 2019-06-03 NOTE — TELEPHONE ENCOUNTER
EC-ECHOCARDIOGRAM COMPLETE W/O CONT   Notes recorded by Rah Anderson M.D. on 5/31/2019 at 10:19 AM PDT  Slightly increased leakiness of the tricuspid valve though previous mitral valve repair with MitraClip is working well.  No cause for concern at this time     Called pt, discussed Echo per , pt verbalizes understanding

## 2019-07-19 ENCOUNTER — OFFICE VISIT (OUTPATIENT)
Dept: CARDIOLOGY | Facility: MEDICAL CENTER | Age: 79
End: 2019-07-19
Payer: MEDICARE

## 2019-07-19 VITALS
DIASTOLIC BLOOD PRESSURE: 48 MMHG | WEIGHT: 108 LBS | HEIGHT: 64 IN | HEART RATE: 84 BPM | BODY MASS INDEX: 18.44 KG/M2 | OXYGEN SATURATION: 95 % | SYSTOLIC BLOOD PRESSURE: 102 MMHG

## 2019-07-19 DIAGNOSIS — I25.5 ISCHEMIC CARDIOMYOPATHY: ICD-10-CM

## 2019-07-19 DIAGNOSIS — I36.1 NON-RHEUMATIC TRICUSPID VALVE INSUFFICIENCY: ICD-10-CM

## 2019-07-19 DIAGNOSIS — I48.20 CHRONIC ATRIAL FIBRILLATION (HCC): ICD-10-CM

## 2019-07-19 DIAGNOSIS — I10 ESSENTIAL HYPERTENSION: ICD-10-CM

## 2019-07-19 DIAGNOSIS — I50.20 SYSTOLIC CHF WITH REDUCED LEFT VENTRICULAR FUNCTION, NYHA CLASS 2 (HCC): ICD-10-CM

## 2019-07-19 DIAGNOSIS — Z98.890 S/P MITRAL VALVE REPAIR: ICD-10-CM

## 2019-07-19 DIAGNOSIS — I25.708 CORONARY ARTERY DISEASE OF BYPASS GRAFT OF NATIVE HEART WITH STABLE ANGINA PECTORIS (HCC): ICD-10-CM

## 2019-07-19 PROCEDURE — 99215 OFFICE O/P EST HI 40 MIN: CPT | Performed by: INTERNAL MEDICINE

## 2019-07-19 RX ORDER — LEVOTHYROXINE SODIUM 88 UG/1
88 TABLET ORAL
COMMUNITY
Start: 2019-06-06 | End: 2019-10-29

## 2019-07-19 RX ORDER — CLOPIDOGREL BISULFATE 75 MG/1
75 TABLET ORAL DAILY
Qty: 90 TAB | Refills: 0 | Status: SHIPPED | OUTPATIENT
Start: 2019-07-19 | End: 2019-10-29

## 2019-07-19 RX ORDER — CARVEDILOL 6.25 MG/1
3.12 TABLET ORAL 2 TIMES DAILY WITH MEALS
Qty: 180 TAB | Refills: 3 | COMMUNITY
Start: 2019-07-19 | End: 2020-02-10 | Stop reason: SDUPTHER

## 2019-07-19 ASSESSMENT — ENCOUNTER SYMPTOMS
SHORTNESS OF BREATH: 1
BRUISES/BLEEDS EASILY: 1
BACK PAIN: 1

## 2019-07-19 NOTE — LETTER
[facility]   [address], [address]  [place] [address]  Phone: [phone]  Fax: [phone]              Daya Woods  1940    Encounter Date: 7/19/2019    Derrick Lilly M.D.          PROGRESS NOTE:      Cardiology Follow-up Consultation Note    Date of note:    7/19/2019    Primary Care Provider: Sourav Swift M.D.  Referring Provider: Rah Anderson M.D.     Patient Name: Daya Woods   YOB: 1940  MRN:              [id number]    Chief Complaint: chest pain    History of Present Illness: Daya Woods is a 79 y.o. female whose current medical problems include prior MI and 5-vessel CABG in 2002 now with now 2/5 grafts open by cardiac catheterization 8/26/16 (LIMA-diag patent, SVG-OM status post PCI 10/9/2018, all others occluded), severe disease of her native LAD status post PCI with drug-eluting prior stents, and an occluded native right coronary artery filling via collaterals, chronic atrial fibrillation, hypertension, dyslipidemia, and severe functional MR s/p mitraclip on 10/15/2018 and chronic systolic congestive heart failure with EF 40% who is here for follow-up.    Last seen by Dr. Rah Anderson on 4/15/2019.    Interim Events:  She gets left chest wall pain, moderate severity, mostly every afternoon which resolves with rest. Also gets shortness of breath with increased exertion.     Walking limited by back pain and hip pain, only 1 block.     In terms of hypertension, SBP at home in the 90s. No lightheadedness or syncope.    In terms of dyslipidemia, well controlled.     In terms of a fib, no palpitations.     Decreased plavix to 1/2 pill due to nose bleeds.     Weight decreased 7 pounds in the last 6 months. Cannot gain it back for some reason.       Review of Systems   HENT: Positive for nosebleeds.    Respiratory: Positive for shortness of breath.    Hematologic/Lymphatic: Bruises/bleeds easily.      Musculoskeletal: Positive for back pain and joint pain.   Allergic/Immunologic: Positive for environmental allergies.     All other systems reviewed and discussed using a comprehensive questionnaire and are negative.       Past Medical History:   Diagnosis Date   • Anesthesia     ponv   • Anticoagulation monitoring, special range    • Atrial fibrillation (HCC)    • CAD (coronary artery disease) 10/09/2018    PCI/ANA (Synergy 3.0 x 28mm) to the OM.   • Cataract     Bilateral IOL   • Chronic anticoagulation    • Chronic atrial fibrillation (HCC)     Managed with rate control and warfarin for anticoagulation    • Coronary atherosclerosis of native coronary artery 12/2002    CABG x 5 (LIMA-D3, SVG-OM, SVG-RCA, SVG-distal LAD, SVG-D1). August 2016: LIMA-D3 and SVG-OM patent (interval occlusion of the SVG-RCA); chronic occluded SVG-LAD, SVG-D1.   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    • MI, old 2002   • Mitral regurgitation 10/2018    Status post MV repair with MitraClip.   • Osteoarthritis    • Pain     right knee, back   • Raynaud's disease    • Spinal stenosis 8/20/2015   • Status post coronary artery bypass grafts x 5 08/2002    LIMA-D3, SVG-LAD, SVG-OM, SVG-RCA, SVG-distal LAD.    • Valvular heart disease     s/p constance clip X1         Past Surgical History:   Procedure Laterality Date   • TRANSCATHETER MITRAL VALVE REPAIR  10/15/2018    Procedure: TRANSCATHETER MITRAL VALVE REPAIR- WITH POSSIBLE OPEN HEART AND ANY ASSOCIATED PROCEDURES;  Surgeon: Moy Don M.D.;  Location: SURGERY [facility];  Service: Cardiac   • DEVON  10/15/2018    Procedure: DEVON;  Surgeon: Moy Don M.D.;  Location: SURGERY [facility];  Service: Cardiac   • RECOVERY  8/26/2016     "Procedure: CATH LAB-DEVON W/LHC W/MARLEY-JO-ANN/GRINSELL-LARGE GROUP;  Surgeon: Recoveryonly Surgery;  Location: SURGERY PRE-POST PROC UNIT [facility];  Service:    • OTHER Bilateral 08/2014    cataract extraction with iols   • APPENDECTOMY     • GYN SURGERY      c section    • MITRAL VALVE REPLACE      MV clip X1   • MULTIPLE CORONARY ARTERY BYPASS      2009 CABG x 5   • TONSILLECTOMY AND ADENOIDECTOMY     • TUBAL LIGATION     • ZZZ CARDIAC CATH           Current Outpatient Prescriptions   Medication Sig Dispense Refill   • furosemide (LASIX) 20 MG Tab Take 1 Tab by mouth every day. 90 Tab 3   • lisinopril (PRINIVIL) 10 MG Tab Take 1 Tab by mouth 2 times a day. 180 Tab 3   • carvedilol (COREG) 6.25 MG Tab Take 1 Tab by mouth 2 times a day, with meals. 180 Tab 3   • spironolactone (ALDACTONE) 25 MG Tab Take 0.5 Tabs by mouth every day. 30 Tab 11   • potassium chloride SA (K-DUR) 10 MEQ Tab CR Take 1 Tab by mouth every day. 90 Tab 3   • warfarin (COUMADIN) 5 MG Tab Take 5-7.5 mg by mouth every day. 7.5 mg on Mondays, wednesdays, and Fridays. All other days 5 mg     • magnesium oxide (MAG-OX) 400 (241.3 Mg) MG Tab tablet Take 1 Tab by mouth every day. 30 Tab 11   • atorvastatin (LIPITOR) 40 MG Tab Take 40 mg by mouth every evening.     • levothyroxine (SYNTHROID) 75 MCG Tab Take 75 mcg by mouth Every morning on an empty stomach.  0   • clopidogrel (PLAVIX) 75 MG TABS Take 1 Tab by mouth every day. (Patient taking differently: Take 75 mg by mouth. 1/2 tab daily) 90 Tab 3     No current facility-administered medications for this visit.          Allergies   Allergen Reactions   • Codeine Unspecified     \"I just don't like it. It does weird things to me.\"   • Hydrochlorothiazide Unspecified     Unsure of reaction   • Isosorbide Mononitrate Rash     Rash   • Keflex Rash     Rash   • Sulfa Drugs Rash     rash   • Tape Unspecified     Tears skin off   • Xarelto [Rivaroxaban] Rash     rash         Family History   Problem " "Relation Age of Onset   • Heart Attack Brother 55        heart attack         Social History     Social History   • Marital status:      Spouse name: N/A   • Number of children: N/A   • Years of education: N/A     Occupational History   • Not on file.     Social History Main Topics   • Smoking status: Former Smoker     Packs/day: 0.50     Years: 40.00     Quit date: 2/24/2002   • Smokeless tobacco: Never Used   • Alcohol use Yes      Comment: varies   • Drug use: No   • Sexual activity: Not on file     Other Topics Concern   • Not on file     Social History Narrative   • No narrative on file         Physical Exam:  Ambulatory Vitals  /48 (BP Location: Left arm, Patient Position: Sitting, BP Cuff Size: Adult)   Pulse 84   Ht 1.626 m (5' 4\")   Wt 49 kg (108 lb)   SpO2 95%    Oxygen Therapy:  Pulse Oximetry: 95 %  BP Readings from Last 4 Encounters:   07/19/19 102/48   04/15/19 130/74   01/08/19 136/82   12/06/18 112/60       Weight/BMI: Body mass index is 18.54 kg/m².  Wt Readings from Last 4 Encounters:   07/19/19 49 kg (108 lb)   04/15/19 49.9 kg (110 lb)   01/08/19 52.2 kg (115 lb)   12/06/18 51.1 kg (112 lb 10.5 oz)       General: No apparent distress  Eyes: nl conjunctiva  ENT: OP clear, normal external appearance of nose and ears  Neck: JVP 4-5 cm H2O, no carotid bruits  Lungs: normal respiratory effort, CTAB  Heart: irregular rhythm, regular rate, no murmurs, no rubs or gallops, no edema bilateral lower extremities. No LV/RV heave on cardiac palpatation. 2+ bilateral radial pulses.  2+ bilateral dp pulses.   Abdomen: soft, non tender, non distended, no masses, normal bowel sounds.  No HSM.  Extremities/MSK: no clubbing, no cyanosis. Cachectic.   Neurological: No focal sensory deficits  Psychiatric: Appropriate affect, A/O x 3, intact judgement and insight  Skin: Warm extremities    Lab Data Review:  Lab Results   Component Value Date/Time    CHOLSTRLTOT 137 02/24/2014 03:05 AM    LDL 67 " 02/24/2014 03:05 AM    HDL 55 02/24/2014 03:05 AM    TRIGLYCERIDE 74 02/24/2014 03:05 AM       Lab Results   Component Value Date/Time    SODIUM 134 (L) 10/16/2018 02:58 AM    POTASSIUM 3.9 10/16/2018 02:58 AM    CHLORIDE 101 10/16/2018 02:58 AM    CO2 23 10/16/2018 02:58 AM    GLUCOSE 152 (H) 10/16/2018 02:58 AM    BUN 17 10/16/2018 02:58 AM    CREATININE 0.78 10/16/2018 02:58 AM     Lab Results   Component Value Date/Time    ALKPHOSPHAT 53 10/09/2018 06:50 AM    ASTSGOT 28 10/09/2018 06:50 AM    ALTSGPT 28 10/09/2018 06:50 AM    TBILIRUBIN 0.6 10/09/2018 06:50 AM      Lab Results   Component Value Date/Time    WBC 5.5 10/16/2018 03:00 AM     No components found for: HBGA1C  No components found for: TROPONIN  No components found for: BNP    [facility] labs 5/7/2019:  hgb 15.1  plt 264  Creatinine 0.8  Bun 12  Potassium 4.4  Sodium 134  LFTs WNL except  ALT 53  TSH 6.23    HDL 51  LDL 62  tg 86        Cardiac Imaging and Procedures Review:    EKG dated 10/19/2018 : My personal interpretation is a fib, lateral infarct, anteroseptal infarct, abnormal t waves consider ischemia.     Echo dated 5/29/2019:   CONCLUSIONS  Moderately reduced left ventricular systolic function.  Reduced right ventricular systolic function.  Moderately dilated left atrium.  Known mitral valve repair (Mitraclip) that is functioning adequately   with normal gradient and mild to moderate regurgitation.  Moderate (borderline severe) eccentric tricuspid regurgitation.  Estimated right ventricular systolic pressure  is 30 mmHg.  Compared to the images of the study done 11/30/2018, tricuspid   regurgitation is slightly worse.  Otherwise, there is no significant   Change.    LV EF:  35    %    LH (10/9/2018):   IMPRESSION:  1.  3-4+ mitral regurgitation.  2.  Reduced left ventricular systolic function with ejection fraction of 35%,   mid to distal anterior and apical hypokinesis.  3.  Elevated left ventricular end-diastolic pressure.  4.  Coronary  artery disease with known occluded saphenous vein graft to the   distal left anterior descending to the diagonal branch and to the right   coronary artery with patent left internal mammary artery to the mid to distal   left anterior descending artery, patent saphenous vein graft to a small obtuse   marginal branch with high-grade ostial proximal graft stenosis.  5.  Successful percutaneous transluminal coronary angioplasty/stent placement   of the ostial to proximal saphenous vein graft to the obtuse marginal branch   with 3.0x28 mm Synergy drug-eluting stent.  6.  Elevated left ventricular end-diastolic pressure.  7.  Elevated right heart pressure with 40 mmHg.    Radiology test Review:  CXR: 10/2018  FINDINGS:  Sternotomy wires demonstrated.  Cardiomegaly.  Mild lung base interstitial opacities consistent with mild interstitial edema. No consolidation.  No pleural effusion.  No pneumothorax.    Carotid US 2018:   Vascular Laboratory   CONCLUSIONS   Atherosclerosis without stenosis reaching 50% threshold.      Medical Decision Makin. Systolic CHF with reduced left ventricular function (HCC)  NYHA class II-III, stage C secondary to ischemic cardiomyopathy. LVEF 35%. Now with decreasing ability to tolerate medications. Appears euvolemic.   -continue coreg 3.125mg PO bid  -continue lisinopril 10mg PO bid for now, if symptomatic hypotension, would decrease this to 10mg PO Daily  -continue spironolactone 12.5mg PO daily  -continue lasix 20mg PO Daily along with kcl 10mEq daily, BMP check in 3 months prior to appointment with Dr. Don.   -continue magnesium.   -will discuss at future visit if LVEF continues to drop possible ICD for primary prevention.   -lives in [place], [place], would not be a good candidate for entresto given her hypotension and lack of access to quick medical care.       2. S/P mitral valve repair  Now with mild to moderate MR.  -CTM    3. Ischemic cardiomyopathy  LVEF worsening slightly  -CTM,  known that only 2 grafts are open.     4. Essential hypertension  Adequately controlled    5. Coronary artery disease of bypass graft of native heart with stable angina pectoris (HCC)  Now with symptoms possibly consistent with stable angina.   -if angina worsens, would decrease lisinopril to 10mg PO Daily and increase coreg back to 6.25mg PO daily. Would unlikely tolerate imdur due to hypotension but could try. Ranexa is another option.  If symptoms become severe, would discuss films with Dr. Don to see if there is any other candidates for intervention.     6. Chronic atrial fibrillation (HCC)  [id number] score of 6. Currently asymptomatic  -continue coumadin, goal INR 2-3. Will discuss transition to NOAC next visit.   -continue coreg for rate control  -will consider rhythm control options next visit, although she does appear to have very chronic a fib, these may not work.       7. Non-rheumatic tricuspid valve insufficiency  Moderate to severe  -CTM, repeat echo in a year.       Return in about 6 months (around 1/19/2020).      Derrick Lilly MD, [facility]  [facility]  [address] [address]  Phone: [phone]  Fax: [phone]                  Sourav Swift M.D.  [facility] [facility] [address]  VIA Facsimile: [phone]

## 2019-07-19 NOTE — PROGRESS NOTES
Cardiology Follow-up Consultation Note    Date of note:    7/19/2019    Primary Care Provider: Sourav Swift M.D.  Referring Provider: Rah Anderson M.D.     Patient Name: Daya Woods   YOB: 1940  MRN:              [id number]    Chief Complaint: chest pain    History of Present Illness: Daya Woods is a 79 y.o. female whose current medical problems include prior MI and 5-vessel CABG in 2002 now with now 2/5 grafts open by cardiac catheterization 8/26/16 (LIMA-diag patent, SVG-OM status post PCI 10/9/2018, all others occluded), severe disease of her native LAD status post PCI with drug-eluting prior stents, and an occluded native right coronary artery filling via collaterals, chronic atrial fibrillation, hypertension, dyslipidemia, and severe functional MR s/p mitraclip on 10/15/2018 and chronic systolic congestive heart failure with EF 40% who is here for follow-up.    Last seen by Dr. Rah Anderson on 4/15/2019.    Interim Events:  She gets left chest wall pain, moderate severity, mostly every afternoon which resolves with rest. Also gets shortness of breath with increased exertion.     Walking limited by back pain and hip pain, only 1 block.     In terms of hypertension, SBP at home in the 90s. No lightheadedness or syncope.    In terms of dyslipidemia, well controlled.     In terms of a fib, no palpitations.     Decreased plavix to 1/2 pill due to nose bleeds.     Weight decreased 7 pounds in the last 6 months. Cannot gain it back for some reason.       Review of Systems   HENT: Positive for nosebleeds.    Respiratory: Positive for shortness of breath.    Hematologic/Lymphatic: Bruises/bleeds easily.   Musculoskeletal: Positive for back pain and joint pain.   Allergic/Immunologic: Positive for environmental allergies.     All other systems reviewed and discussed using a comprehensive questionnaire and are negative.       Past Medical History:   Diagnosis Date   • Anesthesia      ponv   • Anticoagulation monitoring, special range    • Atrial fibrillation (HCC)    • CAD (coronary artery disease) 10/09/2018    PCI/ANA (Synergy 3.0 x 28mm) to the OM.   • Cataract     Bilateral IOL   • Chronic anticoagulation    • Chronic atrial fibrillation (HCC)     Managed with rate control and warfarin for anticoagulation    • Coronary atherosclerosis of native coronary artery 12/2002    CABG x 5 (LIMA-D3, SVG-OM, SVG-RCA, SVG-distal LAD, SVG-D1). August 2016: LIMA-D3 and SVG-OM patent (interval occlusion of the SVG-RCA); chronic occluded SVG-LAD, SVG-D1.   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    • MI, old 2002   • Mitral regurgitation 10/2018    Status post MV repair with MitraClip.   • Osteoarthritis    • Pain     right knee, back   • Raynaud's disease    • Spinal stenosis 8/20/2015   • Status post coronary artery bypass grafts x 5 08/2002    LIMA-D3, SVG-LAD, SVG-OM, SVG-RCA, SVG-distal LAD.    • Valvular heart disease     s/p constance clip X1         Past Surgical History:   Procedure Laterality Date   • TRANSCATHETER MITRAL VALVE REPAIR  10/15/2018    Procedure: TRANSCATHETER MITRAL VALVE REPAIR- WITH POSSIBLE OPEN HEART AND ANY ASSOCIATED PROCEDURES;  Surgeon: Moy Don M.D.;  Location: SURGERY [facility];  Service: Cardiac   • DEVON  10/15/2018    Procedure: DEVON;  Surgeon: Moy Don M.D.;  Location: SURGERY [facility];  Service: Cardiac   • RECOVERY  8/26/2016    Procedure: CATH LAB-DEVON W/LHC W/POSSIBLE-JO-ANN/GRINSELL-LARGE GROUP;  Surgeon: Manuel Surgery;  Location: SURGERY [facility]POST PROC [facility];  Service:    • OTHER Bilateral 08/2014    cataract extraction with iols   • APPENDECTOMY     • GYN SURGERY      c section    • MITRAL VALVE  "REPLACE      MV clip X1   • MULTIPLE CORONARY ARTERY BYPASS      2009 CABG x 5   • TONSILLECTOMY AND ADENOIDECTOMY     • TUBAL LIGATION     • ZZZ CARDIAC CATH           Current Outpatient Prescriptions   Medication Sig Dispense Refill   • furosemide (LASIX) 20 MG Tab Take 1 Tab by mouth every day. 90 Tab 3   • lisinopril (PRINIVIL) 10 MG Tab Take 1 Tab by mouth 2 times a day. 180 Tab 3   • carvedilol (COREG) 6.25 MG Tab Take 1 Tab by mouth 2 times a day, with meals. 180 Tab 3   • spironolactone (ALDACTONE) 25 MG Tab Take 0.5 Tabs by mouth every day. 30 Tab 11   • potassium chloride SA (K-DUR) 10 MEQ Tab CR Take 1 Tab by mouth every day. 90 Tab 3   • warfarin (COUMADIN) 5 MG Tab Take 5-7.5 mg by mouth every day. 7.5 mg on Mondays, wednesdays, and Fridays. All other days 5 mg     • magnesium oxide (MAG-OX) 400 (241.3 Mg) MG Tab tablet Take 1 Tab by mouth every day. 30 Tab 11   • atorvastatin (LIPITOR) 40 MG Tab Take 40 mg by mouth every evening.     • levothyroxine (SYNTHROID) 75 MCG Tab Take 75 mcg by mouth Every morning on an empty stomach.  0   • clopidogrel (PLAVIX) 75 MG TABS Take 1 Tab by mouth every day. (Patient taking differently: Take 75 mg by mouth. 1/2 tab daily) 90 Tab 3     No current facility-administered medications for this visit.          Allergies   Allergen Reactions   • Codeine Unspecified     \"I just don't like it. It does weird things to me.\"   • Hydrochlorothiazide Unspecified     Unsure of reaction   • Isosorbide Mononitrate Rash     Rash   • Keflex Rash     Rash   • Sulfa Drugs Rash     rash   • Tape Unspecified     Tears skin off   • Xarelto [Rivaroxaban] Rash     rash         Family History   Problem Relation Age of Onset   • Heart Attack Brother 55        heart attack         Social History     Social History   • Marital status:      Spouse name: N/A   • Number of children: N/A   • Years of education: N/A     Occupational History   • Not on file.     Social History Main Topics   • " "Smoking status: Former Smoker     Packs/day: 0.50     Years: 40.00     Quit date: 2/24/2002   • Smokeless tobacco: Never Used   • Alcohol use Yes      Comment: varies   • Drug use: No   • Sexual activity: Not on file     Other Topics Concern   • Not on file     Social History Narrative   • No narrative on file         Physical Exam:  Ambulatory Vitals  /48 (BP Location: Left arm, Patient Position: Sitting, BP Cuff Size: Adult)   Pulse 84   Ht 1.626 m (5' 4\")   Wt 49 kg (108 lb)   SpO2 95%    Oxygen Therapy:  Pulse Oximetry: 95 %  BP Readings from Last 4 Encounters:   07/19/19 102/48   04/15/19 130/74   01/08/19 136/82   12/06/18 112/60       Weight/BMI: Body mass index is 18.54 kg/m².  Wt Readings from Last 4 Encounters:   07/19/19 49 kg (108 lb)   04/15/19 49.9 kg (110 lb)   01/08/19 52.2 kg (115 lb)   12/06/18 51.1 kg (112 lb 10.5 oz)       General: No apparent distress  Eyes: nl conjunctiva  ENT: OP clear, normal external appearance of nose and ears  Neck: JVP 4-5 cm H2O, no carotid bruits  Lungs: normal respiratory effort, CTAB  Heart: irregular rhythm, regular rate, no murmurs, no rubs or gallops, no edema bilateral lower extremities. No LV/RV heave on cardiac palpatation. 2+ bilateral radial pulses.  2+ bilateral dp pulses.   Abdomen: soft, non tender, non distended, no masses, normal bowel sounds.  No HSM.  Extremities/MSK: no clubbing, no cyanosis. Cachectic.   Neurological: No focal sensory deficits  Psychiatric: Appropriate affect, A/O x 3, intact judgement and insight  Skin: Warm extremities    Lab Data Review:  Lab Results   Component Value Date/Time    CHOLSTRLTOT 137 02/24/2014 03:05 AM    LDL 67 02/24/2014 03:05 AM    HDL 55 02/24/2014 03:05 AM    TRIGLYCERIDE 74 02/24/2014 03:05 AM       Lab Results   Component Value Date/Time    SODIUM 134 (L) 10/16/2018 02:58 AM    POTASSIUM 3.9 10/16/2018 02:58 AM    CHLORIDE 101 10/16/2018 02:58 AM    CO2 23 10/16/2018 02:58 AM    GLUCOSE 152 (H) " 10/16/2018 02:58 AM    BUN 17 10/16/2018 02:58 AM    CREATININE 0.78 10/16/2018 02:58 AM     Lab Results   Component Value Date/Time    ALKPHOSPHAT 53 10/09/2018 06:50 AM    ASTSGOT 28 10/09/2018 06:50 AM    ALTSGPT 28 10/09/2018 06:50 AM    TBILIRUBIN 0.6 10/09/2018 06:50 AM      Lab Results   Component Value Date/Time    WBC 5.5 10/16/2018 03:00 AM     No components found for: HBGA1C  No components found for: TROPONIN  No components found for: BNP    [facility] labs 5/7/2019:  hgb 15.1  plt 264  Creatinine 0.8  Bun 12  Potassium 4.4  Sodium 134  LFTs WNL except  ALT 53  TSH 6.23    HDL 51  LDL 62  tg 86        Cardiac Imaging and Procedures Review:    EKG dated 10/19/2018 : My personal interpretation is a fib, lateral infarct, anteroseptal infarct, abnormal t waves consider ischemia.     Echo dated 5/29/2019:   CONCLUSIONS  Moderately reduced left ventricular systolic function.  Reduced right ventricular systolic function.  Moderately dilated left atrium.  Known mitral valve repair (Mitraclip) that is functioning adequately   with normal gradient and mild to moderate regurgitation.  Moderate (borderline severe) eccentric tricuspid regurgitation.  Estimated right ventricular systolic pressure  is 30 mmHg.  Compared to the images of the study done 11/30/2018, tricuspid   regurgitation is slightly worse.  Otherwise, there is no significant   Change.    LV EF:  35    %    [facility] (10/9/2018):   IMPRESSION:  1.  3-4+ mitral regurgitation.  2.  Reduced left ventricular systolic function with ejection fraction of 35%,   mid to distal anterior and apical hypokinesis.  3.  Elevated left ventricular end-diastolic pressure.  4.  Coronary artery disease with known occluded saphenous vein graft to the   distal left anterior descending to the diagonal branch and to the right   coronary artery with patent left internal mammary artery to the mid to distal   left anterior descending artery, patent saphenous vein graft to a small  obtuse   marginal branch with high-grade ostial proximal graft stenosis.  5.  Successful percutaneous transluminal coronary angioplasty/stent placement   of the ostial to proximal saphenous vein graft to the obtuse marginal branch   with 3.0x28 mm Synergy drug-eluting stent.  6.  Elevated left ventricular end-diastolic pressure.  7.  Elevated right heart pressure with 40 mmHg.    Radiology test Review:  CXR: 10/2018  FINDINGS:  Sternotomy wires demonstrated.  Cardiomegaly.  Mild lung base interstitial opacities consistent with mild interstitial edema. No consolidation.  No pleural effusion.  No pneumothorax.    Carotid US 2018:   [facility]   CONCLUSIONS   Atherosclerosis without stenosis reaching 50% threshold.      Medical Decision Makin. Systolic CHF with reduced left ventricular function (HCC)  NYHA class II-III, stage C secondary to ischemic cardiomyopathy. LVEF 35%. Now with decreasing ability to tolerate medications. Appears euvolemic.   -continue coreg 3.125mg PO bid  -continue lisinopril 10mg PO bid for now, if symptomatic hypotension, would decrease this to 10mg PO Daily  -continue spironolactone 12.5mg PO daily  -continue lasix 20mg PO Daily along with kcl 10mEq daily, BMP check in 3 months prior to appointment with Dr. Don.   -continue magnesium.   -will discuss at future visit if LVEF continues to drop possible ICD for primary prevention.   -lives in [place], [place], would not be a good candidate for entresto given her hypotension and lack of access to quick medical care.       2. S/P mitral valve repair  Now with mild to moderate MR.  -CTM    3. Ischemic cardiomyopathy  LVEF worsening slightly  -CTM, known that only 2 grafts are open.     4. Essential hypertension  Adequately controlled    5. Coronary artery disease of bypass graft of native heart with stable angina pectoris (HCC)  Now with symptoms possibly consistent with stable angina.   -if angina worsens, would decrease lisinopril to  10mg PO Daily and increase coreg back to 6.25mg PO daily. Would unlikely tolerate imdur due to hypotension but could try. Ranexa is another option.  If symptoms become severe, would discuss films with Dr. Don to see if there is any other candidates for intervention.     6. Chronic atrial fibrillation (HCC)  [id number] score of 6. Currently asymptomatic  -continue coumadin, goal INR 2-3. Will discuss transition to NOAC next visit.   -continue coreg for rate control  -will consider rhythm control options next visit, although she does appear to have very chronic a fib, these may not work.       7. Non-rheumatic tricuspid valve insufficiency  Moderate to severe  -CTM, repeat echo in a year.       Return in about 6 months (around 1/19/2020).      Derrick Lilly MD, [facility]  [facility], [address]  [address] [address]  Phone: [phone]  Fax: [phone]

## 2019-07-19 NOTE — PATIENT INSTRUCTIONS
Please increase plavix (also called clopidogrel) back to 75mg once a day.     Please stop plavix on 10/9/2019.    Please get non-fasting blood tests in 3 months.

## 2019-10-29 ENCOUNTER — OFFICE VISIT (OUTPATIENT)
Dept: CARDIOLOGY | Facility: MEDICAL CENTER | Age: 79
End: 2019-10-29
Payer: MEDICARE

## 2019-10-29 VITALS
SYSTOLIC BLOOD PRESSURE: 134 MMHG | OXYGEN SATURATION: 99 % | BODY MASS INDEX: 19.1 KG/M2 | HEART RATE: 89 BPM | WEIGHT: 111.88 LBS | DIASTOLIC BLOOD PRESSURE: 82 MMHG | HEIGHT: 64 IN

## 2019-10-29 DIAGNOSIS — Z95.1 STATUS POST CORONARY ARTERY BYPASS GRAFTS X 5: ICD-10-CM

## 2019-10-29 DIAGNOSIS — I25.810 CORONARY ARTERY DISEASE INVOLVING CORONARY BYPASS GRAFT OF NATIVE HEART WITHOUT ANGINA PECTORIS: ICD-10-CM

## 2019-10-29 DIAGNOSIS — I25.5 ISCHEMIC CARDIOMYOPATHY: ICD-10-CM

## 2019-10-29 DIAGNOSIS — I50.20 SYSTOLIC CHF WITH REDUCED LEFT VENTRICULAR FUNCTION, NYHA CLASS 2 (HCC): ICD-10-CM

## 2019-10-29 DIAGNOSIS — Z95.5 STENTED CORONARY ARTERY: ICD-10-CM

## 2019-10-29 DIAGNOSIS — Z79.01 CHRONIC ANTICOAGULATION: ICD-10-CM

## 2019-10-29 DIAGNOSIS — I48.20 CHRONIC ATRIAL FIBRILLATION (HCC): ICD-10-CM

## 2019-10-29 DIAGNOSIS — Z98.890 S/P MITRAL VALVE REPAIR: ICD-10-CM

## 2019-10-29 PROCEDURE — 99214 OFFICE O/P EST MOD 30 MIN: CPT | Performed by: INTERNAL MEDICINE

## 2019-10-29 ASSESSMENT — ENCOUNTER SYMPTOMS
DIZZINESS: 0
FEVER: 0
CONSTITUTIONAL NEGATIVE: 1
PSYCHIATRIC NEGATIVE: 1
ABDOMINAL PAIN: 0
CLAUDICATION: 0
GASTROINTESTINAL NEGATIVE: 1
NAUSEA: 0
HEADACHES: 0
EYES NEGATIVE: 1
WEAKNESS: 0
DEPRESSION: 0
CHILLS: 0
NEUROLOGICAL NEGATIVE: 1
DOUBLE VISION: 0
WEIGHT LOSS: 0
SHORTNESS OF BREATH: 1
BLURRED VISION: 0
CARDIOVASCULAR NEGATIVE: 1
PALPITATIONS: 0
MYALGIAS: 0
FOCAL WEAKNESS: 0
MUSCULOSKELETAL NEGATIVE: 1
BRUISES/BLEEDS EASILY: 0
NERVOUS/ANXIOUS: 0
VOMITING: 0
COUGH: 0

## 2019-10-29 NOTE — PROGRESS NOTES
Chief Complaint   Patient presents with   • Coronary Artery Disease     s/p mitral valve repair        Subjective:   Daya Woods is a 79 y.o. female who presents today for one year MitraClip repair follow up.    Since the patient's last visit on 12/06/18, she has been doing well clinically. She admits to moderate dyspnea on exertion. She denies fatigue, shortness of breath, chest pain, dizziness or syncope. She still takes short walks with her dogs.    Past Medical History:   Diagnosis Date   • Anesthesia     ponv   • Anticoagulation monitoring, special range    • CAD (coronary artery disease) 10/09/2018    PCI/ANA (Synergy 3.0 x 28mm) to the OM.   • Cataract     Bilateral IOL   • Chronic atrial fibrillation     Managed with rate control and warfarin for anticoagulation    • Coronary atherosclerosis of native coronary artery 12/2002    CABG x 5 (LIMA-D3, SVG-OM, SVG-RCA, SVG-distal LAD, SVG-D1). August 2016: LIMA-D3 and SVG-OM patent (interval occlusion of the SVG-RCA); chronic occluded SVG-LAD, SVG-D1.   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    • MI, old 2002   • Mitral regurgitation 10/2018    Status post MV repair with MitraClip.   • Osteoarthritis    • Pain     right knee, back   • Raynaud's disease    • Spinal stenosis 8/20/2015   • Status post coronary artery bypass grafts x 5 08/2002    LIMA-D3, SVG-LAD, SVG-OM, SVG-RCA, SVG-distal LAD.      Past Surgical History:   Procedure Laterality Date   • TRANSCATHETER MITRAL VALVE REPAIR  10/15/2018    Procedure: TRANSCATHETER MITRAL VALVE REPAIR- WITH POSSIBLE OPEN HEART AND ANY ASSOCIATED PROCEDURES;  Surgeon: Moy Don M.D.;  Location: SURGERY [facility];  Service: Cardiac   • DEVON  10/15/2018    Procedure: DEVON;   Surgeon: Moy Don M.D.;  Location: SURGERY [facility];  Service: Cardiac   • ZZZ CARDIAC CATH  10/2018    PCI to SVG to OM.    • RECOVERY  2016    Procedure: CATH LAB-DEVON W/LHC W/MARLEY-JO-ANN/GAGAN-LARGE GROUP;  Surgeon: Recoveryonly Surgery;  Location: SURGERY PRE-POST PROC UNIT [facility];  Service:    • OTHER Bilateral 2014    cataract extraction with iols   • MULTIPLE CORONARY ARTERY BYPASS       CABG x 5   • APPENDECTOMY     • GYN SURGERY      c section    • MITRAL VALVE REPLACE      MV clip X1   • TONSILLECTOMY AND ADENOIDECTOMY     • TUBAL LIGATION       Family History   Problem Relation Age of Onset   • Heart Attack Brother 55        heart attack     Social History     Socioeconomic History   • Marital status:      Spouse name: Not on file   • Number of children: Not on file   • Years of education: Not on file   • Highest education level: Not on file   Occupational History   • Not on file   Social Needs   • Financial resource strain: Not on file   • Food insecurity:     Worry: Not on file     Inability: Not on file   • Transportation needs:     Medical: Not on file     Non-medical: Not on file   Tobacco Use   • Smoking status: Former Smoker     Packs/day: 0.50     Years: 40.00     Pack years: 20.00     Last attempt to quit: 2002     Years since quittin.6   • Smokeless tobacco: Never Used   Substance and Sexual Activity   • Alcohol use: Yes     Alcohol/week: 4.2 oz     Types: 7 Cans of beer per week   • Drug use: No   • Sexual activity: Not on file   Lifestyle   • Physical activity:     Days per week: Not on file     Minutes per session: Not on file   • Stress: Not on file   Relationships   • Social connections:     Talks on phone: Not on file     Gets together: Not on file     Attends Mandaen service: Not on file     Active member of club or organization: Not on file     Attends meetings of clubs or organizations: Not on file     Relationship status: Not on file  "  • Intimate partner violence:     Fear of current or ex partner: Not on file     Emotionally abused: Not on file     Physically abused: Not on file     Forced sexual activity: Not on file   Other Topics Concern   • Not on file   Social History Narrative    Retired      Allergies   Allergen Reactions   • Codeine Unspecified     \"I just don't like it. It does weird things to me.\"   • Hydrochlorothiazide Unspecified     Unsure of reaction   • Isosorbide Mononitrate Rash     Rash   • Keflex Rash     Rash   • Sulfa Drugs Rash     rash   • Tape Unspecified     Tears skin off   • Xarelto [Rivaroxaban] Rash     rash     (Medications reviewed.)  Outpatient Encounter Medications as of 10/29/2019   Medication Sig Dispense Refill   • carvedilol (COREG) 6.25 MG Tab Take 0.5 Tabs by mouth 2 times a day, with meals. 180 Tab 3   • furosemide (LASIX) 20 MG Tab Take 1 Tab by mouth every day. 90 Tab 3   • lisinopril (PRINIVIL) 10 MG Tab Take 1 Tab by mouth 2 times a day. 180 Tab 3   • spironolactone (ALDACTONE) 25 MG Tab Take 0.5 Tabs by mouth every day. 30 Tab 11   • potassium chloride SA (K-DUR) 10 MEQ Tab CR Take 1 Tab by mouth every day. 90 Tab 3   • warfarin (COUMADIN) 5 MG Tab Take 5-7.5 mg by mouth every day. 7.5 mg on Mondays, wednesdays, and Fridays. All other days 5 mg     • magnesium oxide (MAG-OX) 400 (241.3 Mg) MG Tab tablet Take 1 Tab by mouth every day. 30 Tab 11   • atorvastatin (LIPITOR) 40 MG Tab Take 40 mg by mouth every evening.     • levothyroxine (SYNTHROID) 75 MCG Tab Take 75 mcg by mouth Every morning on an empty stomach.  0   • clopidogrel (PLAVIX) 75 MG Tab Take 1 Tab by mouth every day. (Patient not taking: Reported on 10/29/2019) 90 Tab 0   • levothyroxine (SYNTHROID) 88 MCG Tab Take 88 mcg by mouth every 7 days. Take 75mcg the other 6 days.       No facility-administered encounter medications on file as of 10/29/2019.      Review of Systems   Constitutional: Negative.  Negative for chills, " "fever, malaise/fatigue and weight loss.   HENT: Negative.  Negative for hearing loss.    Eyes: Negative.  Negative for blurred vision and double vision.   Respiratory: Positive for shortness of breath. Negative for cough.    Cardiovascular: Negative.  Negative for chest pain, palpitations, claudication and leg swelling.   Gastrointestinal: Negative.  Negative for abdominal pain, nausea and vomiting.   Genitourinary: Negative.  Negative for dysuria and urgency.   Musculoskeletal: Negative.  Negative for joint pain and myalgias.   Skin: Negative.  Negative for itching and rash.   Neurological: Negative.  Negative for dizziness, focal weakness, weakness and headaches.   Endo/Heme/Allergies: Negative.  Does not bruise/bleed easily.   Psychiatric/Behavioral: Negative.  Negative for depression. The patient is not nervous/anxious.         Objective:   /82 (BP Location: Right arm, Patient Position: Sitting, BP Cuff Size: Adult)   Pulse 89   Ht 1.626 m (5' 4\")   Wt 50.8 kg (111 lb 14.1 oz)   LMP  (LMP Unknown)   SpO2 99%   BMI 19.20 kg/m²     Physical Exam   Constitutional: She is oriented to person, place, and time. She appears well-developed and well-nourished.   HENT:   Head: Normocephalic and atraumatic.   Eyes: EOM are normal.   Neck: No JVD present.   Cardiovascular: Normal rate and normal heart sounds. An irregularly irregular rhythm present.   Pulmonary/Chest: Effort normal and breath sounds normal.   Abdominal: Soft. Bowel sounds are normal.   No hepatosplenomegaly.   Musculoskeletal: Normal range of motion.   Lymphadenopathy:     She has no cervical adenopathy.   Neurological: She is alert and oriented to person, place, and time.   Skin: Skin is warm and dry.   Psychiatric: She has a normal mood and affect.     CARDIAC STUDIES/PROCEDURES:     CARDIAC CATHETERIZATION CONCLUSIONS (10/09/18)  1.  3-4+ mitral regurgitation.  2.  Reduced left ventricular systolic function with ejection fraction of 35%,   mid " to distal anterior and apical hypokinesis.  3.  Elevated left ventricular end-diastolic pressure.  4.  Coronary artery disease with known occluded saphenous vein graft to the   distal left anterior descending to the diagonal branch and to the right   coronary artery with patent left internal mammary artery to the mid to distal   left anterior descending artery, patent saphenous vein graft to a small obtuse   marginal branch with high-grade ostial proximal graft stenosis.  5.  Successful percutaneous transluminal coronary angioplasty/stent placement   of the ostial to proximal saphenous vein graft to the obtuse marginal branch   with 3.0x28 mm Synergy drug-eluting stent.  6.  Elevated left ventricular end-diastolic pressure.  7.  Elevated right heart pressure with 40 mmHg.     CARDIAC CATHETERIZATION CONCLUSIONS by Dr. Menard (08/26/16)  1. Complex heavily calcified high-grade disease of the proximal and mid left anterior descending.  2. Chronic total occlusion of the circumflex.  3. Codominant RCA with moderate native disease.  4. Totally occluded saphenous vein graft x3, (chronic).  5. Patent saphenous vein graft to an obtuse marginal with high-grade stenosis of the distal anastomosis.  6. Patent left internal mammary artery to a diagonal vessel.  7. Technically difficult and demanding PCI with overlapping stent placement of the proximal and mid LAD with 3 overlapping stents were placed at the area of dissection and occlusion that is still heavily calcified those being 2.5x12, 2.5x12 and 2.5x8.      CAROTID ULTRASOUND (09/20/18)  Atherosclerosis without stenosis reaching 50% threshold.    ECHOCARDIOGRAM CONCLUSIONS (05/28/19)  Moderately reduced left ventricular systolic function.  Reduced right ventricular systolic function.  Moderately dilated left atrium.  Known mitral valve repair (Mitraclip) that is functioning adequately   with normal gradient and mild to moderate regurgitation.  Moderate (borderline  severe) eccentric tricuspid regurgitation.  Estimated right ventricular systolic pressure is 30 mmHg.  Compared to the images of the study done 11/30/2018, tricuspid   regurgitation is slightly worse.    Otherwise, there is no significant change.  (study result reviewed)     ECHOCARDIOGRAM CONCLUSIONS (11/30/18)  Moderately reduced left ventricular systolic function.  Moderately dilated left atrium.  Known transcatheter mitral valve repair (1 clips in place).  Moderate mitral regurgitation.  Aortic sclerosis without stenosis.  Estimated right ventricular systolic pressure is 40 mmHg.  Compared to the images of the prior study done on 10/16/18,   mitral regurgitation is slightly worse.     ECHOCARDIOGRAM CONCLUSIONS (07/10/18)  Moderately reduced left ventricular systolic function.  Left ventricular ejection fraction is visually estimated to be 35-40%.   Mild concentric left ventricular hypertrophy.  Severely dilated left atrium.  Severe mitral regurgitation.  Estimated right ventricular systolic pressure  is 30 mmHg.  Compared to the images of the prior study done on 04/04/17, mitral   regurgitation is slightly worse and now severe, LVEF is unchanged.     EKG performed on (10/19/18) was reviewed: EKG personally interpreted shows atrial fibrillation.  EKG performed on (10/16/18) EKG shows atrial fibrillation with premature ventricular contractions.  EKG performed on (10/15/18) EKG shows atrial fibrillation.  EKG performed on (10/09/18) EKG shows atrial fibrillation.     Laboratory results of (10/16/18) Bun of 17 mg/dl, creatinine levels of 0.78 mg/dl noted.  Laboratory results of (10/15/18) Bun of 9 mg/dl, creatinine levels of 0.84 mg/dl noted.  Laboratory results of (10/10/18) Bun of 7 mg/dl, creatinine levels of 0.67 mg/dl noted.     TRANSESOPHAGEAL ECHOCARDIOGRAM CONCLUSIONS by Dr. Ohara (10/15/18)  Results pending.     TRANSESOPHAGEAL ECHOCARDIOGRAM CONCLUSIONS by  (10/09/18)  Mildly reduced LV  systolic function, EF 40%.  Small apical aneurysm noted.  Mildly reduced RV systolic function.  Severe LAE.  SUDHAKAR is large, spontaneous contrast present, low emptying velocities.  Severe mitral regurgitaiton due to prolapse and functional MR,   predominantly A2-A3.  Compared to TTE 7-27-18, EF similar, unable to est RVSP, severe MR.    Assessment:     1. S/P mitral valve repair     2. Systolic CHF with reduced left ventricular function, NYHA class 2 ([facility])     3. Ischemic cardiomyopathy     4. Coronary artery disease involving coronary bypass graft of native heart without angina pectoris     5. Status post coronary artery bypass grafts x 5     6. Stented coronary artery     7. Chronic atrial fibrillation     8. Chronic anticoagulation         Medical Decision Making:  Today's Assessment / Status / Plan:     1. Status post transcatheter percutaneous mitral valve repair (MitraClip) with 1clip, under general anesthesia (10/15/18): She is clinically doing well, NYHA class II.  2. Chronic systolic congestive heart failure with cardiomyopathy: The overall volume status is adequate.  3. Coronary artery disease with prior coronary bypass graft (in 2002 and redo in 2016) and stent placement: She is clinically doing well. I will continue with current medical care including carvedilol, lisinopril and atorvastatin. We will discontinue Plavix and continue with aspirin therapy only.  4. Atrial fibrillation on anticoagulation therapy (warfarin): She is doing well on anticoagulation and the ventricular rate is well controlled.    We will follow up with her PRN from MitraClip standpoint and have her continue follow up with her primary cardiologist, Derrick Muniz    CC Derrick Muniz and Sourav Hogan

## 2019-10-29 NOTE — LETTER
[facility]   [address], [address]  [place] [address]  Phone: [phone]  Fax: [phone]              Daya Woods  1940    Encounter Date: 10/29/2019    Moy Don M.D.          PROGRESS NOTE:  Chief Complaint   Patient presents with   • Coronary Artery Disease     s/p mitral valve repair        Subjective:   Daya Woods is a 79 y.o. female who presents today for one year MitraClip repair follow up.    Since the patient's last visit on 12/06/18, she has been doing well clinically. She denies fatigue, shortness of breath, dyspnea on exertion, chest pain, dizziness or syncope.    Past Medical History:   Diagnosis Date   • Anesthesia     ponv   • Anticoagulation monitoring, special range    • CAD (coronary artery disease) 10/09/2018    PCI/ANA (Synergy 3.0 x 28mm) to the OM.   • Cataract     Bilateral IOL   • Chronic atrial fibrillation     Managed with rate control and warfarin for anticoagulation    • Coronary atherosclerosis of native coronary artery 12/2002    CABG x 5 (LIMA-D3, SVG-OM, SVG-RCA, SVG-distal LAD, SVG-D1). August 2016: LIMA-D3 and SVG-OM patent (interval occlusion of the SVG-RCA); chronic occluded SVG-LAD, SVG-D1.   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    • MI, old 2002   • Mitral regurgitation 10/2018    Status post MV repair with MitraClip.   • Osteoarthritis    • Pain     right knee, back   • Raynaud's disease    • Spinal stenosis 8/20/2015   • Status post coronary artery bypass grafts x 5 08/2002    LIMA-D3, SVG-LAD, SVG-OM, SVG-RCA, SVG-distal LAD.      Past Surgical History:   Procedure Laterality Date   • TRANSCATHETER MITRAL VALVE REPAIR  10/15/2018    Procedure: TRANSCATHETER  MITRAL VALVE REPAIR- WITH POSSIBLE OPEN HEART AND ANY ASSOCIATED PROCEDURES;  Surgeon: Moy Don M.D.;  Location: SURGERY [facility];  Service: Cardiac   • DEVON  10/15/2018    Procedure: DEVON;  Surgeon: Moy Don M.D.;  Location: SURGERY [facility];  Service: Cardiac   • ZZZ CARDIAC CATH  10/2018    PCI to SVG to OM.    • RECOVERY  2016    Procedure: CATH LAB-DEVON W/LHC W/POSSIBLE-JO-ANN/GRINSELL-LARGE GROUP;  Surgeon: Recoveryonhazel Surgery;  [place] [facility] [place]  Service:    • OTHER Bilateral 2014    cataract extraction with iols   • MULTIPLE CORONARY ARTERY BYPASS       CABG x 5   • APPENDECTOMY     • GYN SURGERY      c section    • MITRAL VALVE REPLACE      MV clip X1   • TONSILLECTOMY AND ADENOIDECTOMY     • TUBAL LIGATION       Family History   Problem Relation Age of Onset   • Heart Attack Brother 55        heart attack     Social History     Socioeconomic History   • Marital status:      Spouse name: Not on file   • Number of children: Not on file   • Years of education: Not on file   • Highest education level: Not on file   Occupational History   • Not on file   Social Needs   • Financial resource strain: Not on file   • Food insecurity:     Worry: Not on file     Inability: Not on file   • Transportation needs:     Medical: Not on file     Non-medical: Not on file   Tobacco Use   • Smoking status: Former Smoker     Packs/day: 0.50     Years: 40.00     Pack years: 20.00     Last attempt to quit: 2002     Years since quittin.6   • Smokeless tobacco: Never Used   Substance and Sexual Activity   • Alcohol use: Yes     Alcohol/week: 4.2 oz     Types: 7 Cans of beer per week   • Drug use: No   • Sexual activity: Not on file   Lifestyle   • Physical activity:     Days per week: Not on file     Minutes per session: Not on file   • Stress: Not on file   Relationships   • Social connections:     Talks on phone: Not on file     Gets together: Not on  "file     Attends Taoist service: Not on file     Active member of club or organization: Not on file     Attends meetings of clubs or organizations: Not on file     Relationship status: Not on file   • Intimate partner violence:     Fear of current or ex partner: Not on file     Emotionally abused: Not on file     Physically abused: Not on file     Forced sexual activity: Not on file   Other Topics Concern   • Not on file   Social History Narrative    Retired      Allergies   Allergen Reactions   • Codeine Unspecified     \"I just don't like it. It does weird things to me.\"   • Hydrochlorothiazide Unspecified     Unsure of reaction   • Isosorbide Mononitrate Rash     Rash   • Keflex Rash     Rash   • Sulfa Drugs Rash     rash   • Tape Unspecified     Tears skin off   • Xarelto [Rivaroxaban] Rash     rash     (Medications reviewed.)  Outpatient Encounter Medications as of 10/29/2019   Medication Sig Dispense Refill   • carvedilol (COREG) 6.25 MG Tab Take 0.5 Tabs by mouth 2 times a day, with meals. 180 Tab 3   • furosemide (LASIX) 20 MG Tab Take 1 Tab by mouth every day. 90 Tab 3   • lisinopril (PRINIVIL) 10 MG Tab Take 1 Tab by mouth 2 times a day. 180 Tab 3   • spironolactone (ALDACTONE) 25 MG Tab Take 0.5 Tabs by mouth every day. 30 Tab 11   • potassium chloride SA (K-DUR) 10 MEQ Tab CR Take 1 Tab by mouth every day. 90 Tab 3   • warfarin (COUMADIN) 5 MG Tab Take 5-7.5 mg by mouth every day. 7.5 mg on Mondays, wednesdays, and Fridays. All other days 5 mg     • magnesium oxide (MAG-OX) 400 (241.3 Mg) MG Tab tablet Take 1 Tab by mouth every day. 30 Tab 11   • atorvastatin (LIPITOR) 40 MG Tab Take 40 mg by mouth every evening.     • levothyroxine (SYNTHROID) 75 MCG Tab Take 75 mcg by mouth Every morning on an empty stomach.  0   • clopidogrel (PLAVIX) 75 MG Tab Take 1 Tab by mouth every day. (Patient not taking: Reported on 10/29/2019) 90 Tab 0   • levothyroxine (SYNTHROID) 88 MCG Tab Take 88 mcg by " "mouth every 7 days. Take 75mcg the other 6 days.       No facility-administered encounter medications on file as of 10/29/2019.      Review of Systems   Constitutional: Negative.  Negative for chills, fever, malaise/fatigue and weight loss.   HENT: Negative.  Negative for hearing loss.    Eyes: Negative.  Negative for blurred vision and double vision.   Respiratory: Negative.  Negative for cough and shortness of breath.    Cardiovascular: Negative.  Negative for chest pain, palpitations, claudication and leg swelling.   Gastrointestinal: Negative.  Negative for abdominal pain, nausea and vomiting.   Genitourinary: Negative.  Negative for dysuria and urgency.   Musculoskeletal: Negative.  Negative for joint pain and myalgias.   Skin: Negative.  Negative for itching and rash.   Neurological: Negative.  Negative for dizziness, focal weakness, weakness and headaches.   Endo/Heme/Allergies: Negative.  Does not bruise/bleed easily.   Psychiatric/Behavioral: Negative.  Negative for depression. The patient is not nervous/anxious.         Objective:   Ht 1.626 m (5' 4\")   Wt 50.8 kg (111 lb 14.1 oz)   LMP  (LMP Unknown)   BMI 19.20 kg/m²      Physical Exam   Constitutional: She is oriented to person, place, and time. She appears well-developed and well-nourished.   HENT:   Head: Normocephalic and atraumatic.   Eyes: EOM are normal.   Neck: No JVD present.   Cardiovascular: Normal rate and normal heart sounds. An irregularly irregular rhythm present.   Pulmonary/Chest: Effort normal and breath sounds normal.   Abdominal: Soft. Bowel sounds are normal.   No hepatosplenomegaly.   Musculoskeletal: Normal range of motion.   Lymphadenopathy:     She has no cervical adenopathy.   Neurological: She is alert and oriented to person, place, and time.   Skin: Skin is warm and dry.   Psychiatric: She has a normal mood and affect.     CARDIAC STUDIES/PROCEDURES:     CARDIAC CATHETERIZATION CONCLUSIONS (10/09/18)  1.  3-4+ mitral " regurgitation.  2.  Reduced left ventricular systolic function with ejection fraction of 35%,   mid to distal anterior and apical hypokinesis.  3.  Elevated left ventricular end-diastolic pressure.  4.  Coronary artery disease with known occluded saphenous vein graft to the   distal left anterior descending to the diagonal branch and to the right   coronary artery with patent left internal mammary artery to the mid to distal   left anterior descending artery, patent saphenous vein graft to a small obtuse   marginal branch with high-grade ostial proximal graft stenosis.  5.  Successful percutaneous transluminal coronary angioplasty/stent placement   of the ostial to proximal saphenous vein graft to the obtuse marginal branch   with 3.0x28 mm Synergy drug-eluting stent.  6.  Elevated left ventricular end-diastolic pressure.  7.  Elevated right heart pressure with 40 mmHg.     CARDIAC CATHETERIZATION CONCLUSIONS by Dr. Menard (08/26/16)  1. Complex heavily calcified high-grade disease of the proximal and mid left anterior descending.  2. Chronic total occlusion of the circumflex.  3. Codominant RCA with moderate native disease.  4. Totally occluded saphenous vein graft x3, (chronic).  5. Patent saphenous vein graft to an obtuse marginal with high-grade stenosis of the distal anastomosis.  6. Patent left internal mammary artery to a diagonal vessel.  7. Technically difficult and demanding PCI with overlapping stent placement of the proximal and mid LAD with 3 overlapping stents were placed at the area of dissection and occlusion that is still heavily calcified those being 2.5x12, 2.5x12 and 2.5x8.      CAROTID ULTRASOUND (09/20/18)  Atherosclerosis without stenosis reaching 50% threshold.    ECHOCARDIOGRAM CONCLUSIONS (05/28/19)  Moderately reduced left ventricular systolic function.  Reduced right ventricular systolic function.  Moderately dilated left atrium.  Known mitral valve repair (Mitraclip) that is  functioning adequately   with normal gradient and mild to moderate regurgitation.  Moderate (borderline severe) eccentric tricuspid regurgitation.  Estimated right ventricular systolic pressure  is 30 mmHg.  Compared to the images of the study done 11/30/2018, tricuspid   regurgitation is slightly worse.  Otherwise, there is no significant   change.  (study result reviewed)     ECHOCARDIOGRAM CONCLUSIONS (11/30/18)  Moderately reduced left ventricular systolic function.  Moderately dilated left atrium.  Known transcatheter mitral valve repair (1 clips in place).  Moderate mitral regurgitation.  Aortic sclerosis without stenosis.  Estimated right ventricular systolic pressure is 40 mmHg.  Compared to the images of the prior study done on 10/16/18, mitral regurgitation is slightly worse.     ECHOCARDIOGRAM CONCLUSIONS (07/10/18)  Moderately reduced left ventricular systolic function.  Left ventricular ejection fraction is visually estimated to be 35-40%.   Mild concentric left ventricular hypertrophy.  Severely dilated left atrium.  Severe mitral regurgitation.  Estimated right ventricular systolic pressure  is 30 mmHg.  Compared to the images of the prior study done on 04/04/17, mitral   regurgitation is slightly worse and now severe, LVEF is unchanged.     EKG performed on (10/19/18) was reviewed: EKG personally interpreted shows atrial fibrillation.  EKG performed on (10/16/18) EKG shows atrial fibrillation with premature ventricular contractions.  EKG performed on (10/15/18) EKG shows atrial fibrillation.  EKG performed on (10/09/18) EKG shows atrial fibrillation.     Laboratory results of (10/16/18) Bun of 17 mg/dl, creatinine levels of 0.78 mg/dl noted.  Laboratory results of (10/15/18) Bun of 9 mg/dl, creatinine levels of 0.84 mg/dl noted.  Laboratory results of (10/10/18) Bun of 7 mg/dl, creatinine levels of 0.67 mg/dl noted.     TRANSESOPHAGEAL ECHOCARDIOGRAM CONCLUSIONS by Dr. Ohara (10/15/18)  Results  pending.     TRANSESOPHAGEAL ECHOCARDIOGRAM CONCLUSIONS by  (10/09/18)  Mildly reduced LV systolic function, EF 40%.  Small apical aneurysm noted.  Mildly reduced RV systolic function.  Severe LAE.  SUDHAKAR is large, spontaneous contrast present, low emptying velocities.  Severe mitral regurgitaiton due to prolapse and functional MR,   predominantly A2-A3.  Compared to TTE 7-27-18, EF similar, unable to est RVSP, severe MR.    Assessment:     1. S/P mitral valve repair     2. Systolic CHF with reduced left ventricular function, NYHA class 2 ([facility])     3. Ischemic cardiomyopathy     4. Coronary artery disease involving coronary bypass graft of native heart without angina pectoris     5. Status post coronary artery bypass grafts x 5     6. Stented coronary artery     7. Chronic atrial fibrillation     8. Chronic anticoagulation         Medical Decision Making:  Today's Assessment / Status / Plan:     1. Status post transcatheter percutaneous mitral valve repair (MitraClip) with 1clip, under general anesthesia (10/15/18): She is clinically doing well.  2. Chronic systolic congestive heart failure with cardiomyopathy: The overall volume status is adequate.  3. Coronary artery disease with prior coronary bypass graft  (in 2002 and redo in 2016): She is clinically doing well. I will continue with current medical care including carvedilol, lisinopril and atorvastatin.  4. Atrial fibrillation on anticoagulation therapy (warfarin): She is doing well on anticoagulation and the ventricular rate is well controlled.      CC Derrick Muniz and Sourav Hogan         No Recipients

## 2019-12-01 DIAGNOSIS — I50.23 ACUTE ON CHRONIC SYSTOLIC CHF (CONGESTIVE HEART FAILURE) (HCC): ICD-10-CM

## 2019-12-03 RX ORDER — LISINOPRIL 10 MG/1
TABLET ORAL
Qty: 180 TAB | Refills: 3 | Status: SHIPPED | OUTPATIENT
Start: 2019-12-03 | End: 2020-02-19

## 2020-01-14 DIAGNOSIS — I50.23 ACUTE ON CHRONIC SYSTOLIC CHF (CONGESTIVE HEART FAILURE) (HCC): ICD-10-CM

## 2020-01-14 RX ORDER — SPIRONOLACTONE 25 MG/1
12.5 TABLET ORAL DAILY
Qty: 45 TAB | Refills: 3 | Status: SHIPPED | OUTPATIENT
Start: 2020-01-14 | End: 2020-02-19

## 2020-02-06 ENCOUNTER — TELEPHONE (OUTPATIENT)
Dept: CARDIOLOGY | Facility: MEDICAL CENTER | Age: 80
End: 2020-02-06

## 2020-02-06 NOTE — TELEPHONE ENCOUNTER
Spoke w/ pt. Regarding upcoming appt. W/ JM. Pt. States she will complete labs prior to appt. At [facility] She requested order be mailed to address on file. Appt. Time and date confirmed.

## 2020-02-10 DIAGNOSIS — I10 ESSENTIAL HYPERTENSION: Primary | ICD-10-CM

## 2020-02-10 RX ORDER — CARVEDILOL 3.12 MG/1
3.12 TABLET ORAL 2 TIMES DAILY WITH MEALS
Qty: 180 TAB | Refills: 2 | Status: SHIPPED | OUTPATIENT
Start: 2020-02-10 | End: 2020-02-19

## 2020-02-12 ENCOUNTER — TELEPHONE (OUTPATIENT)
Dept: CARDIOLOGY | Facility: MEDICAL CENTER | Age: 80
End: 2020-02-12

## 2020-02-12 DIAGNOSIS — I50.20 SYSTOLIC CHF WITH REDUCED LEFT VENTRICULAR FUNCTION, NYHA CLASS 2 (HCC): ICD-10-CM

## 2020-02-12 DIAGNOSIS — I25.5 ISCHEMIC CARDIOMYOPATHY: ICD-10-CM

## 2020-02-12 DIAGNOSIS — I10 ESSENTIAL HYPERTENSION: ICD-10-CM

## 2020-02-19 ENCOUNTER — OFFICE VISIT (OUTPATIENT)
Dept: CARDIOLOGY | Facility: MEDICAL CENTER | Age: 80
End: 2020-02-19
Payer: MEDICARE

## 2020-02-19 VITALS
OXYGEN SATURATION: 97 % | BODY MASS INDEX: 19.46 KG/M2 | HEART RATE: 74 BPM | WEIGHT: 114 LBS | SYSTOLIC BLOOD PRESSURE: 120 MMHG | DIASTOLIC BLOOD PRESSURE: 64 MMHG | HEIGHT: 64 IN

## 2020-02-19 DIAGNOSIS — I48.20 CHRONIC ATRIAL FIBRILLATION (HCC): ICD-10-CM

## 2020-02-19 DIAGNOSIS — I10 ESSENTIAL HYPERTENSION: ICD-10-CM

## 2020-02-19 DIAGNOSIS — I25.5 ISCHEMIC CARDIOMYOPATHY: ICD-10-CM

## 2020-02-19 DIAGNOSIS — Z95.5 STENTED CORONARY ARTERY: ICD-10-CM

## 2020-02-19 DIAGNOSIS — I50.23 ACUTE ON CHRONIC SYSTOLIC CHF (CONGESTIVE HEART FAILURE) (HCC): ICD-10-CM

## 2020-02-19 DIAGNOSIS — Z98.890 S/P MITRAL VALVE REPAIR: ICD-10-CM

## 2020-02-19 DIAGNOSIS — I25.810 CORONARY ARTERY DISEASE INVOLVING CORONARY BYPASS GRAFT OF NATIVE HEART WITHOUT ANGINA PECTORIS: ICD-10-CM

## 2020-02-19 DIAGNOSIS — I36.1 NON-RHEUMATIC TRICUSPID VALVE INSUFFICIENCY: ICD-10-CM

## 2020-02-19 DIAGNOSIS — Z66 DNR (DO NOT RESUSCITATE): ICD-10-CM

## 2020-02-19 DIAGNOSIS — E78.5 DYSLIPIDEMIA: ICD-10-CM

## 2020-02-19 PROCEDURE — 99215 OFFICE O/P EST HI 40 MIN: CPT | Performed by: INTERNAL MEDICINE

## 2020-02-19 RX ORDER — LISINOPRIL 10 MG/1
TABLET ORAL
Qty: 180 TAB | Refills: 3 | Status: SHIPPED | OUTPATIENT
Start: 2020-02-19 | End: 2021-01-19

## 2020-02-19 RX ORDER — ATORVASTATIN CALCIUM 40 MG/1
40 TABLET, FILM COATED ORAL DAILY
Qty: 90 TAB | Refills: 3 | Status: SHIPPED | OUTPATIENT
Start: 2020-02-19 | End: 2021-01-19

## 2020-02-19 RX ORDER — SPIRONOLACTONE 25 MG/1
12.5 TABLET ORAL DAILY
Qty: 45 TAB | Refills: 3 | Status: SHIPPED | OUTPATIENT
Start: 2020-02-19 | End: 2021-01-19

## 2020-02-19 RX ORDER — POTASSIUM CHLORIDE 750 MG/1
5 TABLET, EXTENDED RELEASE ORAL DAILY
Qty: 90 TAB | Refills: 3 | Status: SHIPPED | OUTPATIENT
Start: 2020-02-19 | End: 2021-01-19

## 2020-02-19 RX ORDER — CARVEDILOL 3.12 MG/1
3.12 TABLET ORAL 2 TIMES DAILY WITH MEALS
Qty: 180 TAB | Refills: 2 | Status: SHIPPED | OUTPATIENT
Start: 2020-02-19 | End: 2020-05-18

## 2020-02-19 RX ORDER — FUROSEMIDE 20 MG/1
20 TABLET ORAL DAILY
Qty: 90 TAB | Refills: 3 | Status: SHIPPED | OUTPATIENT
Start: 2020-02-19 | End: 2021-01-19

## 2020-02-19 ASSESSMENT — ENCOUNTER SYMPTOMS
BRUISES/BLEEDS EASILY: 1
BACK PAIN: 1

## 2020-02-19 NOTE — PROGRESS NOTES
Cardiology Follow-up Consultation Note    Date of note:    2/19/2020  Primary Care Provider: Sourav Swift M.D.  Referring Provider: Rah Anderson M.D.     Patient Name: Daya Woods   YOB: 1940  MRN:              [id number]    Chief Complaint: chest pain    History of Present Illness: Daya Woods is a 79 y.o. female whose current medical problems include prior MI and 5-vessel CABG in 2002 now with now 2/5 grafts open by cardiac catheterization 8/26/16 (LIMA-diag patent, SVG-OM status post PCI 10/9/2018, all others occluded), severe disease of her native LAD status post PCI with drug-eluting prior stents, and an occluded native right coronary artery filling via collaterals, chronic atrial fibrillation, hypertension, dyslipidemia, and severe functional MR s/p mitraclip on 10/15/2018 and chronic systolic congestive heart failure with EF 40% who is here for follow-up.    At our visit, 7/19/2019:  She gets left chest wall pain, moderate severity, mostly every afternoon which resolves with rest. Also gets shortness of breath with increased exertion.     Walking limited by back pain and hip pain, only 1 block.     In terms of hypertension, SBP at home in the 90s. No lightheadedness or syncope.    Decreased plavix to 1/2 pill due to nose bleeds.     Weight decreased 7 pounds in the last 6 months. Cannot gain it back for some reason.     Interim Events:  Continues to get moderate severity dyspnea on exertion associated with back soreness which resolves with rest. Only walking dog down the walk and back.     In terms of a fib, no palpitations.     Hypertension well controlled.     In terms of CHF, no LE edema, dry weight at home around 106 pounds.     Review of Systems   Hematologic/Lymphatic: Bruises/bleeds easily.   Musculoskeletal: Positive for back pain.   Allergic/Immunologic: Positive for environmental allergies.     All other systems reviewed and discussed using a comprehensive  questionnaire and are negative.         Past Medical History:   Diagnosis Date   • Anesthesia     ponv   • Anticoagulation monitoring, special range    • CAD (coronary artery disease) 10/09/2018    PCI/ANA (Synergy 3.0 x 28mm) to the OM.   • Cataract     Bilateral IOL   • Chronic atrial fibrillation     Managed with rate control and warfarin for anticoagulation    • Coronary atherosclerosis of native coronary artery 12/2002    CABG x 5 (LIMA-D3, SVG-OM, SVG-RCA, SVG-distal LAD, SVG-D1). August 2016: LIMA-D3 and SVG-OM patent (interval occlusion of the SVG-RCA); chronic occluded SVG-LAD, SVG-D1.   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    • MI, old 2002   • Mitral regurgitation 10/2018    Status post MV repair with MitraClip.   • Osteoarthritis    • Pain     right knee, back   • Raynaud's disease    • Spinal stenosis 8/20/2015   • Status post coronary artery bypass grafts x 5 08/2002    LIMA-D3, SVG-LAD, SVG-OM, SVG-RCA, SVG-distal LAD.          Past Surgical History:   Procedure Laterality Date   • TRANSCATHETER MITRAL VALVE REPAIR  10/15/2018    Procedure: TRANSCATHETER MITRAL VALVE REPAIR- WITH POSSIBLE OPEN HEART AND ANY ASSOCIATED PROCEDURES;  Surgeon: Myo Don M.D.;  Location: SURGERY [facility];  Service: Cardiac   • DEVON  10/15/2018    Procedure: DEVON;  Surgeon: oMy Don M.D.;  Location: SURGERY [facility];  Service: Cardiac   • ZZZ CARDIAC CATH  10/2018    PCI to SVG to OM.    • RECOVERY  8/26/2016    Procedure: CATH LAB-DEVON W/LHC W/POSSIBLE-JO-ANN/GRINSELL-LARGE GROUP;  Surgeon: Recoveryonly Surgery;  Location: SURGERY PRE-POST PROC UNIT [facility] [place];  Service:    • OTHER Bilateral 08/2014    cataract extraction with iols   • MULTIPLE CORONARY ARTERY BYPASS  " 2002     CABG x 5   • APPENDECTOMY     • GYN SURGERY      c section    • MITRAL VALVE REPLACE      MV clip X1   • TONSILLECTOMY AND ADENOIDECTOMY     • TUBAL LIGATION           Current Outpatient Medications   Medication Sig Dispense Refill   • aspirin EC (ECOTRIN) 81 MG Tablet Delayed Response Take 81 mg by mouth every day.     • carvedilol (COREG) 3.125 MG Tab Take 1 Tab by mouth 2 times a day, with meals. 180 Tab 2   • spironolactone (ALDACTONE) 25 MG Tab Take 0.5 Tabs by mouth every day. 45 Tab 3   • lisinopril (PRINIVIL) 10 MG Tab take 1 tablet by mouth twice a day 180 Tab 3   • furosemide (LASIX) 20 MG Tab Take 1 Tab by mouth every day. 90 Tab 3   • potassium chloride SA (K-DUR) 10 MEQ Tab CR Take 1 Tab by mouth every day. 90 Tab 3   • warfarin (COUMADIN) 5 MG Tab Take 5-7.5 mg by mouth every day. 7.5 mg on Mondays, wednesdays, and Fridays. All other days 5 mg     • magnesium oxide (MAG-OX) 400 (241.3 Mg) MG Tab tablet Take 1 Tab by mouth every day. 30 Tab 11   • atorvastatin (LIPITOR) 40 MG Tab Take 40 mg by mouth every evening.     • levothyroxine (SYNTHROID) 75 MCG Tab Take 75 mcg by mouth Every morning on an empty stomach.  0     No current facility-administered medications for this visit.          Allergies   Allergen Reactions   • Codeine Unspecified     \"I just don't like it. It does weird things to me.\"   • Hydrochlorothiazide Unspecified     Unsure of reaction   • Isosorbide Mononitrate Rash     Rash   • Keflex Rash     Rash   • Sulfa Drugs Rash     rash   • Tape Unspecified     Tears skin off   • Xarelto [Rivaroxaban] Rash     rash         Family History   Problem Relation Age of Onset   • Heart Attack Brother 55        heart attack         Social History     Socioeconomic History   • Marital status:      Spouse name: Not on file   • Number of children: Not on file   • Years of education: Not on file   • Highest education level: Not on file   Occupational History   • Not on file   Social " "Needs   • Financial resource strain: Not on file   • Food insecurity     Worry: Not on file     Inability: Not on file   • Transportation needs     Medical: Not on file     Non-medical: Not on file   Tobacco Use   • Smoking status: Former Smoker     Packs/day: 0.50     Years: 40.00     Pack years: 20.00     Last attempt to quit: 2002     Years since quittin.9   • Smokeless tobacco: Never Used   Substance and Sexual Activity   • Alcohol use: Yes     Alcohol/week: 4.2 oz     Types: 7 Cans of beer per week   • Drug use: No   • Sexual activity: Not on file   Lifestyle   • Physical activity     Days per week: Not on file     Minutes per session: Not on file   • Stress: Not on file   Relationships   • Social connections     Talks on phone: Not on file     Gets together: Not on file     Attends [employer] service: Not on file     Active member of club or organization: Not on file     Attends meetings of clubs or organizations: Not on file     Relationship status: Not on file   • Intimate partner violence     Fear of current or ex partner: Not on file     Emotionally abused: Not on file     Physically abused: Not on file     Forced sexual activity: Not on file   Other Topics Concern   • Not on file   Social History Narrative    Retired          Physical Exam:  Ambulatory Vitals  /64 (BP Location: Left arm, Patient Position: Sitting)   Pulse 74   Ht 1.626 m (5' 4\")   Wt 51.7 kg (114 lb)   SpO2 97%    Oxygen Therapy:  Pulse Oximetry: 97 %  BP Readings from Last 4 Encounters:   20 120/64   10/29/19 134/82   19 102/48   04/15/19 130/74       Weight/BMI: Body mass index is 19.57 kg/m².  Wt Readings from Last 4 Encounters:   20 51.7 kg (114 lb)   10/29/19 50.8 kg (111 lb 14.1 oz)   19 49 kg (108 lb)   04/15/19 49.9 kg (110 lb)       General: No apparent distress  Eyes: nl conjunctiva  ENT: OP clear, normal external appearance of nose and ears  Neck: JVP <8 cm H2O, no carotid " bruits  Lungs: normal respiratory effort, CTAB  Heart: irregular rhythm, regular rate, no murmurs, no rubs or gallops, no edema bilateral lower extremities. No LV/RV heave on cardiac palpatation. 2+ bilateral radial pulses.  2+ bilateral dp pulses.   Abdomen: soft, non tender, non distended, no masses, normal bowel sounds.  No HSM.  Extremities/MSK: no clubbing, no cyanosis. Cachectic.   Neurological: No focal sensory deficits  Psychiatric: Appropriate affect, A/O x 3, intact judgement and insight  Skin: Warm extremities    Exam repeated in full and unchanged except for as noted above.      Lab Data Review:  Lab Results   Component Value Date/Time    CHOLSTRLTOT 137 02/24/2014 03:05 AM    LDL 67 02/24/2014 03:05 AM    HDL 55 02/24/2014 03:05 AM    TRIGLYCERIDE 74 02/24/2014 03:05 AM       Lab Results   Component Value Date/Time    SODIUM 134 (L) 10/16/2018 02:58 AM    POTASSIUM 3.9 10/16/2018 02:58 AM    CHLORIDE 101 10/16/2018 02:58 AM    CO2 23 10/16/2018 02:58 AM    GLUCOSE 152 (H) 10/16/2018 02:58 AM    BUN 17 10/16/2018 02:58 AM    CREATININE 0.78 10/16/2018 02:58 AM     Lab Results   Component Value Date/Time    ALKPHOSPHAT 53 10/09/2018 06:50 AM    ASTSGOT 28 10/09/2018 06:50 AM    ALTSGPT 28 10/09/2018 06:50 AM    TBILIRUBIN 0.6 10/09/2018 06:50 AM      Lab Results   Component Value Date/Time    WBC 5.5 10/16/2018 03:00 AM     No components found for: HBGA1C  No components found for: TROPONIN  No components found for: BNP    [facility] labs 5/7/2019:  hgb 15.1  plt 264  Creatinine 0.8  Bun 12  Potassium 4.4  Sodium 134  LFTs WNL except  ALT 53  TSH 6.23    HDL 51  LDL 62  tg 86    [facility] labs 2/11/2020:  Sodium 138  Potassium 4.3  LFTs WNL  Creatinine 0.7  Bun 13          Cardiac Imaging and Procedures Review:    EKG dated 10/19/2018 : My personal interpretation is a fib, lateral infarct, anteroseptal infarct, abnormal t waves consider ischemia.     Echo dated 5/29/2019:   CONCLUSIONS  Moderately reduced left  ventricular systolic function.  Reduced right ventricular systolic function.  Moderately dilated left atrium.  Known mitral valve repair (Mitraclip) that is functioning adequately   with normal gradient and mild to moderate regurgitation.  Moderate (borderline severe) eccentric tricuspid regurgitation.  Estimated right ventricular systolic pressure  is 30 mmHg.  Compared to the images of the study done 2018, tricuspid   regurgitation is slightly worse.  Otherwise, there is no significant   Change.    LV EF:  35    %    [facility] (10/9/2018):   IMPRESSION:  1.  3-4+ mitral regurgitation.  2.  Reduced left ventricular systolic function with ejection fraction of 35%,   mid to distal anterior and apical hypokinesis.  3.  Elevated left ventricular end-diastolic pressure.  4.  Coronary artery disease with known occluded saphenous vein graft to the   distal left anterior descending to the diagonal branch and to the right   coronary artery with patent left internal mammary artery to the mid to distal   left anterior descending artery, patent saphenous vein graft to a small obtuse   marginal branch with high-grade ostial proximal graft stenosis.  5.  Successful percutaneous transluminal coronary angioplasty/stent placement   of the ostial to proximal saphenous vein graft to the obtuse marginal branch   with 3.0x28 mm Synergy drug-eluting stent.  6.  Elevated left ventricular end-diastolic pressure.  7.  Elevated right heart pressure with 40 mmHg.    Radiology test Review:  CXR: 10/2018  FINDINGS:  Sternotomy wires demonstrated.  Cardiomegaly.  Mild lung base interstitial opacities consistent with mild interstitial edema. No consolidation.  No pleural effusion.  No pneumothorax.    Carotid US 2018:   Vascular Laboratory   CONCLUSIONS   Atherosclerosis without stenosis reaching 50% threshold.      Medical Decision Makin. Systolic CHF with reduced left ventricular function (HCC)  NYHA class II-III, stage C secondary to  ischemic cardiomyopathy. LVEF 35%.  Appears euvolemic.   -continue coreg 3.125mg PO bid  -continue lisinopril 10mg PO bid for now, if symptomatic hypotension, would decrease this to 10mg PO Daily  -continue spironolactone 12.5mg PO daily  -continue lasix 20mg PO Daily along with kcl 10mEq daily, BMP check in 6 months  -continue magnesium.   -will discuss at future visit if LVEF continues to drop possible ICD for primary prevention. Recheck echo now.   -lives in [place], [place], would not be a good candidate for entresto given her hypotension and lack of access to quick medical care.   -If LVEF not improved, will consider switch to metoprolol XL to be able to uptitrate this medication without hypotension.       2. S/P mitral valve repair  Now with mild to moderate MR.  -CTM    3. Ischemic cardiomyopathy  LVEF worsening slightly  -CTM, known that only 2 grafts are open.     4. Essential hypertension  Adequately controlled    5. Coronary artery disease of bypass graft of native heart with stable angina pectoris (HCC)  Angina now resolved. Does have some dyspnea, probably from CHF.   -continue aspirin as part of dual therapy at this time given known continued obstructive CAD.   -if angina worsens, would decrease lisinopril to 10mg PO Daily and increase coreg back to 6.25mg PO daily. Would unlikely tolerate imdur due to hypotension but could try. Ranexa is another option.  If symptoms become severe, would discuss films with Dr. Don to see if there is any other candidates for intervention.     6. Chronic atrial fibrillation (HCC)  [id number] score of 6. Currently asymptomatic  -continue coumadin, goal INR 2-3. Prefers to stay on coumadin.   -continue coreg for rate control  -If LVEF does not improve, I would discuss ablation for a fib and heart failure vs admission for dofetilide loading vs starting amiodarone with cardioversion.     7. Non-rheumatic tricuspid valve insufficiency  Moderate to severe  -CTM, repeat echo in  a year.       Return in about 3 months (around 5/19/2020). with RIN Lilly MD, [facility], [facility]  [address] [address]  Phone: [phone]  Fax: [phone]

## 2020-05-12 ENCOUNTER — HOSPITAL ENCOUNTER (OUTPATIENT)
Dept: CARDIOLOGY | Facility: MEDICAL CENTER | Age: 80
End: 2020-05-12
Attending: INTERNAL MEDICINE
Payer: MEDICARE

## 2020-05-12 DIAGNOSIS — I36.1 NON-RHEUMATIC TRICUSPID VALVE INSUFFICIENCY: ICD-10-CM

## 2020-05-12 DIAGNOSIS — Z98.890 S/P MITRAL VALVE REPAIR: ICD-10-CM

## 2020-05-12 DIAGNOSIS — I48.20 CHRONIC ATRIAL FIBRILLATION (HCC): ICD-10-CM

## 2020-05-12 LAB
LV EJECT FRACT  99904: 20
LV EJECT FRACT MOD 2C 99903: 35.9
LV EJECT FRACT MOD 4C 99902: 34.91
LV EJECT FRACT MOD BP 99901: 35.28

## 2020-05-12 PROCEDURE — 93306 TTE W/DOPPLER COMPLETE: CPT | Mod: 26 | Performed by: INTERNAL MEDICINE

## 2020-05-12 PROCEDURE — 93306 TTE W/DOPPLER COMPLETE: CPT

## 2020-05-16 ENCOUNTER — TELEPHONE (OUTPATIENT)
Dept: CARDIOLOGY | Facility: MEDICAL CENTER | Age: 80
End: 2020-05-16

## 2020-05-16 DIAGNOSIS — I50.20 SYSTOLIC CHF WITH REDUCED LEFT VENTRICULAR FUNCTION, NYHA CLASS 2 (HCC): ICD-10-CM

## 2020-05-18 RX ORDER — METOPROLOL SUCCINATE 25 MG/1
25 TABLET, EXTENDED RELEASE ORAL DAILY
Qty: 90 TAB | Refills: 3 | Status: SHIPPED | OUTPATIENT
Start: 2020-05-18 | End: 2020-06-24 | Stop reason: SDUPTHER

## 2020-05-18 NOTE — TELEPHONE ENCOUNTER
LVEF worsening, now 20%.    Please have her stop carvedilol and start metoprolol XL 25mg PO Daily.     Also lets have her reschedule her f/u appointment with RIN and see why she has cancelled so many appointments. Thanks!  
Spoke with patient and discussed results and recommendations.  Patient states that her most recent appointment was cancelled because she was unable to do a virtual visit.  Advised patient that we are seeing patients in-person again.      Patient transferred to scheduling.    Scheduling:  Please schedule patient for in-clinic visit with JA.  Not urgent but sooner than later.  
Alert and oriented to person, place and time, memory intact, behavior appropriate to situation, PERRL.

## 2020-05-26 NOTE — PROGRESS NOTES
Chief Complaint   Patient presents with   • Coronary Artery Disease   • CHF (Systolic)       Subjective:   Daya Woods is a 80 y.o. female who presents today for follow up with her daughter Lavinia.     Patient of Dr. Lilly.Current medical problems include CAD s/p 5-vessel CABG in 2002 now with now 2/5 grafts open by cardiac catheterization 8/26/16 (LIMA-diag patent, SVG-OM status post PCI 10/9/2018, all others occluded), chronic atrial fibrillation, hypertension, dyslipidemia, and systolic congestive heart failure with EF 40%, functional mitral regurgitation status post Mitraclip procedure on 10/15/2018.    She had some tiredness and headaches after starting the metoprolol but these have now resolved.     Blood pressure at home generally ranges 120s/70s. Monitors weight at home 115lbs, no leg swelling, orthopnea. Had some dyspnea when she started gardening this season but this has now resolved. She does tire easily but this is not new.    Denies palpitations, dizziness.     No chest pains with activity or at rest.     No bleeding complications.    Past Medical History:   Diagnosis Date   • Anesthesia     ponv   • Anticoagulation monitoring, special range    • CAD (coronary artery disease) 10/09/2018    PCI/ANA (Synergy 3.0 x 28mm) to the OM.   • Cataract     Bilateral IOL   • Chronic atrial fibrillation (HCC)     Managed with rate control and warfarin for anticoagulation    • Coronary atherosclerosis of native coronary artery 12/2002    CABG x 5 (LIMA-D3, SVG-OM, SVG-RCA, SVG-distal LAD, SVG-D1). August 2016: LIMA-D3 and SVG-OM patent (interval occlusion of the SVG-RCA); chronic occluded SVG-LAD, SVG-D1.   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    • MI, old 2002   •  Mitral regurgitation 10/2018    Status post MV repair with MitraClip.   • Osteoarthritis    • Pain     right knee, back   • Raynaud's disease    • Spinal stenosis 2015   • Status post coronary artery bypass grafts x 5 2002    LIMA-D3, SVG-LAD, SVG-OM, SVG-RCA, SVG-distal LAD.      Past Surgical History:   Procedure Laterality Date   • TRANSCATHETER MITRAL VALVE REPAIR  10/15/2018    Procedure: TRANSCATHETER MITRAL VALVE REPAIR- WITH POSSIBLE OPEN HEART AND ANY ASSOCIATED PROCEDURES;  Surgeon: Moy Don M.D.;  Location: SURGERY [facility];  Service: Cardiac   • DEVON  10/15/2018    Procedure: DEOVN;  Surgeon: Moy Don M.D.;  Location: SURGERY [facility];  Service: Cardiac   • ZZZ CARDIAC CATH  10/2018    PCI to SVG to OM.    • RECOVERY  2016    Procedure: CATH LAB-DEVON W/LHC W/MARLEY-JO-ANN/GRINSELL-LARGE GROUP;  Surgeon: Recoveryon Surgery;  Location: SURGERY PRE-POST PROC UNIT [facility] [place];  Service:    • OTHER Bilateral 2014    cataract extraction with iols   • MULTIPLE CORONARY ARTERY BYPASS  2002     CABG x 5   • APPENDECTOMY     • GYN SURGERY      c section    • MITRAL VALVE REPLACE      MV clip X1   • TONSILLECTOMY AND ADENOIDECTOMY     • TUBAL LIGATION       Family History   Problem Relation Age of Onset   • Heart Attack Brother 55        heart attack     Social History     Socioeconomic History   • Marital status:      Spouse name: Not on file   • Number of children: Not on file   • Years of education: Not on file   • Highest education level: Not on file   Occupational History   • Not on file   Social Needs   • Financial resource strain: Not on file   • Food insecurity     Worry: Not on file     Inability: Not on file   • Transportation needs     Medical: Not on file     Non-medical: Not on file   Tobacco Use   • Smoking status: Former Smoker     Packs/day: 0.50     Years: 40.00     Pack years: 20.00     Last attempt to quit: 2002     Years since quittin.2   •  "Smokeless tobacco: Never Used   Substance and Sexual Activity   • Alcohol use: Yes     Alcohol/week: 4.2 oz     Types: 7 Cans of beer per week   • Drug use: No   • Sexual activity: Not on file   Lifestyle   • Physical activity     Days per week: Not on file     Minutes per session: Not on file   • Stress: Not on file   Relationships   • Social connections     Talks on phone: Not on file     Gets together: Not on file     Attends [employer] service: Not on file     Active member of club or organization: Not on file     Attends meetings of clubs or organizations: Not on file     Relationship status: Not on file   • Intimate partner violence     Fear of current or ex partner: Not on file     Emotionally abused: Not on file     Physically abused: Not on file     Forced sexual activity: Not on file   Other Topics Concern   • Not on file   Social History Narrative    Retired      Allergies   Allergen Reactions   • Codeine Unspecified     \"I just don't like it. It does weird things to me.\"   • Hydrochlorothiazide Unspecified     Unsure of reaction   • Isosorbide Mononitrate Rash     Rash   • Keflex Rash     Rash   • Sulfa Drugs Rash     rash   • Tape Unspecified     Tears skin off   • Xarelto [Rivaroxaban] Rash     rash     Outpatient Encounter Medications as of 5/27/2020   Medication Sig Dispense Refill   • metoprolol SR (TOPROL XL) 25 MG TABLET SR 24 HR Take 1 Tab by mouth every day. 90 Tab 3   • aspirin EC (ECOTRIN) 81 MG Tablet Delayed Response Take 81 mg by mouth every day.     • atorvastatin (LIPITOR) 40 MG Tab Take 1 Tab by mouth every day. 90 Tab 3   • spironolactone (ALDACTONE) 25 MG Tab Take 0.5 Tabs by mouth every day. 45 Tab 3   • potassium chloride SA (K-DUR) 10 MEQ Tab CR Take 0.5 Tabs by mouth every day. 90 Tab 3   • magnesium oxide (MAG-OX) 400 MG Tab tablet Take 1 Tab by mouth every day. 90 Tab 3   • lisinopril (PRINIVIL) 10 MG Tab take 1 tablet by mouth twice a day 180 Tab 3   • furosemide " "(LASIX) 20 MG Tab Take 1 Tab by mouth every day. 90 Tab 3   • warfarin (COUMADIN) 5 MG Tab Take 5-7.5 mg by mouth every day. 7.5 mg on Mondays, wednesdays, and Fridays. All other days 5 mg     • levothyroxine (SYNTHROID) 75 MCG Tab Take 75 mcg by mouth Every morning on an empty stomach.  0   • [DISCONTINUED] Magnesium Oxide 400 (240 Mg) MG Tab TAKE 1 TABLET BY MOUTH DAILY       No facility-administered encounter medications on file as of 5/27/2020.      Review of Systems   Constitutional: Positive for malaise/fatigue.        No weight gain   Respiratory: Negative for shortness of breath.    Cardiovascular: Negative for chest pain, palpitations, orthopnea, leg swelling and PND.   Gastrointestinal: Negative for nausea.        No abdominal bloating, no early satiety   Musculoskeletal: Negative for falls.   Neurological: Negative for dizziness.        No lightheadedness    Psychiatric/Behavioral:        +stressors at home        Objective:   /70 (BP Location: Left arm, Patient Position: Sitting, BP Cuff Size: Adult)   Pulse 64   Ht 1.626 m (5' 4\")   Wt 51.7 kg (114 lb)   LMP  (LMP Unknown)   SpO2 95%   BMI 19.57 kg/m²     Physical Exam   Constitutional: She is oriented to person, place, and time. She appears well-developed and well-nourished. No distress.   HENT:   Head: Normocephalic and atraumatic.   Neck: No JVD present.   Cardiovascular: Normal rate. An irregularly irregular rhythm present.   No murmur heard.  Pulmonary/Chest: Effort normal and breath sounds normal. She has no wheezes. She has no rales.   Abdominal: Soft. She exhibits no distension.   Musculoskeletal:         General: No edema.   Neurological: She is alert and oriented to person, place, and time.   Skin: Skin is warm and dry. No pallor.   Psychiatric: Judgment normal.   Vitals reviewed.       [facility] (10/9/2018):   IMPRESSION:  1.  3-4+ mitral regurgitation.  2.  Reduced left ventricular systolic function with ejection fraction of 35%,   mid " to distal anterior and apical hypokinesis.  3.  Elevated left ventricular end-diastolic pressure.  4.  Coronary artery disease with known occluded saphenous vein graft to the distal left anterior descending to the diagonal branch and to the right   coronary artery with patent left internal mammary artery to the mid to distal left anterior descending artery, patent saphenous vein graft to a small obtuse   marginal branch with high-grade ostial proximal graft stenosis.  5.  Successful percutaneous transluminal coronary angioplasty/stent placement of the ostial to proximal saphenous vein graft to the obtuse marginal branch with 3.0x28 mm Synergy drug-eluting stent.  6.  Elevated left ventricular end-diastolic pressure.  7.  Elevated right heart pressure with 40 mmHg.      CONCLUSIONS  Severely reduced left ventricular systolic function.  Left ventricular ejection fraction is visually estimated to be 20%.  Global hypokinesis with an aneurysm noted in the LV apex and apical inferior and septal walls.  Left ventricle is moderately dilated.  Small right ventricle.  Moderately reduced right ventricular systolic function.  Enlarged right atrium.  Severely dilated left atrium.  Known mitral valve repair (mitraclip) with normal transvalvular   gradient of 4mmHg.  Moderate mitral regurgitation.  Moderate tricuspid regurgitation.  Right atrial pressure is estimated to be 3 mmHg.  Estimated right ventricular systolic pressure is 45 mmHg.     Compared to the previous echocardiogram performed on 05/28/19: The LVEF and wall motion based on side by side comparison does not appear different.   Assessment:     1. Systolic CHF with reduced left ventricular function, NYHA class 2 (HCC)  proBrain Natriuretic Peptide, NT   2. Dyspnea on exertion  proBrain Natriuretic Peptide, NT       Medical Decision Making:  Today's Assessment / Status / Plan:   Chronic HFrEF, stage C, NYHA class II  Ischemic cardiomyopathy.   - LVEF 20% based on echo  from April of this year  - euvolemic on exam  -metoprolol SR 25mg, just now adjusting to the med, will uptitrate at next visit  -continue lisinopril 10mg PO   -continue spironolactone 12.5mg PO daily  -continue lasix 20mg PO Daily along with kcl 10mEq daily, CMP and BNP check before next visit  -continue magnesium   - we had a long discussion about ICD, decision tool provided. She is hesitant to have a procedure that would not improve her heart function, even if it prevents sudden cardiac death. Will continue this conversation at next visit.   -not a good candidate for sacubitril due to hypotension      S/P mitral valve repair  Now with mild to moderate MR.  -CTM     Coronary artery disease of bypass graft of native heart without angina   LVEF worsening slightly  CTM, known that only 2 grafts are open.   -continue aspirin as part of dual therapy at this time given known continued obstructive CAD.   -if angina worsens consider ranexa or would discuss films with Dr. Don to see if there is any other candidates for intervention.       Chronic atrial fibrillation   [id number] score of 6. Currently asymptomatic  -continue coumadin, goal INR 2-3  -continue metoprolol, at 25mg now, goal is to uptitrate to 50mg which we discussed today  - we discussed converting back to sinus rhythm which she does not believe will be successful, I recommended against DCCV in favor or discussing AAD vs. Ablation with EP, again she declines this but we will revisit at next visit     Tricuspid valve insufficiency  - moderate, no peripheral edema  - cont lasix 20mg    In summary we had a 40min discussion about her echo results, ICD options and afib options. Since she is feeling relatively well at this point she doesn't want to change anything. Follow up in 1mo to uptitrate BB and discuss treatment options.     Collaborating MD: Dr. Jarrell

## 2020-05-27 ENCOUNTER — OFFICE VISIT (OUTPATIENT)
Dept: CARDIOLOGY | Facility: MEDICAL CENTER | Age: 80
End: 2020-05-27
Payer: MEDICARE

## 2020-05-27 VITALS
OXYGEN SATURATION: 95 % | BODY MASS INDEX: 19.46 KG/M2 | WEIGHT: 114 LBS | DIASTOLIC BLOOD PRESSURE: 70 MMHG | SYSTOLIC BLOOD PRESSURE: 126 MMHG | HEART RATE: 64 BPM | HEIGHT: 64 IN

## 2020-05-27 DIAGNOSIS — I25.5 ISCHEMIC CARDIOMYOPATHY: ICD-10-CM

## 2020-05-27 DIAGNOSIS — I25.810 CORONARY ARTERY DISEASE INVOLVING CORONARY BYPASS GRAFT OF NATIVE HEART WITHOUT ANGINA PECTORIS: ICD-10-CM

## 2020-05-27 DIAGNOSIS — I10 ESSENTIAL HYPERTENSION: ICD-10-CM

## 2020-05-27 DIAGNOSIS — R06.09 DYSPNEA ON EXERTION: ICD-10-CM

## 2020-05-27 DIAGNOSIS — Z98.890 S/P MITRAL VALVE REPAIR: ICD-10-CM

## 2020-05-27 DIAGNOSIS — I36.1 NON-RHEUMATIC TRICUSPID VALVE INSUFFICIENCY: ICD-10-CM

## 2020-05-27 DIAGNOSIS — E78.5 DYSLIPIDEMIA: ICD-10-CM

## 2020-05-27 DIAGNOSIS — I48.20 CHRONIC ATRIAL FIBRILLATION (HCC): ICD-10-CM

## 2020-05-27 DIAGNOSIS — I50.20 SYSTOLIC CHF WITH REDUCED LEFT VENTRICULAR FUNCTION, NYHA CLASS 2 (HCC): ICD-10-CM

## 2020-05-27 DIAGNOSIS — Z79.01 CHRONIC ANTICOAGULATION: ICD-10-CM

## 2020-05-27 DIAGNOSIS — Z66 DO NOT RESUSCITATE STATUS: ICD-10-CM

## 2020-05-27 DIAGNOSIS — Z95.1 STATUS POST CORONARY ARTERY BYPASS GRAFTS X 5: ICD-10-CM

## 2020-05-27 PROCEDURE — 99214 OFFICE O/P EST MOD 30 MIN: CPT | Performed by: PHYSICIAN ASSISTANT

## 2020-05-27 RX ORDER — LANOLIN ALCOHOL/MO/W.PET/CERES
CREAM (GRAM) TOPICAL
COMMUNITY
Start: 2020-03-31 | End: 2020-05-27

## 2020-05-27 ASSESSMENT — ENCOUNTER SYMPTOMS
NAUSEA: 0
DIZZINESS: 0
ORTHOPNEA: 0
PALPITATIONS: 0
ROS GI COMMENTS: NO ABDOMINAL BLOATING, NO EARLY SATIETY
SHORTNESS OF BREATH: 0
PND: 0
FALLS: 0

## 2020-05-27 ASSESSMENT — FIBROSIS 4 INDEX: FIB4 SCORE: 3.15

## 2020-06-17 ENCOUNTER — TELEPHONE (OUTPATIENT)
Dept: CARDIOLOGY | Facility: MEDICAL CENTER | Age: 80
End: 2020-06-17

## 2020-06-17 NOTE — TELEPHONE ENCOUNTER
Spoke with patient regarding her labs patient stated that she had labs completed at [facility] I will call and get records to be send to us.

## 2020-06-23 ASSESSMENT — ENCOUNTER SYMPTOMS
PND: 0
ORTHOPNEA: 0
PALPITATIONS: 0
DIZZINESS: 0
SHORTNESS OF BREATH: 0

## 2020-06-23 NOTE — PROGRESS NOTES
Chief Complaint   Patient presents with   • Congestive Heart Failure   • Coronary Artery Disease   • Atrial Fibrillation       Subjective:   Daya Woods is a 80 y.o. female who presents today for follow up with her daughter Lavinia.     Patient of Dr. Lilly.Current medical problems include CAD s/p 5-vessel CABG in 2002 now with now 2/5 grafts open by cardiac catheterization 8/26/16 (LIMA-diag patent, SVG-OM status post PCI 10/9/2018, all others occluded), chronic atrial fibrillation, hypertension, dyslipidemia, and systolic congestive heart failure with EF 20%, recently fell from 40%, functional mitral regurgitation status post Mitraclip procedure on 10/15/2018.     At last visit we discussed options for ICD, rhythm control for her atrial fibrillation and medical therapy.     She is not interested in ICD or rhythm control for atrial fibrillation.     Her blood pressures at home are generally 110-120 systolic, HR 58-70 since starting metoprolol.     She is active at home, she can walk a block with her dog without any dyspnea. She denies orthopnea, leg swelling, abdominal bloating.    No chest pain/pressure with activity.      No bleeding complications.    Past Medical History:   Diagnosis Date   • Anesthesia     ponv   • Anticoagulation monitoring, special range    • CAD (coronary artery disease) 10/09/2018    PCI/ANA (Synergy 3.0 x 28mm) to the OM.   • Cataract     Bilateral IOL   • Chronic atrial fibrillation (HCC)     Managed with rate control and warfarin for anticoagulation    • Coronary atherosclerosis of native coronary artery 12/2002    CABG x 5 (LIMA-D3, SVG-OM, SVG-RCA, SVG-distal LAD, SVG-D1). August 2016: LIMA-D3 and SVG-OM patent (interval occlusion of the SVG-RCA); chronic occluded SVG-LAD, SVG-D1.   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    • MI, old 2002   • Mitral regurgitation 10/2018    Status post MV repair with MitraClip.   • Osteoarthritis    • Pain     right knee, back   • Raynaud's disease    • Spinal stenosis 8/20/2015   • Status post coronary artery bypass grafts x 5 08/2002    LIMA-D3, SVG-LAD, SVG-OM, SVG-RCA, SVG-distal LAD.      Past Surgical History:   Procedure Laterality Date   • TRANSCATHETER MITRAL VALVE REPAIR  10/15/2018    Procedure: TRANSCATHETER MITRAL VALVE REPAIR- WITH POSSIBLE OPEN HEART AND ANY ASSOCIATED PROCEDURES;  Surgeon: Moy Don M.D.;  Location: SURGERY [facility];  Service: Cardiac   • DEVON  10/15/2018    Procedure: DEVON;  Surgeon: Moy Don M.D.;  Location: SURGERY [facility];  Service: Cardiac   • ZZZ CARDIAC CATH  10/2018    PCI to SVG to OM.    • RECOVERY  8/26/2016    Procedure: CATH LAB-DEVON W/LHC W/MARLEY-JO-ANN/GRINSELL-LARGE GROUP;  Surgeon: Manuel Surgery;  Location: SURGERY PRE-POST PROC UNIT [facility] [place];  Service:    • OTHER Bilateral 08/2014    cataract extraction with iols   • MULTIPLE CORONARY ARTERY BYPASS  2002     CABG x 5   • APPENDECTOMY     • GYN SURGERY      c section    • MITRAL VALVE REPLACE      MV clip X1   • TONSILLECTOMY AND ADENOIDECTOMY     • TUBAL LIGATION       Family History   Problem Relation Age of Onset   • Heart Attack Brother 55        heart attack     Social History     Socioeconomic History   • Marital status:      Spouse name: Not on file   • Number of children: Not on file   • Years of education: Not on file   • Highest education level: Not on file   Occupational History   • Not on file   Social Needs   • Financial resource strain: Not on file   • Food insecurity     Worry: Not on file     Inability: Not on file   • Transportation needs     Medical: Not on file     Non-medical: Not on file   Tobacco Use   • Smoking status: Former Smoker     Packs/day:  "0.50     Years: 40.00     Pack years: 20.00     Last attempt to quit: 2002     Years since quittin.3   • Smokeless tobacco: Never Used   Substance and Sexual Activity   • Alcohol use: Yes     Alcohol/week: 4.2 oz     Types: 7 Cans of beer per week   • Drug use: No   • Sexual activity: Not on file   Lifestyle   • Physical activity     Days per week: Not on file     Minutes per session: Not on file   • Stress: Not on file   Relationships   • Social connections     Talks on phone: Not on file     Gets together: Not on file     Attends Confucianism service: Not on file     Active member of club or organization: Not on file     Attends meetings of clubs or organizations: Not on file     Relationship status: Not on file   • Intimate partner violence     Fear of current or ex partner: Not on file     Emotionally abused: Not on file     Physically abused: Not on file     Forced sexual activity: Not on file   Other Topics Concern   • Not on file   Social History Narrative    Retired      Allergies   Allergen Reactions   • Codeine Unspecified     \"I just don't like it. It does weird things to me.\"   • Hydrochlorothiazide Unspecified     Unsure of reaction   • Isosorbide Mononitrate Rash     Rash   • Keflex Rash     Rash   • Sulfa Drugs Rash     rash   • Tape Unspecified     Tears skin off   • Xarelto [Rivaroxaban] Rash     rash     Outpatient Encounter Medications as of 2020   Medication Sig Dispense Refill   • metoprolol SR (TOPROL XL) 50 MG TABLET SR 24 HR Take 1 Tab by mouth every day. 30 Tab 2   • aspirin EC (ECOTRIN) 81 MG Tablet Delayed Response Take 81 mg by mouth every day.     • atorvastatin (LIPITOR) 40 MG Tab Take 1 Tab by mouth every day. 90 Tab 3   • spironolactone (ALDACTONE) 25 MG Tab Take 0.5 Tabs by mouth every day. 45 Tab 3   • magnesium oxide (MAG-OX) 400 MG Tab tablet Take 1 Tab by mouth every day. 90 Tab 3   • lisinopril (PRINIVIL) 10 MG Tab take 1 tablet by mouth twice a day 180 " "Tab 3   • furosemide (LASIX) 20 MG Tab Take 1 Tab by mouth every day. 90 Tab 3   • warfarin (COUMADIN) 5 MG Tab Take 5-7.5 mg by mouth every day. 7.5 mg on Mondays, wednesdays, and Fridays. All other days 5 mg     • levothyroxine (SYNTHROID) 75 MCG Tab Take 75 mcg by mouth Every morning on an empty stomach.  0   • [DISCONTINUED] Magnesium Oxide 400 (240 Mg) MG Tab      • [DISCONTINUED] metoprolol SR (TOPROL XL) 25 MG TABLET SR 24 HR Take 1 Tab by mouth every day. 90 Tab 3   • potassium chloride SA (K-DUR) 10 MEQ Tab CR Take 0.5 Tabs by mouth every day. 90 Tab 3     No facility-administered encounter medications on file as of 6/24/2020.      Review of Systems   Constitutional: Negative for weight loss.   Respiratory: Negative for cough and shortness of breath.    Cardiovascular: Negative for chest pain, palpitations, orthopnea, leg swelling and PND.   Gastrointestinal: Negative for blood in stool.   Genitourinary: Negative for hematuria.   Neurological: Negative for dizziness.   Psychiatric/Behavioral: The patient is nervous/anxious.         Objective:   /68 (BP Location: Left arm, Patient Position: Sitting, BP Cuff Size: Adult)   Pulse 72   Ht 1.626 m (5' 4\")   Wt 51.6 kg (113 lb 12.8 oz)   LMP  (LMP Unknown)   SpO2 96%   BMI 19.53 kg/m²     Physical Exam   Constitutional: She is oriented to person, place, and time. She appears well-developed and well-nourished. No distress.   Neck:   High V wave   Cardiovascular: Normal rate. An irregularly irregular rhythm present.   Pulses:       Radial pulses are 2+ on the right side and 2+ on the left side.        Dorsalis pedis pulses are 2+ on the right side and 2+ on the left side.   No murmur auscultated   Pulmonary/Chest: She has no wheezes. She has no rales.   Abdominal: Soft. She exhibits no distension.   Musculoskeletal:         General: No edema.   Neurological: She is alert and oriented to person, place, and time.   Skin: Skin is warm and dry. No pallor. "   Psychiatric: Judgment normal.   Vitals reviewed.       [facility] (10/9/2018):   IMPRESSION:  1.  3-4+ mitral regurgitation.  2.  Reduced left ventricular systolic function with ejection fraction of 35%,   mid to distal anterior and apical hypokinesis.  3.  Elevated left ventricular end-diastolic pressure.  4.  Coronary artery disease with known occluded saphenous vein graft to the distal left anterior descending to the diagonal branch and to the right coronary artery with patent left internal mammary artery to the mid to distal left anterior descending artery, patent saphenous vein graft to a small obtuse marginal branch with high-grade ostial proximal graft stenosis.  5.  Successful percutaneous transluminal coronary angioplasty/stent placement of the ostial to proximal saphenous vein graft to the obtuse marginal branch with 3.0x28 mm Synergy drug-eluting stent.  6.  Elevated left ventricular end-diastolic pressure.  7.  Elevated right heart pressure with 40 mmHg.    TTE 5/12/20  CONCLUSIONS  Severely reduced left ventricular systolic function.  Left ventricular ejection fraction is visually estimated to be 20%.  Global hypokinesis with an aneurysm noted in the LV apex and apical inferior and septal walls.  Left ventricle is moderately dilated.  Small right ventricle.  Moderately reduced right ventricular systolic function.  Enlarged right atrium.  Severely dilated left atrium.  Known mitral valve repair (mitraclip) with normal transvalvular   gradient of 4mmHg.  Moderate mitral regurgitation.  Moderate tricuspid regurgitation.  Right atrial pressure is estimated to be 3 mmHg.  Estimated right ventricular systolic pressure is 45 mmHg.     Compared to the previous echocardiogram performed on 05/28/19: The LVEF and wall motion based on side by side comparison does not appear different.     Labs 6/15/20  Reviewed in media  Assessment:     1. Systolic CHF with reduced left ventricular function, NYHA class 2 ([facility])   metoprolol SR (TOPROL XL) 50 MG TABLET SR 24 HR   2. Ischemic cardiomyopathy     3. Coronary artery disease involving coronary bypass graft of native heart without angina pectoris     4. Status post coronary artery bypass grafts x 5     5. Dyslipidemia     6. Essential hypertension     7. Chronic atrial fibrillation (HCC)     8. Chronic anticoagulation     9. S/P mitral valve repair     10. Non-rheumatic tricuspid valve insufficiency         Medical Decision Making:  Today's Assessment / Status / Plan:   Chronic HFrEF, stage C, NYHA class II  Ischemic cardiomyopathy.   - LVEF 20% based on echo from April of this year  - euvolemic on exam  - try to titrate metoprolol to 50mg, if can't tolerate then she will try 37.5mg  -continue lisinopril 10mg PO, ARNI hasn't been tried due to soft BPs   -continue spironolactone 12.5mg PO daily  -continue lasix 20mg PO Daily along with kcl 10mEq daily, SCr and electrolytes stable  - NTproBNP 2900  -continue magnesium   - we again discussed risk of ventricular arrhythmias, SCD in the setting of ischemic CMO, she is not interested in any device at this time    S/P mitral valve clip  - mild to moderate MR, normal gradient across valve  -CTM     Coronary artery disease of bypass graft of native heart without angina   LVEF worsening slightly. I recommended a cardiac PET, or possibly angiogram to see if recent reduction in EF is due to a new blockage, as stenting, if plausible, may improve her EF, she would like to think about this further  - known that only 2 grafts are open.   -continue aspirin    -if angina develops consider ranexa.   -I will discuss films with Dr. Don to see if there is any other candidates for intervention.       Chronic atrial fibrillation   [id number] score of 6. Currently asymptomatic  -continue coumadin, goal INR 2-3, INR monitored in [place]  -Metop 50mg   - we discussed rhythm control and how it may help her EF, she is scared of electrical cardioversion but  we discussed other options such as an ablation, she again is not interested in any procedures     Tricuspid valve insufficiency  - moderate, no peripheral edema  - cont lasix 20mg    In summary, try metop SR 50mg. She will consider ischemic workup for reduced EF.    Collaborating MD: Dr. Tavarez  Addendum: spoke with Dr. Don, he reviewed the echo and last cath results. EF looks better than reported, about 35%. Recommended medical therapy only.

## 2020-06-24 ENCOUNTER — OFFICE VISIT (OUTPATIENT)
Dept: CARDIOLOGY | Facility: MEDICAL CENTER | Age: 80
End: 2020-06-24
Payer: MEDICARE

## 2020-06-24 VITALS
OXYGEN SATURATION: 96 % | SYSTOLIC BLOOD PRESSURE: 136 MMHG | DIASTOLIC BLOOD PRESSURE: 68 MMHG | HEART RATE: 72 BPM | HEIGHT: 64 IN | BODY MASS INDEX: 19.43 KG/M2 | WEIGHT: 113.8 LBS

## 2020-06-24 DIAGNOSIS — I48.20 CHRONIC ATRIAL FIBRILLATION (HCC): ICD-10-CM

## 2020-06-24 DIAGNOSIS — Z79.01 CHRONIC ANTICOAGULATION: ICD-10-CM

## 2020-06-24 DIAGNOSIS — I50.20 SYSTOLIC CHF WITH REDUCED LEFT VENTRICULAR FUNCTION, NYHA CLASS 2 (HCC): ICD-10-CM

## 2020-06-24 DIAGNOSIS — I25.810 CORONARY ARTERY DISEASE INVOLVING CORONARY BYPASS GRAFT OF NATIVE HEART WITHOUT ANGINA PECTORIS: ICD-10-CM

## 2020-06-24 DIAGNOSIS — I36.1 NON-RHEUMATIC TRICUSPID VALVE INSUFFICIENCY: ICD-10-CM

## 2020-06-24 DIAGNOSIS — I10 ESSENTIAL HYPERTENSION: ICD-10-CM

## 2020-06-24 DIAGNOSIS — I25.5 ISCHEMIC CARDIOMYOPATHY: ICD-10-CM

## 2020-06-24 DIAGNOSIS — Z98.890 S/P MITRAL VALVE REPAIR: ICD-10-CM

## 2020-06-24 DIAGNOSIS — E78.5 DYSLIPIDEMIA: ICD-10-CM

## 2020-06-24 DIAGNOSIS — Z95.1 STATUS POST CORONARY ARTERY BYPASS GRAFTS X 5: ICD-10-CM

## 2020-06-24 PROCEDURE — 99214 OFFICE O/P EST MOD 30 MIN: CPT | Performed by: PHYSICIAN ASSISTANT

## 2020-06-24 RX ORDER — METOPROLOL SUCCINATE 50 MG/1
50 TABLET, EXTENDED RELEASE ORAL DAILY
Qty: 30 TAB | Refills: 2 | Status: SHIPPED | OUTPATIENT
Start: 2020-06-24 | End: 2020-07-21

## 2020-06-24 RX ORDER — LANOLIN ALCOHOL/MO/W.PET/CERES
CREAM (GRAM) TOPICAL
COMMUNITY
Start: 2020-06-11 | End: 2020-06-24

## 2020-06-24 ASSESSMENT — ENCOUNTER SYMPTOMS
WEIGHT LOSS: 0
COUGH: 0
NERVOUS/ANXIOUS: 1
BLOOD IN STOOL: 0

## 2020-06-24 ASSESSMENT — FIBROSIS 4 INDEX: FIB4 SCORE: 3.15

## 2020-07-21 DIAGNOSIS — I50.20 SYSTOLIC CHF WITH REDUCED LEFT VENTRICULAR FUNCTION, NYHA CLASS 2 (HCC): ICD-10-CM

## 2020-07-21 RX ORDER — METOPROLOL SUCCINATE 25 MG/1
37.5 TABLET, EXTENDED RELEASE ORAL DAILY
Qty: 135 TAB | Refills: 0 | Status: SHIPPED | OUTPATIENT
Start: 2020-07-21 | End: 2020-07-23 | Stop reason: SDUPTHER

## 2020-07-21 NOTE — TELEPHONE ENCOUNTER
JA    Patient would like to refill their rx metoprolol SR (TOPROL XL) 25 MG TABLET SR 24 HR to be refilled and sent to the [facility] on file. Pt was advised to take up to 50 MG, but it did not seem to help. Pt takes the 25MG tablet and the other one they cut in half and take. Pt is running out of their medication.     Thank you,  Belinda MANCILLA

## 2020-07-23 DIAGNOSIS — I50.20 SYSTOLIC CHF WITH REDUCED LEFT VENTRICULAR FUNCTION, NYHA CLASS 2 (HCC): ICD-10-CM

## 2020-07-23 RX ORDER — METOPROLOL SUCCINATE 25 MG/1
37.5 TABLET, EXTENDED RELEASE ORAL DAILY
Qty: 135 TAB | Refills: 0 | Status: SHIPPED | OUTPATIENT
Start: 2020-07-23 | End: 2020-10-06

## 2020-07-23 NOTE — TELEPHONE ENCOUNTER
Spoke with Marely and verified pharmacy. Rx re-sent. Advised patient's daughter to reach out to us if pharmacy does not receive Rx. Daughter verbalized understanding and was appreciative for the return phone call.

## 2020-07-23 NOTE — TELEPHONE ENCOUNTER
Pt daughter Marely called back to advise that the pharmacy has not received the medication. Please call Marely back at [phone].      Thank you,  Belinda CRUZ

## 2020-09-11 ENCOUNTER — TELEPHONE (OUTPATIENT)
Dept: CARDIOLOGY | Facility: MEDICAL CENTER | Age: 80
End: 2020-09-11

## 2020-09-11 DIAGNOSIS — E03.9 HYPOTHYROIDISM, UNSPECIFIED TYPE: ICD-10-CM

## 2020-09-14 RX ORDER — LEVOTHYROXINE SODIUM 0.07 MG/1
75 TABLET ORAL
Qty: 90 TAB | Refills: 0 | Status: SHIPPED | OUTPATIENT
Start: 2020-09-14 | End: 2021-10-19

## 2020-09-14 NOTE — TELEPHONE ENCOUNTER
Spoke to patient and discussed the above information. Patient verbalized understanding and advised that she has an appointment with a new PCP in two weeks. Patient also verbalized appreciation for the follow-up.    Pharmacy confirmed by patient. Rx sent.  
Encounter forwarded to [facility] for approval of non-cardiac med. [facility]IN currently out-of-the-office but is expected to return on Monday.  
RIN/edith    Pt calling to ask for assistance getting levothyroxine renewed as Cardiology has done for her for many years.  Pt spoke with [facility] in [place] and has been told we won't refill as it's non-cardiac.  Pt says others have been filled recently that are non-cardiac as well.    Pt is requesting a phone call     
0

## 2020-10-06 ENCOUNTER — OFFICE VISIT (OUTPATIENT)
Dept: CARDIOLOGY | Facility: MEDICAL CENTER | Age: 80
End: 2020-10-06
Payer: MEDICARE

## 2020-10-06 VITALS
HEART RATE: 78 BPM | HEIGHT: 64 IN | WEIGHT: 112.2 LBS | BODY MASS INDEX: 19.15 KG/M2 | OXYGEN SATURATION: 96 % | DIASTOLIC BLOOD PRESSURE: 74 MMHG | SYSTOLIC BLOOD PRESSURE: 132 MMHG

## 2020-10-06 DIAGNOSIS — I73.00 RAYNAUD'S DISEASE WITHOUT GANGRENE: Chronic | ICD-10-CM

## 2020-10-06 DIAGNOSIS — Z79.01 CHRONIC ANTICOAGULATION: ICD-10-CM

## 2020-10-06 DIAGNOSIS — Z98.890 S/P MITRAL VALVE REPAIR: ICD-10-CM

## 2020-10-06 DIAGNOSIS — I48.20 CHRONIC ATRIAL FIBRILLATION (HCC): ICD-10-CM

## 2020-10-06 DIAGNOSIS — I25.810 CORONARY ARTERY DISEASE INVOLVING CORONARY BYPASS GRAFT OF NATIVE HEART WITHOUT ANGINA PECTORIS: ICD-10-CM

## 2020-10-06 DIAGNOSIS — Z66 DO NOT RESUSCITATE STATUS: ICD-10-CM

## 2020-10-06 DIAGNOSIS — I25.5 ISCHEMIC CARDIOMYOPATHY: ICD-10-CM

## 2020-10-06 DIAGNOSIS — I10 ESSENTIAL HYPERTENSION: ICD-10-CM

## 2020-10-06 DIAGNOSIS — E78.5 DYSLIPIDEMIA: ICD-10-CM

## 2020-10-06 DIAGNOSIS — I50.20 SYSTOLIC CHF WITH REDUCED LEFT VENTRICULAR FUNCTION, NYHA CLASS 2 (HCC): ICD-10-CM

## 2020-10-06 DIAGNOSIS — I36.1 NON-RHEUMATIC TRICUSPID VALVE INSUFFICIENCY: ICD-10-CM

## 2020-10-06 DIAGNOSIS — Z95.1 STATUS POST CORONARY ARTERY BYPASS GRAFTS X 5: ICD-10-CM

## 2020-10-06 PROCEDURE — 99215 OFFICE O/P EST HI 40 MIN: CPT | Performed by: INTERNAL MEDICINE

## 2020-10-06 RX ORDER — METOPROLOL SUCCINATE 25 MG/1
37.5 TABLET, EXTENDED RELEASE ORAL DAILY
Qty: 135 TAB | Refills: 3 | Status: SHIPPED | OUTPATIENT
Start: 2020-10-06 | End: 2021-01-19

## 2020-10-06 ASSESSMENT — FIBROSIS 4 INDEX: FIB4 SCORE: 3.15

## 2020-10-06 NOTE — PROGRESS NOTES
Cardiology Follow-up Consultation Note    Date of note:    10/6/2020  Primary Care Provider: Sourav Swift M.D.  Referring Provider: Rah Anderson M.D.     Patient Name: Daya Woods   YOB: 1940  MRN:              [id number]    Chief Complaint: chest pain    History of Present Illness: Daya Woods is a 80 y.o. female whose current medical problems include prior MI and 5-vessel CABG in 2002 now with now 2/5 grafts open by cardiac catheterization 8/26/16 (LIMA-diag patent, SVG-OM status post PCI 10/9/2018, all others occluded), severe disease of her native LAD status post PCI with drug-eluting prior stents, and an occluded native right coronary artery filling via collaterals, chronic atrial fibrillation, hypertension, dyslipidemia, and severe functional MR s/p mitraclip on 10/15/2018 and chronic systolic congestive heart failure with EF 40% who is here for follow-up.    At our visit, 7/19/2019:  She gets left chest wall pain, moderate severity, mostly every afternoon which resolves with rest. Also gets shortness of breath with increased exertion.     Walking limited by back pain and hip pain, only 1 block.     In terms of hypertension, SBP at home in the 90s. No lightheadedness or syncope.    Decreased plavix to 1/2 pill due to nose bleeds.     Weight decreased 7 pounds in the last 6 months. Cannot gain it back for some reason.     At our visit, 2/19/2020:  Continues to get moderate severity dyspnea on exertion associated with back soreness which resolves with rest. Only walking dog down the walk and back.     Interim Events:  In terms of CHF, no LE edema, dry weight at home around 110 pounds. No limitation in exercise or walking except by her back. No key exercise but stays busy. + fatigue.     In terms of a fib, no palpitations.     Hypertension well controlled.     Band like dull pain around her abdomen which comes and goes and is sometimes exertional. Currently mild.      ROS  + easy bruising, seasonal allergies.     Constitution: Negative for chills, fever and night sweats.   HENT: Negative for nosebleeds.    Eyes: Negative for vision loss in left eye and vision loss in right eye.   Respiratory: Negative for hemoptysis.    Gastrointestinal: Negative for hematemesis, hematochezia and melena.   Genitourinary: Negative for hematuria.   Neurological: Negative for focal weakness, numbness and paresthesias.     All other systems reviewed and discussed using a comprehensive questionnaire and are negative.       Past Medical History:   Diagnosis Date   • Anesthesia     ponv   • Anticoagulation monitoring, special range    • CAD (coronary artery disease) 10/09/2018    PCI/ANA (Synergy 3.0 x 28mm) to the OM.   • Cataract     Bilateral IOL   • Chronic atrial fibrillation (HCC)     Managed with rate control and warfarin for anticoagulation    • Coronary atherosclerosis of native coronary artery 12/2002    CABG x 5 (LIMA-D3, SVG-OM, SVG-RCA, SVG-distal LAD, SVG-D1). August 2016: LIMA-D3 and SVG-OM patent (interval occlusion of the SVG-RCA); chronic occluded SVG-LAD, SVG-D1.   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    • MI, old 2002   • Mitral regurgitation 10/2018    Status post MV repair with MitraClip.   • Osteoarthritis    • Pain     right knee, back   • Raynaud's disease    • Spinal stenosis 8/20/2015   • Status post coronary artery bypass grafts x 5 08/2002    LIMA-D3, SVG-LAD, SVG-OM, SVG-RCA, SVG-distal LAD.          Past Surgical History:   Procedure Laterality Date   • TRANSCATHETER MITRAL VALVE REPAIR  10/15/2018    Procedure: TRANSCATHETER MITRAL VALVE REPAIR- WITH POSSIBLE OPEN HEART AND ANY ASSOCIATED PROCEDURES;  Surgeon: Moy Don M.D.;  " Location: SURGERY [facility];  Service: Cardiac   • DEVON  10/15/2018    Procedure: DEVON;  Surgeon: Moy Don M.D.;  Location: SURGERY [facility];  Service: Cardiac   • ZZZ CARDIAC CATH  10/2018    PCI to SVG to OM.    • RECOVERY  8/26/2016    Procedure: CATH LAB-DEVON W/LHC W/POSSIBLE-JO-ANN/GRINSELL-LARGE GROUP;  Surgeon: Recoveryonly Surgery;  Location: SURGERY PRE-POST PROC UNIT [facility];  Service:    • OTHER Bilateral 08/2014    cataract extraction with iols   • MULTIPLE CORONARY ARTERY BYPASS  2002     CABG x 5   • APPENDECTOMY     • GYN SURGERY      c section    • MITRAL VALVE REPLACE      MV clip X1   • TONSILLECTOMY AND ADENOIDECTOMY     • TUBAL LIGATION           Current Outpatient Medications   Medication Sig Dispense Refill   • levothyroxine (SYNTHROID) 75 MCG Tab Take 1 Tab by mouth Every morning on an empty stomach. 90 Tab 0   • metoprolol SR (TOPROL XL) 25 MG TABLET SR 24 HR Take 1.5 Tabs by mouth every day. 135 Tab 0   • aspirin EC (ECOTRIN) 81 MG Tablet Delayed Response Take 81 mg by mouth every day.     • atorvastatin (LIPITOR) 40 MG Tab Take 1 Tab by mouth every day. 90 Tab 3   • spironolactone (ALDACTONE) 25 MG Tab Take 0.5 Tabs by mouth every day. 45 Tab 3   • potassium chloride SA (K-DUR) 10 MEQ Tab CR Take 0.5 Tabs by mouth every day. 90 Tab 3   • magnesium oxide (MAG-OX) 400 MG Tab tablet Take 1 Tab by mouth every day. 90 Tab 3   • lisinopril (PRINIVIL) 10 MG Tab take 1 tablet by mouth twice a day 180 Tab 3   • furosemide (LASIX) 20 MG Tab Take 1 Tab by mouth every day. 90 Tab 3   • warfarin (COUMADIN) 5 MG Tab Take 5-7.5 mg by mouth every day. 7.5 mg on Mondays, wednesdays, and Fridays. All other days 5 mg       No current facility-administered medications for this visit.          Allergies   Allergen Reactions   • Codeine Unspecified     \"I just don't like it. It does weird things to me.\"   • Hydrochlorothiazide Unspecified     Unsure of reaction   • Isosorbide Mononitrate Rash    " " Rash   • Keflex Rash     Rash   • Sulfa Drugs Rash     rash   • Tape Unspecified     Tears skin off   • Xarelto [Rivaroxaban] Rash     rash         Family History   Problem Relation Age of Onset   • Heart Attack Brother 55        heart attack         Social History     Socioeconomic History   • Marital status:      Spouse name: Not on file   • Number of children: Not on file   • Years of education: Not on file   • Highest education level: Not on file   Occupational History   • Not on file   Social Needs   • Financial resource strain: Not on file   • Food insecurity     Worry: Not on file     Inability: Not on file   • Transportation needs     Medical: Not on file     Non-medical: Not on file   Tobacco Use   • Smoking status: Former Smoker     Packs/day: 0.50     Years: 40.00     Pack years: 20.00     Quit date: 2002     Years since quittin.6   • Smokeless tobacco: Never Used   Substance and Sexual Activity   • Alcohol use: Yes     Alcohol/week: 4.2 oz     Types: 7 Cans of beer per week   • Drug use: No   • Sexual activity: Not on file   Lifestyle   • Physical activity     Days per week: Not on file     Minutes per session: Not on file   • Stress: Not on file   Relationships   • Social connections     Talks on phone: Not on file     Gets together: Not on file     Attends Mandaen service: Not on file     Active member of club or organization: Not on file     Attends meetings of clubs or organizations: Not on file     Relationship status: Not on file   • Intimate partner violence     Fear of current or ex partner: Not on file     Emotionally abused: Not on file     Physically abused: Not on file     Forced sexual activity: Not on file   Other Topics Concern   • Not on file   Social History Narrative    Retired          Physical Exam:  Ambulatory Vitals  /74 (BP Location: Left arm, Patient Position: Sitting, BP Cuff Size: Adult)   Pulse 78   Ht 1.626 m (5' 4\")   Wt 50.9 kg (112 " lb 3.2 oz)   SpO2 96%    Oxygen Therapy:  Pulse Oximetry: 96 %  BP Readings from Last 4 Encounters:   10/06/20 132/74   06/24/20 136/68   05/27/20 126/70   02/19/20 120/64       Weight/BMI: Body mass index is 19.26 kg/m².  Wt Readings from Last 4 Encounters:   10/06/20 50.9 kg (112 lb 3.2 oz)   06/24/20 51.6 kg (113 lb 12.8 oz)   05/27/20 51.7 kg (114 lb)   02/19/20 51.7 kg (114 lb)       General: No apparent distress  Eyes: nl conjunctiva  ENT: OP covered by mask  Neck: JVP 3-4 cm H2O, no carotid bruits  Lungs: normal respiratory effort, CTAB  Heart: RRR,  no murmurs, no rubs or gallops, no edema bilateral lower extremities. No LV/RV heave on cardiac palpatation. 2+ bilateral radial pulses.  1+ left dp pulse, 2+ right dp pulse.   Abdomen: soft, non tender, non distended, no masses, normal bowel sounds.  No HSM.  Extremities/MSK: no clubbing, no cyanosis. Cachectic.   Neurological: No focal sensory deficits  Psychiatric: Appropriate affect, A/O x 3, intact judgement and insight  Skin: Warm extremities    Exam repeated in full and unchanged except for as noted above.      Lab Data Review:  Lab Results   Component Value Date/Time    CHOLSTRLTOT 137 02/24/2014 03:05 AM    LDL 67 02/24/2014 03:05 AM    HDL 55 02/24/2014 03:05 AM    TRIGLYCERIDE 74 02/24/2014 03:05 AM       Lab Results   Component Value Date/Time    SODIUM 134 (L) 10/16/2018 02:58 AM    POTASSIUM 3.9 10/16/2018 02:58 AM    CHLORIDE 101 10/16/2018 02:58 AM    CO2 23 10/16/2018 02:58 AM    GLUCOSE 152 (H) 10/16/2018 02:58 AM    BUN 17 10/16/2018 02:58 AM    CREATININE 0.78 10/16/2018 02:58 AM     Lab Results   Component Value Date/Time    ALKPHOSPHAT 53 10/09/2018 06:50 AM    ASTSGOT 28 10/09/2018 06:50 AM    ALTSGPT 28 10/09/2018 06:50 AM    TBILIRUBIN 0.6 10/09/2018 06:50 AM      Lab Results   Component Value Date/Time    WBC 5.5 10/16/2018 03:00 AM     No components found for: HBGA1C  No components found for: TROPONIN  No components found for:  BNP    [facility] labs 5/7/2019:  hgb 15.1  plt 264  Creatinine 0.8  Bun 12  Potassium 4.4  Sodium 134  LFTs WNL except  ALT 53  TSH 6.23    HDL 51  LDL 62  tg 86    [facility] labs 2/11/2020:  Sodium 138  Potassium 4.3  LFTs WNL  Creatinine 0.7  Bun 13    [facility] labs 6/15/2020:  Sodium 134  Potassium 4.2  LFTs WNL  NT pro BNP 2940  Creatinine 0.7  Bun 10          Cardiac Imaging and Procedures Review:    EKG dated 10/19/2018 : My personal interpretation is a fib, lateral infarct, anteroseptal infarct, abnormal t waves consider ischemia.     Echo dated 5/29/2019:   CONCLUSIONS  Moderately reduced left ventricular systolic function.  Reduced right ventricular systolic function.  Moderately dilated left atrium.  Known mitral valve repair (Mitraclip) that is functioning adequately   with normal gradient and mild to moderate regurgitation.  Moderate (borderline severe) eccentric tricuspid regurgitation.  Estimated right ventricular systolic pressure  is 30 mmHg.  Compared to the images of the study done 11/30/2018, tricuspid   regurgitation is slightly worse.  Otherwise, there is no significant   Change.    LV EF:  35    %    Echo 5/12/2020:  CONCLUSIONS  Severely reduced left ventricular systolic function.  Left ventricular ejection fraction is visually estimated to be 20%.  Global hypokinesis with an aneurysm noted in the LV apex and apical   inferior and septal walls.  Left ventricle is moderately dilated.  Small right ventricle.  Moderately reduced right ventricular systolic function.  Enlarged right atrium.  Severely dilated left atrium.  Known mitral valve repair (mitraclip) with normal transvalvular   gradient of 4mmHg.  Moderate mitral regurgitation.  Moderate tricuspid regurgitation.  Right atrial pressure is estimated to be 3 mmHg.  Estimated right ventricular systolic pressure is 45 mmHg.     Compared to the previous echocardiogram performed on 05/28/19: The LVEF   and wall motion based on side by side comparison does  not appear   different.       [facility] (10/9/2018):   IMPRESSION:  1.  3-4+ mitral regurgitation.  2.  Reduced left ventricular systolic function with ejection fraction of 35%,   mid to distal anterior and apical hypokinesis.  3.  Elevated left ventricular end-diastolic pressure.  4.  Coronary artery disease with known occluded saphenous vein graft to the   distal left anterior descending to the diagonal branch and to the right   coronary artery with patent left internal mammary artery to the mid to distal   left anterior descending artery, patent saphenous vein graft to a small obtuse   marginal branch with high-grade ostial proximal graft stenosis.  5.  Successful percutaneous transluminal coronary angioplasty/stent placement   of the ostial to proximal saphenous vein graft to the obtuse marginal branch   with 3.0x28 mm Synergy drug-eluting stent.  6.  Elevated left ventricular end-diastolic pressure.  7.  Elevated right heart pressure with 40 mmHg.    Radiology test Review:  CXR: 10/2018  FINDINGS:  Sternotomy wires demonstrated.  Cardiomegaly.  Mild lung base interstitial opacities consistent with mild interstitial edema. No consolidation.  No pleural effusion.  No pneumothorax.    Carotid US 2018:   [facility]   CONCLUSIONS   Atherosclerosis without stenosis reaching 50% threshold.      Medical Decision Makin. Systolic CHF with reduced left ventricular function (HCC)  NYHA class II-III, stage C secondary to ischemic cardiomyopathy. LVEF 20%.  Appears euvolemic.   -continue metoprolol XL 37.5mg PO daily. If she has symptomatic reynauds on this during the winter, we will stop metoprolol which can exacerbate this and instead switch back to carvedilol 3.125mg PO bid. uptitration of this was limited due to hypotension, but her reynauds symptoms can be severe.   -continue lisinopril 10mg PO bid for now, if symptomatic hypotension, would decrease this to 10mg PO Daily  -continue spironolactone 12.5mg PO  daily  -continue lasix 20mg PO Daily along with kcl 5mEq daily, BMP check in 3 months  -continue magnesium.   -unlikely able to tolerate significantly more medical therapy. Did broach possibility of ICD, will continue to discuss at future visits, not ready to go ahead with device placement yet.   -lives in [place], [place], would not be a good candidate for entresto given her hypotension and lack of access to quick medical care.       2. S/P mitral valve repair  Now with mild to moderate MR.  -CTM    3. Ischemic cardiomyopathy  LVEF 20%. Her anatomy was discussed with Dr. Don with [facility] recently and there was no clear further intervention that could be performed by last cath images.   -CTM, known that only 2 grafts are open.     4. Essential hypertension  Adequately controlled    5. Coronary artery disease of bypass graft of native heart with stable angina pectoris (HCC)  Angina previously resolved, but now likely recurring.   -continue aspirin as part of dual therapy at this time given known continued obstructive CAD.   -if angina worsens, would decrease lisinopril to 10mg PO Daily and increase coreg back to 6.25mg PO daily. Would unlikely tolerate imdur due to hypotension but could try. Ranexa is another option.    6. Chronic atrial fibrillation (HCC)  [id number] score of 6. Currently asymptomatic  -continue coumadin, goal INR 2-3. Prefers to stay on coumadin.   -continue coreg for rate control  -If symptoms don't improve, could consider ablation for a fib and heart failure vs admission for dofetilide loading vs starting amiodarone with cardioversion.     7. Non-rheumatic tricuspid valve insufficiency  Moderate to severe and associated with moderate MR.   -CTM, repeat echo in a year, ordered for 6 months from now.       Return in about 3 months (around 1/6/2021). with RIN and 6 months with SERAFIN.       Derrick Lilly MD, [facility]  [facility], [facility]  [address]  [address] [address]  [place] [address]  Phone: [phone]  Fax: [phone]

## 2020-10-06 NOTE — PATIENT INSTRUCTIONS
Please schedule your echocardiogram in 6 months.     Please get non-fasting blood tests in 3 months.

## 2021-01-05 ENCOUNTER — TELEPHONE (OUTPATIENT)
Dept: CARDIOLOGY | Facility: MEDICAL CENTER | Age: 81
End: 2021-01-05

## 2021-01-05 NOTE — TELEPHONE ENCOUNTER
Chart Prep    Spoke to pt daughter regarding standing lab work and she stated that they never knew about the lab work and will pick the orders up tomorrow.

## 2021-01-06 ENCOUNTER — HOSPITAL ENCOUNTER (OUTPATIENT)
Dept: CARDIOLOGY | Facility: MEDICAL CENTER | Age: 81
End: 2021-01-06
Attending: INTERNAL MEDICINE
Payer: MEDICARE

## 2021-01-06 DIAGNOSIS — I50.20 SYSTOLIC CHF WITH REDUCED LEFT VENTRICULAR FUNCTION, NYHA CLASS 2 (HCC): ICD-10-CM

## 2021-01-06 DIAGNOSIS — I48.20 CHRONIC ATRIAL FIBRILLATION (HCC): ICD-10-CM

## 2021-01-06 PROCEDURE — 93306 TTE W/DOPPLER COMPLETE: CPT

## 2021-01-08 LAB
LV EJECT FRACT  99904: 25
LV EJECT FRACT MOD 2C 99903: 25.48
LV EJECT FRACT MOD 4C 99902: 25.13
LV EJECT FRACT MOD BP 99901: 25.5

## 2021-01-08 PROCEDURE — 93306 TTE W/DOPPLER COMPLETE: CPT | Mod: 26 | Performed by: INTERNAL MEDICINE

## 2021-01-19 ENCOUNTER — OFFICE VISIT (OUTPATIENT)
Dept: CARDIOLOGY | Facility: MEDICAL CENTER | Age: 81
End: 2021-01-19
Payer: MEDICARE

## 2021-01-19 VITALS
HEART RATE: 71 BPM | SYSTOLIC BLOOD PRESSURE: 126 MMHG | BODY MASS INDEX: 19.46 KG/M2 | OXYGEN SATURATION: 97 % | DIASTOLIC BLOOD PRESSURE: 64 MMHG | RESPIRATION RATE: 18 BRPM | HEIGHT: 64 IN | WEIGHT: 114 LBS

## 2021-01-19 DIAGNOSIS — I25.810 CORONARY ARTERY DISEASE INVOLVING CORONARY BYPASS GRAFT OF NATIVE HEART WITHOUT ANGINA PECTORIS: ICD-10-CM

## 2021-01-19 DIAGNOSIS — I73.00 RAYNAUD'S DISEASE WITHOUT GANGRENE: Chronic | ICD-10-CM

## 2021-01-19 DIAGNOSIS — I48.20 CHRONIC ATRIAL FIBRILLATION (HCC): ICD-10-CM

## 2021-01-19 DIAGNOSIS — Z95.1 STATUS POST CORONARY ARTERY BYPASS GRAFTS X 5: ICD-10-CM

## 2021-01-19 DIAGNOSIS — I50.20 SYSTOLIC CHF WITH REDUCED LEFT VENTRICULAR FUNCTION, NYHA CLASS 2 (HCC): ICD-10-CM

## 2021-01-19 DIAGNOSIS — I36.1 NON-RHEUMATIC TRICUSPID VALVE INSUFFICIENCY: ICD-10-CM

## 2021-01-19 DIAGNOSIS — I25.5 ISCHEMIC CARDIOMYOPATHY: ICD-10-CM

## 2021-01-19 DIAGNOSIS — Z98.890 S/P MITRAL VALVE REPAIR: ICD-10-CM

## 2021-01-19 DIAGNOSIS — E78.5 DYSLIPIDEMIA: ICD-10-CM

## 2021-01-19 DIAGNOSIS — I10 ESSENTIAL HYPERTENSION: ICD-10-CM

## 2021-01-19 DIAGNOSIS — I50.23 ACUTE ON CHRONIC SYSTOLIC CHF (CONGESTIVE HEART FAILURE) (HCC): ICD-10-CM

## 2021-01-19 DIAGNOSIS — Z95.5 STENTED CORONARY ARTERY: ICD-10-CM

## 2021-01-19 DIAGNOSIS — Z79.01 CHRONIC ANTICOAGULATION: ICD-10-CM

## 2021-01-19 PROCEDURE — 99214 OFFICE O/P EST MOD 30 MIN: CPT | Performed by: INTERNAL MEDICINE

## 2021-01-19 RX ORDER — SPIRONOLACTONE 25 MG/1
12.5 TABLET ORAL DAILY
Qty: 45 TAB | Refills: 3 | Status: SHIPPED | OUTPATIENT
Start: 2021-01-19 | End: 2021-10-19

## 2021-01-19 RX ORDER — POTASSIUM CHLORIDE 750 MG/1
5 TABLET, EXTENDED RELEASE ORAL DAILY
Qty: 90 TAB | Refills: 3 | Status: SHIPPED | OUTPATIENT
Start: 2021-01-19 | End: 2021-10-19

## 2021-01-19 RX ORDER — ATORVASTATIN CALCIUM 40 MG/1
40 TABLET, FILM COATED ORAL DAILY
Qty: 90 TAB | Refills: 3 | Status: SHIPPED | OUTPATIENT
Start: 2021-01-19 | End: 2021-10-19

## 2021-01-19 RX ORDER — LISINOPRIL 10 MG/1
TABLET ORAL
Qty: 180 TAB | Refills: 3 | Status: SHIPPED | OUTPATIENT
Start: 2021-01-19 | End: 2021-10-19

## 2021-01-19 RX ORDER — FUROSEMIDE 20 MG/1
20 TABLET ORAL DAILY
Qty: 90 TAB | Refills: 3 | Status: SHIPPED | OUTPATIENT
Start: 2021-01-19 | End: 2021-10-19

## 2021-01-19 RX ORDER — METOPROLOL SUCCINATE 25 MG/1
37.5 TABLET, EXTENDED RELEASE ORAL DAILY
Qty: 135 TAB | Refills: 3 | Status: SHIPPED | OUTPATIENT
Start: 2021-01-19 | End: 2021-10-19

## 2021-01-19 NOTE — PROGRESS NOTES
Cardiology Follow-up Consultation Note    Date of note:    1/19/2021  Primary Care Provider: Sourav Swift M.D.  Referring Provider: Rah Anderson M.D.     Patient Name: Daya Woods   YOB: 1940  MRN:              [id number]    Chief Complaint: chest pain    History of Present Illness: Daya Woods is a 80 y.o. female whose current medical problems include prior MI and 5-vessel CABG in 2002 now with now 2/5 grafts open by cardiac catheterization 8/26/16 (LIMA-diag patent, SVG-OM status post PCI 10/9/2018, all others occluded), severe disease of her native LAD status post PCI with drug-eluting prior stents, and an occluded native right coronary artery filling via collaterals, chronic atrial fibrillation, hypertension, dyslipidemia, and severe functional MR s/p mitraclip on 10/15/2018 and chronic systolic congestive heart failure with EF 40% who is here for follow-up.    At our visit, 7/19/2019:  She gets left chest wall pain, moderate severity, mostly every afternoon which resolves with rest. Also gets shortness of breath with increased exertion.     Walking limited by back pain and hip pain, only 1 block.     In terms of hypertension, SBP at home in the 90s. No lightheadedness or syncope.    Decreased plavix to 1/2 pill due to nose bleeds.     Weight decreased 7 pounds in the last 6 months. Cannot gain it back for some reason.     At our visit, 2/19/2020:  Continues to get moderate severity dyspnea on exertion associated with back soreness which resolves with rest. Only walking dog down the walk and back.     At our visit, 10/6/2020:  In terms of CHF, no LE edema, dry weight at home around 110 pounds. No limitation in exercise or walking except by her back. No key exercise but stays busy. + fatigue.     Band like dull pain around her abdomen which comes and goes and is sometimes exertional. Currently mild.     Interim Events:  In terms of CHF, no LE edema, dry weight at home  around 110 pounds. Walks the dog just one block.  No key exercise but stays busy. + fatigue. Echo showed moderate TR.     In terms of a fib, no palpitations.     Hypertension well controlled.       ROS  + easy bruising, seasonal allergies.     Constitution: Negative for chills, fever and night sweats.   HENT: Negative for nosebleeds.    Eyes: Negative for vision loss in left eye and vision loss in right eye.   Respiratory: Negative for hemoptysis.    Gastrointestinal: Negative for hematemesis, hematochezia and melena.   Genitourinary: Negative for hematuria.   Neurological: Negative for focal weakness, numbness and paresthesias.         Past Medical History:   Diagnosis Date   • Anesthesia     ponv   • Anticoagulation monitoring, special range    • CAD (coronary artery disease) 10/09/2018    PCI/ANA (Synergy 3.0 x 28mm) to the OM.   • Cataract     Bilateral IOL   • Chronic atrial fibrillation (HCC)     Managed with rate control and warfarin for anticoagulation    • Coronary atherosclerosis of native coronary artery 12/2002    CABG x 5 (LIMA-D3, SVG-OM, SVG-RCA, SVG-distal LAD, SVG-D1). August 2016: LIMA-D3 and SVG-OM patent (interval occlusion of the SVG-RCA); chronic occluded SVG-LAD, SVG-D1.   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    • MI, old 2002   • Mitral regurgitation 10/2018    Status post MV repair with MitraClip.   • Osteoarthritis    • Pain     right knee, back   • Raynaud's disease    • Spinal stenosis 8/20/2015   • Status post coronary artery bypass grafts x 5 08/2002    LIMA-D3, SVG-LAD, SVG-OM, SVG-RCA, SVG-distal LAD.          Past Surgical History:   Procedure Laterality Date   • TRANSCATHETER MITRAL VALVE REPAIR  10/15/2018    Procedure: TRANSCATHETER MITRAL  "VALVE REPAIR- WITH POSSIBLE OPEN HEART AND ANY ASSOCIATED PROCEDURES;  Surgeon: Moy Don M.D.;  Location: SURGERY [facility];  Service: Cardiac   • DEVON  10/15/2018    Procedure: DEVON;  Surgeon: Moy Don M.D.;  Location: SURGERY [facility];  Service: Cardiac   • ZZZ CARDIAC CATH  10/2018    PCI to SVG to OM.    • RECOVERY  8/26/2016    Procedure: CATH LAB-DEVON W/LHC W/POSSIBLE-JO-ANN/GRINSELL-LARGE GROUP;  Surgeon: Recoveryonhazel Surgery;  Location: SURGERY PRE-POST PROC UNIT [facility];  Service:    • OTHER Bilateral 08/2014    cataract extraction with iols   • MULTIPLE CORONARY ARTERY BYPASS  2002     CABG x 5   • APPENDECTOMY     • GYN SURGERY      c section    • MITRAL VALVE REPLACE      MV clip X1   • TONSILLECTOMY AND ADENOIDECTOMY     • TUBAL LIGATION           Current Outpatient Medications   Medication Sig Dispense Refill   • metoprolol SR (TOPROL XL) 25 MG TABLET SR 24 HR Take 1.5 Tabs by mouth every day. 135 Tab 3   • levothyroxine (SYNTHROID) 75 MCG Tab Take 1 Tab by mouth Every morning on an empty stomach. 90 Tab 0   • aspirin EC (ECOTRIN) 81 MG Tablet Delayed Response Take 81 mg by mouth every day.     • atorvastatin (LIPITOR) 40 MG Tab Take 1 Tab by mouth every day. 90 Tab 3   • spironolactone (ALDACTONE) 25 MG Tab Take 0.5 Tabs by mouth every day. 45 Tab 3   • potassium chloride SA (K-DUR) 10 MEQ Tab CR Take 0.5 Tabs by mouth every day. 90 Tab 3   • magnesium oxide (MAG-OX) 400 MG Tab tablet Take 1 Tab by mouth every day. 90 Tab 3   • lisinopril (PRINIVIL) 10 MG Tab take 1 tablet by mouth twice a day 180 Tab 3   • furosemide (LASIX) 20 MG Tab Take 1 Tab by mouth every day. 90 Tab 3   • warfarin (COUMADIN) 5 MG Tab Take 5-7.5 mg by mouth every day. 7.5 mg on Mondays, wednesdays, and Fridays. All other days 5 mg       No current facility-administered medications for this visit.          Allergies   Allergen Reactions   • Codeine Unspecified     \"I just don't like it. It does weird things " "to me.\"   • Hydrochlorothiazide Unspecified     Unsure of reaction   • Isosorbide Mononitrate Rash     Rash   • Keflex Rash     Rash   • Sulfa Drugs Rash     rash   • Tape Unspecified     Tears skin off   • Xarelto [Rivaroxaban] Rash     rash         Family History   Problem Relation Age of Onset   • Heart Attack Brother 55        heart attack         Social History     Socioeconomic History   • Marital status:      Spouse name: Not on file   • Number of children: Not on file   • Years of education: Not on file   • Highest education level: Not on file   Occupational History   • Not on file   Social Needs   • Financial resource strain: Not on file   • Food insecurity     Worry: Not on file     Inability: Not on file   • Transportation needs     Medical: Not on file     Non-medical: Not on file   Tobacco Use   • Smoking status: Former Smoker     Packs/day: 0.50     Years: 40.00     Pack years: 20.00     Quit date: 2002     Years since quittin.9   • Smokeless tobacco: Never Used   Substance and Sexual Activity   • Alcohol use: Yes     Alcohol/week: 4.2 oz     Types: 7 Cans of beer per week   • Drug use: No   • Sexual activity: Not on file   Lifestyle   • Physical activity     Days per week: Not on file     Minutes per session: Not on file   • Stress: Not on file   Relationships   • Social connections     Talks on phone: Not on file     Gets together: Not on file     Attends [employer] service: Not on file     Active member of club or organization: Not on file     Attends meetings of clubs or organizations: Not on file     Relationship status: Not on file   • Intimate partner violence     Fear of current or ex partner: Not on file     Emotionally abused: Not on file     Physically abused: Not on file     Forced sexual activity: Not on file   Other Topics Concern   • Not on file   Social History Narrative    Retired          Physical Exam:  Ambulatory Vitals  /64 (BP Location: Left arm, " "Patient Position: Sitting, BP Cuff Size: Adult)   Pulse 71   Resp 18   Ht 1.626 m (5' 4\")   Wt 51.7 kg (114 lb)   SpO2 97%    Oxygen Therapy:  Pulse Oximetry: 97 %  BP Readings from Last 4 Encounters:   01/19/21 126/64   10/06/20 132/74   06/24/20 136/68   05/27/20 126/70       Weight/BMI: Body mass index is 19.57 kg/m².  Wt Readings from Last 4 Encounters:   01/19/21 51.7 kg (114 lb)   10/06/20 50.9 kg (112 lb 3.2 oz)   06/24/20 51.6 kg (113 lb 12.8 oz)   05/27/20 51.7 kg (114 lb)       General: No apparent distress  Eyes: nl conjunctiva  ENT: OP covered by mask  Neck: JVP 3-4 cm H2O, no carotid bruits  Lungs: normal respiratory effort, CTAB  Heart: RRR,  no murmurs, no rubs or gallops, no edema bilateral lower extremities. No LV/RV heave on cardiac palpatation. 2+ bilateral radial pulses.    Abdomen: soft, non tender, non distended, no masses, normal bowel sounds.  No HSM.  Extremities/MSK: no clubbing, no cyanosis. Cachectic.   Neurological: No focal sensory deficits  Psychiatric: Appropriate affect, A/O x 3, intact judgement and insight  Skin: Warm extremities    Exam repeated in full and unchanged except for as noted above.        Lab Data Review:  Lab Results   Component Value Date/Time    CHOLSTRLTOT 137 02/24/2014 03:05 AM    LDL 67 02/24/2014 03:05 AM    HDL 55 02/24/2014 03:05 AM    TRIGLYCERIDE 74 02/24/2014 03:05 AM       Lab Results   Component Value Date/Time    SODIUM 134 (L) 10/16/2018 02:58 AM    POTASSIUM 3.9 10/16/2018 02:58 AM    CHLORIDE 101 10/16/2018 02:58 AM    CO2 23 10/16/2018 02:58 AM    GLUCOSE 152 (H) 10/16/2018 02:58 AM    BUN 17 10/16/2018 02:58 AM    CREATININE 0.78 10/16/2018 02:58 AM     Lab Results   Component Value Date/Time    ALKPHOSPHAT 53 10/09/2018 06:50 AM    ASTSGOT 28 10/09/2018 06:50 AM    ALTSGPT 28 10/09/2018 06:50 AM    TBILIRUBIN 0.6 10/09/2018 06:50 AM      Lab Results   Component Value Date/Time    WBC 5.5 10/16/2018 03:00 AM     No components found for: " HBGA1C  No components found for: TROPONIN  No results found for: BNP    [facility] labs 5/7/2019:  hgb 15.1  plt 264  Creatinine 0.8  Bun 12  Potassium 4.4  Sodium 134  LFTs WNL except  ALT 53  TSH 6.23    HDL 51  LDL 62  tg 86    [facility] labs 2/11/2020:  Sodium 138  Potassium 4.3  LFTs WNL  Creatinine 0.7  Bun 13    OSH labs 6/15/2020:  Sodium 134  Potassium 4.2  LFTs WNL  NT pro BNP 2940  Creatinine 0.7  Bun 10    [facility] 1/7/2021:  Sodium 136  Potassium 4.3  Bun 12  Creatinine 0.7  Mag 2.2  NTproBNP 2340            Cardiac Imaging and Procedures Review:    EKG dated 10/19/2018 : My personal interpretation is a fib, lateral infarct, anteroseptal infarct, abnormal t waves consider ischemia.     TTE 1/8/2021:  CONCLUSIONS  Compared to the images of the prior study done 5/12/20, no significant   changes are noted.   Severely reduced left ventricular systolic function. Left ventricular   ejection fraction is visually estimated to be 25%.  Unable to determine diastolic function due to mitral valve repair.  Status post MitraClip. Transvalvular gradient of 4 mmHg, heart rate 75   bpm.  Estimated right ventricular systolic pressure  is 36 mmHg.      Echo dated 5/29/2019:   CONCLUSIONS  Moderately reduced left ventricular systolic function.  Reduced right ventricular systolic function.  Moderately dilated left atrium.  Known mitral valve repair (Mitraclip) that is functioning adequately   with normal gradient and mild to moderate regurgitation.  Moderate (borderline severe) eccentric tricuspid regurgitation.  Estimated right ventricular systolic pressure  is 30 mmHg.  Compared to the images of the study done 11/30/2018, tricuspid   regurgitation is slightly worse.  Otherwise, there is no significant   Change.    LV EF:  35    %    Echo 5/12/2020:  CONCLUSIONS  Severely reduced left ventricular systolic function.  Left ventricular ejection fraction is visually estimated to be 20%.  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  Left ventricle is moderately dilated.  Small right ventricle.  Moderately reduced right ventricular systolic function.  Enlarged right atrium.  Severely dilated left atrium.  Known mitral valve repair (mitraclip) with normal transvalvular   gradient of 4mmHg.  Moderate mitral regurgitation.  Moderate tricuspid regurgitation.  Right atrial pressure is estimated to be 3 mmHg.  Estimated right ventricular systolic pressure is 45 mmHg.     Compared to the previous echocardiogram performed on 05/28/19: The LVEF   and wall motion based on side by side comparison does not appear   different.       [facility] (10/9/2018):   IMPRESSION:  1.  3-4+ mitral regurgitation.  2.  Reduced left ventricular systolic function with ejection fraction of 35%,   mid to distal anterior and apical hypokinesis.  3.  Elevated left ventricular end-diastolic pressure.  4.  Coronary artery disease with known occluded saphenous vein graft to the   distal left anterior descending to the diagonal branch and to the right   coronary artery with patent left internal mammary artery to the mid to distal   left anterior descending artery, patent saphenous vein graft to a small obtuse   marginal branch with high-grade ostial proximal graft stenosis.  5.  Successful percutaneous transluminal coronary angioplasty/stent placement   of the ostial to proximal saphenous vein graft to the obtuse marginal branch   with 3.0x28 mm Synergy drug-eluting stent.  6.  Elevated left ventricular end-diastolic pressure.  7.  Elevated right heart pressure with 40 mmHg.    Radiology test Review:  CXR: 10/2018  FINDINGS:  Sternotomy wires demonstrated.  Cardiomegaly.  Mild lung base interstitial opacities consistent with mild interstitial edema. No consolidation.  No pleural effusion.  No pneumothorax.    Carotid US 9/2018:   Vascular Laboratory   CONCLUSIONS   Atherosclerosis without stenosis reaching 50% threshold.      Medical Decision  [place]. Systolic CHF with reduced left ventricular function (HCC)  NYHA class II-III, stage C secondary to ischemic cardiomyopathy. LVEF 20%.  Appears euvolemic.   -continue metoprolol XL 37.5mg PO daily. If she has symptomatic reynauds on this during the winter, we will stop metoprolol which can exacerbate this and instead switch back to carvedilol 3.125mg PO bid. uptitration of this was limited due to hypotension, but her reynauds symptoms can be severe.   -continue lisinopril 10mg PO bid for now, if symptomatic hypotension, would decrease this to 10mg PO Daily  -continue spironolactone 12.5mg PO daily  -continue lasix 20mg PO Daily along with kcl 5mEq daily, BMP check in 3 months  -continue magnesium.   -unlikely able to tolerate significantly more medical therapy. Did broach possibility of ICD, will continue to discuss at future visits, not ready to go ahead with device placement yet.   -lives in [place], [place], would not be a good candidate for entresto given her hypotension and lack of access to quick medical care.     2. S/P mitral valve repair  Now with mild to moderate MR.  -CTM    3. Ischemic cardiomyopathy  LVEF 20%. Her anatomy was discussed with Dr. Don with [facility] recently and there was no clear further intervention that could be performed by last cath images.   -CTM, known that only 2 grafts are open.     4. Essential hypertension  Adequately controlled    5. Coronary artery disease of bypass graft of native heart with stable angina pectoris ([facility])  Continues with stable angina/VILLEGAS.   -continue aspirin as part of dual therapy at this time given known continued obstructive CAD.   -if angina worsens, would decrease lisinopril to 10mg PO Daily and increase coreg back to 6.25mg PO daily. Would unlikely tolerate imdur due to hypotension but could try. Ranexa is another option.    6. Chronic atrial fibrillation (HCC)  [id number] score of 6. Currently asymptomatic  -continue coumadin, goal INR  2-3. Prefers to stay on coumadin.   -continue coreg for rate control  -If symptoms don't improve, could consider ablation for a fib and heart failure vs admission for dofetilide loading vs starting amiodarone with cardioversion.     7. Non-rheumatic tricuspid valve insufficiency  Repeat echo shows moderate TR 1/2021 and associated with moderate MR.   -CTM, repeat echo in a year    8. Goals of Care - has POLST, DNR/DNI.       Return in about 3 months (around 4/19/2021).  With RIN Lilly MD, [facility], [address]  [address] [address]  Phone: [phone]  Fax: [phone]

## 2021-03-30 ENCOUNTER — TELEPHONE (OUTPATIENT)
Dept: CARDIOLOGY | Facility: MEDICAL CENTER | Age: 81
End: 2021-03-30

## 2021-03-30 NOTE — TELEPHONE ENCOUNTER
Called patient in regards to records for appointment on April 14, 2021 at 10:15AM  W/Talya Anderson to review most recent lab result . No answer, left voicemail to call back at [phone].

## 2021-04-02 ENCOUNTER — TELEPHONE (OUTPATIENT)
Dept: CARDIOLOGY | Facility: MEDICAL CENTER | Age: 81
End: 2021-04-02

## 2021-04-02 NOTE — TELEPHONE ENCOUNTER
Called patient on 4/2/2021 in regards to records for appointment on April 14, 2021 at 10:15AM  W/Talya Anderson to review most recent lab result.  Pt said she will have the lab done before her appointment.

## 2021-04-12 ENCOUNTER — TELEPHONE (OUTPATIENT)
Dept: CARDIOLOGY | Facility: MEDICAL CENTER | Age: 81
End: 2021-04-12

## 2021-04-12 NOTE — TELEPHONE ENCOUNTER
Called and left  for patient on 4/12/2021 in regards to records for appointment on April 14, 2021 at 10:15AM  W/Talya Anderson to follow up on most recent labs being done and if she has any question she can reach us at [phone].

## 2021-04-13 ASSESSMENT — ENCOUNTER SYMPTOMS
BLOOD IN STOOL: 0
SHORTNESS OF BREATH: 0
DIZZINESS: 0
PND: 0
ORTHOPNEA: 0
COUGH: 0
PALPITATIONS: 0

## 2021-04-13 NOTE — PROGRESS NOTES
Chief Complaint   Patient presents with   • Dyslipidemia   • Hypertension     F/V Dx: Essential Hypertension   • Atrial Fibrillation     F/V Dx: Chronic atrial fibrillation       Subjective:   Daya Woods is a 80 y.o. female who presents today for follow up with her daughter Lavinia.     Patient of Dr. Lilly.Current medical problems include CAD s/p 5-vessel CABG in 2002 now with now 2/5 grafts open by cardiac catheterization 8/26/16 (LIMA-diag patent, SVG-OM status post PCI 10/9/2018, all others occluded), ischemic CMO, HFrEF, chronic atrial fibrillation, hypertension, dyslipidemia, functional mitral regurgitation status post Mitraclip procedure on 10/15/2018.     At last visit we discussed options for ICD, rhythm control for her atrial fibrillation and medical therapy. She was not interested in ICD or rhythm control for atrial fibrillation.     She denies any cardiac symptoms today. She is able to do her ADLs without symptoms. Occasionally she feels lightheaded and she eats a salty snack and drinks water, this resolves her symptoms.     No chest pain/pressure with activity. She can walk a block with her dog without any dyspnea. She denies orthopnea, leg swelling, abdominal bloating.    No bleeding complications. Working with the AC clinic so she can incorporate more vegetables into her diet.   Past Medical History:   Diagnosis Date   • Anesthesia     ponv   • Anticoagulation monitoring, special range    • CAD (coronary artery disease) 10/09/2018    PCI/ANA (Synergy 3.0 x 28mm) to the OM.   • Cataract     Bilateral IOL   • Chronic atrial fibrillation (HCC)     Managed with rate control and warfarin for anticoagulation    • Coronary atherosclerosis of native coronary artery 12/2002    CABG x 5 (LIMA-D3, SVG-OM, SVG-RCA, SVG-distal LAD, SVG-D1). August 2016: LIMA-D3 and SVG-OM patent (interval occlusion of the SVG-RCA); chronic occluded SVG-LAD, SVG-D1.   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    • MI, old 2002   • Mitral regurgitation 10/2018    Status post MV repair with MitraClip.   • Osteoarthritis    • Pain     right knee, back   • Raynaud's disease    • Spinal stenosis 8/20/2015   • Status post coronary artery bypass grafts x 5 08/2002    LIMA-D3, SVG-LAD, SVG-OM, SVG-RCA, SVG-distal LAD.      Past Surgical History:   Procedure Laterality Date   • TRANSCATHETER MITRAL VALVE REPAIR  10/15/2018    Procedure: TRANSCATHETER MITRAL VALVE REPAIR- WITH POSSIBLE OPEN HEART AND ANY ASSOCIATED PROCEDURES;  Surgeon: Moy Don M.D.;  Location: SURGERY [facility];  Service: Cardiac   • DEVON  10/15/2018    Procedure: DEVON;  Surgeon: Moy oDn M.D.;  Location: SURGERY [facility];  Service: Cardiac   • [facility] CARDIAC CATH  10/2018    PCI to SVG to OM.    • RECOVERY  8/26/2016    Procedure: CATH LAB-DEVON W/LHC W/MARLEY-JO-ANN/GRINSELL-LARGE GROUP;  Surgeon: Todd Surgery;  Location: SURGERY PRE-POST [facility] UN[facility];  Service:    • OTHER Bilateral 08/2014    cataract extraction with iols   • MULTIPLE CORONARY ARTERY BYPASS  2002     CABG x 5   • APPENDECTOMY     • GYN SURGERY      c section    • MITRAL VALVE REPLACE      MV clip X1   • TONSILLECTOMY AND ADENOIDECTOMY     • TUBAL LIGATION       Family History   Problem Relation Age of Onset   • Heart Attack Brother 55        heart attack     Social History     Socioeconomic History   • Marital status:      Spouse name: Not on file   • Number of children: Not on file   • Years of education: Not on file   • Highest education level: Not on file   Occupational History   • Not on file   Tobacco Use   • Smoking status: Former Smoker     Packs/day: 0.50     Years: 40.00     Pack years: 20.00     Quit date: 2/24/2002      "Years since quittin.1   • Smokeless tobacco: Never Used   Substance and Sexual Activity   • Alcohol use: Yes     Alcohol/week: 4.2 oz     Types: 7 Cans of beer per week   • Drug use: No   • Sexual activity: Not on file   Other Topics Concern   • Not on file   Social History Narrative    Retired      Social Determinants of Health     Financial Resource Strain:    • Difficulty of Paying Living Expenses:    Food Insecurity:    • Worried About Running Out of Food in the Last Year:    • Ran Out of Food in the Last Year:    Transportation Needs:    • Lack of Transportation (Medical):    • Lack of Transportation (Non-Medical):    Physical Activity:    • Days of Exercise per Week:    • Minutes of Exercise per Session:    Stress:    • Feeling of Stress :    Social Connections:    • Frequency of Communication with Friends and Family:    • Frequency of Social Gatherings with Friends and Family:    • Attends Religion Services:    • Active Member of Clubs or Organizations:    • Attends Club or Organization Meetings:    • Marital Status:    Intimate Partner Violence:    • Fear of Current or Ex-Partner:    • Emotionally Abused:    • Physically Abused:    • Sexually Abused:      Allergies   Allergen Reactions   • Codeine Unspecified     \"I just don't like it. It does weird things to me.\"   • Hydrochlorothiazide Unspecified     Unsure of reaction   • Isosorbide Mononitrate Rash     Rash   • Keflex Rash     Rash   • Sulfa Drugs Rash     rash   • Tape Unspecified     Tears skin off   • Xarelto [Rivaroxaban] Rash     rash     Outpatient Encounter Medications as of 2021   Medication Sig Dispense Refill   • atorvastatin (LIPITOR) 40 MG Tab Take 1 Tab by mouth every day. 90 Tab 3   • furosemide (LASIX) 20 MG Tab Take 1 Tab by mouth every day. 90 Tab 3   • lisinopril (PRINIVIL) 10 MG Tab take 1 tablet by mouth twice a day 180 Tab 3   • magnesium oxide (MAG-OX) 400 MG Tab tablet Take 1 Tab by mouth every day. 90 Tab " "3   • metoprolol SR (TOPROL XL) 25 MG TABLET SR 24 HR Take 1.5 Tabs by mouth every day. 135 Tab 3   • potassium chloride SA (K-DUR) 10 MEQ Tab CR Take 0.5 Tabs by mouth every day. 90 Tab 3   • spironolactone (ALDACTONE) 25 MG Tab Take 0.5 Tabs by mouth every day. 45 Tab 3   • levothyroxine (SYNTHROID) 75 MCG Tab Take 1 Tab by mouth Every morning on an empty stomach. 90 Tab 0   • aspirin EC (ECOTRIN) 81 MG Tablet Delayed Response Take 81 mg by mouth every day.     • warfarin (COUMADIN) 5 MG Tab Take 5-7.5 mg by mouth every day. 5 mg on Mondays, wednesdays, and Fridays. All other days 7.5 mg       No facility-administered encounter medications on file as of 4/14/2021.     Review of Systems   Constitutional: Negative for chills, fever and malaise/fatigue.        No unexpected weight changes   HENT: Negative for nosebleeds.    Respiratory: Negative for cough and shortness of breath.    Cardiovascular: Negative for chest pain, palpitations, orthopnea, leg swelling and PND.   Gastrointestinal: Negative for blood in stool, melena and nausea.        No abdominal bloating, no early satiety   Genitourinary: Negative for hematuria.   Musculoskeletal: Negative for falls.   Neurological: Negative for dizziness.        + lightheadedness    Psychiatric/Behavioral: Negative for depression.        Objective:   /60 (BP Location: Left arm, Patient Position: Sitting, BP Cuff Size: Adult)   Pulse 74   Resp 14   Ht 1.626 m (5' 4\")   Wt 53.1 kg (117 lb)   LMP  (LMP Unknown)   SpO2 96%   BMI 20.08 kg/m²     Physical Exam   Constitutional: She is oriented to person, place, and time. She appears well-developed and well-nourished. No distress.   Thin well appearing female, NAD   HENT:   Head: Normocephalic and atraumatic.   Eyes: Conjunctivae are normal. No scleral icterus.   Neck: No JVD present.   Cardiovascular: Normal rate, regular rhythm and intact distal pulses.   No murmur heard.  JVP 5   Pulmonary/Chest: Effort normal and " breath sounds normal. No respiratory distress. She has no wheezes. She has no rales.   Abdominal: Soft. Bowel sounds are normal. She exhibits no distension. There is no abdominal tenderness.   Musculoskeletal:         General: No edema.      Cervical back: Neck supple.   Lymphadenopathy:     She has no cervical adenopathy.   Neurological: She is alert and oriented to person, place, and time. No cranial nerve deficit.   Skin: Skin is warm and dry. She is not diaphoretic. No pallor.   Psychiatric: Judgment normal.   Vitals reviewed.       [facility] (10/9/2018):   IMPRESSION:  1.  3-4+ mitral regurgitation.  2.  Reduced left ventricular systolic function with ejection fraction of 35%,   mid to distal anterior and apical hypokinesis.  3.  Elevated left ventricular end-diastolic pressure.  4.  Coronary artery disease with known occluded saphenous vein graft to the distal left anterior descending to the diagonal branch and to the right coronary artery with patent left internal mammary artery to the mid to distal left anterior descending artery, patent saphenous vein graft to a small obtuse marginal branch with high-grade ostial proximal graft stenosis.  5.  Successful percutaneous transluminal coronary angioplasty/stent placement of the ostial to proximal saphenous vein graft to the obtuse marginal branch with 3.0x28 mm Synergy drug-eluting stent.  6.  Elevated left ventricular end-diastolic pressure.  7.  Elevated right heart pressure with 40 mmHg.    TTE 1/6/21  CONCLUSIONS  Compared to the images of the prior study done 5/12/20, no significant   changes are noted.   Severely reduced left ventricular systolic function. Left ventricular   ejection fraction is visually estimated to be 25%.  Unable to determine diastolic function due to mitral valve repair.  Status post MitraClip. Transvalvular gradient of 4 mmHg, heart rate 75   bpm.  Estimated right ventricular systolic pressure  is 36 mmHg.  Left Ventricle  Left ventricle is  normal in size. Normal left ventricular wall   thickness. Severely reduced left ventricular systolic function. Left   ventricular ejection fraction is visually estimated to be 25%. Global   hypokinesis. [place] is aneurysmal. Akinesis of the basal segment of the   anteroseptal wall. Akinesis of the basal segment of the inferoseptal   wall. Unable to determine diastolic function due to mitral valve   repair.     Right Ventricle  Right ventricle not well visualized.     Right Atrium  Enlarged right atrium. Normal inferior vena cava size and inspiratory   collapse.     Left Atrium  Severely dilated left atrium. Left atrial volume index is 70 mL/sq m.     Mitral Valve  Status post MitraClip. Transvalvular gradient of 4 mmHg, heart rate 75   bpm. Moderate mitral regurgitation.     Aortic Valve  Aortic sclerosis without stenosis. No aortic insufficiency.     Tricuspid Valve  Structurally normal tricuspid valve. No tricuspid stenosis. Mild   tricuspid regurgitation. Estimated right ventricular systolic pressure    is 36 mmHg.     Pulmonic Valve  The pulmonic valve is not well visualized. No pulmonic stenosis.   Moderate pulmonic insufficiency.     Pericardium  Normal pericardium without effusion.     Aorta  Normal aortic root for body surface area. Ascending aorta diameter is   2.5 cm.    Assessment:     1. Essential hypertension  Basic Metabolic Panel    proBrain Natriuretic Peptide, NT   2. Ischemic cardiomyopathy  proBrain Natriuretic Peptide, NT   3. Chronic anticoagulation  proBrain Natriuretic Peptide, NT   4. Systolic CHF with reduced left ventricular function, NYHA class 2 (HCC)  Basic Metabolic Panel    proBrain Natriuretic Peptide, NT   5. High risk medication use  Basic Metabolic Panel       Medical Decision Making:  Today's Assessment / Status / Plan:   Chronic HFrEF, stage C, NYHA class II  Ischemic cardiomyopathy.   - LVEF 25% based on echo Jan 2021  - euvolemic on exam  -  metoprolol 37.5mg  -continue  lisinopril 10mg PO, ARNI hasn't been tried due to soft BPs   -continue spironolactone 12.5mg PO daily  -continue lasix 20mg PO Daily along with kcl 10mEq daily, SCr and electrolytes stable  - NTproBNP 2400, stable  -continue magnesium   - Previously discussed risk of ventricular arrhythmias, SCD in the setting of ischemic CMO, she is not interested in any device at this time    S/P mitral valve clip  -  moderate MR, normal gradient across valve  -CTM     Coronary artery disease of bypass graft of native heart without angina  - known that only 2 grafts are open.   -continue aspirin    -if angina develops consider ranexa.   -I previously reviewed films with Dr. Don who recommended medical therapy only .       Chronic atrial fibrillation   [id number] score of 6. Currently asymptomatic  -continue coumadin, goal INR 2-3, INR monitored in [place]  -Metop 50mg   - previously we discussed rhythm control and how it may help her EF, she again is not interested in any procedures     Tricuspid valve insufficiency  - mild, no peripheral edema  - cont lasix 20mg    RTC in 6mo, labs prior. Sooner if problems

## 2021-04-14 ENCOUNTER — OFFICE VISIT (OUTPATIENT)
Dept: CARDIOLOGY | Facility: MEDICAL CENTER | Age: 81
End: 2021-04-14
Payer: MEDICARE

## 2021-04-14 VITALS
BODY MASS INDEX: 19.97 KG/M2 | WEIGHT: 117 LBS | SYSTOLIC BLOOD PRESSURE: 110 MMHG | HEIGHT: 64 IN | RESPIRATION RATE: 14 BRPM | OXYGEN SATURATION: 96 % | DIASTOLIC BLOOD PRESSURE: 60 MMHG | HEART RATE: 74 BPM

## 2021-04-14 DIAGNOSIS — Z79.01 CHRONIC ANTICOAGULATION: ICD-10-CM

## 2021-04-14 DIAGNOSIS — Z79.899 HIGH RISK MEDICATION USE: ICD-10-CM

## 2021-04-14 DIAGNOSIS — I10 ESSENTIAL HYPERTENSION: ICD-10-CM

## 2021-04-14 DIAGNOSIS — I25.5 ISCHEMIC CARDIOMYOPATHY: ICD-10-CM

## 2021-04-14 DIAGNOSIS — I50.20 SYSTOLIC CHF WITH REDUCED LEFT VENTRICULAR FUNCTION, NYHA CLASS 2 (HCC): ICD-10-CM

## 2021-04-14 PROCEDURE — 99213 OFFICE O/P EST LOW 20 MIN: CPT | Performed by: PHYSICIAN ASSISTANT

## 2021-04-14 ASSESSMENT — ENCOUNTER SYMPTOMS
DEPRESSION: 0
ROS GI COMMENTS: NO ABDOMINAL BLOATING, NO EARLY SATIETY
FEVER: 0
CHILLS: 0
FALLS: 0
NAUSEA: 0

## 2021-10-06 DIAGNOSIS — Z79.899 HIGH RISK MEDICATION USE: ICD-10-CM

## 2021-10-06 DIAGNOSIS — I50.20 SYSTOLIC CHF WITH REDUCED LEFT VENTRICULAR FUNCTION, NYHA CLASS 2 (HCC): ICD-10-CM

## 2021-10-06 DIAGNOSIS — I10 ESSENTIAL HYPERTENSION: ICD-10-CM

## 2021-10-19 ENCOUNTER — OFFICE VISIT (OUTPATIENT)
Dept: CARDIOLOGY | Facility: MEDICAL CENTER | Age: 81
End: 2021-10-19
Payer: MEDICARE

## 2021-10-19 VITALS
SYSTOLIC BLOOD PRESSURE: 120 MMHG | HEIGHT: 64 IN | HEART RATE: 74 BPM | RESPIRATION RATE: 18 BRPM | WEIGHT: 112 LBS | DIASTOLIC BLOOD PRESSURE: 64 MMHG | OXYGEN SATURATION: 95 % | BODY MASS INDEX: 19.12 KG/M2

## 2021-10-19 DIAGNOSIS — M79.605 PAIN IN BOTH LOWER EXTREMITIES: ICD-10-CM

## 2021-10-19 DIAGNOSIS — E03.9 HYPOTHYROIDISM, UNSPECIFIED TYPE: ICD-10-CM

## 2021-10-19 DIAGNOSIS — E78.5 DYSLIPIDEMIA: ICD-10-CM

## 2021-10-19 DIAGNOSIS — Z95.5 STENTED CORONARY ARTERY: ICD-10-CM

## 2021-10-19 DIAGNOSIS — R73.01 IMPAIRED FASTING GLUCOSE: ICD-10-CM

## 2021-10-19 DIAGNOSIS — Z79.01 CHRONIC ANTICOAGULATION: ICD-10-CM

## 2021-10-19 DIAGNOSIS — M79.604 PAIN IN BOTH LOWER EXTREMITIES: ICD-10-CM

## 2021-10-19 DIAGNOSIS — I36.1 NON-RHEUMATIC TRICUSPID VALVE INSUFFICIENCY: ICD-10-CM

## 2021-10-19 DIAGNOSIS — Z98.890 S/P MITRAL VALVE REPAIR: ICD-10-CM

## 2021-10-19 DIAGNOSIS — I25.810 CORONARY ARTERY DISEASE INVOLVING CORONARY BYPASS GRAFT OF NATIVE HEART WITHOUT ANGINA PECTORIS: ICD-10-CM

## 2021-10-19 DIAGNOSIS — I50.23 ACUTE ON CHRONIC SYSTOLIC CHF (CONGESTIVE HEART FAILURE) (HCC): ICD-10-CM

## 2021-10-19 DIAGNOSIS — I10 ESSENTIAL HYPERTENSION: ICD-10-CM

## 2021-10-19 DIAGNOSIS — I48.20 CHRONIC ATRIAL FIBRILLATION (HCC): ICD-10-CM

## 2021-10-19 DIAGNOSIS — I25.5 ISCHEMIC CARDIOMYOPATHY: ICD-10-CM

## 2021-10-19 DIAGNOSIS — Z95.1 STATUS POST CORONARY ARTERY BYPASS GRAFTS X 5: ICD-10-CM

## 2021-10-19 DIAGNOSIS — I50.20 SYSTOLIC CHF WITH REDUCED LEFT VENTRICULAR FUNCTION, NYHA CLASS 2 (HCC): ICD-10-CM

## 2021-10-19 DIAGNOSIS — Z66 DO NOT RESUSCITATE STATUS: ICD-10-CM

## 2021-10-19 PROCEDURE — 99215 OFFICE O/P EST HI 40 MIN: CPT | Performed by: INTERNAL MEDICINE

## 2021-10-19 RX ORDER — POTASSIUM CHLORIDE 750 MG/1
5 TABLET, EXTENDED RELEASE ORAL DAILY
Qty: 45 TABLET | Refills: 3 | Status: SHIPPED | OUTPATIENT
Start: 2021-10-19 | End: 2022-04-19

## 2021-10-19 RX ORDER — ATORVASTATIN CALCIUM 40 MG/1
40 TABLET, FILM COATED ORAL DAILY
Qty: 90 TABLET | Refills: 3 | Status: SHIPPED | OUTPATIENT
Start: 2021-10-19 | End: 2022-04-19

## 2021-10-19 RX ORDER — LEVOTHYROXINE SODIUM 0.07 MG/1
75 TABLET ORAL
Qty: 90 TABLET | Refills: 3 | Status: SHIPPED | OUTPATIENT
Start: 2021-10-19 | End: 2022-04-19

## 2021-10-19 RX ORDER — LISINOPRIL 10 MG/1
TABLET ORAL
Qty: 180 TABLET | Refills: 3 | Status: SHIPPED | OUTPATIENT
Start: 2021-10-19 | End: 2022-04-19

## 2021-10-19 RX ORDER — METOPROLOL SUCCINATE 25 MG/1
37.5 TABLET, EXTENDED RELEASE ORAL DAILY
Qty: 135 TABLET | Refills: 3 | Status: SHIPPED | OUTPATIENT
Start: 2021-10-19 | End: 2022-04-19

## 2021-10-19 RX ORDER — SPIRONOLACTONE 25 MG/1
12.5 TABLET ORAL DAILY
Qty: 45 TABLET | Refills: 3 | Status: SHIPPED | OUTPATIENT
Start: 2021-10-19 | End: 2022-04-19

## 2021-10-19 RX ORDER — FUROSEMIDE 20 MG/1
20 TABLET ORAL DAILY
Qty: 90 TABLET | Refills: 3 | Status: SHIPPED | OUTPATIENT
Start: 2021-10-19 | End: 2022-04-19

## 2021-10-19 ASSESSMENT — ENCOUNTER SYMPTOMS
MYALGIAS: 1
SHORTNESS OF BREATH: 1

## 2021-10-19 NOTE — PATIENT INSTRUCTIONS
Please schedule the ultrasound of your legs.     Please get non-fasting blood tests in 6 months.

## 2021-10-19 NOTE — PROGRESS NOTES
Cardiology Follow-up Consultation Note    Date of note:    10/19/2021  Primary Care Provider: Sourav Swift M.D.  Referring Provider: Rah Anderson M.D.     Patient Name: Daya Woods   YOB: 1940  MRN:              [id number]    Chief Complaint: chest pain    History of Present Illness: Daya Woods is a 81 y.o. female whose current medical problems include prior MI and 5-vessel CABG in 2002 now with now 2/5 grafts open by cardiac catheterization 8/26/16 (LIMA-diag patent, SVG-OM status post PCI 10/9/2018, all others occluded), severe disease of her native LAD status post PCI with drug-eluting prior stents, and an occluded native right coronary artery filling via collaterals, chronic atrial fibrillation, hypertension, dyslipidemia, and severe functional MR s/p mitraclip on 10/15/2018 and chronic systolic congestive heart failure who is here for follow-up.    At our visit, 7/19/2019:  She gets left chest wall pain, moderate severity, mostly every afternoon which resolves with rest. Also gets shortness of breath with increased exertion.     Walking limited by back pain and hip pain, only 1 block.     In terms of hypertension, SBP at home in the 90s. No lightheadedness or syncope.    Decreased plavix to 1/2 pill due to nose bleeds.     Weight decreased 7 pounds in the last 6 months. Cannot gain it back for some reason.     At our visit, 2/19/2020:  Continues to get moderate severity dyspnea on exertion associated with back soreness which resolves with rest. Only walking dog down the walk and back.     At our visit, 10/6/2020:  In terms of CHF, no LE edema, dry weight at home around 110 pounds. No limitation in exercise or walking except by her back. No key exercise but stays busy. + fatigue.     Band like dull pain around her abdomen which comes and goes and is sometimes exertional. Currently mild.     At our visit, 1/19/2021:  In terms of CHF, no LE edema, dry weight at home around  110 pounds. Walks the dog just one block.  No key exercise but stays busy. + fatigue. Echo showed moderate TR.       Interim Events:  Exercising by doing yard work.     In terms of a fib, no palpitations.     Hypertension well controlled.     In terms of CHF, no LE edema, dry weight at home now  around 108 pounds, has decreased a bit.     She has significant muscle aches all over her legs after any activity.     Review of Systems   Respiratory: Positive for shortness of breath.    Musculoskeletal: Positive for myalgias.   Allergic/Immunologic: Positive for environmental allergies.     All other systems reviewed and discussed using a comprehensive questionnaire and are negative.         Past Medical History:   Diagnosis Date   • Anesthesia     ponv   • Anticoagulation monitoring, special range    • CAD (coronary artery disease) 10/09/2018    PCI/ANA (Synergy 3.0 x 28mm) to the OM.   • Cataract     Bilateral IOL   • Chronic atrial fibrillation (HCC)     Managed with rate control and warfarin for anticoagulation    • Coronary atherosclerosis of native coronary artery 12/2002    CABG x 5 (LIMA-D3, SVG-OM, SVG-RCA, SVG-distal LAD, SVG-D1). August 2016: LIMA-D3 and SVG-OM patent (interval occlusion of the SVG-RCA); chronic occluded SVG-LAD, SVG-D1.   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    • MI, old 2002   • Mitral regurgitation 10/2018    Status post MV repair with MitraClip.   • Osteoarthritis    • Pain     right knee, back   • Raynaud's disease    • Spinal stenosis 8/20/2015   • Status post coronary artery bypass grafts x 5 08/2002    LIMA-D3, SVG-LAD, SVG-OM, SVG-RCA, SVG-distal LAD.          Past Surgical History:   Procedure Laterality Date   • TRANSCATHETER MITRAL VALVE  REPAIR  10/15/2018    Procedure: TRANSCATHETER MITRAL VALVE REPAIR- WITH POSSIBLE OPEN HEART AND ANY ASSOCIATED PROCEDURES;  Surgeon: Moy Don M.D.;  Location: SURGERY [facility];  Service: Cardiac   • DEVON  10/15/2018    Procedure: DEVON;  Surgeon: Moy Don M.D.;  Location: SURGERY [facility];  Service: Cardiac   • ZZZ CARDIAC CATH  10/2018    PCI to SVG to OM.    • RECOVERY  8/26/2016    Procedure: CATH LAB-DEVON W/LHC W/POSSIBLE-JO-ANN/GRINSELL-LARGE GROUP;  Surgeon: Recoveryonly Surgery;  Location: SURGERY PRE-POST PROC UNIT [facility];  Service:    • OTHER Bilateral 08/2014    cataract extraction with iols   • MULTIPLE CORONARY ARTERY BYPASS  2002     CABG x 5   • APPENDECTOMY     • GYN SURGERY      c section    • MITRAL VALVE REPLACE      MV clip X1   • TONSILLECTOMY AND ADENOIDECTOMY     • TUBAL LIGATION           Current Outpatient Medications   Medication Sig Dispense Refill   • atorvastatin (LIPITOR) 40 MG Tab Take 1 Tab by mouth every day. 90 Tab 3   • furosemide (LASIX) 20 MG Tab Take 1 Tab by mouth every day. 90 Tab 3   • lisinopril (PRINIVIL) 10 MG Tab take 1 tablet by mouth twice a day 180 Tab 3   • magnesium oxide (MAG-OX) 400 MG Tab tablet Take 1 Tab by mouth every day. 90 Tab 3   • metoprolol SR (TOPROL XL) 25 MG TABLET SR 24 HR Take 1.5 Tabs by mouth every day. 135 Tab 3   • potassium chloride SA (K-DUR) 10 MEQ Tab CR Take 0.5 Tabs by mouth every day. 90 Tab 3   • spironolactone (ALDACTONE) 25 MG Tab Take 0.5 Tabs by mouth every day. 45 Tab 3   • levothyroxine (SYNTHROID) 75 MCG Tab Take 1 Tab by mouth Every morning on an empty stomach. 90 Tab 0   • aspirin EC (ECOTRIN) 81 MG Tablet Delayed Response Take 81 mg by mouth every day.     • warfarin (COUMADIN) 5 MG Tab Take 5-7.5 mg by mouth every day. 5 mg on Mondays, wednesdays, and Fridays. All other days 7.5 mg       No current facility-administered medications for this visit.         Allergies   Allergen Reactions   • Codeine  "Unspecified     \"I just don't like it. It does weird things to me.\"   • Hydrochlorothiazide Unspecified     Unsure of reaction   • Isosorbide Mononitrate Rash     Rash   • Keflex Rash     Rash   • Sulfa Drugs Rash     rash   • Tape Unspecified     Tears skin off   • Xarelto [Rivaroxaban] Rash     rash         Family History   Problem Relation Age of Onset   • Heart Attack Brother 55        heart attack         Social History     Socioeconomic History   • Marital status:      Spouse name: Not on file   • Number of children: Not on file   • Years of education: Not on file   • Highest education level: Not on file   Occupational History   • Not on file   Tobacco Use   • Smoking status: Former Smoker     Packs/day: 0.50     Years: 40.00     Pack years: 20.00     Quit date: 2002     Years since quittin.6   • Smokeless tobacco: Never Used   Vaping Use   • Vaping Use: Never used   Substance and Sexual Activity   • Alcohol use: Yes     Alcohol/week: 4.2 oz     Types: 7 Cans of beer per week   • Drug use: No   • Sexual activity: Not on file   Other Topics Concern   • Not on file   Social History Narrative    Retired      Social Determinants of Health     Financial Resource Strain:    • Difficulty of Paying Living Expenses:    Food Insecurity:    • Worried About Running Out of Food in the Last Year:    • Ran Out of Food in the Last Year:    Transportation Needs:    • Lack of Transportation (Medical):    • Lack of Transportation (Non-Medical):    Physical Activity:    • Days of Exercise per Week:    • Minutes of Exercise per Session:    Stress:    • Feeling of Stress :    Social Connections:    • Frequency of Communication with Friends and Family:    • Frequency of Social Gatherings with Friends and Family:    • Attends Confucianist Services:    • Active Member of Clubs or Organizations:    • Attends Club or Organization Meetings:    • Marital Status:    Intimate Partner Violence:    • Fear of Current " "or Ex-Partner:    • Emotionally Abused:    • Physically Abused:    • Sexually Abused:          Physical Exam:  Ambulatory Vitals  /64 (BP Location: Left arm, Patient Position: Sitting, BP Cuff Size: Adult)   Pulse 74   Resp 18   Ht 1.626 m (5' 4\")   Wt 50.8 kg (112 lb)   SpO2 95%    Oxygen Therapy:  Pulse Oximetry: 95 %  BP Readings from Last 4 Encounters:   10/19/21 120/64   04/14/21 110/60   01/19/21 126/64   10/06/20 132/74       Weight/BMI: Body mass index is 19.22 kg/m².  Wt Readings from Last 4 Encounters:   10/19/21 50.8 kg (112 lb)   04/14/21 53.1 kg (117 lb)   01/19/21 51.7 kg (114 lb)   10/06/20 50.9 kg (112 lb 3.2 oz)       General: No apparent distress  Eyes: nl conjunctiva  ENT: OP covered by mask  Neck: JVP 3-4 cm H2O, no carotid bruits  Lungs: normal respiratory effort, CTAB  Heart: RRR,  no murmurs, no rubs or gallops, no edema bilateral lower extremities. No LV/RV heave on cardiac palpatation. 2+ bilateral radial pulses.    Abdomen: soft, non tender, non distended, no masses, normal bowel sounds.  No HSM.  Extremities/MSK: no clubbing, no cyanosis. Cachectic.   Neurological: No focal sensory deficits  Psychiatric: Appropriate affect, A/O x 3, intact judgement and insight  Skin: Warm extremities    Exam repeated in full and unchanged except for as noted above.        Lab Data Review:  Lab Results   Component Value Date/Time    CHOLSTRLTOT 137 02/24/2014 03:05 AM    LDL 67 02/24/2014 03:05 AM    HDL 55 02/24/2014 03:05 AM    TRIGLYCERIDE 74 02/24/2014 03:05 AM       Lab Results   Component Value Date/Time    SODIUM 134 (L) 10/16/2018 02:58 AM    POTASSIUM 3.9 10/16/2018 02:58 AM    CHLORIDE 101 10/16/2018 02:58 AM    CO2 23 10/16/2018 02:58 AM    GLUCOSE 152 (H) 10/16/2018 02:58 AM    BUN 17 10/16/2018 02:58 AM    CREATININE 0.78 10/16/2018 02:58 AM     Lab Results   Component Value Date/Time    ALKPHOSPHAT 53 10/09/2018 06:50 AM    ASTSGOT 28 10/09/2018 06:50 AM    ALTSGPT 28 10/09/2018 " 06:50 AM    TBILIRUBIN 0.6 10/09/2018 06:50 AM      Lab Results   Component Value Date/Time    WBC 5.5 10/16/2018 03:00 AM     No components found for: HBGA1C  No components found for: TROPONIN  No results found for: BNP    [facility] labs 5/7/2019:  hgb 15.1  plt 264  Creatinine 0.8  Bun 12  Potassium 4.4  Sodium 134  LFTs WNL except  ALT 53  TSH 6.23    HDL 51  LDL 62  tg 86    [facility] labs 2/11/2020:  Sodium 138  Potassium 4.3  LFTs WNL  Creatinine 0.7  Bun 13    [facility] labs 6/15/2020:  Sodium 134  Potassium 4.2  LFTs WNL  NT pro BNP 2940  Creatinine 0.7  Bun 10    [facility] 1/7/2021:  Sodium 136  Potassium 4.3  Bun 12  Creatinine 0.7  Mag 2.2  NTproBNP 2340    [facility] 10/6/2021:  Sodium 131  Potassium 4.6  Bun 14  Creatinine 0.6  NT-proBNP 2850.       Cardiac Imaging and Procedures Review:    EKG dated 10/19/2018 : My personal interpretation is a fib, lateral infarct, anteroseptal infarct, abnormal t waves consider ischemia.     TTE 1/8/2021:  CONCLUSIONS  Compared to the images of the prior study done 5/12/20, no significant   changes are noted.   Severely reduced left ventricular systolic function. Left ventricular   ejection fraction is visually estimated to be 25%.  Unable to determine diastolic function due to mitral valve repair.  Status post MitraClip. Transvalvular gradient of 4 mmHg, heart rate 75   bpm.  Estimated right ventricular systolic pressure  is 36 mmHg.      Echo dated 5/29/2019:   CONCLUSIONS  Moderately reduced left ventricular systolic function.  Reduced right ventricular systolic function.  Moderately dilated left atrium.  Known mitral valve repair (Mitraclip) that is functioning adequately   with normal gradient and mild to moderate regurgitation.  Moderate (borderline severe) eccentric tricuspid regurgitation.  Estimated right ventricular systolic pressure  is 30 mmHg.  Compared to the images of the study done 11/30/2018, tricuspid   regurgitation is slightly worse.  Otherwise, there is no significant    Change.    LV EF:  35    %    Echo 5/12/2020:  CONCLUSIONS  Severely reduced left ventricular systolic function.  Left ventricular ejection fraction is visually estimated to be 20%.  Global hypokinesis with an aneurysm noted in the LV apex and apical   inferior and septal walls.  Left ventricle is moderately dilated.  Small right ventricle.  Moderately reduced right ventricular systolic function.  Enlarged right atrium.  Severely dilated left atrium.  Known mitral valve repair (mitraclip) with normal transvalvular   gradient of 4mmHg.  Moderate mitral regurgitation.  Moderate tricuspid regurgitation.  Right atrial pressure is estimated to be 3 mmHg.  Estimated right ventricular systolic pressure is 45 mmHg.     Compared to the previous echocardiogram performed on 05/28/19: The LVEF   and wall motion based on side by side comparison does not appear   different.       [facility] (10/9/2018):   IMPRESSION:  1.  3-4+ mitral regurgitation.  2.  Reduced left ventricular systolic function with ejection fraction of 35%,   mid to distal anterior and apical hypokinesis.  3.  Elevated left ventricular end-diastolic pressure.  4.  Coronary artery disease with known occluded saphenous vein graft to the   distal left anterior descending to the diagonal branch and to the right   coronary artery with patent left internal mammary artery to the mid to distal   left anterior descending artery, patent saphenous vein graft to a small obtuse   marginal branch with high-grade ostial proximal graft stenosis.  5.  Successful percutaneous transluminal coronary angioplasty/stent placement   of the ostial to proximal saphenous vein graft to the obtuse marginal branch   with 3.0x28 mm Synergy drug-eluting stent.  6.  Elevated left ventricular end-diastolic pressure.  7.  Elevated right heart pressure with 40 mmHg.    Radiology test Review:  CXR: 10/2018  FINDINGS:  Sternotomy wires demonstrated.  Cardiomegaly.  Mild lung base interstitial opacities  consistent with mild interstitial edema. No consolidation.  No pleural effusion.  No pneumothorax.    Carotid US 2018:   Vascular Laboratory   CONCLUSIONS   Atherosclerosis without stenosis reaching 50% threshold.      Medical Decision Makin. Systolic CHF with reduced left ventricular function (HCC)  NYHA class III, stage C secondary to ischemic cardiomyopathy. LVEF 25%.  Appears euvolemic.   -continue metoprolol XL 37.5mg PO daily. She has previously tried coreg and this hasn't helped her raynauds, so will not  at this time. I am worried about starting a CCB due to symptomatic hypotension.   -continue lisinopril 10mg PO bid for now, if symptomatic hypotension, would decrease this to 10mg PO Daily  -continue spironolactone 12.5mg PO daily  -continue lasix 20mg PO Daily along with kcl 5mEq daily, BMP check in 3 months  -continue magnesium.   -unlikely able to tolerate significantly more medical therapy. We have discussed ICD at two previous visits and she declined. Had this discussion again today and she clearly stated she does not want an ICD now or in the future. No need to bring up again.   -lives in [place], [place], would not be a good candidate for entresto given her hypotension and lack of access to quick medical care.     2. S/P mitral valve repair  Now with Moderate MR.  -CTM if symptoms recur.     3. Ischemic cardiomyopathy  LVEF 25%. Her anatomy was discussed with Dr. Don with [facility] recently and there was no clear further intervention that could be performed by last cath images.   -CTM, known that only 2 grafts are open.     4. Essential hypertension  Adequately controlled    5. Coronary artery disease of bypass graft of native heart with stable angina pectoris (HCC)  Continues with stable angina/VILLEGAS.   -continue aspirin as part of dual therapy at this time given known continued obstructive CAD.   -if angina worsens, would decrease lisinopril to 10mg PO Daily and increase  coreg back to 6.25mg PO daily. Would unlikely tolerate imdur due to hypotension but could try. Ranexa is another option.    6. Chronic atrial fibrillation (HCC)  [id number]-HEOg score of 6. Currently asymptomatic  -continue coumadin, goal INR 2-3. Prefers to stay on coumadin.   -continue coreg for rate control  -If symptoms don't improve, could consider ablation for a fib and heart failure vs admission for dofetilide loading vs starting amiodarone with cardioversion.     7. Non-rheumatic tricuspid valve insufficiency  Repeat echo shows moderate TR 1/2021 and associated with moderate MR.   -CTM, repeat echo in a year, 1/2023    8. Goals of Care - has POLST, DNR/DNI.     9. Hypothyroidism - having difficult getting into PCP. Will refill and manage this for right now.     10. Leg pain - concerning for claudication.  -check vascular LE ultrasound arterial   -check CPK with next labs.     11. Disability paperwork - she certainly qualifies based on her heart failure. Will fill out.       Return in about 6 months (around 4/19/2022).  With RIN Lilly MD, [facility]  [facility], [address]  [address] [address]  Phone: [phone]  Fax: [phone]

## 2021-10-21 ENCOUNTER — TELEPHONE (OUTPATIENT)
Dept: CARDIOLOGY | Facility: MEDICAL CENTER | Age: 81
End: 2021-10-21

## 2021-10-21 NOTE — TELEPHONE ENCOUNTER
Received DMV disability paperwork from SERAFIN for patient.      Filled out paperwork, SERAFIN signed and mailed it to the patient.     Called and notified the patient that it will be mailed out today. She asked to who and I stated to her, as she still needs to sign it. Confirmed address. Answered all questions and concerns, appreciative of call.     Copy made and scanned into [employer]

## 2021-10-27 ENCOUNTER — HOSPITAL ENCOUNTER (OUTPATIENT)
Dept: RADIOLOGY | Facility: MEDICAL CENTER | Age: 81
End: 2021-10-27
Attending: INTERNAL MEDICINE
Payer: MEDICARE

## 2021-10-27 ENCOUNTER — TELEPHONE (OUTPATIENT)
Dept: CARDIOLOGY | Facility: MEDICAL CENTER | Age: 81
End: 2021-10-27

## 2021-10-27 DIAGNOSIS — I50.20 SYSTOLIC CHF WITH REDUCED LEFT VENTRICULAR FUNCTION, NYHA CLASS 2 (HCC): ICD-10-CM

## 2021-10-27 DIAGNOSIS — I36.1 NON-RHEUMATIC TRICUSPID VALVE INSUFFICIENCY: ICD-10-CM

## 2021-10-27 DIAGNOSIS — Z95.1 STATUS POST CORONARY ARTERY BYPASS GRAFTS X 5: ICD-10-CM

## 2021-10-27 DIAGNOSIS — Z98.890 S/P MITRAL VALVE REPAIR: ICD-10-CM

## 2021-10-27 DIAGNOSIS — Z95.5 STENTED CORONARY ARTERY: ICD-10-CM

## 2021-10-27 DIAGNOSIS — I50.23 ACUTE ON CHRONIC SYSTOLIC CHF (CONGESTIVE HEART FAILURE) (HCC): ICD-10-CM

## 2021-10-27 PROCEDURE — 93926 LOWER EXTREMITY STUDY: CPT | Mod: LT

## 2021-10-27 PROCEDURE — 93922 UPR/L XTREMITY ART 2 LEVELS: CPT

## 2021-10-28 NOTE — TELEPHONE ENCOUNTER
Ultrasound showed she does have significant peripheral vascular disease.     She is already on anti platelet and statin therapy. Let's refer to Dr. Mg for intervention consideration.  Thanks!

## 2021-11-01 NOTE — TELEPHONE ENCOUNTER
Curly Mg M.D.  You; Derrick Lilly M.D. 5 days ago     Sure, I will see the patient gladly in clinic, will go over the options.   Vanessa, can you please arrange clinic visit      Called patient and scheduled with Dr. Mg 11/24

## 2021-11-24 ENCOUNTER — OFFICE VISIT (OUTPATIENT)
Dept: CARDIOLOGY | Facility: MEDICAL CENTER | Age: 81
End: 2021-11-24
Payer: MEDICARE

## 2021-11-24 VITALS
WEIGHT: 113.8 LBS | SYSTOLIC BLOOD PRESSURE: 130 MMHG | BODY MASS INDEX: 19.43 KG/M2 | OXYGEN SATURATION: 99 % | RESPIRATION RATE: 16 BRPM | HEART RATE: 77 BPM | HEIGHT: 64 IN | DIASTOLIC BLOOD PRESSURE: 72 MMHG

## 2021-11-24 DIAGNOSIS — I73.9 PERIPHERAL VASCULAR DISEASE (HCC): ICD-10-CM

## 2021-11-24 PROCEDURE — 99214 OFFICE O/P EST MOD 30 MIN: CPT | Performed by: INTERNAL MEDICINE

## 2021-11-24 NOTE — PROGRESS NOTES
Cardiology/PVD clinic consultation Note    Date of note:    11/24/2021    Primary Care Provider: Sourav Swift M.D.  Referring Provider: No ref. provider found     Patient Name: Daya Woods   YOB: 1940  MRN:              [id number]    Chief Complaint: Peripheral vascular disease    Daya Woods is a 81 y.o. female  patient referred by Dr. Lilly for peripheral vascular disease.  She has history of ischemic cardiomyopathy, chronic atrial fibrillation, prior bypass surgery, MitraClip.  She has pain in her both legs with walking.  Left more than right foot lower back, entire legs hurt.    Past Medical History:   Diagnosis Date   • Anesthesia     ponv   • Anticoagulation monitoring, special range    • CAD (coronary artery disease) 10/09/2018    PCI/ANA (Synergy 3.0 x 28mm) to the OM.   • Cataract     Bilateral IOL   • Chronic atrial fibrillation (HCC)     Managed with rate control and warfarin for anticoagulation    • Coronary atherosclerosis of native coronary artery 12/2002    CABG x 5 (LIMA-D3, SVG-OM, SVG-RCA, SVG-distal LAD, SVG-D1). August 2016: LIMA-D3 and SVG-OM patent (interval occlusion of the SVG-RCA); chronic occluded SVG-LAD, SVG-D1.   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    • MI, old 2002   • Mitral regurgitation 10/2018    Status post MV repair with MitraClip.   • Osteoarthritis    • Pain     right knee, back   • Raynaud's disease    • Spinal stenosis 8/20/2015   • Status post coronary artery bypass grafts x 5 08/2002    LIMA-D3, SVG-LAD, SVG-OM, SVG-RCA, SVG-distal LAD.          Past Surgical History:   Procedure Laterality Date   • TRANSCATHETER MITRAL VALVE REPAIR  10/15/2018    Procedure: TRANSCATHETER MITRAL VALVE REPAIR- WITH  "POSSIBLE OPEN HEART AND ANY ASSOCIATED PROCEDURES;  Surgeon: Moy Don M.D.;  Location: SURGERY [facility];  Service: Cardiac   • DEVON  10/15/2018    Procedure: DEVON;  Surgeon: Moy Don M.D.;  Location: SURGERY [facility];  Service: Cardiac   • ZZZ CARDIAC CATH  10/2018    PCI to SVG to OM.    • RECOVERY  8/26/2016    Procedure: CATH LAB-DEVON W/LHC W/POSSIBLE-JO-ANN/GRINSELL-LARGE GROUP;  Surgeon: Recoveryonly Surgery;  Location: SURGERY PRE-POST PROC UNIT [facility];  Service:    • OTHER Bilateral 08/2014    cataract extraction with iols   • MULTIPLE CORONARY ARTERY BYPASS  2002     CABG x 5   • APPENDECTOMY     • GYN SURGERY      c section    • MITRAL VALVE REPLACE      MV clip X1   • TONSILLECTOMY AND ADENOIDECTOMY     • TUBAL LIGATION           Current Outpatient Medications   Medication Sig Dispense Refill   • spironolactone (ALDACTONE) 25 MG Tab Take 0.5 Tablets by mouth every day. 45 Tablet 3   • potassium chloride SA (K-DUR) 10 MEQ Tab CR Take 0.5 Tablets by mouth every day. 45 Tablet 3   • metoprolol SR (TOPROL XL) 25 MG TABLET SR 24 HR Take 1.5 Tablets by mouth every day. 135 Tablet 3   • magnesium oxide (MAG-OX) 400 MG Tab tablet Take 1 Tablet by mouth every day. 90 Tablet 3   • lisinopril (PRINIVIL) 10 MG Tab take 1 tablet by mouth twice a day 180 Tablet 3   • furosemide (LASIX) 20 MG Tab Take 1 Tablet by mouth every day. 90 Tablet 3   • atorvastatin (LIPITOR) 40 MG Tab Take 1 Tablet by mouth every day. 90 Tablet 3   • levothyroxine (SYNTHROID) 75 MCG Tab Take 1 Tablet by mouth every morning on an empty stomach. 90 Tablet 3   • aspirin EC (ECOTRIN) 81 MG Tablet Delayed Response Take 81 mg by mouth every day.     • warfarin (COUMADIN) 5 MG Tab Take 5-7.5 mg by mouth every day. 5 mg on Mondays, wednesdays, and Fridays. All other days 7.5 mg       No current facility-administered medications for this visit.         Allergies   Allergen Reactions   • Codeine Unspecified     \"I just don't like " "it. It does weird things to me.\"   • Hydrochlorothiazide Unspecified     Unsure of reaction   • Isosorbide Mononitrate Rash     Rash   • Keflex Rash     Rash   • Sulfa Drugs Rash     rash   • Tape Unspecified     Tears skin off   • Xarelto [Rivaroxaban] Rash     rash         Family History   Problem Relation Age of Onset   • Heart Attack Brother 55        heart attack             Physical Exam:  Ambulatory Vitals  /72 (BP Location: Left arm, Patient Position: Sitting, BP Cuff Size: Small adult)   Pulse 77   Resp 16   Ht 1.626 m (5' 4\")   Wt 51.6 kg (113 lb 12.8 oz)   SpO2 99%    Oxygen Therapy:  Pulse Oximetry: 99 %  BP Readings from Last 4 Encounters:   11/24/21 130/72   10/19/21 120/64   04/14/21 110/60   01/19/21 126/64       Weight/BMI: Body mass index is 19.53 kg/m².  Wt Readings from Last 4 Encounters:   11/24/21 51.6 kg (113 lb 12.8 oz)   10/19/21 50.8 kg (112 lb)   04/14/21 53.1 kg (117 lb)   01/19/21 51.7 kg (114 lb)     General: Well appearing and in no apparent distress  Head: atrumatic  Eyes: No conjunctival pallor   ENT: normal external appearance of nose and ears  Neck: JVD absent, carotid bruits absent  Lungs: respiratory sounds  normal, additional breath sounds absent  Heart: Irregular rhythm  Pedal pulses 1+ bilaterally, no ulcers        Lab Data Review:  Lab Results   Component Value Date/Time    CHOLSTRLTOT 137 02/24/2014 03:05 AM    LDL 67 02/24/2014 03:05 AM    HDL 55 02/24/2014 03:05 AM    TRIGLYCERIDE 74 02/24/2014 03:05 AM       Lab Results   Component Value Date/Time    SODIUM 134 (L) 10/16/2018 02:58 AM    POTASSIUM 3.9 10/16/2018 02:58 AM    CHLORIDE 101 10/16/2018 02:58 AM    CO2 23 10/16/2018 02:58 AM    GLUCOSE 152 (H) 10/16/2018 02:58 AM    BUN 17 10/16/2018 02:58 AM    CREATININE 0.78 10/16/2018 02:58 AM     Lab Results   Component Value Date/Time    ALKPHOSPHAT 53 10/09/2018 06:50 AM    ASTSGOT 28 10/09/2018 06:50 AM    ALTSGPT 28 10/09/2018 06:50 AM    TBILIRUBIN 0.6 " 10/09/2018 06:50 AM      Lab Results   Component Value Date/Time    WBC 5.5 10/16/2018 03:00 AM     YOAN, vascular duplex reviewed, personally interpreted shows mild decrease circulation on the left side YOAN 0.8, 50 to 75% stenosis of the superficial femoral artery.      Medical Decision Makin-year-old female patient with hypertension, chronic atrial fibrillation, ischemic cardiomyopathy, severe mitral regurgitation status post MitraClip with peripheral vascular disease.    From prior experience she is very afraid to undergo any procedures.  She has been both legs, radiating from back. She is known to have spinal stenosis.  Denies cramping in the calf which I would typically see from  SFA disease.   At this point per patient wishes would continue current management with medical therapy.  Advised to get back to me if she develops cramping in her left calf with walking, we will revisit intervention at that time.    This note was dictated using Dragon speech recognition software.    Curly MARCANO  Interventional cardiologist  [facility], [facility].  [address] [place] [id number]  Phone: [phone]  Fax: [phone]

## 2022-03-31 DIAGNOSIS — I50.23 ACUTE ON CHRONIC SYSTOLIC CHF (CONGESTIVE HEART FAILURE) (HCC): ICD-10-CM

## 2022-03-31 DIAGNOSIS — E03.9 HYPOTHYROIDISM, UNSPECIFIED TYPE: ICD-10-CM

## 2022-03-31 DIAGNOSIS — Z98.890 S/P MITRAL VALVE REPAIR: ICD-10-CM

## 2022-03-31 DIAGNOSIS — I50.20 SYSTOLIC CHF WITH REDUCED LEFT VENTRICULAR FUNCTION, NYHA CLASS 2 (HCC): ICD-10-CM

## 2022-03-31 DIAGNOSIS — I36.1 NON-RHEUMATIC TRICUSPID VALVE INSUFFICIENCY: ICD-10-CM

## 2022-03-31 DIAGNOSIS — Z95.5 STENTED CORONARY ARTERY: ICD-10-CM

## 2022-03-31 DIAGNOSIS — Z95.1 STATUS POST CORONARY ARTERY BYPASS GRAFTS X 5: ICD-10-CM

## 2022-04-19 ENCOUNTER — OFFICE VISIT (OUTPATIENT)
Dept: CARDIOLOGY | Facility: MEDICAL CENTER | Age: 82
End: 2022-04-19
Payer: COMMERCIAL

## 2022-04-19 VITALS
OXYGEN SATURATION: 97 % | HEIGHT: 64 IN | SYSTOLIC BLOOD PRESSURE: 138 MMHG | HEART RATE: 71 BPM | WEIGHT: 112 LBS | BODY MASS INDEX: 19.12 KG/M2 | RESPIRATION RATE: 14 BRPM | DIASTOLIC BLOOD PRESSURE: 66 MMHG

## 2022-04-19 DIAGNOSIS — Z66 DO NOT RESUSCITATE STATUS: ICD-10-CM

## 2022-04-19 DIAGNOSIS — Z95.5 STENTED CORONARY ARTERY: ICD-10-CM

## 2022-04-19 DIAGNOSIS — Z98.890 S/P MITRAL VALVE REPAIR: ICD-10-CM

## 2022-04-19 DIAGNOSIS — I10 ESSENTIAL HYPERTENSION: ICD-10-CM

## 2022-04-19 DIAGNOSIS — Z79.01 CHRONIC ANTICOAGULATION: ICD-10-CM

## 2022-04-19 DIAGNOSIS — E78.5 DYSLIPIDEMIA: ICD-10-CM

## 2022-04-19 DIAGNOSIS — I48.20 CHRONIC ATRIAL FIBRILLATION (HCC): ICD-10-CM

## 2022-04-19 DIAGNOSIS — I50.20 SYSTOLIC CHF WITH REDUCED LEFT VENTRICULAR FUNCTION, NYHA CLASS 2 (HCC): ICD-10-CM

## 2022-04-19 DIAGNOSIS — Z95.1 STATUS POST CORONARY ARTERY BYPASS GRAFTS X 5: ICD-10-CM

## 2022-04-19 DIAGNOSIS — I36.1 NON-RHEUMATIC TRICUSPID VALVE INSUFFICIENCY: ICD-10-CM

## 2022-04-19 DIAGNOSIS — E03.9 HYPOTHYROIDISM, UNSPECIFIED TYPE: ICD-10-CM

## 2022-04-19 DIAGNOSIS — I50.23 ACUTE ON CHRONIC SYSTOLIC CHF (CONGESTIVE HEART FAILURE) (HCC): ICD-10-CM

## 2022-04-19 DIAGNOSIS — I73.9 PERIPHERAL VASCULAR DISEASE (HCC): ICD-10-CM

## 2022-04-19 DIAGNOSIS — I25.5 ISCHEMIC CARDIOMYOPATHY: ICD-10-CM

## 2022-04-19 DIAGNOSIS — I25.810 CORONARY ARTERY DISEASE INVOLVING CORONARY BYPASS GRAFT OF NATIVE HEART WITHOUT ANGINA PECTORIS: ICD-10-CM

## 2022-04-19 PROCEDURE — 99215 OFFICE O/P EST HI 40 MIN: CPT | Performed by: INTERNAL MEDICINE

## 2022-04-19 RX ORDER — METOPROLOL SUCCINATE 25 MG/1
37.5 TABLET, EXTENDED RELEASE ORAL DAILY
Qty: 135 TABLET | Refills: 3 | Status: SHIPPED | OUTPATIENT
Start: 2022-04-19 | End: 2022-12-13

## 2022-04-19 RX ORDER — POTASSIUM CHLORIDE 750 MG/1
5 TABLET, EXTENDED RELEASE ORAL DAILY
Qty: 45 TABLET | Refills: 3 | Status: SHIPPED | OUTPATIENT
Start: 2022-04-19 | End: 2023-05-09

## 2022-04-19 RX ORDER — LEVOTHYROXINE SODIUM 0.07 MG/1
75 TABLET ORAL
Qty: 90 TABLET | Refills: 3 | Status: SHIPPED | OUTPATIENT
Start: 2022-04-19 | End: 2023-05-16

## 2022-04-19 RX ORDER — SPIRONOLACTONE 25 MG/1
12.5 TABLET ORAL DAILY
Qty: 45 TABLET | Refills: 3 | Status: SHIPPED | OUTPATIENT
Start: 2022-04-19 | End: 2023-05-09

## 2022-04-19 RX ORDER — FUROSEMIDE 20 MG/1
20 TABLET ORAL DAILY
Qty: 90 TABLET | Refills: 3 | Status: SHIPPED | OUTPATIENT
Start: 2022-04-19 | End: 2023-05-09

## 2022-04-19 RX ORDER — ATORVASTATIN CALCIUM 40 MG/1
40 TABLET, FILM COATED ORAL DAILY
Qty: 90 TABLET | Refills: 3 | Status: SHIPPED | OUTPATIENT
Start: 2022-04-19 | End: 2023-05-09

## 2022-04-19 RX ORDER — LISINOPRIL 10 MG/1
TABLET ORAL
Qty: 180 TABLET | Refills: 3 | Status: SHIPPED | OUTPATIENT
Start: 2022-04-19 | End: 2023-05-09

## 2022-04-19 NOTE — PROGRESS NOTES
Cardiology Follow-up Consultation Note    Date of note: 4/19/2022  Primary Care Provider: Sourav Swift M.D.  Referring Provider: Rah Anderson M.D.     Patient Name: Daya Woods   YOB: 1940  MRN:              [id number]    Chief Complaint: chest pain    History of Present Illness: Daya Woods is a 82 y.o. female whose current medical problems include prior MI and 5-vessel CABG in 2002 now with now 2/5 grafts open by cardiac catheterization 8/26/16 (LIMA-diag patent, SVG-OM status post PCI 10/9/2018, all others occluded), severe disease of her native LAD status post PCI with drug-eluting prior stents, and an occluded native right coronary artery filling via collaterals, chronic atrial fibrillation, hypertension, dyslipidemia, and severe functional MR s/p mitraclip on 10/15/2018 and chronic systolic congestive heart failure who is here for follow-up.    At our visit, 7/19/2019:  She gets left chest wall pain, moderate severity, mostly every afternoon which resolves with rest. Also gets shortness of breath with increased exertion.     Walking limited by back pain and hip pain, only 1 block.     In terms of hypertension, SBP at home in the 90s. No lightheadedness or syncope.    Decreased plavix to 1/2 pill due to nose bleeds.     Weight decreased 7 pounds in the last 6 months. Cannot gain it back for some reason.     At our visit, 2/19/2020:  Continues to get moderate severity dyspnea on exertion associated with back soreness which resolves with rest. Only walking dog down the walk and back.     At our visit, 10/6/2020:  In terms of CHF, no LE edema, dry weight at home around 110 pounds. No limitation in exercise or walking except by her back. No key exercise but stays busy. + fatigue.     Band like dull pain around her abdomen which comes and goes and is sometimes exertional. Currently mild.     At our visit, 1/19/2021:  In terms of CHF, no LE edema, dry weight at home around 110  pounds. Walks the dog just one block.  No key exercise but stays busy. + fatigue. Echo showed moderate TR.     At our visit, 10/19/2021:  Exercising by doing yard work.     She has significant muscle aches all over her legs after any activity.     Interim Events:  In terms of CHF, no LE edema, dry weight at home now  around 110 pounds, has decreased a bit. Typically walks a block or so at one time.     In terms of a fib, no palpitations.     Hypertension well controlled.     Did see Dr. Mg, opted for medical management of PVD.     ROS  + allergies, leg pain with exercise. + easy bruising.     Constitution: Negative for chills, fever and night sweats.   HENT: Negative for nosebleeds.    Eyes: Negative for vision loss in left eye and vision loss in right eye.   Respiratory: Negative for hemoptysis.    Gastrointestinal: Negative for hematemesis, hematochezia and melena.   Genitourinary: Negative for hematuria.   Neurological: Negative for focal weakness, numbness and paresthesias.     All other systems reviewed and discussed using a comprehensive questionnaire and are negative.         Past Medical History:   Diagnosis Date   • Anesthesia     ponv   • Anticoagulation monitoring, special range    • CAD (coronary artery disease) 10/09/2018    PCI/ANA (Synergy 3.0 x 28mm) to the OM.   • Cataract     Bilateral IOL   • Chronic atrial fibrillation (HCC)     Managed with rate control and warfarin for anticoagulation    • Coronary atherosclerosis of native coronary artery 12/2002    CABG x 5 (LIMA-D3, SVG-OM, SVG-RCA, SVG-distal LAD, SVG-D1). August 2016: LIMA-D3 and SVG-OM patent (interval occlusion of the SVG-RCA); chronic occluded SVG-LAD, SVG-D1.   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    • MI, old 2002   • Mitral regurgitation 10/2018    Status post MV repair with MitraClip.   • Osteoarthritis    • Pain     right knee, back   • Raynaud's disease    • Spinal stenosis 8/20/2015   • Status post coronary artery bypass grafts x 5 08/2002    LIMA-D3, SVG-LAD, SVG-OM, SVG-RCA, SVG-distal LAD.          Past Surgical History:   Procedure Laterality Date   • TRANSCATHETER MITRAL VALVE REPAIR  10/15/2018    Procedure: TRANSCATHETER MITRAL VALVE REPAIR- WITH POSSIBLE OPEN HEART AND ANY ASSOCIATED PROCEDURES;  Surgeon: Moy Don M.D.;  Location: SURGERY [facility];  Service: Cardiac   • DEVON  10/15/2018    Procedure: DEVON;  Surgeon: Moy Don M.D.;  Location: SURGERY [facility];  Service: Cardiac   • ZZZ CARDIAC CATH  10/2018    PCI to SVG to OM.    • RECOVERY  8/26/2016    Procedure: CATH LAB-DEVON W/LHC W/MARLEY-JO-ANN/GRINSELL-LARGE GROUP;  Surgeon: Recoveryonly Surgery;  Location: SURGERY PRE-POST PROC UNIT [facility];  Service:    • OTHER Bilateral 08/2014    cataract extraction with iols   • MULTIPLE CORONARY ARTERY BYPASS  2002     CABG x 5   • APPENDECTOMY     • GYN SURGERY      c section    • MITRAL VALVE REPLACE      MV clip X1   • TONSILLECTOMY AND ADENOIDECTOMY     • TUBAL LIGATION           Current Outpatient Medications   Medication Sig Dispense Refill   • spironolactone (ALDACTONE) 25 MG Tab Take 0.5 Tablets by mouth every day. 45 Tablet 3   • potassium chloride SA (K-DUR) 10 MEQ Tab CR Take 0.5 Tablets by mouth every day. 45 Tablet 3   • metoprolol SR (TOPROL XL) 25 MG TABLET SR 24 HR Take 1.5 Tablets by mouth every day. 135 Tablet 3   • magnesium oxide (MAG-OX) 400 MG Tab tablet Take 1 Tablet by mouth every day. 90 Tablet 3   • lisinopril (PRINIVIL) 10 MG Tab take 1 tablet by mouth twice a day 180 Tablet 3   • furosemide (LASIX) 20 MG Tab Take 1 Tablet by mouth every day. 90 Tablet 3   • atorvastatin (LIPITOR) 40 MG Tab Take 1 Tablet by mouth every day. 90 Tablet 3  "  • levothyroxine (SYNTHROID) 75 MCG Tab Take 1 Tablet by mouth every morning on an empty stomach. 90 Tablet 3   • aspirin EC (ECOTRIN) 81 MG Tablet Delayed Response Take 81 mg by mouth every day.     • warfarin (COUMADIN) 5 MG Tab Take 5-7.5 mg by mouth every day. 5 mg on , , and . All other days 7.5 mg       No current facility-administered medications for this visit.         Allergies   Allergen Reactions   • Codeine Unspecified     \"I just don't like it. It does weird things to me.\"   • Hydrochlorothiazide Unspecified     Unsure of reaction   • Isosorbide Mononitrate Rash     Rash   • Keflex Rash     Rash   • Sulfa Drugs Rash     rash   • Tape Unspecified     Tears skin off   • Xarelto [Rivaroxaban] Rash     rash         Family History   Problem Relation Age of Onset   • Heart Attack Brother 55        heart attack         Social History     Socioeconomic History   • Marital status:      Spouse name: Not on file   • Number of children: Not on file   • Years of education: Not on file   • Highest education level: Not on file   Occupational History   • Not on file   Tobacco Use   • Smoking status: Former Smoker     Packs/day: 0.50     Years: 40.00     Pack years: 20.00     Quit date: 2002     Years since quittin.1   • Smokeless tobacco: Never Used   Vaping Use   • Vaping Use: Never used   Substance and Sexual Activity   • Alcohol use: Yes     Alcohol/week: 4.2 oz     Types: 7 Cans of beer per week     Comment: daily    • Drug use: No   • Sexual activity: Not on file   Other Topics Concern   • Not on file   Social History Narrative    Retired      Social Determinants of Health     Financial Resource Strain: Not on file   Food Insecurity: Not on file   Transportation Needs: Not on file   Physical Activity: Not on file   Stress: Not on file   Social Connections: Not on file   Intimate Partner Violence: Not on file   Housing Stability: Not on file         Physical " "Exam:  Ambulatory Vitals  /66 (BP Location: Left arm, Patient Position: Sitting, BP Cuff Size: Adult)   Pulse 71   Resp 14   Ht 1.626 m (5' 4\")   Wt 50.8 kg (112 lb)   SpO2 97%    Oxygen Therapy:  Pulse Oximetry: 97 %  BP Readings from Last 4 Encounters:   04/19/22 138/66   11/24/21 130/72   10/19/21 120/64   04/14/21 110/60       Weight/BMI: Body mass index is 19.22 kg/m².  Wt Readings from Last 4 Encounters:   04/19/22 50.8 kg (112 lb)   11/24/21 51.6 kg (113 lb 12.8 oz)   10/19/21 50.8 kg (112 lb)   04/14/21 53.1 kg (117 lb)       General: No apparent distress  Eyes: nl conjunctiva  ENT: OP covered by mask  Neck: JVP 3-4 cm H2O, no carotid bruits  Lungs: normal respiratory effort, CTAB  Heart: RRR,  no murmurs, no rubs or gallops, no edema bilateral lower extremities. No LV/RV heave on cardiac palpatation. 2+ bilateral radial pulses.    Abdomen: soft, non tender, non distended, no masses, normal bowel sounds.  No HSM.  Extremities/MSK: no clubbing, no cyanosis. Cachectic.   Neurological: No focal sensory deficits  Psychiatric: Appropriate affect, A/O x 3, intact judgement and insight  Skin: Warm extremities    Exam repeated in full and unchanged except for as noted above.        Lab Data Review:  Lab Results   Component Value Date/Time    CHOLSTRLTOT 137 02/24/2014 03:05 AM    LDL 67 02/24/2014 03:05 AM    HDL 55 02/24/2014 03:05 AM    TRIGLYCERIDE 74 02/24/2014 03:05 AM       Lab Results   Component Value Date/Time    SODIUM 134 (L) 10/16/2018 02:58 AM    POTASSIUM 3.9 10/16/2018 02:58 AM    CHLORIDE 101 10/16/2018 02:58 AM    CO2 23 10/16/2018 02:58 AM    GLUCOSE 152 (H) 10/16/2018 02:58 AM    BUN 17 10/16/2018 02:58 AM    CREATININE 0.78 10/16/2018 02:58 AM     Lab Results   Component Value Date/Time    ALKPHOSPHAT 53 10/09/2018 06:50 AM    ASTSGOT 28 10/09/2018 06:50 AM    ALTSGPT 28 10/09/2018 06:50 AM    TBILIRUBIN 0.6 10/09/2018 06:50 AM      Lab Results   Component Value Date/Time    WBC 5.5 " 10/16/2018 03:00 AM     No components found for: HBGA1C  No components found for: TROPONIN  No results found for: BNP    [facility] labs 5/7/2019:  hgb 15.1  plt 264  Creatinine 0.8  Bun 12  Potassium 4.4  Sodium 134  LFTs WNL except  ALT 53  TSH 6.23    HDL 51  LDL 62  tg 86    [facility] labs 2/11/2020:  Sodium 138  Potassium 4.3  LFTs WNL  Creatinine 0.7  Bun 13    [facility] labs 6/15/2020:  Sodium 134  Potassium 4.2  LFTs WNL  NT pro BNP 2940  Creatinine 0.7  Bun 10    [facility] 1/7/2021:  Sodium 136  Potassium 4.3  Bun 12  Creatinine 0.7  Mag 2.2  NTproBNP 2340    OS[facility] 10/6/2021:  Sodium 131  Potassium 4.6  Bun 14  Creatinine 0.6  NT-proBNP 2850.     [facility] labs 3/2022:  WNL except sodium 132.       Cardiac Imaging and Procedures Review:    EKG dated 10/19/2018 : My personal interpretation is a fib, lateral infarct, anteroseptal infarct, abnormal t waves consider ischemia.     TTE 1/8/2021:  CONCLUSIONS  Compared to the images of the prior study done 5/12/20, no significant   changes are noted.   Severely reduced left ventricular systolic function. Left ventricular   ejection fraction is visually estimated to be 25%.  Unable to determine diastolic function due to mitral valve repair.  Status post MitraClip. Transvalvular gradient of 4 mmHg, heart rate 75   bpm.  Estimated right ventricular systolic pressure  is 36 mmHg.      Echo dated 5/29/2019:   CONCLUSIONS  Moderately reduced left ventricular systolic function.  Reduced right ventricular systolic function.  Moderately dilated left atrium.  Known mitral valve repair (Mitraclip) that is functioning adequately   with normal gradient and mild to moderate regurgitation.  Moderate (borderline severe) eccentric tricuspid regurgitation.  Estimated right ventricular systolic pressure  is 30 mmHg.  Compared to the images of the study done 11/30/2018, tricuspid   regurgitation is slightly worse.  Otherwise, there is no significant   Change.    LV EF:  35    %    Echo  5/12/2020:  CONCLUSIONS  Severely reduced left ventricular systolic function.  Left ventricular ejection fraction is visually estimated to be 20%.  Global hypokinesis with an aneurysm noted in the LV apex and apical   inferior and septal walls.  Left ventricle is moderately dilated.  Small right ventricle.  Moderately reduced right ventricular systolic function.  Enlarged right atrium.  Severely dilated left atrium.  Known mitral valve repair (mitraclip) with normal transvalvular   gradient of 4mmHg.  Moderate mitral regurgitation.  Moderate tricuspid regurgitation.  Right atrial pressure is estimated to be 3 mmHg.  Estimated right ventricular systolic pressure is 45 mmHg.     Compared to the previous echocardiogram performed on 05/28/19: The LVEF   and wall motion based on side by side comparison does not appear   different.       [facility] (10/9/2018):   IMPRESSION:  1.  3-4+ mitral regurgitation.  2.  Reduced left ventricular systolic function with ejection fraction of 35%,   mid to distal anterior and apical hypokinesis.  3.  Elevated left ventricular end-diastolic pressure.  4.  Coronary artery disease with known occluded saphenous vein graft to the   distal left anterior descending to the diagonal branch and to the right   coronary artery with patent left internal mammary artery to the mid to distal   left anterior descending artery, patent saphenous vein graft to a small obtuse   marginal branch with high-grade ostial proximal graft stenosis.  5.  Successful percutaneous transluminal coronary angioplasty/stent placement   of the ostial to proximal saphenous vein graft to the obtuse marginal branch   with 3.0x28 mm Synergy drug-eluting stent.  6.  Elevated left ventricular end-diastolic pressure.  7.  Elevated right heart pressure with 40 mmHg.    Radiology test Review:  CXR: 10/2018  FINDINGS:  Sternotomy wires demonstrated.  Cardiomegaly.  Mild lung base interstitial opacities consistent with mild interstitial  edema. No consolidation.  No pleural effusion.  No pneumothorax.    Carotid US 2018:   Vascular Laboratory   CONCLUSIONS   Atherosclerosis without stenosis reaching 50% threshold.      Medical Decision Makin. Systolic CHF with reduced left ventricular function (HCC)  NYHA class III, stage C secondary to ischemic cardiomyopathy. LVEF 25%.  Appears euvolemic.   -continue metoprolol XL 37.5mg PO daily. She has previously tried coreg and this hasn't helped her raynauds, so will not  at this time. I am worried about starting a CCB due to symptomatic hypotension.   -continue lisinopril 10mg PO bid for now, if symptomatic hypotension, would decrease this to 10mg PO Daily  -continue spironolactone 12.5mg PO daily  -continue lasix 20mg PO Daily along with kcl 5mEq daily, BMP check in 3 months  -continue magnesium.   -unlikely able to tolerate significantly more medical therapy. We have discussed ICD at three previous visits and she clearly stated she does not want an ICD now or in the future. No need to bring up again.   -lives in [place], [place], would not be a good candidate for entresto given her hypotension and lack of access to quick medical care.     2. S/P mitral valve repair  Now with Moderate MR.  -CTM if symptoms recur.     3. Ischemic cardiomyopathy  LVEF 25%. Her anatomy was discussed with Dr. Don and Talya Anderson and there was no clear further intervention that could be performed by last cath images.   -CTM, known that only 2 grafts are open.     4. Essential hypertension  Adequately controlled    5. Coronary artery disease of bypass graft of native heart with stable angina pectoris (HCC)  Continues with stable angina/VILLEGAS.   -continue aspirin as part of dual therapy at this time given known continued obstructive CAD.   -if angina worsens, would decrease lisinopril to 10mg PO Daily and increase metoprolol. Would unlikely tolerate imdur due to hypotension but could try. Ranexa is another  option.    6. Chronic atrial fibrillation (HCC)  [id number] score of 6. Currently asymptomatic  -continue coumadin, goal INR 2-3. Prefers to stay on coumadin.   -continue coreg for rate control  -If symptoms don't improve, could consider ablation for a fib and heart failure vs admission for dofetilide loading vs starting amiodarone with cardioversion.     7. Non-rheumatic tricuspid valve insufficiency  Repeat echo shows moderate TR 1/2021 and associated with moderate MR.   -CTM, repeat echo in 2023 if she would want to potentially pursue further procedures.     8. Goals of Care - has POLST, DNR/DNI.     9. Hypothyroidism - having difficult getting into PCP. Will refill and manage this for right now.     10. PVD - known stable claudication. Has seen Dr. Mg  -referral for intervention if worsens, could also consider plavix instead of aspirin. cilostazol c/i.       Return in about 6 months (around 10/19/2022).  With RIN Lilly MD, [facility]  [facility], [facility]  [address] [place] [id number]  Phone: [phone]  Fax: [phone]

## 2022-09-10 ENCOUNTER — APPOINTMENT (OUTPATIENT)
Dept: RADIOLOGY | Facility: MEDICAL CENTER | Age: 82
End: 2022-09-10
Attending: EMERGENCY MEDICINE
Payer: COMMERCIAL

## 2022-09-10 ENCOUNTER — HOSPITAL ENCOUNTER (EMERGENCY)
Facility: MEDICAL CENTER | Age: 82
End: 2022-09-10
Attending: EMERGENCY MEDICINE
Payer: COMMERCIAL

## 2022-09-10 VITALS
RESPIRATION RATE: 16 BRPM | BODY MASS INDEX: 18.66 KG/M2 | OXYGEN SATURATION: 94 % | DIASTOLIC BLOOD PRESSURE: 91 MMHG | WEIGHT: 108.69 LBS | TEMPERATURE: 97.5 F | HEART RATE: 87 BPM | SYSTOLIC BLOOD PRESSURE: 159 MMHG

## 2022-09-10 DIAGNOSIS — R11.0 NAUSEA: ICD-10-CM

## 2022-09-10 DIAGNOSIS — S00.03XA CONTUSION OF SCALP, INITIAL ENCOUNTER: ICD-10-CM

## 2022-09-10 LAB
ALBUMIN SERPL BCP-MCNC: 4.5 G/DL (ref 3.2–4.9)
ALBUMIN/GLOB SERPL: 1.6 G/DL
ALP SERPL-CCNC: 88 U/L (ref 30–99)
ALT SERPL-CCNC: 23 U/L (ref 2–50)
ANION GAP SERPL CALC-SCNC: 9 MMOL/L (ref 7–16)
APTT PPP: 35.3 SEC (ref 24.7–36)
AST SERPL-CCNC: 26 U/L (ref 12–45)
BASOPHILS # BLD AUTO: 0.6 % (ref 0–1.8)
BASOPHILS # BLD: 0.03 K/UL (ref 0–0.12)
BILIRUB SERPL-MCNC: 0.6 MG/DL (ref 0.1–1.5)
BUN SERPL-MCNC: 12 MG/DL (ref 8–22)
CALCIUM SERPL-MCNC: 9.2 MG/DL (ref 8.5–10.5)
CHLORIDE SERPL-SCNC: 97 MMOL/L (ref 96–112)
CO2 SERPL-SCNC: 23 MMOL/L (ref 20–33)
CREAT SERPL-MCNC: 0.65 MG/DL (ref 0.5–1.4)
EOSINOPHIL # BLD AUTO: 0.15 K/UL (ref 0–0.51)
EOSINOPHIL NFR BLD: 3.1 % (ref 0–6.9)
ERYTHROCYTE [DISTWIDTH] IN BLOOD BY AUTOMATED COUNT: 50.7 FL (ref 35.9–50)
GFR SERPLBLD CREATININE-BSD FMLA CKD-EPI: 89 ML/MIN/1.73 M 2
GLOBULIN SER CALC-MCNC: 2.8 G/DL (ref 1.9–3.5)
GLUCOSE SERPL-MCNC: 99 MG/DL (ref 65–99)
HCT VFR BLD AUTO: 42.8 % (ref 37–47)
HGB BLD-MCNC: 14.4 G/DL (ref 12–16)
IMM GRANULOCYTES # BLD AUTO: 0.01 K/UL (ref 0–0.11)
IMM GRANULOCYTES NFR BLD AUTO: 0.2 % (ref 0–0.9)
INR PPP: 1.56 (ref 0.87–1.13)
LYMPHOCYTES # BLD AUTO: 1.44 K/UL (ref 1–4.8)
LYMPHOCYTES NFR BLD: 29.9 % (ref 22–41)
MCH RBC QN AUTO: 31.4 PG (ref 27–33)
MCHC RBC AUTO-ENTMCNC: 33.6 G/DL (ref 33.6–35)
MCV RBC AUTO: 93.2 FL (ref 81.4–97.8)
MONOCYTES # BLD AUTO: 0.35 K/UL (ref 0–0.85)
MONOCYTES NFR BLD AUTO: 7.3 % (ref 0–13.4)
NEUTROPHILS # BLD AUTO: 2.83 K/UL (ref 2–7.15)
NEUTROPHILS NFR BLD: 58.9 % (ref 44–72)
NRBC # BLD AUTO: 0 K/UL
NRBC BLD-RTO: 0 /100 WBC
PLATELET # BLD AUTO: 194 K/UL (ref 164–446)
PMV BLD AUTO: 9.6 FL (ref 9–12.9)
POTASSIUM SERPL-SCNC: 4.5 MMOL/L (ref 3.6–5.5)
PROT SERPL-MCNC: 7.3 G/DL (ref 6–8.2)
PROTHROMBIN TIME: 18.3 SEC (ref 12–14.6)
RBC # BLD AUTO: 4.59 M/UL (ref 4.2–5.4)
SODIUM SERPL-SCNC: 129 MMOL/L (ref 135–145)
WBC # BLD AUTO: 4.8 K/UL (ref 4.8–10.8)

## 2022-09-10 PROCEDURE — 99283 EMERGENCY DEPT VISIT LOW MDM: CPT | Mod: 25

## 2022-09-10 PROCEDURE — 85610 PROTHROMBIN TIME: CPT

## 2022-09-10 PROCEDURE — 85025 COMPLETE CBC W/AUTO DIFF WBC: CPT

## 2022-09-10 PROCEDURE — 36415 COLL VENOUS BLD VENIPUNCTURE: CPT

## 2022-09-10 PROCEDURE — 85730 THROMBOPLASTIN TIME PARTIAL: CPT

## 2022-09-10 PROCEDURE — 72125 CT NECK SPINE W/O DYE: CPT

## 2022-09-10 PROCEDURE — 80053 COMPREHEN METABOLIC PANEL: CPT

## 2022-09-10 PROCEDURE — 70450 CT HEAD/BRAIN W/O DYE: CPT

## 2022-09-10 RX ORDER — ONDANSETRON 2 MG/ML
4 INJECTION INTRAMUSCULAR; INTRAVENOUS ONCE
Status: DISCONTINUED | OUTPATIENT
Start: 2022-09-10 | End: 2022-09-11 | Stop reason: HOSPADM

## 2022-09-10 RX ORDER — ACETAMINOPHEN 325 MG/1
650 TABLET ORAL ONCE
Status: DISCONTINUED | OUTPATIENT
Start: 2022-09-10 | End: 2022-09-11 | Stop reason: HOSPADM

## 2022-09-11 NOTE — ED PROVIDER NOTES
"ED Provider Note    Scribed for Savanah Case M.D. by Radha Regalado. 9/10/2022  8:23 PM    Primary care provider: Sourav Swift M.D.  Means of arrival: Walk in   History obtained from: Patient  History limited by: None    CHIEF COMPLAINT  Chief Complaint   Patient presents with    T-5000     Hit her head on the corner of a cabinet on Monday, has been icing her head and neck since then, but the pain feels slightly worse    Nausea     Started at 1600 today    Numbness     To the R side of her head/face, same side she hit her head on Monday       HPI  Daya Woods is a 82 y.o. female who presents to the Emergency Department for head discomfort onset 6 days ago.  6 days ago, the patient was walking through her house and hit the right side of her head on a open cabinet.  No LOC, but she did sustain a bruise to her right forehead.  Since the incident, she has been feeling a pain on the right side and the top of her head. She has been icing her head but 2-day the her pain radiated from the right side of her head to left side of her head, down to the right cheek, and into the back of her neck.  Patient states she has a history of similar neck pain in the past, so there is nothing new or different about the neck pain she experienced today when the head pain \"moved all around her head.\"   She felt nauseous when the head pain started moving around, and she became concerned that \"blood was moving at her head.\"  Daughter states that when the patient placed a heat pack on her head a few days ago, she became very nauseous as well. She states that it feels like the head discomfort is swelling, tight, and that there is a weight on it.  She also describes it as a feeling as if something was moving on her head.  At bedside she states that her scalp feels sore, but has no headache.  Despite nursing triage note, she denies any numbness or tingling.  She is on Warfarin for atrial fibrillation. She denies headache, vomiting, " dizziness, vertigo, chest pain, shortness of breath, palpitations, vision changes, diplopia, blurry vision, difficulty walking, unilateral numbness, tingling, or weakness, dysphagia, difficulty with speech, facial droop or fever. She has been compliant with taking her medications, but has not taken her nighttime meds tonight yet.  She takes her Coumadin and her lisinopril at night.  Her blood pressure last night was 110/65. She has been compliant with taking her other medications, and took some of them this morning. Patient has 2-3 stents in place and has had multiple bypasses.     REVIEW OF SYSTEMS  Pertinent positives include head discomfort, nausea, and neck pain.   Pertinent negatives include no headache, vomiting, dizziness, vertigo, chest pain, shortness of breath, palpitations, vision changes, diplopia, blurry vision, difficulty walking, unilateral numbness, tingling, or weakness, dysphagia, or fever.  See HPI for further details. All other systems are negative.    PAST MEDICAL HISTORY  Past Medical History:   Diagnosis Date    Anesthesia     ponv    Anticoagulation monitoring, special range     CAD (coronary artery disease) 10/09/2018    PCI/ANA (Synergy 3.0 x 28mm) to the OM.    Cataract     Bilateral IOL    Chronic atrial fibrillation (HCC)     Managed with rate control and warfarin for anticoagulation     Coronary atherosclerosis of native coronary artery 12/2002    CABG x 5 (LIMA-D3, SVG-OM, SVG-RCA, SVG-distal LAD, SVG-D1). August 2016: LIMA-D3 and SVG-OM patent (interval occlusion of the SVG-RCA); chronic occluded SVG-LAD, SVG-D1.    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     MI, old 2002    Mitral regurgitation 10/2018    Status post MV repair with MitraClip.     Osteoarthritis     Pain     right knee, back    Raynaud's disease     Spinal stenosis 2015    Status post coronary artery bypass grafts x 5 2002    LIMA-D3, SVG-LAD, SVG-OM, SVG-RCA, SVG-distal LAD.        FAMILY HISTORY  Family History   Problem Relation Age of Onset    Heart Attack Brother 55        heart attack       SOCIAL HISTORY  Social History     Tobacco Use    Smoking status: Former     Packs/day: 0.50     Years: 40.00     Pack years: 20.00     Types: Cigarettes     Quit date: 2002     Years since quittin.5    Smokeless tobacco: Never   Vaping Use    Vaping Use: Never used   Substance Use Topics    Alcohol use: Not Currently     Comment: 1-2 a week    Drug use: No      Social History     Substance and Sexual Activity   Drug Use No       SURGICAL HISTORY  Past Surgical History:   Procedure Laterality Date    TRANSCATHETER MITRAL VALVE REPAIR  10/15/2018    Procedure: TRANSCATHETER MITRAL VALVE REPAIR- WITH POSSIBLE OPEN HEART AND ANY ASSOCIATED PROCEDURES;  Surgeon: Moy Don M.D.;  Location: SURGERY [facility];  Service: Cardiac    EDVON  10/15/2018    Procedure: DEVON;  Surgeon: Moy Don M.D.;  Location: SURGERY [facility];  Service: Cardiac    ZZZ CARDIAC CATH  10/2018    PCI to SVG to OM.     RECOVERY  2016    Procedure: CATH LAB-DEVON W/LHC W/MARLEY-JO-ANN/GRINSELL-LARGE GROUP;  Surgeon: Manuel Surgery;  [place] [facility] [place]  Service:     OTHER Bilateral 2014    cataract extraction with iols    MULTIPLE CORONARY ARTERY BYPASS       CABG x 5    APPENDECTOMY      GYN SURGERY      c section     MITRAL VALVE REPLACE      MV clip X1    TONSILLECTOMY AND ADENOIDECTOMY      TUBAL LIGATION         CURRENT MEDICATIONS  Home Medications       Reviewed by Nilsa Martell R.N. (Registered Nurse) on 09/10/22 at 1754  Med List Status: Partial     Medication Last Dose Status   aspirin EC (ECOTRIN) 81 MG Tablet Delayed Response  Active  "  atorvastatin (LIPITOR) 40 MG Tab  Active   furosemide (LASIX) 20 MG Tab  Active   levothyroxine (SYNTHROID) 75 MCG Tab  Active   lisinopril (PRINIVIL) 10 MG Tab  Active   magnesium oxide (MAG-OX) 400 MG Tab tablet  Active   metoprolol SR (TOPROL XL) 25 MG TABLET SR 24 HR  Active   potassium chloride SA (K-DUR) 10 MEQ Tab CR  Active   spironolactone (ALDACTONE) 25 MG Tab  Active   warfarin (COUMADIN) 5 MG Tab  Active                    ALLERGIES  Allergies   Allergen Reactions    Codeine Unspecified     \"I just don't like it. It does weird things to me.\"    Hydrochlorothiazide Unspecified     Unsure of reaction    Isosorbide Mononitrate Rash     Rash    Keflex Rash     Rash    Sulfa Drugs Rash     rash    Tape Unspecified     Tears skin off    Xarelto [Rivaroxaban] Rash     rash       PHYSICAL EXAM  VITAL SIGNS: BP (!) 184/105   Pulse 65   Temp 36.4 °C (97.5 °F) (Temporal)   Resp 18   Wt 49.3 kg (108 lb 11 oz)   LMP  (LMP Unknown)   SpO2 98%   BMI 18.66 kg/m²      Constitutional: Well developed, well nourished; No acute distress; Non-toxic appearance.   HENT: Old appearing bruise to the right forehead that has varying stages of healing and coloration, No significant tenderness to the scalp, Normocephalic; Bilateral external ears normal; Oropharyngeal exam deferred to COVID-19 outbreak and lack of oropharyngeal complaints.   Eyes: PERRL, EOMI, Conjunctiva normal. No discharge.   Neck: No midline c-spine tenderness, Supple; No stridor; No nuchal rigidity.   Lymphatic: No cervical lymphadenopathy noted.   Cardiovascular: Regular rate and Irregularly irregular rhythm without murmurs, rubs, or gallop.   Thorax & Lungs: No respiratory distress, breath sounds clear to auscultation bilaterally without wheezing, rales or rhonchi. Nontender chest wall. No crepitus or subcutaneous air  Abdomen: Soft, nontender, bowel sounds normal. No obvious masses; No pulsatile masses; no rebound, guarding, or peritoneal signs. "   Skin: Good color; warm and dry without rash or petechia.  Back: Nontender, No CVA tenderness.   Extremities: Distal radial, dorsalis pedis, posterior tibial pulses are equal bilaterally; No edema; Nontender calves or saphenous, No cyanosis, No clubbing.   Musculoskeletal: Good range of motion in all major joints. No tenderness to palpation or major deformities noted.   Neurologic: Alert & oriented x 4, clear speech.  Cranial nerves II through XII are intact.  Extraocular movements are intact.  No drift of upper or lower extremities.  Lower extremity strength are 5 out of 5 and equal bilaterally with testing of dorsiflexors and plantar flexors.   are 5 out of 5 and equal bilaterally.  Sensation intact to light touch throughout.  No ataxia with finger-to-nose.      LABS/RADIOLOGY/PROCEDURES  Results for orders placed or performed during the hospital encounter of 09/10/22   CBC WITH DIFFERENTIAL   Result Value Ref Range    WBC 4.8 4.8 - 10.8 K/uL    RBC 4.59 4.20 - 5.40 M/uL    Hemoglobin 14.4 12.0 - 16.0 g/dL    Hematocrit 42.8 37.0 - 47.0 %    MCV 93.2 81.4 - 97.8 fL    MCH 31.4 27.0 - 33.0 pg    MCHC 33.6 33.6 - 35.0 g/dL    RDW 50.7 (H) 35.9 - 50.0 fL    Platelet Count 194 164 - 446 K/uL    MPV 9.6 9.0 - 12.9 fL    Neutrophils-Polys 58.90 44.00 - 72.00 %    Lymphocytes 29.90 22.00 - 41.00 %    Monocytes 7.30 0.00 - 13.40 %    Eosinophils 3.10 0.00 - 6.90 %    Basophils 0.60 0.00 - 1.80 %    Immature Granulocytes 0.20 0.00 - 0.90 %    Nucleated RBC 0.00 /100 WBC    Neutrophils (Absolute) 2.83 2.00 - 7.15 K/uL    Lymphs (Absolute) 1.44 1.00 - 4.80 K/uL    Monos (Absolute) 0.35 0.00 - 0.85 K/uL    Eos (Absolute) 0.15 0.00 - 0.51 K/uL    Baso (Absolute) 0.03 0.00 - 0.12 K/uL    Immature Granulocytes (abs) 0.01 0.00 - 0.11 K/uL    NRBC (Absolute) 0.00 K/uL   COMP METABOLIC PANEL   Result Value Ref Range    Sodium 129 (L) 135 - 145 mmol/L    Potassium 4.5 3.6 - 5.5 mmol/L    Chloride 97 96 - 112 mmol/L    Co2 23  "20 - 33 mmol/L    Anion Gap 9.0 7.0 - 16.0    Glucose 99 65 - 99 mg/dL    Bun 12 8 - 22 mg/dL    Creatinine 0.65 0.50 - 1.40 mg/dL    Calcium 9.2 8.5 - 10.5 mg/dL    AST(SGOT) 26 12 - 45 U/L    ALT(SGPT) 23 2 - 50 U/L    Alkaline Phosphatase 88 30 - 99 U/L    Total Bilirubin 0.6 0.1 - 1.5 mg/dL    Albumin 4.5 3.2 - 4.9 g/dL    Total Protein 7.3 6.0 - 8.2 g/dL    Globulin 2.8 1.9 - 3.5 g/dL    A-G Ratio 1.6 g/dL   PROTHROMBIN TIME (INR)   Result Value Ref Range    PT 18.3 (H) 12.0 - 14.6 sec    INR 1.56 (H) 0.87 - 1.13   APTT   Result Value Ref Range    APTT 35.3 24.7 - 36.0 sec   ESTIMATED GFR   Result Value Ref Range    GFR (CKD-EPI) 89 >60 mL/min/1.73 m 2           CT-HEAD W/O   Final Result         1.  No acute intracranial process.      2. Diffuse atrophy and periventricular white matter changes consistent with chronic small vessel disease.      CT-CSPINE WITHOUT PLUS RECONS   Final Result      1.  No acute fracture is identified      2.  Severe multilevel degenerative disc disease and facet arthropathy      3.  Mild retrolisthesis at C2-C3 and C5-C6.      4.  Mild anterolisthesis at C3-C4 and C4-C5          COURSE & MEDICAL DECISION MAKING  Pertinent Labs & Imaging studies reviewed. (See chart for details)    8:23 PM - Patient seen and examined at bedside. Discussed plan of care, including labs and imaging. Patient agrees to the plan of care. The patient will be medicated with Zofran 4 mg. Ordered for labs and imaging to evaluate her symptoms.     Patient presents to the ER with complaints of a discomfort on her head that \"moved from the right side of her head, to the top of her head, and over to the left side of her head\" with associated nausea and vomiting.  Symptoms began prior to arrival.  They lasted about 30 minutes.  She says she felt like \"blood was moving around in her head.\"  She denies key headache.  No dizziness.  No vertigo.  No visual changes.  No complaints of numbness, tingling or weakness of " "extremities.  No facial drooping or slurred speech.  Patient hit her head against a open cabinet 6 days ago.  She is on Coumadin.  She sustained a bruise to her right forehead.  She was told that the blood from the bruise could \"move around her head.\"  She has been icing her head trying to keep the blood from moving around.  Nursing notes report that the patient had numbness to her head.  The patient denies numbness to me.  She says rather that the discomfort was more of a tightness in the head and a feeling as if something was \"moving around in the head.\"  Since she is on blood thinners and hit her head 6 days ago, she was very concerned that the blood was moving to a different part of her head and that she was bleeding in her brain.  She has no stroke symptoms.  Her neurologic exam is completely normal.  No evidence of CVA at this time.  CT brain was performed.  It is negative for any acute head bleed.  She is on Coumadin.  INR is 1.56.  She follows with the anticoagulation clinic at the [facility].  Her daughter says they will contact the anticoagulation nurse on Monday morning to let her know what the level is.  She ate spinach yesterday and has not taken her Coumadin tonight.  She feels that that could be why her levels a little bit low.  It was 2.8 earlier this week.  No chest pains or shortness of breath.  I asked the patient if she was at all worried about her heart, and the patient states she is not worried about her heart. She says she was worried about her head and was worried she was having a head bleed due to the recent head injury.  Daughter agrees and daughter also says that she is not at all concerned about the patient's heart at this time.  Patient denies headache.  No fevers or chills.  No nuchal rigidity.  No concerns for meningitis.  Again, she had no headache.  This was as a strange moving type sensation in her head and on her scalp.  It has since resolved.  She is no longer nauseated.  CT " C-spine is negative.  No fracture.  She does not have any vertigo or diplopia.  No ataxia.  I do not think this is vertebral artery dissection.  Patient's vitals are normal and stable.  She was a little hypertensive upon arrival but she was rather anxious when I initially evaluated her.  Blood pressure came down nicely on its own to 159/91.  She is post to take her nighttime lisinopril.  Blood pressure was 110 systolic yesterday per daughter.  At this time I think the patient is safe and stable for outpatient management discharge home.  Patient and her daughter are very anxious to get home.  Patient says she is hungry.  She has not eaten in 8 hours.  She wants to go home and eat.  She wants to go home and take her nighttime medicines.  Patient is from [place] but she will be staying with her daughter here in [place] for the next few days.  Patient and daughter been given very strict return precautions and discharge instructions.  They understand if she gets worse in any way she must return to the ER immediately.  Upon discharge patient is completely symptom-free and feels fine.  She wants to go home.  Patient and daughter understand treatment plan and follow-up.    FINAL IMPRESSION  1. Contusion of scalp, initial encounter Acute   2. Nausea Acute         Radha CARLOS (Ena), am scribing for, and in the presence of, Savanah Case M.D..    Electronically signed by: Radha Regalado (Ena), 9/10/2022    Savanah CARLOS M.D. personally performed the services described in this documentation, as scribed by Radha Regalado in my presence, and it is both accurate and complete. C.     This dictation has been created using voice recognition software. The accuracy of the dictation is limited by the abilities of the software. I expect there may be some errors of grammar and possibly content. I made every attempt to manually correct the errors within my dictation. However, errors related to voice recognition software may  still exist and should be interpreted within the appropriate context.    The note accurately reflects work and decisions made by me.  Savanah Case M.D.  9/10/2022  10:58 PM

## 2022-09-11 NOTE — DISCHARGE INSTRUCTIONS
Return to the ER for any worsening head discomfort, headache, dizziness or vertigo, visual changes, double vision, feeling of being off balance, worsening neck pain, numbness/tingling/weakness of extremities, facial drooping, slurred speech, chest pain, shortness of breath, or for any concerns.    Be sure that you contact your anticoagulation nurse at the [facility] first thing Monday morning to let her know that your INR is 1.56.  This is likely lower than she would like you and she may want to readjust your Coumadin dose temporarily.  You should get repeat INR on Monday if possible.     Follow-up with your primary care physician on Monday.  Please call for appointment.

## 2022-09-11 NOTE — ED TRIAGE NOTES
Ambulate to triage  Chief Complaint   Patient presents with    T-5000     Hit her head on the corner of a cabinet on Monday, has been icing her head and neck since then, but the pain feels slightly worse    Nausea     Started at 1600 today    Numbness     To the R side of her head/face, same side she hit her head on Monday     Nausea no vomiting, has a bruise to the R side of her forehead, takes coumadin.

## 2022-09-26 DIAGNOSIS — I25.810 CORONARY ARTERY DISEASE INVOLVING CORONARY BYPASS GRAFT OF NATIVE HEART WITHOUT ANGINA PECTORIS: ICD-10-CM

## 2022-09-26 DIAGNOSIS — I36.1 NON-RHEUMATIC TRICUSPID VALVE INSUFFICIENCY: ICD-10-CM

## 2022-09-26 DIAGNOSIS — E78.5 DYSLIPIDEMIA: ICD-10-CM

## 2022-09-26 DIAGNOSIS — I73.9 PERIPHERAL VASCULAR DISEASE (HCC): ICD-10-CM

## 2022-09-26 DIAGNOSIS — I48.20 CHRONIC ATRIAL FIBRILLATION (HCC): ICD-10-CM

## 2022-09-26 DIAGNOSIS — I10 ESSENTIAL HYPERTENSION: ICD-10-CM

## 2022-09-26 DIAGNOSIS — I50.20 SYSTOLIC CHF WITH REDUCED LEFT VENTRICULAR FUNCTION, NYHA CLASS 2 (HCC): ICD-10-CM

## 2022-09-26 DIAGNOSIS — E03.9 HYPOTHYROIDISM, UNSPECIFIED TYPE: ICD-10-CM

## 2022-10-03 NOTE — PROGRESS NOTES
"Chief Complaint   Patient presents with    Hypertension    Congestive Heart Failure     F/v dx: Systolic CHF with reduced left ventricular function, NYHA class 2 (HCC)    Coronary Artery Disease     F/v dx: Coronary artery disease involving coronary bypass graft of native heart without angina pectoris       Subjective:   Daya Woods is a 82 y.o. female who presents today for follow up with her daughter Lavinia.     Patient of Dr. Lilly.Current medical problems include CAD s/p 5-vessel CABG in 2002 now with now 2/5 grafts open by cardiac catheterization 8/26/16 (LIMA-diag patent, SVG-OM status post PCI 10/9/2018, all others occluded), ischemic CMO, HFrEF, chronic atrial fibrillation, hypertension, dyslipidemia, functional mitral regurgitation status post Mitraclip procedure on 10/15/2018.     Daya has been feeling well. She had 1 visit to the ER last month because she hit her head on a cabinet door and felt dizzy afterwards. Her CT scans were normal, her INR at that time was 1.5. She denies any trips or falls recently. Her INR is checked up in [place] and she recalls it is \"in range\". She is still taking aspirin and has been on both for years. She has peripheral arterial disease in her left leg but no symptoms with this.     She denies dyspnea on exertion, angina, leg swelling or orthopnea. She is able to do her ADLs without limitations. Daya occasionally gets floaters in her eyes along with some lightheadedness and she finds that eating a bag of potato chips helps this.  She thinks it is because her sodium is low.    She is trying to gain weight. She has a good appetite. No chronic diarrhea or nausea/vomiting.   Past Medical History:   Diagnosis Date    Anesthesia     ponv    Anticoagulation monitoring, special range     CAD (coronary artery disease) 10/09/2018    PCI/ANA (Synergy 3.0 x 28mm) to the OM.    Cataract     Bilateral IOL    Chronic atrial fibrillation (HCC)     Managed with rate control and " warfarin for anticoagulation     Coronary atherosclerosis of native coronary artery 12/2002    CABG x 5 (LIMA-D3, SVG-OM, SVG-RCA, SVG-distal LAD, SVG-D1). August 2016: LIMA-D3 and SVG-OM patent (interval occlusion of the SVG-RCA); chronic occluded SVG-LAD, SVG-D1.    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     MI, old 2002    Mitral regurgitation 10/2018    Status post MV repair with MitraClip.    Osteoarthritis     Pain     right knee, back    Raynaud's disease     Spinal stenosis 8/20/2015    Status post coronary artery bypass grafts x 5 08/2002    LIMA-D3, SVG-LAD, SVG-OM, SVG-RCA, SVG-distal LAD.      Past Surgical History:   Procedure Laterality Date    TRANSCATHETER MITRAL VALVE REPAIR  10/15/2018    Procedure: TRANSCATHETER MITRAL VALVE REPAIR- WITH POSSIBLE OPEN HEART AND ANY ASSOCIATED PROCEDURES;  Surgeon: Moy Don M.D.;  Location: SURGERY [facility];  Service: Cardiac    DEVON  10/15/2018    Procedure: DEVON;  Surgeon: Moy Don M.D.;  Location: SURGERY [facility];  Service: Cardiac    [facility] CARDIAC CATH  10/2018    PCI to SVG to OM.     RECOVERY  8/26/2016    Procedure: CATH LAB-DEVON W/LHC W/POSSIBLE-JO-ANN/GRINSELL-LARGE GROUP;  Surgeon: Recoveryonly Surgery;  Location: SURGERY PRE-POST PROC UNIT [facility] [place];  Service:     OTHER Bilateral 08/2014    cataract extraction with iols    MULTIPLE CORONARY ARTERY BYPASS  2002     CABG x 5    APPENDECTOMY      GYN SURGERY      c section     MITRAL VALVE REPLACE      MV clip X1    TONSILLECTOMY AND ADENOIDECTOMY      TUBAL LIGATION       Family History   Problem Relation Age of Onset    Heart Attack Brother 55        heart attack     Social History     Socioeconomic History    Marital status:      Spouse name: Not on  "file    Number of children: Not on file    Years of education: Not on file    Highest education level: Not on file   Occupational History    Not on file   Tobacco Use    Smoking status: Former     Packs/day: 0.50     Years: 40.00     Pack years: 20.00     Types: Cigarettes     Quit date: 2002     Years since quittin.6    Smokeless tobacco: Never   Vaping Use    Vaping Use: Never used   Substance and Sexual Activity    Alcohol use: Not Currently     Comment: 1-2 a week    Drug use: No    Sexual activity: Not on file   Other Topics Concern    Not on file   Social History Narrative    Retired      Social Determinants of Health     Financial Resource Strain: Not on file   Food Insecurity: Not on file   Transportation Needs: Not on file   Physical Activity: Not on file   Stress: Not on file   Social Connections: Not on file   Intimate Partner Violence: Not on file   Housing Stability: Not on file     Allergies   Allergen Reactions    Codeine Unspecified     \"I just don't like it. It does weird things to me.\"    Hydrochlorothiazide Unspecified     Unsure of reaction    Isosorbide Mononitrate Rash     Rash    Keflex Rash     Rash    Sulfa Drugs Rash     rash    Tape Unspecified     Tears skin off    Xarelto [Rivaroxaban] Rash     rash     Outpatient Encounter Medications as of 10/5/2022   Medication Sig Dispense Refill    alendronate (FOSAMAX) 70 MG Tab take 1 tablet by mouth every week ON AN EMPTY STOMACH WITH 6-8 OZ...  (REFER TO PRESCRIPTION NOTES).      atorvastatin (LIPITOR) 40 MG Tab Take 1 Tablet by mouth every day. 90 Tablet 3    furosemide (LASIX) 20 MG Tab Take 1 Tablet by mouth every day. 90 Tablet 3    lisinopril (PRINIVIL) 10 MG Tab take 1 tablet by mouth twice a day 180 Tablet 3    magnesium oxide (MAG-OX) 400 MG Tab tablet Take 1 Tablet by mouth every day. 90 Tablet 3    metoprolol SR (TOPROL XL) 25 MG TABLET SR 24 HR Take 1.5 Tablets by mouth every day. 135 Tablet 3    potassium " "chloride SA (K-DUR) 10 MEQ Tab CR Take 0.5 Tablets by mouth every day. 45 Tablet 3    spironolactone (ALDACTONE) 25 MG Tab Take 0.5 Tablets by mouth every day. 45 Tablet 3    levothyroxine (SYNTHROID) 75 MCG Tab Take 1 Tablet by mouth every morning on an empty stomach. 90 Tablet 3    aspirin EC (ECOTRIN) 81 MG Tablet Delayed Response Take 81 mg by mouth every day.      warfarin (COUMADIN) 5 MG Tab Take 5-7.5 mg by mouth every day. 5 mg on Mondays, wednesdays, and Fridays. All other days 7.5 mg       No facility-administered encounter medications on file as of 10/5/2022.     Review of Systems   Constitutional:  Negative for malaise/fatigue.        No weight gain   Eyes:  Positive for blurred vision.   Respiratory:  Negative for shortness of breath.    Cardiovascular:  Negative for chest pain, palpitations, orthopnea, leg swelling and PND.   Gastrointestinal:  Negative for blood in stool, diarrhea, melena and nausea.        No abdominal bloating, no early satiety   Musculoskeletal:  Negative for falls.   Neurological:  Negative for loss of consciousness.        Occasional lightheadedness       Objective:   /70 (BP Location: Left arm, Patient Position: Sitting, BP Cuff Size: Adult)   Pulse 70   Resp 16   Ht 1.626 m (5' 4\")   Wt 49 kg (108 lb)   LMP  (LMP Unknown)   SpO2 97%   BMI 18.54 kg/m²     Physical Exam  Vitals reviewed.   Constitutional:       General: She is not in acute distress.     Appearance: She is well-developed. She is not diaphoretic.   HENT:      Head: Normocephalic and atraumatic.   Neck:      Vascular: No JVD.      Comments: JVP ~4-5cm  Cardiovascular:      Rate and Rhythm: Normal rate. Rhythm irregular.      Heart sounds: No murmur heard.     Comments: 1+ left radial pulse, otherwise distal pulse in right radial and lower extremities are normal  Pulmonary:      Effort: Pulmonary effort is normal. No respiratory distress.      Breath sounds: Normal breath sounds. No wheezing or rales. "   Abdominal:      General: There is no distension.   Musculoskeletal:      Cervical back: Neck supple.   Lymphadenopathy:      Cervical: No cervical adenopathy.   Skin:     General: Skin is warm and dry.      Coloration: Skin is not pale.   Neurological:      Mental Status: She is alert and oriented to person, place, and time.      Gait: Gait normal.   Psychiatric:         Judgment: Judgment normal.        [facility] (10/9/2018):   IMPRESSION:  1.  3-4+ mitral regurgitation.  2.  Reduced left ventricular systolic function with ejection fraction of 35%,   mid to distal anterior and apical hypokinesis.  3.  Elevated left ventricular end-diastolic pressure.  4.  Coronary artery disease with known occluded saphenous vein graft to the distal left anterior descending to the diagonal branch and to the right coronary artery with patent left internal mammary artery to the mid to distal left anterior descending artery, patent saphenous vein graft to a small obtuse marginal branch with high-grade ostial proximal graft stenosis.  5.  Successful percutaneous transluminal coronary angioplasty/stent placement of the ostial to proximal saphenous vein graft to the obtuse marginal branch with 3.0x28 mm Synergy drug-eluting stent.  6.  Elevated left ventricular end-diastolic pressure.  7.  Elevated right heart pressure with 40 mmHg.    TTE 1/6/21  CONCLUSIONS  Compared to the images of the prior study done 5/12/20, no significant   changes are noted.   Severely reduced left ventricular systolic function. Left ventricular   ejection fraction is visually estimated to be 25%.  Unable to determine diastolic function due to mitral valve repair.  Status post MitraClip. Transvalvular gradient of 4 mmHg, heart rate 75 bpm.  Estimated right ventricular systolic pressure  is 36 mmHg.  Left Ventricle  Left ventricle is normal in size. Normal left ventricular wall   thickness. Severely reduced left ventricular systolic function. Left   ventricular  ejection fraction is visually estimated to be 25%. Global   hypokinesis. [place] is aneurysmal. Akinesis of the basal segment of the   anteroseptal wall. Akinesis of the basal segment of the inferoseptal   wall. Unable to determine diastolic function due to mitral valve   repair.     Right Ventricle  Right ventricle not well visualized.     Right Atrium  Enlarged right atrium. Normal inferior vena cava size and inspiratory   collapse.     Left Atrium  Severely dilated left atrium. Left atrial volume index is 70 mL/sq m.     Mitral Valve  Status post MitraClip. Transvalvular gradient of 4 mmHg, heart rate 75   bpm. Moderate mitral regurgitation.     Aortic Valve  Aortic sclerosis without stenosis. No aortic insufficiency.     Tricuspid Valve  Structurally normal tricuspid valve. No tricuspid stenosis. Mild   tricuspid regurgitation. Estimated right ventricular systolic pressure    is 36 mmHg.     Pulmonic Valve  The pulmonic valve is not well visualized. No pulmonic stenosis.   Moderate pulmonic insufficiency.     Pericardium  Normal pericardium without effusion.     Aorta  Normal aortic root for body surface area. Ascending aorta diameter is   2.5 cm.    Assessment:     1. Hyponatremia  Basic Metabolic Panel      2. Chronic atrial fibrillation (HCC)        3. Secondary hypercoagulable state (HCC)        4. Peripheral vascular disease (HCC)        5. Ischemic cardiomyopathy  Basic Metabolic Panel    CBC WITHOUT DIFFERENTIAL      6. Coronary artery disease involving coronary bypass graft of native heart without angina pectoris        7. Systolic CHF with reduced left ventricular function, NYHA class 2 (HCC)        8. DNR (do not resuscitate)        9. High risk medication use            Medical Decision Making:  Today's Assessment / Status / Plan:   Chronic HFrEF, stage C, NYHA class II  Ischemic cardiomyopathy.   - LVEF 25% based on echo Jan 2021  - euvolemic to dry on exam  -  metoprolol 37.5mg  -continue lisinopril  10mg PO  -continue spironolactone 12.5mg PO daily  -continue lasix 20mg PO Daily along with kcl 10mEq daily, SCr and electrolytes stable  -continue magnesium   - Previously discussed risk of ventricular arrhythmias, SCD in the setting of ischemic CMO, she is not interested in any device at this time    S/P mitral valve clip  -  moderate MR, normal gradient across valve, asymptomatic     Coronary artery disease of bypass graft of native heart without angina  - known that only 2 grafts are open.   - discussed risks of aspirin in addition to the warfarin. She wound like to continue using both understanding the risk of bleeding.   -I previously reviewed films with interventionalist who recommended medical therapy only .       Chronic atrial fibrillation   [id number] score of 6. asymptomatic  -continue coumadin, goal INR 2-3, INR monitored in [place]  -Metop for rate control  - previously we discussed rhythm control and how it may help her EF, she again is not interested in any procedures     Tricuspid valve insufficiency  - cont lasix 20mg    Hyponatremia, mild, stable  - option to stop the spironolactone as her symptoms of lightheadedness that improve with salt and water intake may be more related to hypotension/dehydration. She is happy with how she is feeling and will continue current regimen. BMP ordered for surveillance    RTC in 6mo, labs prior. Sooner if problems

## 2022-10-05 ENCOUNTER — OFFICE VISIT (OUTPATIENT)
Dept: CARDIOLOGY | Facility: MEDICAL CENTER | Age: 82
End: 2022-10-05
Payer: COMMERCIAL

## 2022-10-05 VITALS
BODY MASS INDEX: 18.44 KG/M2 | HEART RATE: 70 BPM | SYSTOLIC BLOOD PRESSURE: 110 MMHG | DIASTOLIC BLOOD PRESSURE: 70 MMHG | HEIGHT: 64 IN | WEIGHT: 108 LBS | RESPIRATION RATE: 16 BRPM | OXYGEN SATURATION: 97 %

## 2022-10-05 DIAGNOSIS — Z79.899 HIGH RISK MEDICATION USE: ICD-10-CM

## 2022-10-05 DIAGNOSIS — I25.810 CORONARY ARTERY DISEASE INVOLVING CORONARY BYPASS GRAFT OF NATIVE HEART WITHOUT ANGINA PECTORIS: ICD-10-CM

## 2022-10-05 DIAGNOSIS — I25.5 ISCHEMIC CARDIOMYOPATHY: ICD-10-CM

## 2022-10-05 DIAGNOSIS — D68.69 SECONDARY HYPERCOAGULABLE STATE (HCC): ICD-10-CM

## 2022-10-05 DIAGNOSIS — I73.9 PERIPHERAL VASCULAR DISEASE (HCC): ICD-10-CM

## 2022-10-05 DIAGNOSIS — E87.1 HYPONATREMIA: ICD-10-CM

## 2022-10-05 DIAGNOSIS — Z66 DO NOT RESUSCITATE STATUS: ICD-10-CM

## 2022-10-05 DIAGNOSIS — I50.20 SYSTOLIC CHF WITH REDUCED LEFT VENTRICULAR FUNCTION, NYHA CLASS 2 (HCC): ICD-10-CM

## 2022-10-05 DIAGNOSIS — I48.20 CHRONIC ATRIAL FIBRILLATION (HCC): ICD-10-CM

## 2022-10-05 PROCEDURE — 99214 OFFICE O/P EST MOD 30 MIN: CPT | Performed by: PHYSICIAN ASSISTANT

## 2022-10-05 RX ORDER — ALENDRONATE SODIUM 70 MG/1
TABLET ORAL
COMMUNITY
Start: 2022-08-21 | End: 2022-12-13 | Stop reason: SINTOL

## 2022-10-05 ASSESSMENT — ENCOUNTER SYMPTOMS
ROS GI COMMENTS: NO ABDOMINAL BLOATING, NO EARLY SATIETY
PND: 0
BLOOD IN STOOL: 0
BLURRED VISION: 1
PALPITATIONS: 0
NAUSEA: 0
SHORTNESS OF BREATH: 0
LOSS OF CONSCIOUSNESS: 0
FALLS: 0
ORTHOPNEA: 0
DIARRHEA: 0

## 2022-10-05 ASSESSMENT — FIBROSIS 4 INDEX: FIB4 SCORE: 2.24

## 2022-12-13 ENCOUNTER — OFFICE VISIT (OUTPATIENT)
Dept: CARDIOLOGY | Facility: MEDICAL CENTER | Age: 82
End: 2022-12-13
Payer: COMMERCIAL

## 2022-12-13 ENCOUNTER — APPOINTMENT (OUTPATIENT)
Dept: RADIOLOGY | Facility: MEDICAL CENTER | Age: 82
DRG: 280 | End: 2022-12-13
Attending: EMERGENCY MEDICINE
Payer: COMMERCIAL

## 2022-12-13 ENCOUNTER — HOSPITAL ENCOUNTER (INPATIENT)
Facility: MEDICAL CENTER | Age: 82
LOS: 3 days | DRG: 280 | End: 2022-12-16
Attending: EMERGENCY MEDICINE | Admitting: STUDENT IN AN ORGANIZED HEALTH CARE EDUCATION/TRAINING PROGRAM
Payer: COMMERCIAL

## 2022-12-13 VITALS
BODY MASS INDEX: 19.12 KG/M2 | WEIGHT: 112 LBS | OXYGEN SATURATION: 98 % | HEIGHT: 64 IN | HEART RATE: 78 BPM | SYSTOLIC BLOOD PRESSURE: 140 MMHG | RESPIRATION RATE: 14 BRPM | DIASTOLIC BLOOD PRESSURE: 78 MMHG

## 2022-12-13 DIAGNOSIS — I50.20 SYSTOLIC CHF WITH REDUCED LEFT VENTRICULAR FUNCTION, NYHA CLASS 2 (HCC): ICD-10-CM

## 2022-12-13 DIAGNOSIS — I25.700 CORONARY ARTERY DISEASE INVOLVING CORONARY BYPASS GRAFT OF NATIVE HEART WITH UNSTABLE ANGINA PECTORIS (HCC): ICD-10-CM

## 2022-12-13 DIAGNOSIS — Z95.5 STENTED CORONARY ARTERY: ICD-10-CM

## 2022-12-13 DIAGNOSIS — I73.9 PERIPHERAL VASCULAR DISEASE (HCC): ICD-10-CM

## 2022-12-13 DIAGNOSIS — I24.9 ACUTE CORONARY SYNDROME (HCC): ICD-10-CM

## 2022-12-13 DIAGNOSIS — I20.0 UNSTABLE ANGINA (HCC): ICD-10-CM

## 2022-12-13 DIAGNOSIS — I25.5 ISCHEMIC CARDIOMYOPATHY: ICD-10-CM

## 2022-12-13 DIAGNOSIS — I25.9 ISCHEMIC HEART DISEASE DUE TO CORONARY ARTERY OBSTRUCTION (HCC): ICD-10-CM

## 2022-12-13 DIAGNOSIS — E78.5 DYSLIPIDEMIA: ICD-10-CM

## 2022-12-13 DIAGNOSIS — D68.69 SECONDARY HYPERCOAGULABLE STATE (HCC): ICD-10-CM

## 2022-12-13 DIAGNOSIS — R07.2 PRECORDIAL CHEST PAIN: ICD-10-CM

## 2022-12-13 DIAGNOSIS — I36.1 NON-RHEUMATIC TRICUSPID VALVE INSUFFICIENCY: ICD-10-CM

## 2022-12-13 DIAGNOSIS — I24.0 ISCHEMIC HEART DISEASE DUE TO CORONARY ARTERY OBSTRUCTION (HCC): ICD-10-CM

## 2022-12-13 DIAGNOSIS — I10 ESSENTIAL HYPERTENSION: ICD-10-CM

## 2022-12-13 DIAGNOSIS — I48.20 CHRONIC ATRIAL FIBRILLATION (HCC): ICD-10-CM

## 2022-12-13 DIAGNOSIS — Z95.1 STATUS POST CORONARY ARTERY BYPASS GRAFTS X 5: ICD-10-CM

## 2022-12-13 DIAGNOSIS — Z79.01 CHRONIC ANTICOAGULATION: ICD-10-CM

## 2022-12-13 PROBLEM — I21.4 NSTEMI (NON-ST ELEVATED MYOCARDIAL INFARCTION) (HCC): Status: ACTIVE | Noted: 2022-12-13

## 2022-12-13 PROBLEM — I16.0 HYPERTENSIVE URGENCY: Status: ACTIVE | Noted: 2022-12-13

## 2022-12-13 PROBLEM — I50.23 ACUTE ON CHRONIC SYSTOLIC CHF (CONGESTIVE HEART FAILURE), NYHA CLASS 3 (HCC): Status: ACTIVE | Noted: 2022-12-13

## 2022-12-13 PROBLEM — E87.1 HYPONATREMIA: Status: ACTIVE | Noted: 2022-12-13

## 2022-12-13 LAB
ALBUMIN SERPL BCP-MCNC: 4.3 G/DL (ref 3.2–4.9)
ALBUMIN/GLOB SERPL: 1.3 G/DL
ALP SERPL-CCNC: 98 U/L (ref 30–99)
ALT SERPL-CCNC: 30 U/L (ref 2–50)
ANION GAP SERPL CALC-SCNC: 11 MMOL/L (ref 7–16)
APTT PPP: 55.7 SEC (ref 24.7–36)
AST SERPL-CCNC: 38 U/L (ref 12–45)
BASOPHILS # BLD AUTO: 0.4 % (ref 0–1.8)
BASOPHILS # BLD: 0.02 K/UL (ref 0–0.12)
BILIRUB SERPL-MCNC: 0.5 MG/DL (ref 0.1–1.5)
BUN SERPL-MCNC: 12 MG/DL (ref 8–22)
CALCIUM ALBUM COR SERPL-MCNC: 9.3 MG/DL (ref 8.5–10.5)
CALCIUM SERPL-MCNC: 9.5 MG/DL (ref 8.5–10.5)
CHLORIDE SERPL-SCNC: 94 MMOL/L (ref 96–112)
CO2 SERPL-SCNC: 26 MMOL/L (ref 20–33)
CREAT SERPL-MCNC: 0.71 MG/DL (ref 0.5–1.4)
EKG IMPRESSION: NORMAL
EOSINOPHIL # BLD AUTO: 0.12 K/UL (ref 0–0.51)
EOSINOPHIL NFR BLD: 2.4 % (ref 0–6.9)
ERYTHROCYTE [DISTWIDTH] IN BLOOD BY AUTOMATED COUNT: 48.9 FL (ref 35.9–50)
GFR SERPLBLD CREATININE-BSD FMLA CKD-EPI: 86 ML/MIN/1.73 M 2
GLOBULIN SER CALC-MCNC: 3.3 G/DL (ref 1.9–3.5)
GLUCOSE SERPL-MCNC: 97 MG/DL (ref 65–99)
HCT VFR BLD AUTO: 42.6 % (ref 37–47)
HGB BLD-MCNC: 14.1 G/DL (ref 12–16)
IMM GRANULOCYTES # BLD AUTO: 0.02 K/UL (ref 0–0.11)
IMM GRANULOCYTES NFR BLD AUTO: 0.4 % (ref 0–0.9)
INR PPP: 4.99 (ref 0.87–1.13)
LYMPHOCYTES # BLD AUTO: 1.34 K/UL (ref 1–4.8)
LYMPHOCYTES NFR BLD: 27.1 % (ref 22–41)
MAGNESIUM SERPL-MCNC: 1.9 MG/DL (ref 1.5–2.5)
MCH RBC QN AUTO: 31.4 PG (ref 27–33)
MCHC RBC AUTO-ENTMCNC: 33.1 G/DL (ref 33.6–35)
MCV RBC AUTO: 94.9 FL (ref 81.4–97.8)
MONOCYTES # BLD AUTO: 0.41 K/UL (ref 0–0.85)
MONOCYTES NFR BLD AUTO: 8.3 % (ref 0–13.4)
NEUTROPHILS # BLD AUTO: 3.03 K/UL (ref 2–7.15)
NEUTROPHILS NFR BLD: 61.4 % (ref 44–72)
NRBC # BLD AUTO: 0 K/UL
NRBC BLD-RTO: 0 /100 WBC
PHOSPHATE SERPL-MCNC: 3.8 MG/DL (ref 2.5–4.5)
PLATELET # BLD AUTO: 211 K/UL (ref 164–446)
PMV BLD AUTO: 9.6 FL (ref 9–12.9)
POTASSIUM SERPL-SCNC: 4.2 MMOL/L (ref 3.6–5.5)
PROT SERPL-MCNC: 7.6 G/DL (ref 6–8.2)
PROTHROMBIN TIME: 44.3 SEC (ref 12–14.6)
RBC # BLD AUTO: 4.49 M/UL (ref 4.2–5.4)
SODIUM SERPL-SCNC: 131 MMOL/L (ref 135–145)
TROPONIN T SERPL-MCNC: 197 NG/L (ref 6–19)
TROPONIN T SERPL-MCNC: 308 NG/L (ref 6–19)
WBC # BLD AUTO: 4.9 K/UL (ref 4.8–10.8)

## 2022-12-13 PROCEDURE — 93005 ELECTROCARDIOGRAM TRACING: CPT | Performed by: EMERGENCY MEDICINE

## 2022-12-13 PROCEDURE — 700111 HCHG RX REV CODE 636 W/ 250 OVERRIDE (IP): Performed by: STUDENT IN AN ORGANIZED HEALTH CARE EDUCATION/TRAINING PROGRAM

## 2022-12-13 PROCEDURE — 84484 ASSAY OF TROPONIN QUANT: CPT

## 2022-12-13 PROCEDURE — 85025 COMPLETE CBC W/AUTO DIFF WBC: CPT | Mod: 91

## 2022-12-13 PROCEDURE — 85610 PROTHROMBIN TIME: CPT

## 2022-12-13 PROCEDURE — 96374 THER/PROPH/DIAG INJ IV PUSH: CPT

## 2022-12-13 PROCEDURE — 99223 1ST HOSP IP/OBS HIGH 75: CPT | Performed by: INTERNAL MEDICINE

## 2022-12-13 PROCEDURE — 700111 HCHG RX REV CODE 636 W/ 250 OVERRIDE (IP): Performed by: EMERGENCY MEDICINE

## 2022-12-13 PROCEDURE — 99285 EMERGENCY DEPT VISIT HI MDM: CPT

## 2022-12-13 PROCEDURE — 80053 COMPREHEN METABOLIC PANEL: CPT

## 2022-12-13 PROCEDURE — 36415 COLL VENOUS BLD VENIPUNCTURE: CPT

## 2022-12-13 PROCEDURE — 84100 ASSAY OF PHOSPHORUS: CPT

## 2022-12-13 PROCEDURE — 85730 THROMBOPLASTIN TIME PARTIAL: CPT

## 2022-12-13 PROCEDURE — 71045 X-RAY EXAM CHEST 1 VIEW: CPT

## 2022-12-13 PROCEDURE — 96375 TX/PRO/DX INJ NEW DRUG ADDON: CPT

## 2022-12-13 PROCEDURE — 99215 OFFICE O/P EST HI 40 MIN: CPT | Performed by: INTERNAL MEDICINE

## 2022-12-13 PROCEDURE — 770020 HCHG ROOM/CARE - TELE (206)

## 2022-12-13 PROCEDURE — 99223 1ST HOSP IP/OBS HIGH 75: CPT | Mod: AI,GC | Performed by: STUDENT IN AN ORGANIZED HEALTH CARE EDUCATION/TRAINING PROGRAM

## 2022-12-13 PROCEDURE — 700102 HCHG RX REV CODE 250 W/ 637 OVERRIDE(OP): Performed by: STUDENT IN AN ORGANIZED HEALTH CARE EDUCATION/TRAINING PROGRAM

## 2022-12-13 PROCEDURE — 93005 ELECTROCARDIOGRAM TRACING: CPT

## 2022-12-13 PROCEDURE — 83735 ASSAY OF MAGNESIUM: CPT

## 2022-12-13 PROCEDURE — A9270 NON-COVERED ITEM OR SERVICE: HCPCS | Performed by: STUDENT IN AN ORGANIZED HEALTH CARE EDUCATION/TRAINING PROGRAM

## 2022-12-13 RX ORDER — ACETAMINOPHEN 325 MG/1
650 TABLET ORAL EVERY 6 HOURS PRN
Status: DISCONTINUED | OUTPATIENT
Start: 2022-12-13 | End: 2022-12-16 | Stop reason: HOSPADM

## 2022-12-13 RX ORDER — LISINOPRIL 10 MG/1
10 TABLET ORAL 2 TIMES DAILY
Status: DISCONTINUED | OUTPATIENT
Start: 2022-12-13 | End: 2022-12-16 | Stop reason: HOSPADM

## 2022-12-13 RX ORDER — FUROSEMIDE 20 MG/1
20 TABLET ORAL DAILY
Status: DISCONTINUED | OUTPATIENT
Start: 2022-12-14 | End: 2022-12-16 | Stop reason: HOSPADM

## 2022-12-13 RX ORDER — SPIRONOLACTONE 25 MG/1
12.5 TABLET ORAL DAILY
Status: DISCONTINUED | OUTPATIENT
Start: 2022-12-14 | End: 2022-12-16 | Stop reason: HOSPADM

## 2022-12-13 RX ORDER — LEVOTHYROXINE SODIUM 0.07 MG/1
75 TABLET ORAL
Status: DISCONTINUED | OUTPATIENT
Start: 2022-12-14 | End: 2022-12-16 | Stop reason: HOSPADM

## 2022-12-13 RX ORDER — METOPROLOL SUCCINATE 25 MG/1
50 TABLET, EXTENDED RELEASE ORAL DAILY
Qty: 200 TABLET | Refills: 3 | Status: SHIPPED | OUTPATIENT
Start: 2022-12-13 | End: 2022-12-22 | Stop reason: SDUPTHER

## 2022-12-13 RX ORDER — NITROGLYCERIN 0.4 MG/1
0.4 TABLET SUBLINGUAL
Status: DISCONTINUED | OUTPATIENT
Start: 2022-12-13 | End: 2022-12-16 | Stop reason: HOSPADM

## 2022-12-13 RX ORDER — NITROGLYCERIN 0.4 MG/1
0.4 TABLET SUBLINGUAL PRN
Status: DISCONTINUED | OUTPATIENT
Start: 2022-12-13 | End: 2022-12-13

## 2022-12-13 RX ORDER — ATORVASTATIN CALCIUM 40 MG/1
40 TABLET, FILM COATED ORAL DAILY
Status: DISCONTINUED | OUTPATIENT
Start: 2022-12-14 | End: 2022-12-16 | Stop reason: HOSPADM

## 2022-12-13 RX ORDER — RANOLAZINE 500 MG/1
500 TABLET, EXTENDED RELEASE ORAL 2 TIMES DAILY
Status: DISCONTINUED | OUTPATIENT
Start: 2022-12-13 | End: 2022-12-16 | Stop reason: HOSPADM

## 2022-12-13 RX ORDER — AMOXICILLIN 250 MG
2 CAPSULE ORAL 2 TIMES DAILY
Status: DISCONTINUED | OUTPATIENT
Start: 2022-12-14 | End: 2022-12-14

## 2022-12-13 RX ORDER — NITROGLYCERIN 0.4 MG/1
0.4 TABLET SUBLINGUAL PRN
Qty: 25 TABLET | Refills: 1 | Status: SHIPPED | OUTPATIENT
Start: 2022-12-13 | End: 2023-10-06

## 2022-12-13 RX ORDER — METOPROLOL SUCCINATE 50 MG/1
50 TABLET, EXTENDED RELEASE ORAL DAILY
Status: DISCONTINUED | OUTPATIENT
Start: 2022-12-14 | End: 2022-12-16 | Stop reason: HOSPADM

## 2022-12-13 RX ORDER — HYDRALAZINE HYDROCHLORIDE 20 MG/ML
10 INJECTION INTRAMUSCULAR; INTRAVENOUS ONCE
Status: COMPLETED | OUTPATIENT
Start: 2022-12-13 | End: 2022-12-13

## 2022-12-13 RX ORDER — ENALAPRILAT 1.25 MG/ML
1.25 INJECTION INTRAVENOUS ONCE
Status: COMPLETED | OUTPATIENT
Start: 2022-12-13 | End: 2022-12-13

## 2022-12-13 RX ORDER — RANOLAZINE 500 MG/1
500 TABLET, EXTENDED RELEASE ORAL 2 TIMES DAILY
Qty: 200 TABLET | Refills: 3 | Status: SHIPPED | OUTPATIENT
Start: 2022-12-13 | End: 2022-12-22

## 2022-12-13 RX ORDER — POLYETHYLENE GLYCOL 3350 17 G/17G
1 POWDER, FOR SOLUTION ORAL
Status: DISCONTINUED | OUTPATIENT
Start: 2022-12-13 | End: 2022-12-14

## 2022-12-13 RX ORDER — HYDRALAZINE HYDROCHLORIDE 20 MG/ML
10 INJECTION INTRAMUSCULAR; INTRAVENOUS EVERY 4 HOURS PRN
Status: DISCONTINUED | OUTPATIENT
Start: 2022-12-13 | End: 2022-12-16 | Stop reason: HOSPADM

## 2022-12-13 RX ORDER — BISACODYL 10 MG
10 SUPPOSITORY, RECTAL RECTAL
Status: DISCONTINUED | OUTPATIENT
Start: 2022-12-13 | End: 2022-12-14

## 2022-12-13 RX ORDER — LANOLIN ALCOHOL/MO/W.PET/CERES
400 CREAM (GRAM) TOPICAL DAILY
COMMUNITY
Start: 2022-11-22 | End: 2022-12-13

## 2022-12-13 RX ADMIN — ENALAPRILAT 1.25 MG: 1.25 INJECTION INTRAVENOUS at 19:42

## 2022-12-13 RX ADMIN — HYDRALAZINE HYDROCHLORIDE 10 MG: 20 INJECTION INTRAMUSCULAR; INTRAVENOUS at 20:55

## 2022-12-13 RX ADMIN — ACETAMINOPHEN 650 MG: 325 TABLET, FILM COATED ORAL at 22:02

## 2022-12-13 RX ADMIN — LISINOPRIL 10 MG: 10 TABLET ORAL at 23:02

## 2022-12-13 ASSESSMENT — ENCOUNTER SYMPTOMS
PND: 0
NECK PAIN: 0
BLOOD IN STOOL: 0
DOUBLE VISION: 0
CONSTIPATION: 0
SHORTNESS OF BREATH: 1
DEPRESSION: 0
FEVER: 0
BACK PAIN: 0
DIARRHEA: 0
HEARTBURN: 0
STRIDOR: 0
GASTROINTESTINAL NEGATIVE: 1
PALPITATIONS: 1
CHILLS: 0
MUSCULOSKELETAL NEGATIVE: 1
RESPIRATORY NEGATIVE: 1
WEAKNESS: 0
DIZZINESS: 1
DIZZINESS: 0
ABDOMINAL PAIN: 0
DIAPHORESIS: 0
ORTHOPNEA: 0
MYALGIAS: 0
NEUROLOGICAL NEGATIVE: 1
WEIGHT LOSS: 0
HEMOPTYSIS: 0
PALPITATIONS: 0
HEADACHES: 0
CONSTITUTIONAL NEGATIVE: 1
VOMITING: 0
CLAUDICATION: 1
BRUISES/BLEEDS EASILY: 1
SPUTUM PRODUCTION: 0
NERVOUS/ANXIOUS: 0
SHORTNESS OF BREATH: 0
EYES NEGATIVE: 1
COUGH: 0
NAUSEA: 0
BLURRED VISION: 0
BRUISES/BLEEDS EASILY: 0
PSYCHIATRIC NEGATIVE: 1
SINUS PAIN: 0
PHOTOPHOBIA: 0
SHORTNESS OF BREATH: 1

## 2022-12-13 ASSESSMENT — PAIN DESCRIPTION - PAIN TYPE: TYPE: ACUTE PAIN

## 2022-12-13 ASSESSMENT — FIBROSIS 4 INDEX
FIB4 SCORE: 2.24
FIB4 SCORE: 2.24

## 2022-12-13 ASSESSMENT — LIFESTYLE VARIABLES: SUBSTANCE_ABUSE: 0
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
## 2022-12-14 ENCOUNTER — APPOINTMENT (OUTPATIENT)
Dept: CARDIOLOGY | Facility: MEDICAL CENTER | Age: 82
DRG: 280 | End: 2022-12-14
Attending: STUDENT IN AN ORGANIZED HEALTH CARE EDUCATION/TRAINING PROGRAM
Payer: COMMERCIAL

## 2022-12-14 PROBLEM — S72.001A CLOSED RIGHT HIP FRACTURE, INITIAL ENCOUNTER (HCC): Status: ACTIVE | Noted: 2022-12-14

## 2022-12-14 PROBLEM — I50.22 CHRONIC SYSTOLIC CHF (CONGESTIVE HEART FAILURE), NYHA CLASS 3 (HCC): Status: ACTIVE | Noted: 2022-12-13

## 2022-12-14 LAB
ALBUMIN SERPL BCP-MCNC: 3.7 G/DL (ref 3.2–4.9)
ALBUMIN/GLOB SERPL: 1.3 G/DL
ALP SERPL-CCNC: 83 U/L (ref 30–99)
ALT SERPL-CCNC: 26 U/L (ref 2–50)
ANION GAP SERPL CALC-SCNC: 9 MMOL/L (ref 7–16)
AST SERPL-CCNC: 36 U/L (ref 12–45)
BASOPHILS # BLD AUTO: 0.5 % (ref 0–1.8)
BASOPHILS # BLD: 0.02 K/UL (ref 0–0.12)
BILIRUB SERPL-MCNC: 0.6 MG/DL (ref 0.1–1.5)
BUN SERPL-MCNC: 9 MG/DL (ref 8–22)
CALCIUM ALBUM COR SERPL-MCNC: 9.2 MG/DL (ref 8.5–10.5)
CALCIUM SERPL-MCNC: 9 MG/DL (ref 8.5–10.5)
CHLORIDE SERPL-SCNC: 98 MMOL/L (ref 96–112)
CHOLEST SERPL-MCNC: 108 MG/DL (ref 100–199)
CO2 SERPL-SCNC: 23 MMOL/L (ref 20–33)
CREAT SERPL-MCNC: 0.61 MG/DL (ref 0.5–1.4)
EOSINOPHIL # BLD AUTO: 0.12 K/UL (ref 0–0.51)
EOSINOPHIL NFR BLD: 2.8 % (ref 0–6.9)
ERYTHROCYTE [DISTWIDTH] IN BLOOD BY AUTOMATED COUNT: 48.4 FL (ref 35.9–50)
GFR SERPLBLD CREATININE-BSD FMLA CKD-EPI: 90 ML/MIN/1.73 M 2
GLOBULIN SER CALC-MCNC: 2.9 G/DL (ref 1.9–3.5)
GLUCOSE SERPL-MCNC: 92 MG/DL (ref 65–99)
HCT VFR BLD AUTO: 38.6 % (ref 37–47)
HDLC SERPL-MCNC: 47 MG/DL
HGB BLD-MCNC: 12.9 G/DL (ref 12–16)
IMM GRANULOCYTES # BLD AUTO: 0.01 K/UL (ref 0–0.11)
IMM GRANULOCYTES NFR BLD AUTO: 0.2 % (ref 0–0.9)
INR PPP: 1.96 (ref 0.87–1.13)
INR PPP: 2.23 (ref 0.87–1.13)
LDLC SERPL CALC-MCNC: 45 MG/DL
LV EJECT FRACT  99904: 25
LV EJECT FRACT MOD 4C 99902: 12.07
LYMPHOCYTES # BLD AUTO: 1.16 K/UL (ref 1–4.8)
LYMPHOCYTES NFR BLD: 26.7 % (ref 22–41)
MAGNESIUM SERPL-MCNC: 1.8 MG/DL (ref 1.5–2.5)
MCH RBC QN AUTO: 31.1 PG (ref 27–33)
MCHC RBC AUTO-ENTMCNC: 33.4 G/DL (ref 33.6–35)
MCV RBC AUTO: 93 FL (ref 81.4–97.8)
MONOCYTES # BLD AUTO: 0.34 K/UL (ref 0–0.85)
MONOCYTES NFR BLD AUTO: 7.8 % (ref 0–13.4)
NEUTROPHILS # BLD AUTO: 2.69 K/UL (ref 2–7.15)
NEUTROPHILS NFR BLD: 62 % (ref 44–72)
NRBC # BLD AUTO: 0 K/UL
NRBC BLD-RTO: 0 /100 WBC
PLATELET # BLD AUTO: 210 K/UL (ref 164–446)
PMV BLD AUTO: 10.9 FL (ref 9–12.9)
POTASSIUM SERPL-SCNC: 4.4 MMOL/L (ref 3.6–5.5)
PROT SERPL-MCNC: 6.6 G/DL (ref 6–8.2)
PROTHROMBIN TIME: 21.8 SEC (ref 12–14.6)
PROTHROMBIN TIME: 24.1 SEC (ref 12–14.6)
RBC # BLD AUTO: 4.15 M/UL (ref 4.2–5.4)
SODIUM SERPL-SCNC: 130 MMOL/L (ref 135–145)
TRIGL SERPL-MCNC: 78 MG/DL (ref 0–149)
TROPONIN T SERPL-MCNC: 236 NG/L (ref 6–19)
WBC # BLD AUTO: 4.3 K/UL (ref 4.8–10.8)

## 2022-12-14 PROCEDURE — 700117 HCHG RX CONTRAST REV CODE 255: Performed by: STUDENT IN AN ORGANIZED HEALTH CARE EDUCATION/TRAINING PROGRAM

## 2022-12-14 PROCEDURE — 93306 TTE W/DOPPLER COMPLETE: CPT

## 2022-12-14 PROCEDURE — 700102 HCHG RX REV CODE 250 W/ 637 OVERRIDE(OP): Performed by: STUDENT IN AN ORGANIZED HEALTH CARE EDUCATION/TRAINING PROGRAM

## 2022-12-14 PROCEDURE — 83735 ASSAY OF MAGNESIUM: CPT

## 2022-12-14 PROCEDURE — 770020 HCHG ROOM/CARE - TELE (206)

## 2022-12-14 PROCEDURE — 93306 TTE W/DOPPLER COMPLETE: CPT | Mod: 26 | Performed by: INTERNAL MEDICINE

## 2022-12-14 PROCEDURE — 80053 COMPREHEN METABOLIC PANEL: CPT

## 2022-12-14 PROCEDURE — 36415 COLL VENOUS BLD VENIPUNCTURE: CPT

## 2022-12-14 PROCEDURE — 80061 LIPID PANEL: CPT

## 2022-12-14 PROCEDURE — 99233 SBSQ HOSP IP/OBS HIGH 50: CPT | Performed by: STUDENT IN AN ORGANIZED HEALTH CARE EDUCATION/TRAINING PROGRAM

## 2022-12-14 PROCEDURE — 99232 SBSQ HOSP IP/OBS MODERATE 35: CPT | Mod: FS | Performed by: INTERNAL MEDICINE

## 2022-12-14 PROCEDURE — A9270 NON-COVERED ITEM OR SERVICE: HCPCS | Performed by: STUDENT IN AN ORGANIZED HEALTH CARE EDUCATION/TRAINING PROGRAM

## 2022-12-14 PROCEDURE — 84484 ASSAY OF TROPONIN QUANT: CPT

## 2022-12-14 PROCEDURE — 85610 PROTHROMBIN TIME: CPT

## 2022-12-14 RX ORDER — POLYETHYLENE GLYCOL 3350 17 G/17G
1 POWDER, FOR SOLUTION ORAL
Status: DISCONTINUED | OUTPATIENT
Start: 2022-12-14 | End: 2022-12-16 | Stop reason: HOSPADM

## 2022-12-14 RX ORDER — BISACODYL 10 MG
10 SUPPOSITORY, RECTAL RECTAL
Status: DISCONTINUED | OUTPATIENT
Start: 2022-12-14 | End: 2022-12-16 | Stop reason: HOSPADM

## 2022-12-14 RX ORDER — AMOXICILLIN 250 MG
2 CAPSULE ORAL 2 TIMES DAILY
Status: DISCONTINUED | OUTPATIENT
Start: 2022-12-14 | End: 2022-12-16 | Stop reason: HOSPADM

## 2022-12-14 RX ADMIN — LEVOTHYROXINE SODIUM 75 MCG: 0.07 TABLET ORAL at 04:12

## 2022-12-14 RX ADMIN — LISINOPRIL 10 MG: 10 TABLET ORAL at 04:11

## 2022-12-14 RX ADMIN — METOPROLOL SUCCINATE 50 MG: 50 TABLET, EXTENDED RELEASE ORAL at 04:13

## 2022-12-14 RX ADMIN — ASPIRIN 81 MG: 81 TABLET, COATED ORAL at 05:04

## 2022-12-14 RX ADMIN — LISINOPRIL 10 MG: 10 TABLET ORAL at 17:23

## 2022-12-14 RX ADMIN — SPIRONOLACTONE 12.5 MG: 25 TABLET ORAL at 04:13

## 2022-12-14 RX ADMIN — ATORVASTATIN CALCIUM 40 MG: 40 TABLET, FILM COATED ORAL at 04:13

## 2022-12-14 RX ADMIN — ACETAMINOPHEN 650 MG: 325 TABLET, FILM COATED ORAL at 04:11

## 2022-12-14 RX ADMIN — FUROSEMIDE 20 MG: 40 TABLET ORAL at 06:00

## 2022-12-14 RX ADMIN — RANOLAZINE 500 MG: 500 TABLET, EXTENDED RELEASE ORAL at 17:23

## 2022-12-14 RX ADMIN — HUMAN ALBUMIN MICROSPHERES AND PERFLUTREN 3 ML: 10; .22 INJECTION, SOLUTION INTRAVENOUS at 17:30

## 2022-12-14 ASSESSMENT — ENCOUNTER SYMPTOMS
CHILLS: 0
DIAPHORESIS: 0
CARDIOVASCULAR NEGATIVE: 1
GASTROINTESTINAL NEGATIVE: 1
DIARRHEA: 0
CONSTITUTIONAL NEGATIVE: 1
HEMATOLOGIC/LYMPHATIC NEGATIVE: 1
VOMITING: 0
SHORTNESS OF BREATH: 0
COUGH: 0
RESPIRATORY NEGATIVE: 1
NEUROLOGICAL NEGATIVE: 1
MUSCULOSKELETAL NEGATIVE: 1
ENDOCRINE NEGATIVE: 1
BRUISES/BLEEDS EASILY: 0
FATIGUE: 0
DIZZINESS: 0
NAUSEA: 0
LIGHT-HEADEDNESS: 0
EYES NEGATIVE: 1
FEVER: 0
CHEST TIGHTNESS: 0
PALPITATIONS: 0
PSYCHIATRIC NEGATIVE: 1
CONSTIPATION: 0
ALLERGIC/IMMUNOLOGIC NEGATIVE: 1

## 2022-12-14 ASSESSMENT — COGNITIVE AND FUNCTIONAL STATUS - GENERAL
SUGGESTED CMS G CODE MODIFIER DAILY ACTIVITY: CH
CLIMB 3 TO 5 STEPS WITH RAILING: A LITTLE
MOBILITY SCORE: 21
SUGGESTED CMS G CODE MODIFIER MOBILITY: CJ
TURNING FROM BACK TO SIDE WHILE IN FLAT BAD: A LITTLE
MOVING TO AND FROM BED TO CHAIR: A LITTLE
DAILY ACTIVITIY SCORE: 24

## 2022-12-14 ASSESSMENT — LIFESTYLE VARIABLES
TOTAL SCORE: 0
HAVE YOU EVER FELT YOU SHOULD CUT DOWN ON YOUR DRINKING: NO
CONSUMPTION TOTAL: INCOMPLETE
ALCOHOL_USE: YES
EVER FELT BAD OR GUILTY ABOUT YOUR DRINKING: NO
TOTAL SCORE: 0
TOTAL SCORE: 0
EVER HAD A DRINK FIRST THING IN THE MORNING TO STEADY YOUR NERVES TO GET RID OF A HANGOVER: NO
HAVE PEOPLE ANNOYED YOU BY CRITICIZING YOUR DRINKING: NO

## 2022-12-14 ASSESSMENT — PAIN DESCRIPTION - PAIN TYPE
TYPE: ACUTE PAIN
TYPE: ACUTE PAIN

## 2022-12-14 ASSESSMENT — FIBROSIS 4 INDEX: FIB4 SCORE: 2.69

## 2022-12-14 NOTE — ED NOTES
result Specimen: Blood Updated: 12/13/22 1840    Troponin T 308 High        Critical troponin, erp notified

## 2022-12-14 NOTE — H&P
[facility] History & Physical Note    Date of Service  12/13/2022    [facility] Team: [facility]   Attending: Gm Rodriguez M.d.  Senior Resident: Dr. Rahman  Contact Number: [phone]    Primary Care Physician  PHILLIP Pires    Consultants  cardiology    Specialist Names: Ichino     Code Status  DNAR/DNI    Chief Complaint  Chief Complaint   Patient presents with    Chest Pain     Patient reports chest pain x 1 month with increase in pain over the last few days. Patient was seen by doctor Lilly with Cardiology and sent patient to ER.        History of Presenting Illness (HPI):   Daya Woods is a 82 y.o. female with past medical history including a prior MI and 5 vessel CABG in 2002, now with 2 out of 5 grafts up by cardiac catheterization on August 2016, severe disease of the native LAD status post PCI with drug-eluting stents, and an occluded native right coronary artery filling via collaterals, chronic atrial fibrillation on warfarin, hypertension, dyslipidemia, severe functional mitral regurgitation status post mitral clip on October 2018, chronic systolic congestive heart failure with ejection fraction of 25%, hypothyroidism, and peripheral vascular disease who presented 12/13/2022 with chest pain.  Patient states that she has been experiencing chest pain for several days now.  This prompted her to make an appointment with her cardiologist today and her to be seen in clinic by Dr. Lilly.  Patient has been having chest pain for several days now.  States that it occurs at rest along with ambulation.  She states that she has also woken up experiencing chest pain.  She had a recurrence today, states it was substernal in nature and radiated to her back.  This prompted her to be seen by her cardiologist.  In clinic today, she was not experiencing any evidence of angina, and there were no ventricular arrhythmias.  It was decided that the patient present to the emergency department to be  evaluated, and to go for her left heart catheterization in the morning.         I discussed the plan of care with patient and family.    Review of Systems  Review of Systems   Constitutional:  Negative for chills, diaphoresis, fever, malaise/fatigue and weight loss.   HENT:  Negative for congestion, ear discharge, ear pain, hearing loss, nosebleeds, sinus pain and tinnitus.    Eyes:  Negative for blurred vision, double vision and photophobia.   Respiratory:  Positive for shortness of breath. Negative for cough, hemoptysis, sputum production and stridor.    Cardiovascular:  Positive for chest pain and claudication. Negative for orthopnea, leg swelling and PND.   Gastrointestinal:  Negative for abdominal pain, blood in stool, constipation, diarrhea, heartburn, nausea and vomiting.   Genitourinary:  Negative for dysuria, frequency and urgency.   Musculoskeletal:  Negative for back pain, myalgias and neck pain.   Psychiatric/Behavioral:  Negative for depression, substance abuse and suicidal ideas.      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    Surgical History   has a past surgical history that includes appendectomy; multiple coronary artery bypass (2002); tonsillectomy and adenoidectomy; tubal ligation; other (Bilateral, 08/2014); recovery (8/26/2016); gyn surgery; transcatheter mitral valve repair (10/15/2018); carmina (10/15/2018); [email or web address] cardiac cath (10/2018);  "and mitral valve replace.     Family History  family history includes Heart Attack (age of onset: 55) in her brother.   Family history reviewed with patient.     Social History  Tobacco: Patient quit in 2002.  Previously 20-pack-year smoking history  Alcohol: Occasionally drinks alcohol  Recreational drugs (illegal or prescription): No illicit drug use  Employment: Unemployed  Living Situation: Lives with daughter  Recent Travel: No recent travel  Primary Care Provider: Reviewed    Other (stressors, spirituality, exposures): None    Allergies  Allergies   Allergen Reactions    Codeine Unspecified     \"I just don't like it. It does weird things to me.\"    Hydrochlorothiazide Unspecified     Unsure of reaction    Isosorbide Mononitrate Rash     Rash    Keflex Rash     Rash    Sulfa Drugs Rash     rash    Tape Unspecified     Tears skin off    Xarelto [Rivaroxaban] Rash     rash       Medications  Prior to Admission Medications   Prescriptions Last Dose Informant Patient Reported? Taking?   aspirin EC (ECOTRIN) 81 MG Tablet Delayed Response 12/13/2022 at 0800 Patient Yes No   Sig: Take 81 mg by mouth every day.   atorvastatin (LIPITOR) 40 MG Tab 12/12/2022 at 2000 Patient No No   Sig: Take 1 Tablet by mouth every day.   furosemide (LASIX) 20 MG Tab 12/13/2022 at 0800 Patient No No   Sig: Take 1 Tablet by mouth every day.   levothyroxine (SYNTHROID) 75 MCG Tab 12/13/2022 at 0600 Patient No No   Sig: Take 1 Tablet by mouth every morning on an empty stomach.   lisinopril (PRINIVIL) 10 MG Tab 12/13/2022 at 0800 Patient No No   Sig: take 1 tablet by mouth twice a day   magnesium oxide (MAG-OX) 400 MG Tab tablet 12/13/2022 at 0800 Patient No No   Sig: Take 1 Tablet by mouth every day.   metoprolol SR (TOPROL XL) 25 MG TABLET SR 24 HR new script at n/a Patient No No   Sig: Take 2 Tablets by mouth every day.   nitroglycerin (NITROSTAT) 0.4 MG SL Tab unknwon at unknown Patient No No   Sig: Place 1 Tablet under the tongue as " needed for Chest Pain.   potassium chloride SA (K-DUR) 10 MEQ Tab CR 12/13/2022 at 0800 Patient No No   Sig: Take 0.5 Tablets by mouth every day.   ranolazine (RANEXA) 500 MG TABLET SR 12 HR new script at n/a Patient No No   Sig: Take 1 Tablet by mouth 2 times a day.   spironolactone (ALDACTONE) 25 MG Tab 12/13/2022 at 0800 Patient No No   Sig: Take 0.5 Tablets by mouth every day.   warfarin (COUMADIN) 5 MG Tab 12/12/2022 at 1900 Patient Yes No   Sig: Take 5-7.5 mg by mouth every day. 5 mg on Mondays, wednesdays, and Fridays. All other days 7.5 mg      Facility-Administered Medications: None       Physical Exam  Temp:  [35.9 °C (96.7 °F)] 35.9 °C (96.7 °F)  Pulse:  [67-80] 71  Resp:  [13-16] 13  BP: (140-202)/(78-94) 196/89  SpO2:  [93 %-98 %] 94 %  Blood Pressure : (!) 196/89   Temperature: 35.9 °C (96.7 °F)   Pulse: 71   Respiration: 13   Pulse Oximetry: 94 %       Physical Exam  Constitutional:       General: She is not in acute distress.     Appearance: Normal appearance. She is normal weight. She is not toxic-appearing or diaphoretic.   HENT:      Head: Normocephalic and atraumatic.      Nose: No congestion.      Mouth/Throat:      Mouth: Mucous membranes are moist.   Eyes:      Extraocular Movements: Extraocular movements intact.      Pupils: Pupils are equal, round, and reactive to light.   Cardiovascular:      Rate and Rhythm: Normal rate. Rhythm irregular.      Pulses: Normal pulses.      Heart sounds: Normal heart sounds. No murmur heard.    No friction rub. No gallop.   Pulmonary:      Effort: Pulmonary effort is normal. No respiratory distress.      Breath sounds: Normal breath sounds. No stridor. No wheezing, rhonchi or rales.   Chest:      Chest wall: No tenderness.   Abdominal:      General: Abdomen is flat. There is no distension.      Palpations: Abdomen is soft. There is no mass.      Tenderness: There is no abdominal tenderness. There is no right CVA tenderness, left CVA tenderness, guarding or  rebound.      Hernia: No hernia is present.   Musculoskeletal:         General: No swelling, tenderness or signs of injury.      Right lower leg: No edema.      Left lower leg: No edema.   Neurological:      General: No focal deficit present.      Mental Status: She is alert and oriented to person, place, and time.   Psychiatric:         Mood and Affect: Mood normal.       Laboratory:  Recent Labs     12/13/22  1714   WBC 4.9   RBC 4.49   HEMOGLOBIN 14.1   HEMATOCRIT 42.6   MCV 94.9   MCH 31.4   MCHC 33.1*   RDW 48.9   PLATELETCT 211   MPV 9.6     Recent Labs     12/13/22  1714   SODIUM 131*   POTASSIUM 4.2   CHLORIDE 94*   CO2 26   GLUCOSE 97   BUN 12   CREATININE 0.71   CALCIUM 9.5     Recent Labs     12/13/22  1714   ALTSGPT 30   ASTSGOT 38   ALKPHOSPHAT 98   TBILIRUBIN 0.5   GLUCOSE 97     Recent Labs     12/13/22  1841   APTT 55.7*   INR 4.99*     No results for input(s): NTPROBNP in the last 72 hours.      Recent Labs     12/13/22 1714 12/13/22  1841   TROPONINT 308* 197*       Imaging:  DX-CHEST-PORTABLE (1 VIEW)   Final Result      1.  Stable enlarged cardiac silhouette with no acute failure.   2.  There is atherosclerosis.      EC-ECHOCARDIOGRAM COMPLETE W/O CONT    (Results Pending)       X-Ray:  I have personally reviewed the images and compared with prior images.    Assessment/Plan:  Problem Representation:       Daya Woods is a 82 y.o. female with past medical history including a prior MI and 5 vessel CABG in 2002, now with 2 out of 5 grafts up by cardiac catheterization on August 2016, severe disease of the native LAD status post PCI with drug-eluting stents, and an occluded native right coronary artery filling via collaterals, chronic atrial fibrillation on warfarin, hypertension, dyslipidemia, severe functional mitral regurgitation status post mitral clip on October 2018, chronic systolic congestive heart failure with ejection fraction of 25%, hypothyroidism, and peripheral vascular disease  who presented 12/13/2022 after being sent here by her cardiologist for unstable angina.  On presentation, patient was found to have elevated troponin of 308 which trended down to 197.  EKG was obtained as well without any ST segment elevation.  Cardiology was consulted, and it was decided the patient will be admitted for left heart catheterization in the morning.  Holding off on anticoagulation at this time as patient has a supratherapeutic INR.  No indication for FFP at this time.       I anticipate this patient will require at least two midnights for appropriate medical management, necessitating inpatient admission because of medical management of NSTEMI, requiring left heart catheterization in the morning.    Patient will need a Telemetry bed on MEDICAL service .  The need is secondary to unstable angina with elevated troponins and elevated INR, requiring left heart catheterization in the morning..    * NSTEMI (non-ST elevated myocardial infarction) (HCC)  Assessment & Plan  Patient presents with history of unstable angina, symptoms of been present for several days now.  EKG was obtained and reviewed.  No ST segment elevation.  No bundle branch block.  Initial troponin of 308 which down trended to 197.  Case discussed with Dr. Don from cardiology.  Plan is to go for left heart catheterization tomorrow.  Patient should remain n.p.o. at midnight.  Given the patient's elevated INR, holding all anticoagulation at this time.  Patient will be n.p.o. at midnight  Appreciate cardiology recommendations.    Acute on chronic systolic CHF (congestive heart failure), NYHA class 3 ([facility])  Assessment & Plan  NYHA Class III, Stage C secondary to ishcemic cardiomyopathy.   Echocardiogram in January 2021 showed a severely reduced ejection fraction estimated to be at 25%.  There is global hypokinesis.  Plan is to repeat echocardiogram at this time.  Continue metoprolol 50 mg daily which was recently changed by cardiology in  clinic today  Continue lisinopril  Continue Aldactone  Continue home dose Lasix 20 mg p.o. daily.  Patient appears euvolemic at this time  As per chart review, she has had prior discussion regarding ICD on 3 previous visits.  She has previously stated that she does not want an ICD now or in the future.      Hypertensive urgency  Assessment & Plan  Patient presents with systolic blood pressure in the 190s.  Patient states that she is compliant with all her medications.  In clinic today her systolic blood pressure was in the 110s.  Patient does appear to be anxious at this time.  She was given a dose of Vasotec in the emergency department.  Her blood pressure is slowly titrating down and is in the 170s.  Patient will receive her regular home dose blood pressure medication    Peripheral vascular disease (HCC)- (present on admission)  Assessment & Plan  Patient with history of peripheral vascular disease.  She has been seen by Dr. Mg in the past.  Worse on the left lower extremity.  As per chart review, she was switched to Plavix given her bleeding risk on Coumadin.  Patient will need outpatient follow-up        Chronic anticoagulation- (present on admission)  Assessment & Plan  Patiently on chronic anticoagulation on warfarin.  On presentation she has a supratherapeutic INR 4.99.  We will hold off on Coumadin at this time.  Recheck INR at 6 AM in the morning.  There may be an indication for FFP should patient go for procedure    Chronic atrial fibrillation (HCC)- (present on admission)  Assessment & Plan  Patient currently rate controlled.  Still in atrial fibrillation as per EKG.  Continue metoprolol 50 mg daily.  Holding Coumadin in the setting of elevated INR and left heart cath tomorrow.    Essential hypertension- (present on admission)  Assessment & Plan  Continue home dose Lasix, lisinopril, metoprolol, spironolactone.    Hyperlipidemia  Assessment & Plan  Continue home dose  atorvastatin    Hyponatremia  Assessment & Plan  Appears to be baseline.  Patient with sodium less than 130 on presentation.    Hypothyroidism- (present on admission)  Assessment & Plan  Resume home dose Synthroid  Follow-up repeat TSH.      VTE prophylaxis: pharmacologic prophylaxis contraindicated due to supratherapeutic INR

## 2022-12-14 NOTE — PROGRESS NOTES
Cardiology Follow Up Progress Note    Date of Service  12/14/2022    Attending Physician  Sourav Nuñez M.D.    Chief Complaint   NSTEMI    HPI  Daya Woods is a 82 y.o. female with a past medical history of coronary artery disease with prior coronary artery bypass graft surgery, stented coronary artery, history of MitraClip, chronic systolic congenital heart failure, atrial fibrillation on chronic anticoagulation therapy, peripheral vascular disease who presented 12/13/2022 sent from our office by Derrick Muniz for unstable angina for cardiac catheterization.    Interim Events  12/14/2022: No cardiac events overnight. Atrial fibrillation with a bundle branch block on telemetry. Hypertensive upon admission abated with vasotec. INR downtrending 4.99 on admission and 2.23 this morning. She reports she is comfortable. Only has concerns of intermittent left sided mid-axillary pain that is alleviated with palpation. No palpitations. No shortness of breath, dyspnea on exertion, orthopnea or PND. No lower extremity edema. No dizziness or lightheadedness. No syncope or presyncope.      Review of Systems  Review of Systems   Constitutional: Negative.  Negative for chills, diaphoresis, fatigue and fever.   HENT: Negative.     Eyes: Negative.    Respiratory: Negative.  Negative for cough, chest tightness and shortness of breath.    Cardiovascular: Negative.  Negative for chest pain, palpitations and leg swelling.   Gastrointestinal: Negative.  Negative for constipation, diarrhea, nausea and vomiting.   Endocrine: Negative.    Genitourinary: Negative.  Negative for decreased urine volume, difficulty urinating, dysuria and frequency.   Musculoskeletal: Negative.         Pinpoint midaxillary pain that it alleviated with palpation.    Skin: Negative.    Allergic/Immunologic: Negative.    Neurological: Negative.  Negative for dizziness and light-headedness.   Hematological: Negative.  Does not bruise/bleed easily.    Psychiatric/Behavioral: Negative.       Vital signs in last 24 hours  Temp:  [35.9 °C (96.7 °F)-36.9 °C (98.4 °F)] 36.9 °C (98.4 °F)  Pulse:  [53-80] 61  Resp:  [9-33] 18  BP: (120-204)/() 145/71  SpO2:  [91 %-98 %] 96 %    Physical Exam  Physical Exam  Vitals and nursing note reviewed.   Constitutional:       General: She is not in acute distress.     Appearance: Normal appearance. She is not toxic-appearing.   HENT:      Head: Normocephalic and atraumatic.      Right Ear: External ear normal.      Left Ear: External ear normal.      Nose: Nose normal.      Mouth/Throat:      Mouth: Mucous membranes are moist.      Pharynx: Oropharynx is clear.   Eyes:      General: No scleral icterus.     Extraocular Movements: Extraocular movements intact.      Conjunctiva/sclera: Conjunctivae normal.      Pupils: Pupils are equal, round, and reactive to light.   Neck:      Vascular: No JVD.   Cardiovascular:      Rate and Rhythm: Normal rate. Rhythm irregular.      Pulses: Normal pulses.      Heart sounds: Normal heart sounds. No murmur heard.    No friction rub. No gallop.   Pulmonary:      Effort: Pulmonary effort is normal.      Breath sounds: Normal breath sounds.   Abdominal:      General: Abdomen is flat. Bowel sounds are normal. There is no distension.      Palpations: Abdomen is soft.   Musculoskeletal:         General: Normal range of motion.      Cervical back: Normal range of motion and neck supple.      Right lower leg: No edema.      Left lower leg: No edema.   Skin:     General: Skin is warm and dry.      Capillary Refill: Capillary refill takes less than 2 seconds.      Coloration: Skin is not jaundiced.   Neurological:      General: No focal deficit present.      Mental Status: She is alert and oriented to person, place, and time.   Psychiatric:         Mood and Affect: Mood normal.         Behavior: Behavior normal.       Lab Review  Lab Results   Component Value Date/Time    WBC 4.3 (L) 12/13/2022 11:50  PM    RBC 4.15 (L) 12/13/2022 11:50 PM    HEMOGLOBIN 12.9 12/13/2022 11:50 PM    HEMATOCRIT 38.6 12/13/2022 11:50 PM    MCV 93.0 12/13/2022 11:50 PM    MCH 31.1 12/13/2022 11:50 PM    MCHC 33.4 (L) 12/13/2022 11:50 PM    MPV 10.9 12/13/2022 11:50 PM      Lab Results   Component Value Date/Time    SODIUM 130 (L) 12/14/2022 04:00 AM    POTASSIUM 4.4 12/14/2022 04:00 AM    CHLORIDE 98 12/14/2022 04:00 AM    CO2 23 12/14/2022 04:00 AM    GLUCOSE 92 12/14/2022 04:00 AM    BUN 9 12/14/2022 04:00 AM    CREATININE 0.61 12/14/2022 04:00 AM      Lab Results   Component Value Date/Time    ASTSGOT 36 12/14/2022 04:00 AM    ALTSGPT 26 12/14/2022 04:00 AM     Lab Results   Component Value Date/Time    CHOLSTRLTOT 108 12/14/2022 04:00 AM    LDL 45 12/14/2022 04:00 AM    HDL 47 12/14/2022 04:00 AM    TRIGLYCERIDE 78 12/14/2022 04:00 AM    TROPONINT 236 (H) 12/14/2022 04:00 AM       No results for input(s): NTPROBNP in the last 72 hours.    Cardiac Imaging and Procedures Review  EKG:  My personal interpretation of the EKG dated 12/13/2022 is Atrial fibrillation with repolarization abnormality.     Echocardiogram:  repeat pending  CONCLUSIONS  Compared to the images of the prior study done 5/12/20, no significant   changes are noted.   Severely reduced left ventricular systolic function. Left ventricular   ejection fraction is visually estimated to be 25%.  Unable to determine diastolic function due to mitral valve repair.  Status post MitraClip. Transvalvular gradient of 4 mmHg, heart rate 75   bpm.  Estimated right ventricular systolic pressure  is 36 mmHg.    Cardiac Catheterization:  repeat pending    Cardiac Catheterization 10/09/2018  IMPRESSION:  1.  3-4+ mitral regurgitation.  2.  Reduced left ventricular systolic function with ejection fraction of 35%,   mid to distal anterior and apical hypokinesis.  3.  Elevated left ventricular end-diastolic pressure.  4.  Coronary artery disease with known occluded saphenous vein graft  to the   distal left anterior descending to the diagonal branch and to the right   coronary artery with patent left internal mammary artery to the mid to distal   left anterior descending artery, patent saphenous vein graft to a small obtuse   marginal branch with high-grade ostial proximal graft stenosis.  5.  Successful percutaneous transluminal coronary angioplasty/stent placement   of the ostial to proximal saphenous vein graft to the obtuse marginal branch   with 3.0x28 mm Synergy drug-eluting stent.  6.  Elevated left ventricular end-diastolic pressure.  7.  Elevated right heart pressure with 40 mmHg.     RECOMMENDATIONS:  Recommend medical therapy with continuation of Plavix and   consider for MitraClip.      Imaging  Chest X-Ray:  12/13/2022  FINDINGS:  There are median sternotomy wires. Metallic clip again projects over the cardiac silhouette.  The mediastinal and cardiac silhouette is enlarged but unchanged.  There is atherosclerosis of the aorta.  The lung parenchyma is clear.  There is no significant pleural effusion.  There is no visible pneumothorax.  There are no acute bony abnormalities.  IMPRESSION:  1.  Stable enlarged cardiac silhouette with no acute failure.  2.  There is atherosclerosis.     Assessment/Plan  NSTEMI  -Intermittent mid axillary pain alleviated with palpation. No other concerns.  - Elevated INR of 4.99 on admission down to 2.23 on repeat. Repeat INR pending.  - Continue to hold warfarin.  - Continue aspirin  -Start heparin gtt without bolus tomorrow at 6am  - Continue atorvastatin 40mg daily  -Continue metoprolol 50mg daily.   - Continue renexa.  - Continue nitro PRN  - Repeat EKG and trop with recurrent chest pain  - Keep npo at midnight. Plan for left heart cath tomorrow.    NYHA class III, stage C secondary to ischemic cardiomyopathy. LVEF 25%.  Mitral regurgitation s/p mitraclip  Hypertension  - Not currently in an exacerbation.  - Appears euvolemic on exam.  -ACE-I/ARB/ARNI:  "Continue lisinopril 10mg BID  -Evidence Based Beta-blocker: Continue metoprolol 50mg daily  -Aldosterone Antagonist: Continue spironolactone 12.5mg daily  -Diuretic: Continue PO furosemide 20mg daily  -SGLT2i: Consider the addition of SGLT2i after cath  -ICD recommended multiple times as an outpatient, but declined at all 3 visits. She has been very clear that she does not wish to undergo ICD placement.   - Tight blood pressure management to a goal of <130/90  -Labs: Repeat daily BMP  -Continue Daily weights; Strict I+O’s:   -PNA and Flu vaccine: per pharmacy    Chronic Atrial Fibrillation  - Asymptomatic  - Rate is well controlled with metoprolol  - Elevated INR of 4.99 on admission down to 2.23 on repeat. Repeat INR pending.  - Continue to hold warfarin until after cath    Left femoral artery stenosis  -Patient reports a known \"blood clot\"  -50-75% stenosis of the distal left femoral artery on doppler US.  - Continue aspirin and statin  - Follow up with vascular as an outpatient      Please see Dr. Bowers's attestation for further details and recommendations.   Cardiology will continue to follow.     Please contact me with any questions.    Thank you for allowing me to participate in the care of Daya Woods .    Lulu Orr PA-C, Cardiology  [facility], [facility], [facility]  [address] [address]  Phone: [phone]  Fax: [phone]    PLEASE NOTE: This Note was created using voice recognition Software. I have made every reasonable attempt to correct obvious errors, but I expect that there are errors of grammar and possibly content that I did not discover before finalizing the note.     I personally spent a total of 14 minutes which includes face-to-face time and non-face-to-face time spent on preparing to see the patient, reviewing hospital notes and tests, obtaining history from the patient, performing a medically " appropriate exam, counseling and educating the patient, ordering medications/tests/procedures/referrals as clinically indicated, and documenting information in the electronic medical record.

## 2022-12-14 NOTE — ASSESSMENT & PLAN NOTE
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   Echocardiogram in January 2021 showed a severely reduced ejection fraction estimated to be at 25%.  There is global hypokinesis.  Plan is to repeat echocardiogram at this time.  Continue metoprolol 50 mg daily which was recently changed by cardiology in clinic today  Continue lisinopril  Continue Aldactone  Continue home dose Lasix 20 mg p.o. daily.  Patient appears euvolemic at this time  As per chart review, she has had prior discussion regarding ICD on 3 previous visits.  She has previously stated that she does not want an ICD now or in the future.

## 2022-12-14 NOTE — ASSESSMENT & PLAN NOTE
Patient presents with history of unstable angina, symptoms of been present for several days now.  EKG was obtained and reviewed.  No ST segment elevation.  No bundle branch block.  Initial troponin of 308 which down trended to 197.  Case discussed with Dr. Don from cardiology.  Plan is to go for left heart catheterization tomorrow.  Patient should remain n.p.o. at midnight.  Given the patient's elevated INR, holding all anticoagulation at this time.  Patient will be n.p.o. at midnight  Appreciate cardiology recommendations.

## 2022-12-14 NOTE — PATIENT INSTRUCTIONS
Please start taking ranexa 500mg twice a day.     Please increase metoprolol to 50mg once a day.     Please call our office if you experience shortness of breath, decreased ability to exercise, or start having chest pain which goes away with rest or use of nitroglycerin.  If you experience chest pain that does not resolve with nitroglycerin and/or rest, please call 911 for emergency evaluation.

## 2022-12-14 NOTE — ED TRIAGE NOTES
Chief Complaint   Patient presents with    Chest Pain     Patient reports chest pain x 1 month with increase in pain over the last few days. Patient was seen by doctor Lilly with Cardiology and sent patient to ER.

## 2022-12-14 NOTE — CONSULTS
Cardiology Initial Consultation    Date of Service  12/13/2022    Referring Physician  Gm Rodriguez M.D.    Reason for Consultation  NSTEMI    History of Presenting Illness  Daya Woods is a 82 y.o. female with a past medical history of coronary artery disease with prior coronary artery bypass graft surgery, stented coronary artery, history of MitraClip, chronic systolic congenital heart failure, atrial fibrillation on chronic anticoagulation therapy, peripheral vascular disease who presented 12/13/2022 sent from our office by Derrick Muniz for unstable angina for cardiac catheterization.    Review of Systems  Review of Systems   Constitutional: Negative.  Negative for chills and fever.   HENT: Negative.  Negative for hearing loss.    Eyes: Negative.    Respiratory: Negative.  Negative for cough and shortness of breath.    Cardiovascular:  Positive for chest pain. Negative for palpitations and leg swelling.   Gastrointestinal: Negative.  Negative for abdominal pain, nausea and vomiting.   Genitourinary: Negative.  Negative for dysuria and urgency.   Musculoskeletal: Negative.  Negative for myalgias.   Skin: Negative.  Negative for rash.   Neurological: Negative.  Negative for dizziness, weakness and headaches.   Hematological:  Does not bruise/bleed easily.   Psychiatric/Behavioral: Negative.  The patient is not nervous/anxious.      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), HTN (hypertension), Hyperlipidemia, Hypothyroidism (1/12/2012), Ischemic cardiomyopathy (11/2018), MI, old (2002), Mitral regurgitation (10/2018), Osteoarthritis, Pain, Raynaud's disease, Spinal stenosis (8/20/2015), and Status post coronary  artery bypass grafts x 5 (08/2002).    Surgical History   has a past surgical history that includes appendectomy; multiple coronary artery bypass (2002); tonsillectomy and adenoidectomy; tubal ligation; other (Bilateral, 08/2014); recovery (8/26/2016); gyn surgery; transcatheter mitral valve repair (10/15/2018); carmina (10/15/2018); [facility] cardiac cath (10/2018); and mitral valve replace.    Family History  family history includes Heart Attack (age of onset: 55) in her brother.    Social History   reports that she quit smoking about 20 years ago. Her smoking use included cigarettes. She has a 20.00 pack-year smoking history. She has never used smokeless tobacco. She reports that she does not currently use alcohol. She reports that she does not use drugs.    Medications  Prior to Admission Medications   Prescriptions Last Dose Informant Patient Reported? Taking?   aspirin EC (ECOTRIN) 81 MG Tablet Delayed Response   Yes No   Sig: Take 81 mg by mouth every day.   atorvastatin (LIPITOR) 40 MG Tab   No No   Sig: Take 1 Tablet by mouth every day.   furosemide (LASIX) 20 MG Tab   No No   Sig: Take 1 Tablet by mouth every day.   levothyroxine (SYNTHROID) 75 MCG Tab   No No   Sig: Take 1 Tablet by mouth every morning on an empty stomach.   lisinopril (PRINIVIL) 10 MG Tab   No No   Sig: take 1 tablet by mouth twice a day   magnesium oxide (MAG-OX) 400 MG Tab tablet   No No   Sig: Take 1 Tablet by mouth every day.   magnesium oxide 400 (240 Mg) MG Tab   Yes No   Sig: Take 400 mg by mouth every day.   metoprolol SR (TOPROL XL) 25 MG TABLET SR 24 HR   No No   Sig: Take 2 Tablets by mouth every day.   nitroglycerin (NITROSTAT) 0.4 MG SL Tab   No No   Sig: Place 1 Tablet under the tongue as needed for Chest Pain.   potassium chloride SA (K-DUR) 10 MEQ Tab CR   No No   Sig: Take 0.5 Tablets by mouth every day.   ranolazine (RANEXA) 500 MG TABLET SR 12 HR   No No   Sig: Take 1 Tablet by mouth 2 times a day.   spironolactone (ALDACTONE)  "25 MG Tab   No No   Sig: Take 0.5 Tablets by mouth every day.   warfarin (COUMADIN) 5 MG Tab  Patient Yes No   Sig: Take 5-7.5 mg by mouth every day. 5 mg on Mondays, wednesdays, and Fridays. All other days 7.5 mg      Facility-Administered Medications: None       Allergies  Allergies   Allergen Reactions    Codeine Unspecified     \"I just don't like it. It does weird things to me.\"    Hydrochlorothiazide Unspecified     Unsure of reaction    Isosorbide Mononitrate Rash     Rash    Keflex Rash     Rash    Sulfa Drugs Rash     rash    Tape Unspecified     Tears skin off    Xarelto [Rivaroxaban] Rash     rash   (Medications reviewed.)    Vital signs in last 24 hours  Temp:  [35.9 °C (96.7 °F)] 35.9 °C (96.7 °F)  Pulse:  [67-80] 71  Resp:  [13-16] 13  BP: (175-202)/(89-94) 196/89  SpO2:  [93 %-98 %] 94 %    Physical Exam  Physical Exam  Constitutional:       Appearance: Normal appearance. She is underweight.   HENT:      Head: Normocephalic and atraumatic.      Mouth/Throat:      Mouth: Mucous membranes are moist.   Eyes:      Extraocular Movements: Extraocular movements intact.      Conjunctiva/sclera: Conjunctivae normal.   Cardiovascular:      Rate and Rhythm: Normal rate and regular rhythm.      Pulses: Normal pulses.      Heart sounds: Normal heart sounds.   Pulmonary:      Effort: Pulmonary effort is normal.      Breath sounds: Normal breath sounds.   Abdominal:      General: Bowel sounds are normal.      Palpations: Abdomen is soft.   Musculoskeletal:         General: Normal range of motion.      Cervical back: Normal range of motion and neck supple.   Skin:     General: Skin is warm and dry.   Neurological:      General: No focal deficit present.      Mental Status: She is alert and oriented to person, place, and time. Mental status is at baseline.   Psychiatric:         Mood and Affect: Mood normal.         Behavior: Behavior normal.         Thought Content: Thought content normal.         Judgment: Judgment " normal.   .    Lab Review  Lab Results   Component Value Date/Time    WBC 4.9 12/13/2022 05:14 PM    RBC 4.49 12/13/2022 05:14 PM    HEMOGLOBIN 14.1 12/13/2022 05:14 PM    HEMATOCRIT 42.6 12/13/2022 05:14 PM    MCV 94.9 12/13/2022 05:14 PM    MCH 31.4 12/13/2022 05:14 PM    MCHC 33.1 (L) 12/13/2022 05:14 PM    MPV 9.6 12/13/2022 05:14 PM      Lab Results   Component Value Date/Time    SODIUM 131 (L) 12/13/2022 05:14 PM    POTASSIUM 4.2 12/13/2022 05:14 PM    CHLORIDE 94 (L) 12/13/2022 05:14 PM    CO2 26 12/13/2022 05:14 PM    GLUCOSE 97 12/13/2022 05:14 PM    BUN 12 12/13/2022 05:14 PM    CREATININE 0.71 12/13/2022 05:14 PM      Lab Results   Component Value Date/Time    ASTSGOT 38 12/13/2022 05:14 PM    ALTSGPT 30 12/13/2022 05:14 PM     Lab Results   Component Value Date/Time    CHOLSTRLTOT 137 02/24/2014 03:05 AM    LDL 67 02/24/2014 03:05 AM    HDL 55 02/24/2014 03:05 AM    TRIGLYCERIDE 74 02/24/2014 03:05 AM    TROPONINT 197 (H) 12/13/2022 06:41 PM       No results for input(s): NTPROBNP in the last 72 hours.  (Above labs reviewed.)     Cardiac Imaging and Procedures Review  CARDIAC STUDIES/PROCEDURES:    ANKLE BRACHIAL INDEX (10/27/21)  Right: There is no evidence of arterial disease demonstrated (YOAN is .9-1.0).   Left: There is mild to moderate arterial disease demonstrated (YOAN is .5-.89  (study result reviewed)     CARDIAC CATHETERIZATION CONCLUSIONS (10/09/18)  1.  3-4+ mitral regurgitation.  2.  Reduced left ventricular systolic function with ejection fraction of 35%,   mid to distal anterior and apical hypokinesis.  3.  Elevated left ventricular end-diastolic pressure.  4.  Coronary artery disease with known occluded saphenous vein graft to the   distal left anterior descending to the diagonal branch and to the right   coronary artery with patent left internal mammary artery to the mid to distal   left anterior descending artery, patent saphenous vein graft to a small obtuse   marginal branch with  high-grade ostial proximal graft stenosis.  5.  Successful percutaneous transluminal coronary angioplasty/stent placement   of the ostial to proximal saphenous vein graft to the obtuse marginal branch   with 3.0x28 mm Synergy drug-eluting stent.  6.  Elevated left ventricular end-diastolic pressure.  7.  Elevated right heart pressure with 40 mmHg.     CARDIAC CATHETERIZATION CONCLUSIONS by Dr. Menard (08/26/16)  1. Complex heavily calcified high-grade disease of the proximal and mid left anterior descending.  2. Chronic total occlusion of the circumflex.  3. Codominant RCA with moderate native disease.  4. Totally occluded saphenous vein graft x3, (chronic).  5. Patent saphenous vein graft to an obtuse marginal with high-grade stenosis of the distal anastomosis.  6. Patent left internal mammary artery to a diagonal vessel.  7. Technically difficult and demanding PCI with overlapping stent placement of the proximal and mid LAD with 3 overlapping stents were placed at the area of dissection and occlusion that is still heavily calcified those being 2.5x12, 2.5x12 and 2.5x8.      CAROTID ULTRASOUND (09/20/18)  Atherosclerosis without stenosis reaching 50% threshold.    ECHOCARDIOGRAM CONCLUSIONS (01/06/21)  Compared to the images of the prior study done 5/12/20, no significant   changes are noted.   Severely reduced left ventricular systolic function. Left ventricular   ejection fraction is visually estimated to be 25%.  Unable to determine diastolic function due to mitral valve repair.  Status post MitraClip. Transvalvular gradient of 4 mmHg, heart rate 75   bpm.  Estimated right ventricular systolic pressure  is 36 mmHg.  (study result reviewed)     ECHOCARDIOGRAM CONCLUSIONS (05/28/19)  Moderately reduced left ventricular systolic function.  Reduced right ventricular systolic function.  Moderately dilated left atrium.  Known mitral valve repair (Mitraclip) that is functioning adequately   with normal gradient and  mild to moderate regurgitation.  Moderate (borderline severe) eccentric tricuspid regurgitation.  Estimated right ventricular systolic pressure is 30 mmHg.  Compared to the images of the study done 11/30/2018, tricuspid   regurgitation is slightly worse.    Otherwise, there is no significant change.     ECHOCARDIOGRAM CONCLUSIONS (11/30/18)  Moderately reduced left ventricular systolic function.  Moderately dilated left atrium.  Known transcatheter mitral valve repair (1 clips in place).  Moderate mitral regurgitation.  Aortic sclerosis without stenosis.  Estimated right ventricular systolic pressure is 40 mmHg.  Compared to the images of the prior study done on 10/16/18,   mitral regurgitation is slightly worse.     ECHOCARDIOGRAM CONCLUSIONS (07/10/18)  Moderately reduced left ventricular systolic function.  Left ventricular ejection fraction is visually estimated to be 35-40%.   Mild concentric left ventricular hypertrophy.  Severely dilated left atrium.  Severe mitral regurgitation.  Estimated right ventricular systolic pressure  is 30 mmHg.  Compared to the images of the prior study done on 04/04/17, mitral   regurgitation is slightly worse and now severe, LVEF is unchanged.     EKG performed on (12/13/22) was reviewed: EKG personally interpreted shows atrial fibrillation.  EKG performed on (10/19/18) EKG shows atrial fibrillation.  EKG performed on (10/16/18) EKG shows atrial fibrillation with premature ventricular contractions.  EKG performed on (10/15/18) EKG shows atrial fibrillation.  EKG performed on (10/09/18) EKG shows atrial fibrillation.     Laboratory results of (10/16/18) Bun of 17 mg/dl, creatinine levels of 0.78 mg/dl noted.  Laboratory results of (10/15/18) Bun of 9 mg/dl, creatinine levels of 0.84 mg/dl noted.  Laboratory results of (10/10/18) Bun of 7 mg/dl, creatinine levels of 0.67 mg/dl noted.     LOWER EXTREMITY ARTERIAL ULTRASOUND (10/27/21)  50-75% stenosis of the distal left femoral  artery.  (study result reviewed)     TRANSESOPHAGEAL ECHOCARDIOGRAM CONCLUSIONS by Dr. Ohara (10/15/18)  Results pending.     TRANSESOPHAGEAL ECHOCARDIOGRAM CONCLUSIONS by  (10/09/18)  Mildly reduced LV systolic function, EF 40%.  Small apical aneurysm noted.  Mildly reduced RV systolic function.  Severe LAE.  SUDHAKAR is large, spontaneous contrast present, low emptying velocities.  Severe mitral regurgitaiton due to prolapse and functional MR,   predominantly A2-A3.  Compared to TTE 7-27-18, EF similar, unable to est RVSP, severe MR.     Assessment/Plan  NSTEMI, coronary artery disease with prior coronary artery bypass graft surgery (in 2002 and redo in 2016) and stent placement: She is experiencing chest pain as described. We will schedule for cardiac catheterization.  She understands the risks and benefits and agrees with plan.  History of transcatheter percutaneous mitral valve repair (MitraClip) with 1clip, under general anesthesia (10/15/18): Stable.  Chronic systolic congestive heart failure with cardiomyopathy: The overall volume status is adequate.   Atrial fibrillation on anticoagulation therapy (warfarin): Her INR is elevated. We will hold her warfarin tonight and consider FFP tomorrow.  Peripheral vascular disease: Stable on above medical therapy.   The risks, benefits, and alternatives to coronary angiography with IV sedation were discussed in great detail. Specific risks mentioned include bleeding, infection, kidney damage, allergic reaction, cardiac perforation with possible tamponade requiring kriss-cardiocentesis or possible open heart surgery. In addition, we discussed that 10% of patients will experience small to moderate bruising at the side of the arterial puncture. Lastly the risks of heart attack, stroke, and death were discussed; the risks of major complications such as heart attack or stroke caused by the angiogram is less than 1%; the risk of death is approximately 1 in 1000. The  patient verbalized understanding of these potential complications and wishes to proceed with this procedure.      Thank you for allowing me to participate in the care of this patient.    I will continue to follow this patient    Please contact me with any questions.    Moy Don M.D.   Cardiologist, [facility]  [phone]

## 2022-12-14 NOTE — ASSESSMENT & PLAN NOTE
Patient presents with systolic blood pressure in the 190s.  Patient states that she is compliant with all her medications.  In clinic today her systolic blood pressure was in the 110s.  Patient does appear to be anxious at this time.  She was given a dose of Vasotec in the emergency department.  Her blood pressure is slowly titrating down and is in the 170s.  Patient will receive her regular home dose blood pressure medication

## 2022-12-14 NOTE — ASSESSMENT & PLAN NOTE
Patient with history of peripheral vascular disease.  She has been seen by Dr. Mg in the past.  Worse on the left lower extremity.  As per chart review, she was switched to Plavix given her bleeding risk on Coumadin.  Patient will need outpatient follow-up

## 2022-12-14 NOTE — HEART FAILURE PROGRAM
Per h/p and cardiology consult, patient is not in acutely decompensated heart failure at this time.  HF f/u appointment and discharge checklist are not indicated at this time.  Patient is being treated for USA, going to angiogram today.

## 2022-12-14 NOTE — ED PROVIDER NOTES
ED Provider  Scribed for Sonu Bee D.O. by Sofia Le. 12/13/2022  6:21 PM    Means of arrival:Walk-In  History obtained from:Patient  History limited by: None    CHIEF COMPLAINT  Chief Complaint   Patient presents with    Chest Pain     Patient reports chest pain x 1 month with increase in pain over the last few days. Patient was seen by doctor Lilly with Cardiology and sent patient to ER.        HPI  Daya Woods is a 82 y.o. female who presents to the Emergency Department for evaluation of intermittent chest pain onset one month ago. Patient reports that her pain has increased over the last few days. She had followed up with Dr. Lilly (Cardiology) earlier today and discussed her symptoms, who directed the patient here to the ED for further evaluation. Currently in the ED, patient states that she does not have any chest pain right at this time, and has not had any pain since last night. She reports that the pain sometimes radiates to her back. Denies any shortness of breath. No alleviating or exacerbating factors reported. Denies history of asthma, COPD, or blood clots. Patient is currently taking Coumadin for her atrial fibrillation. Additional history of hypertension.     REVIEW OF SYSTEMS  See HPI for further details. All other systems are negative.     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    SOCIAL HISTORY  Social History  "    Tobacco Use    Smoking status: Former     Packs/day: 0.50     Years: 40.00     Pack years: 20.00     Types: Cigarettes     Quit date: 2002     Years since quittin.8    Smokeless tobacco: Never   Vaping Use    Vaping Use: Never used   Substance and Sexual Activity    Alcohol use: Not Currently     Comment: 1-2 a week    Drug use: No       SURGICAL HISTORY   has a past surgical history that includes appendectomy; multiple coronary artery bypass (); tonsillectomy and adenoidectomy; tubal ligation; other (Bilateral, 2014); recovery (2016); gyn surgery; transcatheter mitral valve repair (10/15/2018); carmina (10/15/2018); zzz cardiac cath (10/2018); and mitral valve replace.    CURRENT MEDICATIONS  Home Medications       Reviewed by Rupali Espana R.N. (Registered Nurse) on 22 at 1659  Med List Status: Partial     Medication Last Dose Status   aspirin EC (ECOTRIN) 81 MG Tablet Delayed Response  Active   atorvastatin (LIPITOR) 40 MG Tab  Active   furosemide (LASIX) 20 MG Tab  Active   levothyroxine (SYNTHROID) 75 MCG Tab  Active   lisinopril (PRINIVIL) 10 MG Tab  Active   magnesium oxide (MAG-OX) 400 MG Tab tablet  Active   magnesium oxide 400 (240 Mg) MG Tab  Active   metoprolol SR (TOPROL XL) 25 MG TABLET SR 24 HR  Active   nitroglycerin (NITROSTAT) 0.4 MG SL Tab  Active   potassium chloride SA (K-DUR) 10 MEQ Tab CR  Active   ranolazine (RANEXA) 500 MG TABLET SR 12 HR  Active   spironolactone (ALDACTONE) 25 MG Tab  Active   warfarin (COUMADIN) 5 MG Tab  Active                    ALLERGIES  Allergies   Allergen Reactions    Codeine Unspecified     \"I just don't like it. It does weird things to me.\"    Hydrochlorothiazide Unspecified     Unsure of reaction    Isosorbide Mononitrate Rash     Rash    Keflex Rash     Rash    Sulfa Drugs Rash     rash    Tape Unspecified     Tears skin off    Xarelto [Rivaroxaban] Rash     rash       PHYSICAL EXAM  VITAL SIGNS: BP (!) 202/91   Pulse 67   Temp " "35.9 °C (96.7 °F) (Temporal)   Resp 16   Ht 1.626 m (5' 4\")   Wt 51.1 kg (112 lb 10.5 oz)   LMP  (LMP Unknown)   SpO2 93%   BMI 19.34 kg/m²   Constitutional: Alert in no apparent distress.  HENT: No signs of trauma, mucous membranes are moist  Eyes: Conjunctiva normal, Non-icteric.   Neck: Normal range of motion, No tenderness, Supple.  Lymphatic: No lymphadenopathy noted.   Cardiovascular: Regular rate and rhythm, no murmurs.   Thorax & Lungs: Normal breath sounds, No respiratory distress, No wheezing, No chest tenderness. Old sternotomy scar  Abdomen: Bowel sounds normal, Soft, No tenderness, No masses, No pulsatile masses. No peritoneal signs.  Skin: Warm, Dry, normal color.   Back: No bony tenderness, No CVA tenderness.   Extremities: No edema, No tenderness, No cyanosis  Musculoskeletal: Good range of motion in all major joints. No tenderness to palpation or major deformities noted.   Neurologic: Alert and oriented x4, Normal motor function, Normal sensory function, No focal deficits noted.   Psychiatric: Affect normal, Judgment normal, Mood normal.   Heart Score of 6    DIAGNOSTIC STUDIES / PROCEDURES    EKG  12 Lead EKG interpreted by me shown below.      LABS  Results for orders placed or performed during the hospital encounter of 12/13/22   CBC with Differential   Result Value Ref Range    WBC 4.9 4.8 - 10.8 K/uL    RBC 4.49 4.20 - 5.40 M/uL    Hemoglobin 14.1 12.0 - 16.0 g/dL    Hematocrit 42.6 37.0 - 47.0 %    MCV 94.9 81.4 - 97.8 fL    MCH 31.4 27.0 - 33.0 pg    MCHC 33.1 (L) 33.6 - 35.0 g/dL    RDW 48.9 35.9 - 50.0 fL    Platelet Count 211 164 - 446 K/uL    MPV 9.6 9.0 - 12.9 fL    Neutrophils-Polys 61.40 44.00 - 72.00 %    Lymphocytes 27.10 22.00 - 41.00 %    Monocytes 8.30 0.00 - 13.40 %    Eosinophils 2.40 0.00 - 6.90 %    Basophils 0.40 0.00 - 1.80 %    Immature Granulocytes 0.40 0.00 - 0.90 %    Nucleated RBC 0.00 /100 WBC    Neutrophils (Absolute) 3.03 2.00 - 7.15 K/uL    Lymphs (Absolute) " 1.34 1.00 - 4.80 K/uL    Monos (Absolute) 0.41 0.00 - 0.85 K/uL    Eos (Absolute) 0.12 0.00 - 0.51 K/uL    Baso (Absolute) 0.02 0.00 - 0.12 K/uL    Immature Granulocytes (abs) 0.02 0.00 - 0.11 K/uL    NRBC (Absolute) 0.00 K/uL   Complete Metabolic Panel (CMP)   Result Value Ref Range    Sodium 131 (L) 135 - 145 mmol/L    Potassium 4.2 3.6 - 5.5 mmol/L    Chloride 94 (L) 96 - 112 mmol/L    Co2 26 20 - 33 mmol/L    Anion Gap 11.0 7.0 - 16.0    Glucose 97 65 - 99 mg/dL    Bun 12 8 - 22 mg/dL    Creatinine 0.71 0.50 - 1.40 mg/dL    Calcium 9.5 8.5 - 10.5 mg/dL    AST(SGOT) 38 12 - 45 U/L    ALT(SGPT) 30 2 - 50 U/L    Alkaline Phosphatase 98 30 - 99 U/L    Total Bilirubin 0.5 0.1 - 1.5 mg/dL    Albumin 4.3 3.2 - 4.9 g/dL    Total Protein 7.6 6.0 - 8.2 g/dL    Globulin 3.3 1.9 - 3.5 g/dL    A-G Ratio 1.3 g/dL   Troponins NOW   Result Value Ref Range    Troponin T 308 (H) 6 - 19 ng/L   Troponins in two (2) hours   Result Value Ref Range    Troponin T 197 (H) 6 - 19 ng/L   APTT   Result Value Ref Range    APTT 55.7 (H) 24.7 - 36.0 sec   PROTHROMBIN TIME (INR)   Result Value Ref Range    PT 44.3 (H) 12.0 - 14.6 sec    INR 4.99 (H) 0.87 - 1.13   CORRECTED CALCIUM   Result Value Ref Range    Correct Calcium 9.3 8.5 - 10.5 mg/dL   ESTIMATED GFR   Result Value Ref Range    GFR (CKD-EPI) 86 >60 mL/min/1.73 m 2   EKG   Result Value Ref Range    Report       [facility]    Test Date:  2022  Pt Name:    CARROLL JAMES                 Department: ER  MRN:        [id number]                      Room:  Gender:     Female                       Technician: [id number]  :        [id number]                   Requested By:ER TRIAGE PROTOCOL  Order #:    [id number]                    Reading MD: CAMMY GALICIA D.O.    Measurements  Intervals                                Axis  Rate:       82                           P:  KS:                                      QRS:        85  QRSD:       111                           T:          185  QT:         406  QTc:        475    Interpretive Statements  Atrial fibrillation  Borderline right axis deviation  Anteroseptal infarct, old  Repol abnrm suggests ischemia, lateral leads  Compared to ECG 10/19/2018 13:30:46  Early repolarization now present  T-wave abnormality no longer present  Myocardial infarct finding still present  Possible ischemia still present  Electronically  Signed On 12- 18:55:45 PST by CAMMY GALICIA D.O.       All labs reviewed by me.    RADIOLOGY  DX-CHEST-PORTABLE (1 VIEW)   Final Result      1.  Stable enlarged cardiac silhouette with no acute failure.   2.  There is atherosclerosis.      EC-ECHOCARDIOGRAM COMPLETE W/O CONT    (Results Pending)     The radiologist's interpretations of all radiological studies have been reviewed by me.    Films have been independently by me      COURSE  Pertinent Labs & Imaging studies reviewed. (See chart for details)    6:21 PM - Patient seen and examined at bedside. After my exam, I discussed plan of care. Also informed patient on the possibility of admission. Patient understands and verbalizes agreement to plan of care.Ordered for DX-chest, EKG, Troponin, CBC with diff, and CMP to evaluate her symptoms.     6:40 PM - Lab called with critical lab results of Troponin 308.     Heart Score of 6    6:58 PM - Paged Cardiology    CRITICAL CARE  The very real possibility of a deterioration of this patient's condition required the highest level of my preparedness for sudden, emergent intervention.  I provided critical care services, which included medication orders, frequent reevaluations of the patient's condition and response to treatment, ordering and reviewing test results, and discussing the case with various consultants.  The critical care time associated with the care of the patient was 30 minutes. Review chart for interventions. This time is exclusive of any other billable procedures.     MEDICAL DECISION  MAKING  This is a 82 y.o. female who presents with a history of cardiac disease, she has had open heart surgery, she been having exertional chest pain with her shortness of breath and at night she is getting chest pain.  She was seen by Dr. Lilly and was sent in for evaluation.  The patient had her last episode of chest discomfort last night.    On my examination the patient has a normal exam she is except elevated blood.  Pressure she was given Vasotec and her blood pressure did begin to trend down this should decrease the pressure on the heart.    Her troponin is elevated concerning for heart disease.  The patient is on Coumadin and she is anticoagulated with this so further anticoagulation is not necessary.  I spoke with  And she noted cardiologist and we will hold Coumadin and watch the trend.  And the patient will be admitted to hospitalist I spoke with hospitalist for admission the patient be admitted for further evaluation and treatment.      DISPOSITION:  Patient will be hospitalized by hospitalist in critical condition.    FINAL IMPRESSION  1. Precordial chest pain    2. Unstable angina (HCC)    3. Acute coronary syndrome (HCC)    4.     The critical care time associated with the care of the patient was 30 minutes.     Sofia CARLOS (Ena), am scribing for, and in the presence of, Sonu Bee D.O..    Electronically signed by: Sofia Le (Ena), 12/13/2022    Sonu CARLOS D.O. personally performed the services described in this documentation, as scribed by Sofia Le in my presence, and it is both accurate and complete.    The note accurately reflects work and decisions made by me.  Sonu Bee D.O.  12/13/2022  7:47 PM

## 2022-12-14 NOTE — ED NOTES
Med rec updated  and complete. Allergies reviewed. Confirmed name and date of birth.  Pt  today 12/13/22 was given an prescription for RANEXA and  METOPROLOL SR . Pt did not take any doses today    No antibiotic use in last 30 days.        Home [facility] [phone]

## 2022-12-14 NOTE — ASSESSMENT & PLAN NOTE
Patient currently rate controlled.  Still in atrial fibrillation as per EKG.  Continue metoprolol 50 mg daily.  Holding Coumadin in the setting of elevated INR and left heart cath tomorrow.

## 2022-12-14 NOTE — ASSESSMENT & PLAN NOTE
Patiently on chronic anticoagulation on warfarin.  On presentation she has a supratherapeutic INR 4.99.  We will hold off on Coumadin at this time.  Recheck INR at 6 AM in the morning.  There may be an indication for FFP should patient go for procedure

## 2022-12-15 ENCOUNTER — TELEPHONE (OUTPATIENT)
Dept: CARDIOLOGY | Facility: MEDICAL CENTER | Age: 82
End: 2022-12-15
Payer: COMMERCIAL

## 2022-12-15 ENCOUNTER — APPOINTMENT (OUTPATIENT)
Dept: CARDIOLOGY | Facility: MEDICAL CENTER | Age: 82
DRG: 280 | End: 2022-12-15
Attending: PHYSICIAN ASSISTANT
Payer: COMMERCIAL

## 2022-12-15 LAB
ALBUMIN SERPL BCP-MCNC: 3.8 G/DL (ref 3.2–4.9)
ALBUMIN/GLOB SERPL: 1.4 G/DL
ALP SERPL-CCNC: 86 U/L (ref 30–99)
ALT SERPL-CCNC: 22 U/L (ref 2–50)
ANION GAP SERPL CALC-SCNC: 10 MMOL/L (ref 7–16)
APTT PPP: 35 SEC (ref 24.7–36)
AST SERPL-CCNC: 22 U/L (ref 12–45)
BILIRUB SERPL-MCNC: 0.7 MG/DL (ref 0.1–1.5)
BUN SERPL-MCNC: 16 MG/DL (ref 8–22)
CALCIUM ALBUM COR SERPL-MCNC: 9.2 MG/DL (ref 8.5–10.5)
CALCIUM SERPL-MCNC: 9 MG/DL (ref 8.5–10.5)
CHLORIDE SERPL-SCNC: 97 MMOL/L (ref 96–112)
CO2 SERPL-SCNC: 24 MMOL/L (ref 20–33)
CREAT SERPL-MCNC: 0.74 MG/DL (ref 0.5–1.4)
ERYTHROCYTE [DISTWIDTH] IN BLOOD BY AUTOMATED COUNT: 47.1 FL (ref 35.9–50)
GFR SERPLBLD CREATININE-BSD FMLA CKD-EPI: 81 ML/MIN/1.73 M 2
GLOBULIN SER CALC-MCNC: 2.8 G/DL (ref 1.9–3.5)
GLUCOSE SERPL-MCNC: 87 MG/DL (ref 65–99)
HCT VFR BLD AUTO: 40.9 % (ref 37–47)
HGB BLD-MCNC: 13.7 G/DL (ref 12–16)
INR PPP: 1.45 (ref 0.87–1.13)
INR PPP: 1.51 (ref 0.87–1.13)
MCH RBC QN AUTO: 30.9 PG (ref 27–33)
MCHC RBC AUTO-ENTMCNC: 33.5 G/DL (ref 33.6–35)
MCV RBC AUTO: 92.3 FL (ref 81.4–97.8)
PLATELET # BLD AUTO: 196 K/UL (ref 164–446)
PMV BLD AUTO: 9.4 FL (ref 9–12.9)
POTASSIUM SERPL-SCNC: 4.1 MMOL/L (ref 3.6–5.5)
PROT SERPL-MCNC: 6.6 G/DL (ref 6–8.2)
PROTHROMBIN TIME: 17.3 SEC (ref 12–14.6)
PROTHROMBIN TIME: 17.9 SEC (ref 12–14.6)
RBC # BLD AUTO: 4.43 M/UL (ref 4.2–5.4)
SODIUM SERPL-SCNC: 131 MMOL/L (ref 135–145)
UFH PPP CHRO-ACNC: <0.1 IU/ML
WBC # BLD AUTO: 4.4 K/UL (ref 4.8–10.8)

## 2022-12-15 PROCEDURE — 770020 HCHG ROOM/CARE - TELE (206)

## 2022-12-15 PROCEDURE — B41D1ZZ FLUOROSCOPY OF AORTA AND BILATERAL LOWER EXTREMITY ARTERIES USING LOW OSMOLAR CONTRAST: ICD-10-PCS | Performed by: INTERNAL MEDICINE

## 2022-12-15 PROCEDURE — 700111 HCHG RX REV CODE 636 W/ 250 OVERRIDE (IP): Performed by: NURSE PRACTITIONER

## 2022-12-15 PROCEDURE — 700111 HCHG RX REV CODE 636 W/ 250 OVERRIDE (IP)

## 2022-12-15 PROCEDURE — 75630 X-RAY AORTA LEG ARTERIES: CPT | Mod: 26 | Performed by: INTERNAL MEDICINE

## 2022-12-15 PROCEDURE — 700102 HCHG RX REV CODE 250 W/ 637 OVERRIDE(OP): Performed by: STUDENT IN AN ORGANIZED HEALTH CARE EDUCATION/TRAINING PROGRAM

## 2022-12-15 PROCEDURE — A9270 NON-COVERED ITEM OR SERVICE: HCPCS | Performed by: INTERNAL MEDICINE

## 2022-12-15 PROCEDURE — A9270 NON-COVERED ITEM OR SERVICE: HCPCS | Performed by: STUDENT IN AN ORGANIZED HEALTH CARE EDUCATION/TRAINING PROGRAM

## 2022-12-15 PROCEDURE — 700105 HCHG RX REV CODE 258: Performed by: INTERNAL MEDICINE

## 2022-12-15 PROCEDURE — 99233 SBSQ HOSP IP/OBS HIGH 50: CPT | Performed by: INTERNAL MEDICINE

## 2022-12-15 PROCEDURE — 700101 HCHG RX REV CODE 250

## 2022-12-15 PROCEDURE — 85730 THROMBOPLASTIN TIME PARTIAL: CPT

## 2022-12-15 PROCEDURE — 85027 COMPLETE CBC AUTOMATED: CPT

## 2022-12-15 PROCEDURE — 85610 PROTHROMBIN TIME: CPT

## 2022-12-15 PROCEDURE — 85520 HEPARIN ASSAY: CPT

## 2022-12-15 PROCEDURE — 99153 MOD SED SAME PHYS/QHP EA: CPT

## 2022-12-15 PROCEDURE — 99232 SBSQ HOSP IP/OBS MODERATE 35: CPT | Mod: FS | Performed by: INTERNAL MEDICINE

## 2022-12-15 PROCEDURE — 36415 COLL VENOUS BLD VENIPUNCTURE: CPT

## 2022-12-15 PROCEDURE — 80053 COMPREHEN METABOLIC PANEL: CPT

## 2022-12-15 PROCEDURE — 36200 PLACE CATHETER IN AORTA: CPT | Performed by: INTERNAL MEDICINE

## 2022-12-15 PROCEDURE — 700117 HCHG RX CONTRAST REV CODE 255: Performed by: INTERNAL MEDICINE

## 2022-12-15 PROCEDURE — 700102 HCHG RX REV CODE 250 W/ 637 OVERRIDE(OP): Performed by: INTERNAL MEDICINE

## 2022-12-15 PROCEDURE — 99152 MOD SED SAME PHYS/QHP 5/>YRS: CPT | Performed by: INTERNAL MEDICINE

## 2022-12-15 RX ORDER — WARFARIN SODIUM 7.5 MG/1
7.5 TABLET ORAL DAILY
Status: DISCONTINUED | OUTPATIENT
Start: 2022-12-15 | End: 2022-12-16 | Stop reason: HOSPADM

## 2022-12-15 RX ORDER — SODIUM CHLORIDE 9 MG/ML
INJECTION, SOLUTION INTRAVENOUS CONTINUOUS
Status: DISCONTINUED | OUTPATIENT
Start: 2022-12-15 | End: 2022-12-16

## 2022-12-15 RX ORDER — HEPARIN SODIUM 1000 [USP'U]/ML
30 INJECTION, SOLUTION INTRAVENOUS; SUBCUTANEOUS PRN
Status: DISCONTINUED | OUTPATIENT
Start: 2022-12-15 | End: 2022-12-15

## 2022-12-15 RX ORDER — LIDOCAINE HYDROCHLORIDE 20 MG/ML
INJECTION, SOLUTION INFILTRATION; PERINEURAL
Status: COMPLETED
Start: 2022-12-15 | End: 2022-12-15

## 2022-12-15 RX ORDER — VERAPAMIL HYDROCHLORIDE 2.5 MG/ML
INJECTION, SOLUTION INTRAVENOUS
Status: COMPLETED
Start: 2022-12-15 | End: 2022-12-15

## 2022-12-15 RX ORDER — HEPARIN SODIUM 5000 [USP'U]/100ML
0-30 INJECTION, SOLUTION INTRAVENOUS CONTINUOUS
Status: DISCONTINUED | OUTPATIENT
Start: 2022-12-15 | End: 2022-12-15

## 2022-12-15 RX ORDER — HEPARIN SODIUM 200 [USP'U]/100ML
INJECTION, SOLUTION INTRAVENOUS
Status: COMPLETED
Start: 2022-12-15 | End: 2022-12-15

## 2022-12-15 RX ORDER — HEPARIN SODIUM 1000 [USP'U]/ML
INJECTION, SOLUTION INTRAVENOUS; SUBCUTANEOUS
Status: COMPLETED
Start: 2022-12-15 | End: 2022-12-15

## 2022-12-15 RX ORDER — MIDAZOLAM HYDROCHLORIDE 1 MG/ML
INJECTION INTRAMUSCULAR; INTRAVENOUS
Status: COMPLETED
Start: 2022-12-15 | End: 2022-12-15

## 2022-12-15 RX ADMIN — ACETAMINOPHEN 650 MG: 325 TABLET, FILM COATED ORAL at 17:45

## 2022-12-15 RX ADMIN — VERAPAMIL HYDROCHLORIDE 2.5 MG: 2.5 INJECTION, SOLUTION INTRAVENOUS at 14:28

## 2022-12-15 RX ADMIN — FENTANYL CITRATE 75 MCG: 50 INJECTION, SOLUTION INTRAMUSCULAR; INTRAVENOUS at 15:41

## 2022-12-15 RX ADMIN — NITROGLYCERIN 10 ML: 20 INJECTION INTRAVENOUS at 14:28

## 2022-12-15 RX ADMIN — ASPIRIN 81 MG: 81 TABLET, COATED ORAL at 06:16

## 2022-12-15 RX ADMIN — IOHEXOL 10 ML: 350 INJECTION, SOLUTION INTRAVENOUS at 15:41

## 2022-12-15 RX ADMIN — SODIUM CHLORIDE: 9 INJECTION, SOLUTION INTRAVENOUS at 17:28

## 2022-12-15 RX ADMIN — METOPROLOL SUCCINATE 50 MG: 50 TABLET, EXTENDED RELEASE ORAL at 06:16

## 2022-12-15 RX ADMIN — FUROSEMIDE 20 MG: 40 TABLET ORAL at 06:15

## 2022-12-15 RX ADMIN — LISINOPRIL 10 MG: 10 TABLET ORAL at 17:26

## 2022-12-15 RX ADMIN — ATORVASTATIN CALCIUM 40 MG: 40 TABLET, FILM COATED ORAL at 06:16

## 2022-12-15 RX ADMIN — SPIRONOLACTONE 12.5 MG: 25 TABLET ORAL at 06:16

## 2022-12-15 RX ADMIN — HEPARIN SODIUM 12 UNITS/KG/HR: 5000 INJECTION, SOLUTION INTRAVENOUS at 09:36

## 2022-12-15 RX ADMIN — LIDOCAINE HYDROCHLORIDE: 20 INJECTION, SOLUTION INFILTRATION; PERINEURAL at 14:28

## 2022-12-15 RX ADMIN — LEVOTHYROXINE SODIUM 75 MCG: 0.07 TABLET ORAL at 06:16

## 2022-12-15 RX ADMIN — RANOLAZINE 500 MG: 500 TABLET, EXTENDED RELEASE ORAL at 06:15

## 2022-12-15 RX ADMIN — RANOLAZINE 500 MG: 500 TABLET, EXTENDED RELEASE ORAL at 17:26

## 2022-12-15 RX ADMIN — WARFARIN SODIUM 7.5 MG: 7.5 TABLET ORAL at 20:21

## 2022-12-15 RX ADMIN — HEPARIN SODIUM 1500 UNITS: 1000 INJECTION, SOLUTION INTRAVENOUS; SUBCUTANEOUS at 09:28

## 2022-12-15 RX ADMIN — MIDAZOLAM HYDROCHLORIDE 2 MG: 1 INJECTION, SOLUTION INTRAMUSCULAR; INTRAVENOUS at 15:23

## 2022-12-15 RX ADMIN — HEPARIN SODIUM: 1000 INJECTION, SOLUTION INTRAVENOUS; SUBCUTANEOUS at 14:30

## 2022-12-15 RX ADMIN — HEPARIN SODIUM 2000 UNITS: 200 INJECTION, SOLUTION INTRAVENOUS at 14:28

## 2022-12-15 RX ADMIN — LISINOPRIL 10 MG: 10 TABLET ORAL at 06:16

## 2022-12-15 ASSESSMENT — ENCOUNTER SYMPTOMS
CHEST TIGHTNESS: 0
SHORTNESS OF BREATH: 0
WHEEZING: 0
COUGH: 0
STRIDOR: 0
APNEA: 0
CHOKING: 0

## 2022-12-15 ASSESSMENT — CHA2DS2 SCORE
HYPERTENSION: YES
CHA2DS2 VASC SCORE: 6
CHF OR LEFT VENTRICULAR DYSFUNCTION: YES
PRIOR STROKE OR TIA OR THROMBOEMBOLISM: NO
SEX: FEMALE
AGE 75 OR GREATER: YES
VASCULAR DISEASE: YES
DIABETES: NO
AGE 65 TO 74: NO

## 2022-12-15 ASSESSMENT — PAIN DESCRIPTION - PAIN TYPE: TYPE: ACUTE PAIN

## 2022-12-15 ASSESSMENT — FIBROSIS 4 INDEX: FIB4 SCORE: 1.91

## 2022-12-15 NOTE — PROGRESS NOTES
[facility] Medicine Daily Progress Note    Date of Service  12/15/2022    Chief Complaint  Daya Woods is a 82 y.o. female admitted 12/13/2022 with chest pain found to have NSTEMI.    Hospital Course  82-year-old female with coronary artery disease with prior CABG, prior stents, history of MitraClip, chronic HFrEF LVEF 25%, chronic atrial fibrillation on warfarin, peripheral vascular disease sent from Dr. Lilly's office for cardiac catheterization.  Patient evaluated in the ED by cardiology, plan to undergo LHC.  Apparently she was given food and ate this morning, LHC was canceled and will proceed tomorrow morning as long as her INR is less than 2, she may require FFP to reverse. No longer having chest pain, feels much improved.     Interval Problem Update  Patient seen and examined resting in bed, denies chest pain, afebrile,   Cardiology following plan for left heart cath today appreciate rec.  On heparin drip   Warfarin to be held until after cath.    I have discussed this patient's plan of care and discharge plan at IDT rounds today with Case Management, Nursing, Nursing leadership, and other members of the IDT team.    Consultants/Specialty  cardiology    Code Status  DNAR/DNI    Disposition  Patient is not medically cleared for discharge.   Anticipate discharge to to home with close outpatient follow-up.  I have placed the appropriate orders for post-discharge needs.    Review of Systems  ROS   Constitutional:  Negative for chills, diaphoresis, fever, malaise/fatigue and weight loss.   HENT:  Negative for congestion, ear discharge, ear pain, hearing loss, nosebleeds, sinus pain and tinnitus.    Eyes:  Negative for blurred vision, double vision and photophobia.   Respiratory:  Positive for shortness of breath. Negative for cough, hemoptysis, sputum production and stridor.    Cardiovascular:  Positive for chest pain and claudication. Negative for orthopnea, leg swelling and PND.   Gastrointestinal:   Negative for abdominal pain, blood in stool, constipation, diarrhea, heartburn, nausea and vomiting.   Genitourinary:  Negative for dysuria, frequency and urgency.   Musculoskeletal:  Negative for back pain, myalgias and neck pain.   Psychiatric/Behavioral:  Negative for depression, substance abuse and suicidal ideas.      Physical Exam  Temp:  [36.1 °C (97 °F)-37 °C (98.6 °F)] 36.9 °C (98.4 °F)  Pulse:  [53-88] 88  Resp:  [14-18] 15  BP: (104-145)/(55-75) 133/75  SpO2:  [94 %-98 %] 97 %    Physical Exam    General: She is not in acute distress.     Appearance: Normal appearance. She is normal weight. She is not toxic-appearing or diaphoretic.   HENT:      Head: Normocephalic and atraumatic.      Nose: No congestion.      Mouth/Throat:      Mouth: Mucous membranes are moist.   Eyes:      Extraocular Movements: Extraocular movements intact.      Pupils: Pupils are equal, round, and reactive to light.   Cardiovascular:      Rate and Rhythm: Normal rate. Rhythm irregular.      Pulses: Normal pulses.      Heart sounds: Normal heart sounds. No murmur heard.    No friction rub. No gallop.   Pulmonary:      Effort: Pulmonary effort is normal. No respiratory distress.      Breath sounds: Normal breath sounds. No stridor. No wheezing, rhonchi or rales.   Chest:      Chest wall: No tenderness.   Abdominal:      General: Abdomen is flat. There is no distension.      Palpations: Abdomen is soft. There is no mass.      Tenderness: There is no abdominal tenderness. There is no right CVA tenderness, left CVA tenderness, guarding or rebound.      Hernia: No hernia is present.   Musculoskeletal:         General: No swelling, tenderness or signs of injury.      Right lower leg: No edema.      Left lower leg: No edema.   Neurological:      General: No focal deficit present.      Mental Status: She is alert and oriented to person, place, and time.   Psychiatric:         Mood and Affect: Mood normal.        Fluids    Intake/Output  Summary (Last 24 hours) at 12/15/2022 1257  Last data filed at 12/14/2022 2000  Gross per 24 hour   Intake 940 ml   Output --   Net 940 ml       Laboratory  Recent Labs     12/13/22  1714 12/13/22  2350 12/15/22  0509   WBC 4.9 4.3* 4.4*   RBC 4.49 4.15* 4.43   HEMOGLOBIN 14.1 12.9 13.7   HEMATOCRIT 42.6 38.6 40.9   MCV 94.9 93.0 92.3   MCH 31.4 31.1 30.9   MCHC 33.1* 33.4* 33.5*   RDW 48.9 48.4 47.1   PLATELETCT 211 210 196   MPV 9.6 10.9 9.4     Recent Labs     12/13/22  1714 12/14/22  0400 12/15/22  0509   SODIUM 131* 130* 131*   POTASSIUM 4.2 4.4 4.1   CHLORIDE 94* 98 97   CO2 26 23 24   GLUCOSE 97 92 87   BUN 12 9 16   CREATININE 0.71 0.61 0.74   CALCIUM 9.5 9.0 9.0     Recent Labs     12/13/22  1841 12/14/22  0400 12/14/22  1656 12/15/22  0509 12/15/22  0727   APTT 55.7*  --   --   --  35.0   INR 4.99*   < > 1.96* 1.51* 1.45*    < > = values in this interval not displayed.         Recent Labs     12/14/22  0400   TRIGLYCERIDE 78   HDL 47   LDL 45       Imaging  EC-ECHOCARDIOGRAM COMPLETE W/ CONT   Final Result      DX-CHEST-PORTABLE (1 VIEW)   Final Result      1.  Stable enlarged cardiac silhouette with no acute failure.   2.  There is atherosclerosis.      CL-LEFT HEART CATHETERIZATION WITH POSSIBLE INTERVENTION    (Results Pending)        Assessment/Plan  * NSTEMI (non-ST elevated myocardial infarction) ([facility])  Assessment & Plan  Troponin [phone], cardiology consulted and plans for left heart cath today .  Continue aspirin, statin, metoprolol.  Monitor on telemetry.  Repeat EKG if she develops any chest pain.       Chronic systolic CHF (congestive heart failure), NYHA class 3 ([facility])  Assessment & Plan  Continue metoprolol, lisinopril, Aldactone, Lasix.  Has declined ICD placement in the past.  Euvolemic on exam    Hypertensive urgency  Assessment & Plan  Much improved, see hypertension problem for details.    Coronary artery disease involving coronary bypass graft of native heart with unstable angina  pectoris (HCC)  Assessment & Plan  Continue aspirin, statin, beta-blocker, spironolactone, lisinopril, Lasix, ranolazine    Peripheral vascular disease (HCC)- (present on admission)  Assessment & Plan  Continue aspirin, statin.    Chronic anticoagulation- (present on admission)  Assessment & Plan  Supratherapeutic on arrival, holding warfarin as patient going for [facility]   Warfarin will need to be resumed following catheterization.    Chronic atrial fibrillation (HCC)- (present on admission)  Assessment & Plan  Rate controlled, continue metoprolol 50 mg daily, holding Coumadin with elevated INR and planned C today     Essential hypertension- (present on admission)  Assessment & Plan  Continue spironolactone, metoprolol, lisinopril, Lasix    Hyperlipidemia  Assessment & Plan  Continue statin    Hypothyroidism- (present on admission)  Assessment & Plan  Continue Synthroid       VTE prophylaxis: SCDs/TEDs    I have performed a physical exam and reviewed and updated ROS and Plan today (12/15/2022). In review of yesterday's note (12/14/2022), there are no changes except as documented above.

## 2022-12-15 NOTE — CARE PLAN
The patient is Stable - Low risk of patient condition declining or worsening    Shift Goals  Clinical Goals: stable vital signs  Patient Goals: no chest pain    Progress made toward(s) clinical / shift goals:        Patient is not progressing towards the following goals:

## 2022-12-15 NOTE — ASSESSMENT & PLAN NOTE
Troponin [phone], cardiology consulted and plans for left heart cath today .  Continue aspirin, statin, metoprolol.  Monitor on telemetry.  Repeat EKG if she develops any chest pain.

## 2022-12-15 NOTE — ASSESSMENT & PLAN NOTE
Continue metoprolol, lisinopril, Aldactone, Lasix.  Has declined ICD placement in the past.  Euvolemic on exam

## 2022-12-15 NOTE — PROGRESS NOTES
[facility] Medicine Daily Progress Note    Date of Service  12/14/2022    Chief Complaint  Daya Woods is a 82 y.o. female admitted 12/13/2022 with chest pain found to have NSTEMI.    Hospital Course  82-year-old female with coronary artery disease with prior CABG, prior stents, history of MitraClip, chronic HFrEF LVEF 25%, chronic atrial fibrillation on warfarin, peripheral vascular disease sent from Dr. Lilly's office for cardiac catheterization.  Patient evaluated in the ED by cardiology, plan to undergo LHC.  Apparently she was given food and ate this morning, LHC was canceled and will proceed tomorrow morning as long as her INR is less than 2, she may require FFP to reverse. No longer having chest pain, feels much improved.     Interval Problem Update  I evaluated the patient in the ED with her daughter at bedside.   Vitals reviewed she is afebrile, pulse is ranged from 50s to 80s normal respiratory rate and room air oxygen saturation, systolic blood pressure is ranged from 120s to 140s.  Plan to go for left heart cath tomorrow with cardiology.  Warfarin to be held until after cath.    I have discussed this patient's plan of care and discharge plan at IDT rounds today with Case Management, Nursing, Nursing leadership, and other members of the IDT team.    Consultants/Specialty  cardiology    Code Status  DNAR/DNI    Disposition  Patient is not medically cleared for discharge.   Anticipate discharge to to home with close outpatient follow-up.  I have placed the appropriate orders for post-discharge needs.    Review of Systems  ROS   Constitutional:  Negative for chills, diaphoresis, fever, malaise/fatigue and weight loss.   HENT:  Negative for congestion, ear discharge, ear pain, hearing loss, nosebleeds, sinus pain and tinnitus.    Eyes:  Negative for blurred vision, double vision and photophobia.   Respiratory:  Positive for shortness of breath. Negative for cough, hemoptysis, sputum production and  stridor.    Cardiovascular:  Positive for chest pain and claudication. Negative for orthopnea, leg swelling and PND.   Gastrointestinal:  Negative for abdominal pain, blood in stool, constipation, diarrhea, heartburn, nausea and vomiting.   Genitourinary:  Negative for dysuria, frequency and urgency.   Musculoskeletal:  Negative for back pain, myalgias and neck pain.   Psychiatric/Behavioral:  Negative for depression, substance abuse and suicidal ideas.      Physical Exam  Temp:  [36.1 °C (97 °F)-36.9 °C (98.4 °F)] 36.1 °C (97 °F)  Pulse:  [53-81] 81  Resp:  [13-33] 16  BP: (120-177)/(56-82) 140/71  SpO2:  [93 %-97 %] 96 %    Physical Exam    General: She is not in acute distress.     Appearance: Normal appearance. She is normal weight. She is not toxic-appearing or diaphoretic.   HENT:      Head: Normocephalic and atraumatic.      Nose: No congestion.      Mouth/Throat:      Mouth: Mucous membranes are moist.   Eyes:      Extraocular Movements: Extraocular movements intact.      Pupils: Pupils are equal, round, and reactive to light.   Cardiovascular:      Rate and Rhythm: Normal rate. Rhythm irregular.      Pulses: Normal pulses.      Heart sounds: Normal heart sounds. No murmur heard.    No friction rub. No gallop.   Pulmonary:      Effort: Pulmonary effort is normal. No respiratory distress.      Breath sounds: Normal breath sounds. No stridor. No wheezing, rhonchi or rales.   Chest:      Chest wall: No tenderness.   Abdominal:      General: Abdomen is flat. There is no distension.      Palpations: Abdomen is soft. There is no mass.      Tenderness: There is no abdominal tenderness. There is no right CVA tenderness, left CVA tenderness, guarding or rebound.      Hernia: No hernia is present.   Musculoskeletal:         General: No swelling, tenderness or signs of injury.      Right lower leg: No edema.      Left lower leg: No edema.   Neurological:      General: No focal deficit present.      Mental Status: She  is alert and oriented to person, place, and time.   Psychiatric:         Mood and Affect: Mood normal.        Fluids    Intake/Output Summary (Last 24 hours) at 12/14/2022 2126  Last data filed at 12/14/2022 2000  Gross per 24 hour   Intake 1060 ml   Output --   Net 1060 ml       Laboratory  Recent Labs     12/13/22  1714 12/13/22  2350   WBC 4.9 4.3*   RBC 4.49 4.15*   HEMOGLOBIN 14.1 12.9   HEMATOCRIT 42.6 38.6   MCV 94.9 93.0   MCH 31.4 31.1   MCHC 33.1* 33.4*   RDW 48.9 48.4   PLATELETCT 211 210   MPV 9.6 10.9     Recent Labs     12/13/22  1714 12/14/22  0400   SODIUM 131* 130*   POTASSIUM 4.2 4.4   CHLORIDE 94* 98   CO2 26 23   GLUCOSE 97 92   BUN 12 9   CREATININE 0.71 0.61   CALCIUM 9.5 9.0     Recent Labs     12/13/22  1841 12/14/22  0400 12/14/22  1656   APTT 55.7*  --   --    INR 4.99* 2.23* 1.96*         Recent Labs     12/14/22  0400   TRIGLYCERIDE 78   HDL 47   LDL 45       Imaging  EC-ECHOCARDIOGRAM COMPLETE W/ CONT         DX-CHEST-PORTABLE (1 VIEW)   Final Result      1.  Stable enlarged cardiac silhouette with no acute failure.   2.  There is atherosclerosis.      CL-LEFT HEART CATHETERIZATION WITH POSSIBLE INTERVENTION    (Results Pending)        Assessment/Plan  * NSTEMI (non-ST elevated myocardial infarction) ([facility])  Assessment & Plan  Troponin [phone], cardiology consulted and plans for left heart cath tomorrow morning.  Continue aspirin, statin, metoprolol.  Monitor on telemetry.  Repeat EKG if she develops any chest pain.  TTE performed today, results pending.     Chronic systolic CHF (congestive heart failure), NYHA class 3 ([facility])  Assessment & Plan  Continue metoprolol, lisinopril, Aldactone, Lasix.  Has declined ICD placement in the past.  Euvolemic on exam    Hypertensive urgency  Assessment & Plan  Much improved, see hypertension problem for details.    Coronary artery disease involving coronary bypass graft of native heart with unstable angina pectoris ([facility])  Assessment &  Plan  Continue aspirin, statin, beta-blocker, spironolactone, lisinopril, Lasix, ranolazine    Peripheral vascular disease (HCC)- (present on admission)  Assessment & Plan  Continue aspirin, statin.    Chronic anticoagulation- (present on admission)  Assessment & Plan  Supratherapeutic on arrival, holding warfarin as patient going for C tomorrow.  Warfarin will need to be resumed following catheterization.    Chronic atrial fibrillation (HCC)- (present on admission)  Assessment & Plan  Rate controlled, continue metoprolol 50 mg daily, holding Coumadin with elevated INR and planned LHC tomorrow    Essential hypertension- (present on admission)  Assessment & Plan  Continue spironolactone, metoprolol, lisinopril, Lasix    Hyperlipidemia  Assessment & Plan  Continue statin    Hypothyroidism- (present on admission)  Assessment & Plan  Continue Synthroid       VTE prophylaxis: SCDs/TEDs    I have performed a physical exam and reviewed and updated ROS and Plan today (12/14/2022). In review of yesterday's note (12/13/2022), there are no changes except as documented above.

## 2022-12-15 NOTE — THERAPY
Occupational Therapy Contact Note     Patient Name: Daya Woods  Age:  82 y.o., Sex:  female  Medical Record #: [id number]  Today's Date: 12/15/2022       12/15/22 0819   Interdisciplinary Plan of Care Collaboration   Collaboration Comments Pt awaiting [facility] this am per chart. Will hold OT eval until post procedure as appropriate.

## 2022-12-15 NOTE — HOSPITAL COURSE
82-year-old female with coronary artery disease with prior CABG, prior stents, history of MitraClip, chronic HFrEF LVEF 25%, chronic atrial fibrillation on warfarin, peripheral vascular disease sent from Dr. Lilly's office for cardiac catheterization.  Patient evaluated in the ED by cardiology, plan to undergo LHC.  Apparently she was given food and ate this morning, LHC was canceled and will proceed tomorrow morning as long as her INR is less than 2, she may require FFP to reverse. No longer having chest pain, feels much improved.

## 2022-12-15 NOTE — PROCEDURES
Cardiac Catheterization report    12/15/2022  3:42 PM    Referring MD: Dr. Bowers    Indication/Preoperative diagnosis:  Hypertensive emergency  Advanced CAD  Advanced PAD  MR s/p MitraClip  Atrial fibrillation  Frailty    Postoperative diagnosis:  Severe peripheral arterial disease preventing access to the coronary vascular system  Severe distal aortic and left iliac stenosis, inability to pass equipment besides a wire      Recommendations:  Guideline directed medical therapy   Cardiovascular Risk factor modification  Pulm sheath in 1 hour, defer restart of heparin to primary team.  6-hour bedrest after sheath pull.    Consider a more rigorous evaluation of her goals of care given her advanced age frailty and multiple medical comorbidities.  Should coronary angiography still be sought, she would require peripheral arterial intervention first to facilitate this.      Procedures performed:  Supervision moderate sedation  Abdominal aortogram with iliofemoral runoff      FINDINGS:  I.  Abdominal aorta:  Severe diffuse aortic tortuosity and calcification, including the iliac bifurcation with subtotal occlusion of greater than 98%.  Heavy bilateral iliofemoral calcification and sequential stenoses.      Procedure details:  The risks and benefits of cardiac catheterization and possible intervention were explained to the patient including death, heart attack, stroke, and emergency surgery.  The patient verbalized understanding and wished to proceed.  The patient was brought to the cardiac catheterization laboratory in the fasting state and prepped and draped in the usual sterile fashion.  Left radial artery was accessed under direct ultrasound guidance.  A slender sheath was unable to be placed past the proximal hub.  It was sufficient to attempt a wire passage which was not able to pass the proximal forearm.  This access site was abandoned.  A TR-Band was placed without difficulty to achieve patent hemostasis.  The  right femoral artery accessed with single front wall puncture under direct ultrasound guidance.  The sheath wire was unable to be advanced past the distal needle tip.  Multiple sticks were completed.  Ultrasound revealed diffuse heavy calcification and multiple sequential stenoses with poor flow.  Subsequently the left groin was fluoroscopically evaluated and under direct ultrasound guidance a single front wall puncture was utilized and a 6 [place] sheath was placed.  The wire was unable to navigate the distal iliac artery without exchanged for a Gen and export catheter.  Eventually it was able to be traversed into the distal abdominal aorta.  The JR4 diagnostic catheter was unable to cross.  The wire was exchanged for a Amplatz superstiff and a 45 cm braided Arrow sheath was unable to cross the stenosis.  At this point given the patient's presentation is a demand ischemic event risk-benefit ratio did not support proceeding and she was not consented for peripheral intervention and therefore the procedure was abandoned.  Sheath will be left in place that she was on heparin prior to the procedure to be pulled on the floor.  Patient tolerated procedure well left the Cath Lab in stable condition.    Complications:  None apparent    Sedation time:  Moderate sedation directly monitored by me during the case while supervising the administration of the sedation medication by an independent trained RN to assist in the monitoring of the patient's level of consciousness and physiological status. I, the supervising physician was present the entire time from beginning of medication administration until the end of the procedure from 1458 until 1538. For detailed administration records please see the moderate sedation documentation in the media tab.        Apollo Stanford MD, FACC, [facility]

## 2022-12-15 NOTE — DIETARY
"Nutrition services: Day 2 of admit.  Daya Woods is a 82 y.o. female with admitting DX of Unstable angina, Closed right hip fracture, NSTEMI.    PT seen for malnutrition screening tool score of 2. Met with pt who reports she was eating three meals/day and wt has been stable at 110# for past couple of years. Pt with no nutrition questions/concerns.       Assessment:  Height: 162.6 cm (5' 4\")  Weight: 50.3 kg (111 lb)- per bedscale on 12/14. Stand up scale on 12/13 was 51.1 kg.   Body mass index is 19.05 kg/m²., BMI classification: normal.   Diet/Intake: NPO. Pt scheduled for procedure today.     Evaluation:   Wt was 49.3 kg/ 108 lbs on 9/10/2022; no wt loss noted per chart review.     Malnutrition Risk: Pt does not meet ASPEN criteria for malnutrition based on pt's report and wt trends per chart review.     Recommendations/Plan:    Advance diet as medically feasible.   Encourage PO intake and document intake as % taken in ADL's to provide interdisciplinary communication across all shifts.   Monitor weight.  Nutrition rep  see patient for ongoing meal and snack preferences as feasible and appropriate with diet advancement.    RD following.           "

## 2022-12-15 NOTE — ASSESSMENT & PLAN NOTE
Rate controlled, continue metoprolol 50 mg daily, holding Coumadin with elevated INR and planned LHC today

## 2022-12-15 NOTE — CARE PLAN
The patient is Watcher - Medium risk of patient condition declining or worsening    Shift Goals  Clinical Goals: hemodynamically stable, cardiac workup  Patient Goals: no cx pain    Progress made toward(s) clinical / shift goals:    Problem: Optimal Care for the Acute Coronary Syndrome Patient  Goal: Optimal Care for the Acute Coronary Syndrome Patient  Outcome: Progressing     Problem: Optimal Care for the STEMI Patient  Goal: Optimal Care for the STEMI Patient  Outcome: Progressing     Problem: Optimal Care for the Cath Lab Patient  Goal: Optimal Care for the Cath Lab Patient  Outcome: Progressing     Problem: Discharge Care for the Acute Coronary Syndrome Patient  Goal: Patient will be discharged on guideline directed medical therapy  Outcome: Progressing       Patient is not progressing towards the following goals:

## 2022-12-15 NOTE — THERAPY
Physical Therapy Contact Note    Patient Name: Daya Woods  Age:  82 y.o., Sex:  female  Medical Record #: [id number]  Today's Date: 12/15/2022    Pt awaiting [facility] this date per chart. Hold PT eval until post procedure.    Vanessa Alfonso, PT, DPT  [phone]

## 2022-12-15 NOTE — PROGRESS NOTES
Cardiology Follow Up Progress Note    Date of Service  12/15/2022    Attending Physician  Marietta Raman M.D.    Chief Complaint   Cardiology consultation for evaluation of unstable angina    HPI  Daya Woods is a 82 y.o. female with CAD s/p CABG x 5v 2002 (2/5 grafts open by cardiac cath 2016), prior PCI (SVG OM-PCI 2018) as well as prior PCIs to native LAD, history of mitral clip (now with moderate MR), chronic A. fib on OAC with warfarin , ischemic cardiomyopathy LVEF 25%, PVD admitted 12/13/2022 from [facility] cardiology office by Dr. Lilly for unstable angina for cardiac cath.    Interim Events  No overnight cardiac events  Telemetry-A. fib rate controlled  No recurrent angina, on IV heparin  Plan for coronary angiography this afternoon keep n.p.o.  Denies chest pain   appears euvolemic.      Review of Systems  Review of Systems   Respiratory:  Negative for apnea, cough, choking, chest tightness, shortness of breath, wheezing and stridor.      Vital signs in last 24 hours  Temp:  [36.1 °C (97 °F)-37 °C (98.6 °F)] 36.9 °C (98.4 °F)  Pulse:  [53-88] 88  Resp:  [14-18] 15  BP: (104-145)/(55-75) 133/75  SpO2:  [94 %-98 %] 97 %    Physical Exam  Physical Exam  Cardiovascular:      Rate and Rhythm: Normal rate. Rhythm irregular.      Pulses: Normal pulses.   Pulmonary:      Effort: Pulmonary effort is normal.   Skin:     General: Skin is warm.   Neurological:      Mental Status: She is alert. Mental status is at baseline.   Psychiatric:         Mood and Affect: Mood normal.       Lab Review  Lab Results   Component Value Date/Time    WBC 4.4 (L) 12/15/2022 05:09 AM    RBC 4.43 12/15/2022 05:09 AM    HEMOGLOBIN 13.7 12/15/2022 05:09 AM    HEMATOCRIT 40.9 12/15/2022 05:09 AM    MCV 92.3 12/15/2022 05:09 AM    MCH 30.9 12/15/2022 05:09 AM    MCHC 33.5 (L) 12/15/2022 05:09 AM    MPV 9.4 12/15/2022 05:09 AM      Lab Results   Component Value Date/Time    SODIUM 131 (L) 12/15/2022 05:09 AM    POTASSIUM 4.1 12/15/2022 05:09  AM    CHLORIDE 97 12/15/2022 05:09 AM    CO2 24 12/15/2022 05:09 AM    GLUCOSE 87 12/15/2022 05:09 AM    BUN 16 12/15/2022 05:09 AM    CREATININE 0.74 12/15/2022 05:09 AM      Lab Results   Component Value Date/Time    ASTSGOT 22 12/15/2022 05:09 AM    ALTSGPT 22 12/15/2022 05:09 AM     Lab Results   Component Value Date/Time    CHOLSTRLTOT 108 12/14/2022 04:00 AM    LDL 45 12/14/2022 04:00 AM    HDL 47 12/14/2022 04:00 AM    TRIGLYCERIDE 78 12/14/2022 04:00 AM    TROPONINT 236 (H) 12/14/2022 04:00 AM       No results for input(s): NTPROBNP in the last 72 hours.    Cardiac Imaging and Procedures Review  EKG:  My personal interpretation of the EKG dated 12/13/2022 is atrial fibrillation.    Echocardiogram: 12/14/2022  Severely reduced left ventricular systolic function.  Global hypokinesis and apical aneurysm.   Known transcatheter mitral edge to edge repair which is functioning   normally wiht 2+ mitral regurgitation.           Cardiac Catheterization:   10/9/2018  Underwent PCI of ostial to proximal SVG to OM.  GRAFTS:  Left internal mammary artery to the diagonal branch patent, saphenous vein graft to the distal left anterior descending artery known to be occluded, saphenous vein graft to the diagonal branch known to be occluded,   saphenous vein graft to the obtuse marginal branch patent; however, with   ostial proximal complex high-grade stenosis of 99%.  Distally, there is a   small-caliber obtuse marginal branch noted, saphenous vein graft to the right   coronary artery known to be occluded.      Imaging  Chest X-Ray:  Stable enlarged cardiac silhouette with no acute failure.  2.  There is atherosclerosis.     Stress Test: Not applicable    Assessment/Plan  #Unstable angina  #Known ischemic cardiomyopathy LVEF 25% (EF 20% in 2020, 40% 2018).  #HFrEF, appears euvolemic, on low-dose furosemide 20 mg daily.  #MitraClip 2018, recent echo showed 2+ mitral regurgitation.  #PCI VG- OM 2018.  #CABGx 5v 2002, 2/5  grafts open.    #Peripheral vascular disease, left leg claudication  #Chronic A. Fib  #On warfarin, on hold pending cardiac cath  #Hypertension  #Hyperlipidemia    Recommendations  -INR ~1.5, resume IV heparin.  -Continue aspirin 81, atorvastatin 40 mg .  -Continue lisinopril 10 mg twice a day.  -Continue Toprol-XL 50 daily.  -On Ranexa 500 twice daily.  -Spironolactone 12.5 mg daily.  -Plan for [facility] 12/15/22  -Keep NPO    Cardiology will follow along.    I personally spent a total of 10  minutes which includes face-to-face time and non-face-to-face time spent on preparing to see the patient, reviewing hospital notes and tests, obtaining history from the patient, performing a medically appropriate exam, counseling and educating the patient, ordering medications/tests/procedures/referrals as clinically indicated, and documenting information in the electronic medical record.       Thank you for allowing me to participate in the care of this patient.  I will continue to follow this patient    Please contact me with any questions.    MARIO Carranza.   Cardiologist, [facility]  [phone]

## 2022-12-15 NOTE — ASSESSMENT & PLAN NOTE
Supratherapeutic on arrival, holding warfarin as patient going for [facility]   Warfarin will need to be resumed following catheterization.

## 2022-12-16 VITALS
SYSTOLIC BLOOD PRESSURE: 158 MMHG | RESPIRATION RATE: 14 BRPM | OXYGEN SATURATION: 92 % | WEIGHT: 121.25 LBS | DIASTOLIC BLOOD PRESSURE: 82 MMHG | TEMPERATURE: 98.4 F | HEART RATE: 71 BPM | HEIGHT: 64 IN | BODY MASS INDEX: 20.7 KG/M2

## 2022-12-16 LAB
ANION GAP SERPL CALC-SCNC: 8 MMOL/L (ref 7–16)
BUN SERPL-MCNC: 14 MG/DL (ref 8–22)
CALCIUM SERPL-MCNC: 8.2 MG/DL (ref 8.5–10.5)
CHLORIDE SERPL-SCNC: 103 MMOL/L (ref 96–112)
CO2 SERPL-SCNC: 21 MMOL/L (ref 20–33)
CREAT SERPL-MCNC: 0.68 MG/DL (ref 0.5–1.4)
ERYTHROCYTE [DISTWIDTH] IN BLOOD BY AUTOMATED COUNT: 48.2 FL (ref 35.9–50)
GFR SERPLBLD CREATININE-BSD FMLA CKD-EPI: 88 ML/MIN/1.73 M 2
GLUCOSE SERPL-MCNC: 126 MG/DL (ref 65–99)
HCT VFR BLD AUTO: 37 % (ref 37–47)
HGB BLD-MCNC: 12.1 G/DL (ref 12–16)
INR PPP: 1.5 (ref 0.87–1.13)
MCH RBC QN AUTO: 30.8 PG (ref 27–33)
MCHC RBC AUTO-ENTMCNC: 32.7 G/DL (ref 33.6–35)
MCV RBC AUTO: 94.1 FL (ref 81.4–97.8)
PLATELET # BLD AUTO: 165 K/UL (ref 164–446)
PMV BLD AUTO: 9.2 FL (ref 9–12.9)
POTASSIUM SERPL-SCNC: 4 MMOL/L (ref 3.6–5.5)
PROTHROMBIN TIME: 17.8 SEC (ref 12–14.6)
RBC # BLD AUTO: 3.93 M/UL (ref 4.2–5.4)
SODIUM SERPL-SCNC: 132 MMOL/L (ref 135–145)
WBC # BLD AUTO: 5.1 K/UL (ref 4.8–10.8)

## 2022-12-16 PROCEDURE — 97165 OT EVAL LOW COMPLEX 30 MIN: CPT

## 2022-12-16 PROCEDURE — 99239 HOSP IP/OBS DSCHRG MGMT >30: CPT | Performed by: INTERNAL MEDICINE

## 2022-12-16 PROCEDURE — A9270 NON-COVERED ITEM OR SERVICE: HCPCS | Performed by: STUDENT IN AN ORGANIZED HEALTH CARE EDUCATION/TRAINING PROGRAM

## 2022-12-16 PROCEDURE — 36415 COLL VENOUS BLD VENIPUNCTURE: CPT

## 2022-12-16 PROCEDURE — 700105 HCHG RX REV CODE 258: Performed by: INTERNAL MEDICINE

## 2022-12-16 PROCEDURE — 97162 PT EVAL MOD COMPLEX 30 MIN: CPT

## 2022-12-16 PROCEDURE — 80048 BASIC METABOLIC PNL TOTAL CA: CPT

## 2022-12-16 PROCEDURE — 99232 SBSQ HOSP IP/OBS MODERATE 35: CPT | Mod: FS | Performed by: INTERNAL MEDICINE

## 2022-12-16 PROCEDURE — 85027 COMPLETE CBC AUTOMATED: CPT

## 2022-12-16 PROCEDURE — 700102 HCHG RX REV CODE 250 W/ 637 OVERRIDE(OP): Performed by: STUDENT IN AN ORGANIZED HEALTH CARE EDUCATION/TRAINING PROGRAM

## 2022-12-16 PROCEDURE — 85610 PROTHROMBIN TIME: CPT

## 2022-12-16 RX ADMIN — SODIUM CHLORIDE: 9 INJECTION, SOLUTION INTRAVENOUS at 01:40

## 2022-12-16 RX ADMIN — METOPROLOL SUCCINATE 50 MG: 50 TABLET, EXTENDED RELEASE ORAL at 05:10

## 2022-12-16 RX ADMIN — LEVOTHYROXINE SODIUM 75 MCG: 0.07 TABLET ORAL at 05:10

## 2022-12-16 RX ADMIN — SPIRONOLACTONE 12.5 MG: 25 TABLET ORAL at 05:10

## 2022-12-16 RX ADMIN — ATORVASTATIN CALCIUM 40 MG: 40 TABLET, FILM COATED ORAL at 05:10

## 2022-12-16 RX ADMIN — RANOLAZINE 500 MG: 500 TABLET, EXTENDED RELEASE ORAL at 05:08

## 2022-12-16 RX ADMIN — ASPIRIN 81 MG: 81 TABLET, COATED ORAL at 05:08

## 2022-12-16 RX ADMIN — FUROSEMIDE 20 MG: 40 TABLET ORAL at 05:10

## 2022-12-16 RX ADMIN — LISINOPRIL 10 MG: 10 TABLET ORAL at 05:11

## 2022-12-16 ASSESSMENT — ACTIVITIES OF DAILY LIVING (ADL): TOILETING: INDEPENDENT

## 2022-12-16 ASSESSMENT — ENCOUNTER SYMPTOMS
WHEEZING: 0
CHOKING: 0
COUGH: 0
STRIDOR: 0
CHEST TIGHTNESS: 0
SHORTNESS OF BREATH: 0
APNEA: 0

## 2022-12-16 ASSESSMENT — COGNITIVE AND FUNCTIONAL STATUS - GENERAL
DAILY ACTIVITIY SCORE: 24
MOBILITY SCORE: 21
SUGGESTED CMS G CODE MODIFIER MOBILITY: CJ
STANDING UP FROM CHAIR USING ARMS: A LITTLE
SUGGESTED CMS G CODE MODIFIER DAILY ACTIVITY: CH
CLIMB 3 TO 5 STEPS WITH RAILING: A LITTLE
WALKING IN HOSPITAL ROOM: A LITTLE

## 2022-12-16 ASSESSMENT — GAIT ASSESSMENTS
DISTANCE (FEET): 200
GAIT LEVEL OF ASSIST: SUPERVISED

## 2022-12-16 NOTE — THERAPY
Physical Therapy   Initial Evaluation     Patient Name: Daya Woods  Age:  82 y.o., Sex:  female  Medical Record #: [id number]  Today's Date: 12/16/2022     Precautions  Precautions: Fall Risk  Comments: NTEMI, medically managed    Assessment  Patient is 82 y.o. female with a diagnosis of NSTEMI with PMH including but not limited to CAD with prior CABG, stented coronary artery, MitraClip, CHF, chronic afib on AC, and PVD. Pt underwent cardiac catheterization which was unsuccesful due to aortic stenosis, recommended medical management of NSTEMI. PT provided education to pt regarding goals for mobility upon DC home and s/sx of failure response when taking longer walks in home and community. Pt receptive and has already employed several strategies during walks PTA. Pt with no further acute PT needs at this time. Continue to mobilize with nursing.     Plan    Recommend Physical Therapy for Evaluation only        Discharge Recommendations: Anticipate that the patient will have no further physical therapy needs after discharge from the hospital       12/16/22 1103   Precautions   Precautions Fall Risk   Comments NTEMI, medically managed   Vitals   O2 Delivery Device None - Room Air   Pain 0 - 10 Group   Therapist Pain Assessment During Activity;Nurse Notified  (no pain reported)   Prior Living Situation   Prior Services None   Housing / Facility 1 Story House   Steps Into Home 4  (with railing)   Steps In Home 0   Equipment Owned None   Lives with - Patient's Self Care Capacity Alone and Able to Care For Self   Comments pt is normally home alone with small dog but Dtr reports she will have someone with her 24/7 initally upon DC home   Prior Level of Functional Mobility   Bed Mobility Independent   Transfer Status Independent   Ambulation Independent   Distance Ambulation (Feet)   (community)   Assistive Devices Used None   Stairs Independent   Comments reports walking dog daily, distances dependent on tolerance    Cognition    Level of Consciousness Alert   Comments motivated to DC home   Active ROM Lower Body    Active ROM Lower Body  WDL   Strength Lower Body   Lower Body Strength  WDL   Balance Assessment   Sitting Balance (Static) Good   Sitting Balance (Dynamic) Good   Standing Balance (Static) Good   Standing Balance (Dynamic) Fair +   Weight Shift Sitting Good   Weight Shift Standing Good   Comments w/o AD   Gait Analysis   Gait Level Of Assist Supervised   Assistive Device None   Distance (Feet) 200   # of Times Distance was Traveled 1   Deviation   (dec leif)   # of Stairs Climbed 2  (light railng use)   Level of Assist with Stairs Supervised   Weight Bearing Status no restrictions   Comments overall steady gait, discussed s/sx of failure response with good tolerance   Bed Mobility    Supine to Sit Supervised   Sit to Supine   (up with OT at end of session)   Scooting Supervised   Functional Mobility   Sit to Stand Supervised   How much difficulty does the patient currently have...   Turning over in bed (including adjusting bedclothes, sheets and blankets)? 4   Sitting down on and standing up from a chair with arms (e.g., wheelchair, bedside commode, etc.) 4   Moving from lying on back to sitting on the side of the bed? 4   How much help from another person does the patient currently need...   Moving to and from a bed to a chair (including a wheelchair)? 3   Need to walk in a hospital room? 3   Climbing 3-5 steps with a railing? 3   6 clicks Mobility Score 21   Anticipated Discharge Equipment and Recommendations   Discharge Recommendations Anticipate that the patient will have no further physical therapy needs after discharge from the hospital   Interdisciplinary Plan of Care Collaboration   IDT Collaboration with  Nursing;Family / Caregiver;Occupational Therapist   Patient Position at End of Therapy   (up with OT at end of session)   Collaboration Comments aware of session   Session Information   Date / Session  Number  12/16-1 (eval only)

## 2022-12-16 NOTE — THERAPY
Occupational Therapy   Initial Evaluation     Patient Name: Daya Woods  Age:  82 y.o., Sex:  female  Medical Record #: [id number]  Today's Date: 12/16/2022          Assessment  Patient is 82 y.o. female who presents to [facility] for cardiac catheterization. Pt appears to be at functional baseline performing BADLs at SPV level. Recommend DC home.     Plan    Recommend Occupational Therapy Eval only    DC Equipment Recommendations: (P) None  Discharge Recommendations: (P) Anticipate that the patient will have no further occupational therapy needs after discharge from the hospital        Objective       12/16/22 1106   Charge Group   OT Evaluation OT Evaluation Low   Total Time Spent   OT Time Spent Yes   OT Evaluation (Minutes) 20   Initial Contact Note    Initial Contact Note Order Received and Verified, Occupational Therapy Evaluation in Progress with Full Report to Follow.   Prior Living Situation   Prior Services None   Housing / Facility 1 Story House   Bathroom Set up Walk In Shower   Equipment Owned None   Lives with - Patient's Self Care Capacity Adult Children   Comments going to stay with adult daughter following admit, reports pt will have assist as long as she needs it   Prior Level of ADL Function   Self Feeding Independent   Grooming / Hygiene Independent   Bathing Independent   Dressing Independent   Toileting Independent   Prior Level of IADL Function   Medication Management Independent   Laundry Independent   Kitchen Mobility Independent   Finances Independent   Home Management Independent   Shopping Independent   Prior Level Of Mobility Independent Without Device in Community;Independent Without Device in Home   Pain 0 - 10 Group   Therapist Pain Assessment Post Activity Pain Same as Prior to Activity;Nurse Notified  (no c/o pain during session)   Cognition    Cognition / Consciousness WDL   Level of Consciousness Alert   Comments very pleasent and cooperative   Active ROM Upper Body   Active ROM  Upper Body  WDL   Strength Upper Body   Upper Body Strength  WDL   Coordination Upper Body   Coordination WDL   Balance Assessment   Sitting Balance (Static) Good   Sitting Balance (Dynamic) Good   Standing Balance (Static) Good   Standing Balance (Dynamic) Fair +   Weight Shift Sitting Good   Weight Shift Standing Good   Comments no AD, no key LOB   Bed Mobility    Supine to Sit Supervised   Sit to Supine   (NT in chair post)   Scooting Supervised   ADL Assessment   Grooming Supervision;Standing   Lower Body Dressing Supervision   Toileting Supervision   How much help from another person does the patient currently need...   Putting on and taking off regular lower body clothing? 4   Bathing (including washing, rinsing, and drying)? 4   Toileting, which includes using a toilet, bedpan, or urinal? 4   Putting on and taking off regular upper body clothing? 4   Taking care of personal grooming such as brushing teeth? 4   Eating meals? 4   6 Clicks Daily Activity Score 24   Functional Mobility   Sit to Stand Supervised   Bed, Chair, Wheelchair Transfer Supervised   Mobility within room and bathroom no AD   Activity Tolerance   Sitting in Chair 10+ min (up post)   Sitting Edge of Bed 5 min   Standing 8 min   Education Group   Role of Occupational Therapist Patient Response Patient;Acceptance;Explanation;Demonstration;Verbal Demonstration;Action Demonstration   Problem List   Problem List None   Anticipated Discharge Equipment and Recommendations   DC Equipment Recommendations None   Discharge Recommendations Anticipate that the patient will have no further occupational therapy needs after discharge from the hospital   Interdisciplinary Plan of Care Collaboration   IDT Collaboration with  Nursing   Patient Position at End of Therapy Seated;Call Light within Reach;Tray Table within Reach;Phone within Reach   Collaboration Comments report given   Session Information   Date / Session Number  12/16, 1x only

## 2022-12-16 NOTE — DISCHARGE SUMMARY
Discharge Summary    CHIEF COMPLAINT ON ADMISSION  Chief Complaint   Patient presents with    Chest Pain     Patient reports chest pain x 1 month with increase in pain over the last few days. Patient was seen by doctor Lilly with Cardiology and sent patient to ER.        Reason for Admission  Sent by MD     Admission Date  12/13/2022    CODE STATUS  DNAR/DNI    HPI & HOSPITAL COURSE  This is a 82 y.o. female with coronary artery disease with prior CABG, prior stents, history of MitraClip, chronic HFrEF LVEF 25%, chronic atrial fibrillation on warfarin, peripheral vascular disease sent from Dr. Lilly's office for cardiac catheterization.  Patient evaluated in the ED by cardiology, plan to undergo LHC.   as long as her INR is less than 2. Coronary angiogram was tried on 12/15 but  due to lack of vascular access from left radial, left and right femoral arteries was unable to be performed. Cardiology also discussed with patient regarding ICD for primary prevention but patient has declined it at this time.   Patient now denies any chest pain and doing better, she will need to follow up with cardiology as outpatient Continue spironolactone, ranolazine, metoprolol, lisinopril, furosemide, atorvastatin, aspirin for HF, Afib and CAD  Patient will be discharged home today     The patient met 2-midnight criteria for an inpatient stay at the time of discharge.    Discharge Date  12/16/2022    FOLLOW UP ITEMS POST DISCHARGE  Cardiology     DISCHARGE DIAGNOSES  Principal Problem:    NSTEMI (non-ST elevated myocardial infarction) (HCC) POA: Unknown  Active Problems:    Hypothyroidism POA: Yes    Hyperlipidemia POA: Unknown    Essential hypertension POA: Yes      Overview: Essential    Chronic atrial fibrillation (HCC) POA: Yes      Overview: Managed with rate control and warfarin for anticoagulation    Chronic anticoagulation POA: Yes    Peripheral vascular disease (HCC) POA: Yes    Unstable angina (HCC) POA: Yes    Coronary  artery disease involving coronary bypass graft of native heart with unstable angina pectoris (HCC) POA: Unknown      Overview: Patient with history of prior MI and 5 vessel CABG in 2002, now with 2 out       of 5 grafts open by cardiac catheterization on August 2016 status post PCI       October 2018, and severe disease of her native LAD status post PCI with       drug-eluting prior stents and occluded native right coronary artery       filling collaterals.  Patient presented to clinic today to see her       cardiologist.  She was complaining of several day history of unstable       angina.  She was having pain at rest and with exertion.  It was       recommended the patient present to the emergency department for repeat       left heart cath to assess her coronary artery anatomy.            As per cardiology recommendations.  Patient says metoprolol will be       increased to 50 mg p.o. daily along with Ranexa 50 mg p.o. twice daily.        Patient should continue aspirin.  Hold Coumadin for now given her elevated       INR and plan for left heart cath tomorrow morning.            Case was further discussed with Dr. Don.  He recommends repeating her       INR at 6 AM tomorrow in the setting that the patient will need FFP prior       to procedure.                Hypertensive urgency POA: Unknown    Chronic systolic CHF (congestive heart failure), NYHA class 3 (HCC) POA: Unknown  Resolved Problems:    * No resolved hospital problems. *      FOLLOW UP  Future Appointments   Date Time Provider Department Center   1/24/2023  9:15 AM Talya Anderson P.A.-C. [facility] None     No follow-up provider specified.    MEDICATIONS ON DISCHARGE     Medication List        CONTINUE taking these medications        Instructions   aspirin EC 81 MG Tbec  Commonly known as: ECOTRIN   Take 81 mg by mouth every day.  Dose: 81 mg     atorvastatin 40 MG Tabs  Commonly known as: Lipitor   Take 1 Tablet by mouth every day.  Dose: 40 mg    "  furosemide 20 MG Tabs  Commonly known as: LASIX   Take 1 Tablet by mouth every day.  Dose: 20 mg     levothyroxine 75 MCG Tabs  Commonly known as: SYNTHROID   Take 1 Tablet by mouth every morning on an empty stomach.  Dose: 75 mcg     lisinopril 10 MG Tabs  Commonly known as: PRINIVIL   take 1 tablet by mouth twice a day     magnesium oxide 400 MG Tabs tablet  Commonly known as: MAG-OX   Take 1 Tablet by mouth every day.  Dose: 400 mg     metoprolol SR 25 MG Tb24  Commonly known as: TOPROL XL   Take 2 Tablets by mouth every day.  Dose: 50 mg     nitroglycerin 0.4 MG Subl  Commonly known as: NITROSTAT   Place 1 Tablet under the tongue as needed for Chest Pain.  Dose: 0.4 mg     potassium chloride SA 10 MEQ Tbcr  Commonly known as: K-DUR   Take 0.5 Tablets by mouth every day.  Dose: 5 mEq     ranolazine 500 MG Tb12  Commonly known as: RANEXA   Take 1 Tablet by mouth 2 times a day.  Dose: 500 mg     spironolactone 25 MG Tabs  Commonly known as: ALDACTONE   Take 0.5 Tablets by mouth every day.  Dose: 12.5 mg     warfarin 5 MG Tabs  Commonly known as: COUMADIN   Take 5-7.5 mg by mouth every day. 5 mg on Mondays, wednesdays, and Fridays. All other days 7.5 mg  Dose: 5-7.5 mg              Allergies  Allergies   Allergen Reactions    Codeine Unspecified     \"I just don't like it. It does weird things to me.\"    Hydrochlorothiazide Unspecified     Unsure of reaction    Isosorbide Mononitrate Rash     Rash    Keflex Rash     Rash    Sulfa Drugs Rash     rash    Tape Unspecified     Tears skin off    Xarelto [Rivaroxaban] Rash     rash       DIET  Orders Placed This Encounter   Procedures    Diet Order Diet: Cardiac     Standing Status:   Standing     Number of Occurrences:   1     Order Specific Question:   Diet:     Answer:   Cardiac [6]       ACTIVITY  As tolerated.  Weight bearing as tolerated    CONSULTATIONS  Cardiology     PROCEDURES  Coronary angiogram     LABORATORY  Lab Results   Component Value Date    SODIUM 132 " (L) 12/16/2022    POTASSIUM 4.0 12/16/2022    CHLORIDE 103 12/16/2022    CO2 21 12/16/2022    GLUCOSE 126 (H) 12/16/2022    BUN 14 12/16/2022    CREATININE 0.68 12/16/2022        Lab Results   Component Value Date    WBC 5.1 12/16/2022    HEMOGLOBIN 12.1 12/16/2022    HEMATOCRIT 37.0 12/16/2022    PLATELETCT 165 12/16/2022        Total time of the discharge process exceeds 41 minutes.

## 2022-12-16 NOTE — PROGRESS NOTES
Report received from Douglas Lester. Assumed pt care. Pt resting in bed. Pt A&O x 4. Fall precautions in place, call light and belongings within reach, bed in lowest position. No signs of distress.

## 2022-12-16 NOTE — PROGRESS NOTES
Pharmacy Warfarin Monitoring     Date: 12/15/2022  Reason for Anticoagulation: Atrial Fibrillation   Target INR: 2.0 to 3.0    [id number] VASc Score: 6  HAS-BLED Score: 3     Hemoglobin Value: 13.7  Hematocrit Value: 40.9  Lab Platelet Value: 196    INR from last 7 days       Date/Time INR Value    12/15/22 0727 1.45    12/15/22 0509 1.51    12/14/22 1656 1.96    12/14/22 0400 2.23    12/13/22 1841 4.99          Dose from last 7 days       Date/Time Dose (mg)    12/15/22 1637 7.5          Home dose:  5-7.5 mg daily  Significant Interactions: Antiplatelet Medications  Bridge Therapy: Not at this time    Comments: Transitioning from heparin infusion to home warfarin.  With subtherapeutic INR will resume therapy with 7.5 mg daily, slightly higher than home dosing, until therapeutic.    Education Material Provided?: No (chronic warfarin therapy)  Pharmacist suggested discharge dosing: Can likely resume home dosing with INR within 1 week of discharge     Thank you!  Mickie Lopez, PharmD, BCCCP

## 2022-12-16 NOTE — PROGRESS NOTES
Cardiology Follow Up Progress Note    Date of Service  12/16/2022    Attending Physician  Marietta Raman M.D.    Chief Complaint   Cardiology consultation for evaluation of unstable angina    HPI  Daya Woods is a 82 y.o. female with CAD s/p CABG x 5v 2002 (2/5 grafts open by cardiac cath 2016), prior PCI (SVG OM-PCI 2018) as well as prior PCIs to native LAD, history of mitral clip (now with moderate MR), chronic A. fib on OAC with warfarin , ischemic cardiomyopathy LVEF 25%, PVD [place] [facility] [place] Dr. Lilly for unstable angina for cardiac cath.    Interim Events  No overnight cardiac events  Telemetry-A. fib rate controlled  Cardiac catheter report 12/15/2022 showed severe peripheral arterial disease preventing access to the coronary vascular system.  Patient with severe distal aortic and left iliac stenosis, inability to pass equipment besides a wire.  Continue with optimal guided medical therapy.  Daughter and grandson at bedside all questions answered.  Patient declined to undergo AICD implantation.  Review of Systems  Review of Systems   Respiratory:  Negative for apnea, cough, choking, chest tightness, shortness of breath, wheezing and stridor.      Vital signs in last 24 hours  Temp:  [36.5 °C (97.7 °F)-36.9 °C (98.4 °F)] 36.9 °C (98.4 °F)  Pulse:  [68-88] 71  Resp:  [12-15] 14  BP: (132-158)/(69-82) 158/82  SpO2:  [90 %-97 %] 92 %    Physical Exam  Physical Exam  Cardiovascular:      Rate and Rhythm: Normal rate. Rhythm irregular.      Pulses: Normal pulses.   Pulmonary:      Effort: Pulmonary effort is normal.   Skin:     General: Skin is warm.   Neurological:      Mental Status: She is alert. Mental status is at baseline.   Psychiatric:         Mood and Affect: Mood normal.       Lab Review  Lab Results   Component Value Date/Time    WBC 5.1 12/16/2022 02:25 AM    RBC 3.93 (L) 12/16/2022 02:25 AM    HEMOGLOBIN 12.1 12/16/2022 02:25 AM    HEMATOCRIT 37.0 12/16/2022 02:25  AM    MCV 94.1 12/16/2022 02:25 AM    MCH 30.8 12/16/2022 02:25 AM    MCHC 32.7 (L) 12/16/2022 02:25 AM    MPV 9.2 12/16/2022 02:25 AM      Lab Results   Component Value Date/Time    SODIUM 132 (L) 12/16/2022 02:25 AM    POTASSIUM 4.0 12/16/2022 02:25 AM    CHLORIDE 103 12/16/2022 02:25 AM    CO2 21 12/16/2022 02:25 AM    GLUCOSE 126 (H) 12/16/2022 02:25 AM    BUN 14 12/16/2022 02:25 AM    CREATININE 0.68 12/16/2022 02:25 AM      Lab Results   Component Value Date/Time    ASTSGOT 22 12/15/2022 05:09 AM    ALTSGPT 22 12/15/2022 05:09 AM     Lab Results   Component Value Date/Time    CHOLSTRLTOT 108 12/14/2022 04:00 AM    LDL 45 12/14/2022 04:00 AM    HDL 47 12/14/2022 04:00 AM    TRIGLYCERIDE 78 12/14/2022 04:00 AM    TROPONINT 236 (H) 12/14/2022 04:00 AM       No results for input(s): NTPROBNP in the last 72 hours.    Cardiac Imaging and Procedures Review  EKG:  My personal interpretation of the EKG dated 12/13/2022 is atrial fibrillation.    Echocardiogram: 12/14/2022  Severely reduced left ventricular systolic function.  Global hypokinesis and apical aneurysm.   Known transcatheter mitral edge to edge repair which is functioning   normally wiht 2+ mitral regurgitation.           Cardiac Catheterization:   10/9/2018  Underwent PCI of ostial to proximal SVG to OM.  GRAFTS:  Left internal mammary artery to the diagonal branch patent, saphenous vein graft to the distal left anterior descending artery known to be occluded, saphenous vein graft to the diagonal branch known to be occluded,   saphenous vein graft to the obtuse marginal branch patent; however, with   ostial proximal complex high-grade stenosis of 99%.  Distally, there is a   small-caliber obtuse marginal branch noted, saphenous vein graft to the right   coronary artery known to be occluded.        Cardiac cath report  12/15/2022  Postoperative diagnosis:  Severe peripheral arterial disease preventing access to the coronary vascular system  Severe distal  aortic and left iliac stenosis, inability to pass equipment besides a wire        Recommendations:  Guideline directed medical therapy   Cardiovascular Risk factor modification       Consider a more rigorous evaluation of her goals of care given her advanced age frailty and multiple medical comorbidities.  Should coronary angiography still be sought, she would require peripheral arterial intervention first to facilitate this.          Imaging  Chest X-Ray:  Stable enlarged cardiac silhouette with no acute failure.  2.  There is atherosclerosis.     Stress Test: Not applicable    Assessment/Plan  #Unstable angina  #Known ischemic cardiomyopathy LVEF 25% (EF 20% in 2020, 40% 2018).  #HFrEF, appears euvolemic, on low-dose furosemide 20 mg daily.  #MitraClip 2018, recent echo showed 2+ mitral regurgitation.  #PCI VG- OM 2018.  #CABGx 5v 2002, 2/5 grafts open.    # Advanced PAD  #Chronic A. Fib  #On warfarin  #Hypertension  #Hyperlipidemia  # Frailty    Recommendations  -s/p [facility] 12/5/22 unable to get access to the coronary vascular system secondary to severe peripheral arterial disease.  -Continue optimal guided medical therapy.  -No signs and symptoms of heart failure  -Resume warfarin post cath  -Continue aspirin 81, atorvastatin 40 mg .  -Continue lisinopril 10 mg twice a day.  -Continue Toprol-XL 50 daily.  -Continue Ranexa 500 twice daily.  -Spironolactone 12.5 mg daily.  -We discussed AICD implantation for primary prevention however patient declined to undergo any device implantation.  -She would like to continue conservative treatment and optimal guided medical therapy with close follow-up in cardiology clinic.    Cardiology will sign off.  Follow-up appointment in cardiology clinic in 2 weeks, will arrange.        I personally spent a total of 10 minutes which includes face-to-face time and non-face-to-face time spent on preparing to see the patient, reviewing hospital notes and tests, obtaining history from the  patient, performing a medically appropriate exam, counseling and educating the patient, ordering medications/tests/procedures/referrals as clinically indicated, and documenting information in the electronic medical record.       Thank you for allowing me to participate in the care of this patient.      Please contact me with any questions.    MARIO Carranza.   Cardiologist, [facility]  [phone]

## 2022-12-22 ENCOUNTER — TELEPHONE (OUTPATIENT)
Dept: CARDIOLOGY | Facility: MEDICAL CENTER | Age: 82
End: 2022-12-22
Payer: COMMERCIAL

## 2022-12-22 DIAGNOSIS — I50.20 SYSTOLIC CHF WITH REDUCED LEFT VENTRICULAR FUNCTION, NYHA CLASS 2 (HCC): ICD-10-CM

## 2022-12-22 RX ORDER — METOPROLOL SUCCINATE 50 MG/1
50 TABLET, EXTENDED RELEASE ORAL
Qty: 180 TABLET | Refills: 3 | Status: SHIPPED | OUTPATIENT
Start: 2022-12-22 | End: 2023-01-24 | Stop reason: SDUPTHER

## 2022-12-22 NOTE — TELEPHONE ENCOUNTER
"RIN    Caller: Marely Woods (daughter)    Topic/issue: RANEXA    Marely states that the RANEXA is causing dizziness, nausea, chest pain, tired and just makes the patient feel \"off\". Marely would like to know if JM  can reduce the dosage. Please advise.    Thank you,  Yasmani DUNNE    Callback Number: [phone]    "

## 2022-12-23 NOTE — TELEPHONE ENCOUNTER
-------------------------------------  Called Marely [phone]  Relayed VR recommendations. Marely in agreement. No refills needed at this time. Marely agrees to send mychart message or call clinic when refills are needed. Mychart message sent for clarification and future reference. Marely verbalized understanding and is appreciative of call back.

## 2023-01-21 NOTE — PROGRESS NOTES
"Chief Complaint   Patient presents with    Follow-Up     FV Dx; Hyponatremia    HTN (Controlled)       Subjective:   Daya Woods is a 82 y.o. female who presents today for follow up with her daughter Lavinia.     Patient of Dr. Lilly.Current medical problems include CAD s/p 5-vessel CABG in 2002 now with now 2/5 grafts open by cardiac catheterization 8/26/16 (LIMA-diag patent, SVG-OM status post PCI 10/9/2018, all others occluded), ischemic CMO, HFrEF, chronic atrial fibrillation, hypertension, dyslipidemia, functional mitral regurgitation status post Mitraclip procedure on 10/15/2018.     She saw Dr. Lilly in Dec 2022 and reported severe chest pressure. She was referred to the emergency department her HS troponin was elevated at 308, and ultimately for a coronary angiogram which was unable to be completed due to severe peripheral arterial disease ( preventing access to coronary vascular system). She was discharged on medical therapy.   After discharged she was not tolerating ranolazine and this was discontinued. Her beta blocker was increased.     Daya does not recall the chest pain but does remember feeling like she was going to pass out.  Since she was discharged from the hospital she denies any type of chest pressure or pain.  She was having some dizziness when taking the ranolazine but now that the ranolazine was discontinued she is feeling much better.  She had 1 episode of dizziness where she feels like her \"vision gets blurry\" that occurred while she was loading the car with bags and walking back-and-forth from the house.  The symptoms lasted for a few minutes then got better after she rested.  She is not interested in retrial laying isosorbide mononitrate or ranolazine.    She checks his her blood pressure occasionally at home, she does not notice any low heart rates on her blood pressure monitor when she does so.    She complains of left hip/thigh pain especially when she is walking her dog.  " She rests for a few minutes and then the pain goes away and she can keep walking.    Past Medical History:   Diagnosis Date    Anesthesia     ponv    Anticoagulation monitoring, special range     CAD (coronary artery disease) 10/09/2018    PCI/ANA (Synergy 3.0 x 28mm) to the OM.    Cataract     Bilateral IOL    Chronic atrial fibrillation (HCC)     Managed with rate control and warfarin for anticoagulation     Coronary atherosclerosis of native coronary artery 12/2002    CABG x 5 (LIMA-D3, SVG-OM, SVG-RCA, SVG-distal LAD, SVG-D1). August 2016: LIMA-D3 and SVG-OM patent (interval occlusion of the SVG-RCA); chronic occluded SVG-LAD, SVG-D1.    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     MI, old 2002    Mitral regurgitation 10/2018    Status post MV repair with MitraClip.    Osteoarthritis     Pain     right knee, back    Raynaud's disease     Spinal stenosis 8/20/2015    Status post coronary artery bypass grafts x 5 08/2002    LIMA-D3, SVG-LAD, SVG-OM, SVG-RCA, SVG-distal LAD.      Past Surgical History:   Procedure Laterality Date    TRANSCATHETER MITRAL VALVE REPAIR  10/15/2018    Procedure: TRANSCATHETER MITRAL VALVE REPAIR- WITH POSSIBLE OPEN HEART AND ANY ASSOCIATED PROCEDURES;  Surgeon: Moy Don M.D.;  Location: SURGERY [facility];  Service: Cardiac    DEVON  10/15/2018    Procedure: DEVON;  Surgeon: Moy Don M.D.;  Location: SURGERY [facility];  Service: Cardiac    ZZZ CARDIAC CATH  10/2018    PCI to SVG to OM.     RECOVERY  8/26/2016    Procedure: CATH LAB-DEVON W/LHC W/POSSIBLE-JO-ANN/GRINSELL-LARGE GROUP;  Surgeon: Recoveryonly Surgery;  [place] [facility] [place]:     OTHER Bilateral 08/2014    cataract extraction with iols    MULTIPLE  "CORONARY ARTERY BYPASS       CABG x 5    APPENDECTOMY      GYN SURGERY      c section     MITRAL VALVE REPLACE      MV clip X1    TONSILLECTOMY AND ADENOIDECTOMY      TUBAL LIGATION       Family History   Problem Relation Age of Onset    Heart Attack Brother 55        heart attack     Social History     Socioeconomic History    Marital status:      Spouse name: Not on file    Number of children: Not on file    Years of education: Not on file    Highest education level: Not on file   Occupational History    Not on file   Tobacco Use    Smoking status: Former     Packs/day: 0.50     Years: 40.00     Pack years: 20.00     Types: Cigarettes     Quit date: 2002     Years since quittin.9    Smokeless tobacco: Never   Vaping Use    Vaping Use: Never used   Substance and Sexual Activity    Alcohol use: Not Currently     Comment: 1-2 a week    Drug use: No    Sexual activity: Not on file   Other Topics Concern    Not on file   Social History Narrative    Retired      Social Determinants of Health     Financial Resource Strain: Not on file   Food Insecurity: Not on file   Transportation Needs: Not on file   Physical Activity: Not on file   Stress: Not on file   Social Connections: Not on file   Intimate Partner Violence: Not on file   Housing Stability: Not on file     Allergies   Allergen Reactions    Codeine Unspecified     \"I just don't like it. It does weird things to me.\"    Hydrochlorothiazide Unspecified     Unsure of reaction    Isosorbide Mononitrate Rash     Rash    Keflex Rash     Rash    Sulfa Drugs Rash     rash    Tape Unspecified     Tears skin off    Xarelto [Rivaroxaban] Rash     rash     Outpatient Encounter Medications as of 2023   Medication Sig Dispense Refill    metoprolol SR (TOPROL XL) 25 MG TABLET SR 24 HR Take 1 Tablet by mouth see administration instructions. 1.5 tabs=37.5mg in AM 2 tabs= 50mg in  Tablet 3    nitroglycerin (NITROSTAT) 0.4 MG SL Tab Place " "1 Tablet under the tongue as needed for Chest Pain. 25 Tablet 1    atorvastatin (LIPITOR) 40 MG Tab Take 1 Tablet by mouth every day. 90 Tablet 3    furosemide (LASIX) 20 MG Tab Take 1 Tablet by mouth every day. 90 Tablet 3    lisinopril (PRINIVIL) 10 MG Tab take 1 tablet by mouth twice a day 180 Tablet 3    magnesium oxide (MAG-OX) 400 MG Tab tablet Take 1 Tablet by mouth every day. 90 Tablet 3    potassium chloride SA (K-DUR) 10 MEQ Tab CR Take 0.5 Tablets by mouth every day. 45 Tablet 3    spironolactone (ALDACTONE) 25 MG Tab Take 0.5 Tablets by mouth every day. 45 Tablet 3    levothyroxine (SYNTHROID) 75 MCG Tab Take 1 Tablet by mouth every morning on an empty stomach. 90 Tablet 3    aspirin EC (ECOTRIN) 81 MG Tablet Delayed Response Take 81 mg by mouth every day.      warfarin (COUMADIN) 5 MG Tab Take 5-7.5 mg by mouth every day. 5 mg on Mondays, wednesdays, and Fridays. All other days 7.5 mg      [DISCONTINUED] metoprolol SR (TOPROL XL) 50 MG TABLET SR 24 HR Take 1 Tablet by mouth 2 (two) times a day. (Patient taking differently: Take 50 mg by mouth 2 (two) times a day. 37.5mg in AM 50mg in PM) 180 Tablet 3     No facility-administered encounter medications on file as of 1/24/2023.     Review of Systems   Constitutional:         No unexpected weight changes   HENT:  Negative for nosebleeds.    Eyes:  Positive for blurred vision.   Respiratory:  Negative for cough and shortness of breath.    Cardiovascular:  Positive for claudication. Negative for chest pain, palpitations, orthopnea, leg swelling and PND.   Gastrointestinal:  Negative for blood in stool, melena and nausea.   Genitourinary:  Negative for hematuria.   Neurological:  Positive for dizziness.      Objective:   BP (!) 141/61 (BP Location: Left arm, Patient Position: Sitting, BP Cuff Size: Adult)   Pulse 69   Resp 12   Ht 1.626 m (5' 4\")   Wt 51.7 kg (114 lb)   LMP  (LMP Unknown)   SpO2 98%   BMI 19.57 kg/m²     Physical Exam  Constitutional:  "      General: She is not in acute distress.     Appearance: She is well-developed. She is not diaphoretic.   HENT:      Head: Normocephalic and atraumatic.   Eyes:      General: No scleral icterus.     Conjunctiva/sclera: Conjunctivae normal.   Neck:      Vascular: No JVD.   Cardiovascular:      Rate and Rhythm: Normal rate. Rhythm irregular.      Pulses: Normal pulses.      Heart sounds: No murmur heard.     Comments: 2+ bilateral femoral pulses without hematoma or swelling  Pulmonary:      Effort: Pulmonary effort is normal. No respiratory distress.      Breath sounds: Normal breath sounds. No wheezing or rales.   Abdominal:      General: There is no distension.   Musculoskeletal:      Cervical back: Neck supple.      Right lower leg: No edema.      Left lower leg: No edema.   Lymphadenopathy:      Cervical: No cervical adenopathy.   Skin:     General: Skin is warm and dry.   Neurological:      Mental Status: She is alert and oriented to person, place, and time.      Gait: Gait normal.   Psychiatric:         Judgment: Judgment normal.        [facility] (10/9/2018):   IMPRESSION:  1.  3-4+ mitral regurgitation.  2.  Reduced left ventricular systolic function with ejection fraction of 35%,   mid to distal anterior and apical hypokinesis.  3.  Elevated left ventricular end-diastolic pressure.  4.  Coronary artery disease with known occluded saphenous vein graft to the distal left anterior descending to the diagonal branch and to the right coronary artery with patent left internal mammary artery to the mid to distal left anterior descending artery, patent saphenous vein graft to a small obtuse marginal branch with high-grade ostial proximal graft stenosis.  5.  Successful percutaneous transluminal coronary angioplasty/stent placement of the ostial to proximal saphenous vein graft to the obtuse marginal branch with 3.0x28 mm Synergy drug-eluting stent.  6.  Elevated left ventricular end-diastolic pressure.  7.  Elevated right  heart pressure with 40 mmHg.    TTE 1/6/21  CONCLUSIONS  Compared to the images of the prior study done 5/12/20, no significant   changes are noted.   Severely reduced left ventricular systolic function. Left ventricular   ejection fraction is visually estimated to be 25%.  Unable to determine diastolic function due to mitral valve repair.  Status post MitraClip. Transvalvular gradient of 4 mmHg, heart rate 75 bpm.  Estimated right ventricular systolic pressure  is 36 mmHg.  Left Ventricle  Left ventricle is normal in size. Normal left ventricular wall   thickness. Severely reduced left ventricular systolic function. Left   ventricular ejection fraction is visually estimated to be 25%. Global   hypokinesis. [place] is aneurysmal. Akinesis of the basal segment of the   anteroseptal wall. Akinesis of the basal segment of the inferoseptal   wall. Unable to determine diastolic function due to mitral valve   repair.     Right Ventricle  Right ventricle not well visualized.     Right Atrium  Enlarged right atrium. Normal inferior vena cava size and inspiratory   collapse.     Left Atrium  Severely dilated left atrium. Left atrial volume index is 70 mL/sq m.     Mitral Valve  Status post MitraClip. Transvalvular gradient of 4 mmHg, heart rate 75   bpm. Moderate mitral regurgitation.     Aortic Valve  Aortic sclerosis without stenosis. No aortic insufficiency.     Tricuspid Valve  Structurally normal tricuspid valve. No tricuspid stenosis. Mild   tricuspid regurgitation. Estimated right ventricular systolic pressure    is 36 mmHg.     Pulmonic Valve  The pulmonic valve is not well visualized. No pulmonic stenosis.   Moderate pulmonic insufficiency.     Pericardium  Normal pericardium without effusion.     Aorta  Normal aortic root for body surface area. Ascending aorta diameter is   2.5 cm.    Anigiogram 12/15/22  Indication/Preoperative diagnosis:  Hypertensive emergency  Advanced CAD  Advanced PAD  MR s/p MitraClip  Atrial  fibrillation  Frailty     Postoperative diagnosis:  Severe peripheral arterial disease preventing access to the coronary vascular system  Severe distal aortic and left iliac stenosis, inability to pass equipment besides a wire        Recommendations:  Guideline directed medical therapy   Cardiovascular Risk factor modification  Pulm sheath in 1 hour, defer restart of heparin to primary team.  6-hour bedrest after sheath pull.     Consider a more rigorous evaluation of her goals of care given her advanced age frailty and multiple medical comorbidities.  Should coronary angiography still be sought, she would require peripheral arterial intervention first to facilitate this.        Procedures performed:  Supervision moderate sedation  Abdominal aortogram with iliofemoral runoff        FINDINGS:  I.  Abdominal aorta:  Severe diffuse aortic tortuosity and calcification, including the iliac bifurcation with subtotal occlusion of greater than 98%.  Heavy bilateral iliofemoral calcification and sequential stenoses.  Assessment:     1. Systolic CHF with reduced left ventricular function, NYHA class 2 ([facility])  metoprolol SR (TOPROL XL) 25 MG TABLET SR 24 HR          Medical Decision Making:  Today's Assessment / Status / Plan:   CAD with stable angina  PAD with claudication   - She is not interested in re-trying intervention. Her symptoms are manageable and she prefers less medication   - she has signed a DNR/DNI/POLST on file  - aspirin  - statin  - beta blocker: Metoprolol SR 37.5 mg in a.m., 50 mg in p.m.  - intolerant to isosorbide mononitrate and ranolazine      Chronic HFrEF, stage C, NYHA class II  Ischemic cardiomyopathy.   - LVEF 25% based on echo Jan 2021  - euvolemic to dry on exam  -  metoprolol 37.5mg  -continue lisinopril 10mg PO  -continue spironolactone 12.5mg PO daily  -continue lasix 20mg PO Daily along with kcl 10mEq daily, SCr and electrolytes stable  -continue magnesium   -Her symptoms of dizziness/blurred  "vision with mild exertion such as loading the car is concerning for ventricular arrhythmias.  We again readdressed the recommendation for ICD which she again declined, stating \"when it is my time to go with my time to go\"    S/P mitral valve clip  -  moderate MR, normal gradient across valve, asymptomatic  -Aspirin       Chronic atrial fibrillation   [id number] score of 6. asymptomatic  -continue coumadin, goal INR 2-3, INR monitored in [place]  -Metop for rate control  - previously we discussed rhythm control and how it may help her EF, she again is not interested in any procedures     Tricuspid valve insufficiency  - cont lasix 20mg    Hyponatremia, mild, stable  - option to stop the spironolactone as her symptoms of lightheadedness that improve with salt and water intake may be more related to hypotension/dehydration. She prefers to continue her meds as is    RTC in 3-4mo to assess PAD symptoms  "

## 2023-01-24 ENCOUNTER — OFFICE VISIT (OUTPATIENT)
Dept: CARDIOLOGY | Facility: MEDICAL CENTER | Age: 83
End: 2023-01-24
Payer: COMMERCIAL

## 2023-01-24 VITALS
SYSTOLIC BLOOD PRESSURE: 141 MMHG | OXYGEN SATURATION: 98 % | HEIGHT: 64 IN | DIASTOLIC BLOOD PRESSURE: 61 MMHG | HEART RATE: 69 BPM | RESPIRATION RATE: 12 BRPM | WEIGHT: 114 LBS | BODY MASS INDEX: 19.46 KG/M2

## 2023-01-24 DIAGNOSIS — I24.0 ISCHEMIC HEART DISEASE DUE TO CORONARY ARTERY OBSTRUCTION (HCC): ICD-10-CM

## 2023-01-24 DIAGNOSIS — I25.9 ISCHEMIC HEART DISEASE DUE TO CORONARY ARTERY OBSTRUCTION (HCC): ICD-10-CM

## 2023-01-24 DIAGNOSIS — I50.20 SYSTOLIC CHF WITH REDUCED LEFT VENTRICULAR FUNCTION, NYHA CLASS 2 (HCC): ICD-10-CM

## 2023-01-24 DIAGNOSIS — I73.9 PERIPHERAL VASCULAR DISEASE (HCC): ICD-10-CM

## 2023-01-24 DIAGNOSIS — D68.69 SECONDARY HYPERCOAGULABLE STATE (HCC): ICD-10-CM

## 2023-01-24 DIAGNOSIS — I48.20 CHRONIC ATRIAL FIBRILLATION (HCC): ICD-10-CM

## 2023-01-24 DIAGNOSIS — I25.708 CORONARY ARTERY DISEASE OF BYPASS GRAFT OF NATIVE HEART WITH STABLE ANGINA PECTORIS (HCC): ICD-10-CM

## 2023-01-24 DIAGNOSIS — Z98.890 S/P MITRAL VALVE REPAIR: ICD-10-CM

## 2023-01-24 DIAGNOSIS — R54 AGE-RELATED PHYSICAL DEBILITY: ICD-10-CM

## 2023-01-24 DIAGNOSIS — Z66 DO NOT RESUSCITATE STATUS: ICD-10-CM

## 2023-01-24 PROCEDURE — 99214 OFFICE O/P EST MOD 30 MIN: CPT | Performed by: PHYSICIAN ASSISTANT

## 2023-01-24 RX ORDER — METOPROLOL SUCCINATE 25 MG/1
25 TABLET, EXTENDED RELEASE ORAL SEE ADMIN INSTRUCTIONS
Qty: 360 TABLET | Refills: 3 | Status: ON HOLD | OUTPATIENT
Start: 2023-01-24 | End: 2023-10-11

## 2023-01-24 ASSESSMENT — ENCOUNTER SYMPTOMS
BLURRED VISION: 1
COUGH: 0
BLOOD IN STOOL: 0
ORTHOPNEA: 0
PALPITATIONS: 0
DIZZINESS: 1
SHORTNESS OF BREATH: 0
CLAUDICATION: 1
NAUSEA: 0
PND: 0

## 2023-01-24 ASSESSMENT — FIBROSIS 4 INDEX: FIB4 SCORE: 2.27

## 2023-05-01 DIAGNOSIS — I25.5 ISCHEMIC CARDIOMYOPATHY: ICD-10-CM

## 2023-05-01 DIAGNOSIS — E87.1 HYPONATREMIA: ICD-10-CM

## 2023-05-02 NOTE — RESULT ENCOUNTER NOTE
FYI: Na 129 on last labs, has chronic mild hyponatremia  Discussed discontinuing a diuretic last visit since she was having some dizziness, can readdress this visit. May be able to use lasix PRN.

## 2023-05-09 ENCOUNTER — OFFICE VISIT (OUTPATIENT)
Dept: CARDIOLOGY | Facility: MEDICAL CENTER | Age: 83
End: 2023-05-09
Payer: COMMERCIAL

## 2023-05-09 VITALS
WEIGHT: 110 LBS | OXYGEN SATURATION: 100 % | SYSTOLIC BLOOD PRESSURE: 148 MMHG | BODY MASS INDEX: 18.78 KG/M2 | HEART RATE: 80 BPM | RESPIRATION RATE: 16 BRPM | HEIGHT: 64 IN | DIASTOLIC BLOOD PRESSURE: 84 MMHG

## 2023-05-09 DIAGNOSIS — D68.69 SECONDARY HYPERCOAGULABLE STATE (HCC): ICD-10-CM

## 2023-05-09 DIAGNOSIS — I25.9 ISCHEMIC HEART DISEASE DUE TO CORONARY ARTERY OBSTRUCTION (HCC): ICD-10-CM

## 2023-05-09 DIAGNOSIS — I50.23 ACUTE ON CHRONIC SYSTOLIC CHF (CONGESTIVE HEART FAILURE) (HCC): ICD-10-CM

## 2023-05-09 DIAGNOSIS — I20.0 UNSTABLE ANGINA (HCC): ICD-10-CM

## 2023-05-09 DIAGNOSIS — I24.0 ISCHEMIC HEART DISEASE DUE TO CORONARY ARTERY OBSTRUCTION (HCC): ICD-10-CM

## 2023-05-09 DIAGNOSIS — I10 ESSENTIAL HYPERTENSION: ICD-10-CM

## 2023-05-09 DIAGNOSIS — R73.01 IMPAIRED FASTING GLUCOSE: ICD-10-CM

## 2023-05-09 DIAGNOSIS — Z95.1 STATUS POST CORONARY ARTERY BYPASS GRAFTS X 5: ICD-10-CM

## 2023-05-09 DIAGNOSIS — I48.20 CHRONIC ATRIAL FIBRILLATION (HCC): ICD-10-CM

## 2023-05-09 DIAGNOSIS — E78.5 HYPERLIPIDEMIA, UNSPECIFIED HYPERLIPIDEMIA TYPE: ICD-10-CM

## 2023-05-09 DIAGNOSIS — Z95.5 STENTED CORONARY ARTERY: ICD-10-CM

## 2023-05-09 PROBLEM — I21.4 NSTEMI (NON-ST ELEVATED MYOCARDIAL INFARCTION) (HCC): Status: RESOLVED | Noted: 2022-12-13 | Resolved: 2023-05-09

## 2023-05-09 PROCEDURE — 99215 OFFICE O/P EST HI 40 MIN: CPT | Performed by: INTERNAL MEDICINE

## 2023-05-09 RX ORDER — POTASSIUM CHLORIDE 750 MG/1
5 TABLET, EXTENDED RELEASE ORAL DAILY
Qty: 50 TABLET | Refills: 3 | Status: SHIPPED | OUTPATIENT
Start: 2023-05-09 | End: 2023-10-19

## 2023-05-09 RX ORDER — SPIRONOLACTONE 25 MG/1
12.5 TABLET ORAL DAILY
Qty: 50 TABLET | Refills: 3 | Status: SHIPPED | OUTPATIENT
Start: 2023-05-09 | End: 2023-07-07

## 2023-05-09 RX ORDER — FUROSEMIDE 20 MG/1
20 TABLET ORAL DAILY
Qty: 100 TABLET | Refills: 3 | Status: SHIPPED | OUTPATIENT
Start: 2023-05-09 | End: 2023-10-19

## 2023-05-09 RX ORDER — LISINOPRIL 10 MG/1
TABLET ORAL
Qty: 200 TABLET | Refills: 3 | Status: ON HOLD | OUTPATIENT
Start: 2023-05-09 | End: 2023-10-11

## 2023-05-09 RX ORDER — MAGNESIUM OXIDE 400 MG/1
400 TABLET ORAL DAILY
Qty: 100 TABLET | Refills: 3 | Status: SHIPPED | OUTPATIENT
Start: 2023-05-09 | End: 2023-12-21

## 2023-05-09 RX ORDER — ATORVASTATIN CALCIUM 40 MG/1
40 TABLET, FILM COATED ORAL DAILY
Qty: 100 TABLET | Refills: 3 | Status: SHIPPED | OUTPATIENT
Start: 2023-05-09 | End: 2023-12-21

## 2023-05-09 ASSESSMENT — FIBROSIS 4 INDEX: FIB4 SCORE: 2.3
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
## 2023-05-15 DIAGNOSIS — E03.9 HYPOTHYROIDISM, UNSPECIFIED TYPE: ICD-10-CM

## 2023-05-16 RX ORDER — LEVOTHYROXINE SODIUM 0.07 MG/1
75 TABLET ORAL
Qty: 90 TABLET | Refills: 3 | Status: SHIPPED | OUTPATIENT
Start: 2023-05-16 | End: 2023-12-21

## 2023-07-05 DIAGNOSIS — I50.23 ACUTE ON CHRONIC SYSTOLIC CHF (CONGESTIVE HEART FAILURE) (HCC): ICD-10-CM

## 2023-07-07 RX ORDER — SPIRONOLACTONE 25 MG/1
TABLET ORAL
Qty: 45 TABLET | Refills: 3 | Status: SHIPPED | OUTPATIENT
Start: 2023-07-07 | End: 2023-12-21

## 2023-10-04 ENCOUNTER — TELEPHONE (OUTPATIENT)
Dept: CARDIOLOGY | Facility: MEDICAL CENTER | Age: 83
End: 2023-10-04
Payer: COMMERCIAL

## 2023-10-04 NOTE — TELEPHONE ENCOUNTER
S/w patients grandsimin Moreno, discussed JA recommendations below, patient and grandson both verbalized understanding. Will call back for any additional changes or questions.

## 2023-10-04 NOTE — TELEPHONE ENCOUNTER
Phone Number Called: [phone] (Josh's Cell)    Call outcome:  Spoke to patients Josh jeffery    Message: Called to discuss patients current symptoms and concerns.   Patient mentioned being dizzy since last f/u in May, more frequent since June.   Sudden presyncope, vision changes within that last two months.   No CP, SOB  Lost about 10 pounds, weights 100lbs.  Patient is reporting its her metoprolol making her feel this way.  Once she takes medication in the morning and feels symptoms throughout the day.    BP 97/51, 97/61  HR 82    Patients grandson reports patient falling 3 weeks ago, patient stood up to fast, doesn't remember falling. Did not hit head and protected herself pretty well.    Currently taking all medications in chart, no missed doses, no changes in meds     Metoprolol 1 tablet morning and 1.5 at night.    ER precautions discussed with grandson and patient.   Will send to JA for further recommendations since SERAFIN TODD.

## 2023-10-04 NOTE — TELEPHONE ENCOUNTER
Talya Anderson P.A.-C.  You 24 minutes ago (3:24 PM)       I'm ok if she wants to reduce the metoprolol, can hold the morning dose. I don't think it explains the weight loss.

## 2023-10-04 NOTE — TELEPHONE ENCOUNTER
SERAFIN    Caller: Marely Woods (daughter)    Topic/issue: MEDICAL ADVICE    Marely states that pt is having some vision issues and is also dizzy more frequently, she has also lost 10lbs since her last OV. Marely would like to know if some mediccations need to be adjusted. Daya is currently with her grandson so Marely is requesting us to call Josh (grandson).    Thank you.  Yasmani DUNNE    Callback Number: [phone] (Josh's Cell)

## 2023-10-06 ENCOUNTER — APPOINTMENT (OUTPATIENT)
Dept: RADIOLOGY | Facility: MEDICAL CENTER | Age: 83
DRG: 100 | End: 2023-10-06
Attending: EMERGENCY MEDICINE
Payer: COMMERCIAL

## 2023-10-06 ENCOUNTER — HOSPITAL ENCOUNTER (INPATIENT)
Facility: MEDICAL CENTER | Age: 83
LOS: 5 days | DRG: 100 | End: 2023-10-11
Attending: EMERGENCY MEDICINE | Admitting: HOSPITALIST
Payer: COMMERCIAL

## 2023-10-06 DIAGNOSIS — D62 ANEMIA DUE TO ACUTE BLOOD LOSS: ICD-10-CM

## 2023-10-06 DIAGNOSIS — R56.9 SEIZURE (HCC): ICD-10-CM

## 2023-10-06 DIAGNOSIS — R53.1 WEAKNESS: ICD-10-CM

## 2023-10-06 DIAGNOSIS — I50.23 ACUTE ON CHRONIC SYSTOLIC CHF (CONGESTIVE HEART FAILURE) (HCC): ICD-10-CM

## 2023-10-06 PROBLEM — E87.20 LACTIC ACIDOSIS: Status: ACTIVE | Noted: 2023-10-06

## 2023-10-06 PROBLEM — D68.9 COAGULOPATHY (HCC): Status: ACTIVE | Noted: 2023-10-06

## 2023-10-06 PROBLEM — G93.41 ACUTE METABOLIC ENCEPHALOPATHY: Status: ACTIVE | Noted: 2023-10-06

## 2023-10-06 LAB
ABO GROUP BLD: NORMAL
ACANTHOCYTES BLD QL SMEAR: NORMAL
ALBUMIN SERPL BCP-MCNC: 3.3 G/DL (ref 3.2–4.9)
ALBUMIN/GLOB SERPL: 1.2 G/DL
ALP SERPL-CCNC: 98 U/L (ref 30–99)
ALT SERPL-CCNC: 21 U/L (ref 2–50)
ANION GAP SERPL CALC-SCNC: 19 MMOL/L (ref 7–16)
ANISOCYTOSIS BLD QL SMEAR: ABNORMAL
APPEARANCE UR: CLEAR
APTT PPP: 61.2 SEC (ref 24.7–36)
AST SERPL-CCNC: 24 U/L (ref 12–45)
B-OH-BUTYR SERPL-MCNC: 0.09 MMOL/L (ref 0.02–0.27)
BARCODED ABORH UBTYP: 5100
BARCODED ABORH UBTYP: 9500
BARCODED ABORH UBTYP: 9500
BARCODED PRD CODE UBPRD: NORMAL
BARCODED UNIT NUM UBUNT: NORMAL
BASOPHILS # BLD AUTO: 0.4 % (ref 0–1.8)
BASOPHILS # BLD: 0.02 K/UL (ref 0–0.12)
BILIRUB SERPL-MCNC: 0.3 MG/DL (ref 0.1–1.5)
BILIRUB UR QL STRIP.AUTO: NEGATIVE
BLD GP AB SCN SERPL QL: NORMAL
BUN SERPL-MCNC: 26 MG/DL (ref 8–22)
BURR CELLS BLD QL SMEAR: NORMAL
CALCIUM ALBUM COR SERPL-MCNC: 9.3 MG/DL (ref 8.5–10.5)
CALCIUM SERPL-MCNC: 8.7 MG/DL (ref 8.5–10.5)
CHLORIDE SERPL-SCNC: 94 MMOL/L (ref 96–112)
CO2 SERPL-SCNC: 16 MMOL/L (ref 20–33)
COLOR UR: YELLOW
COMMENT 1642: NORMAL
COMPONENT R 8504R: NORMAL
CREAT SERPL-MCNC: 0.86 MG/DL (ref 0.5–1.4)
CRP SERPL HS-MCNC: 0.53 MG/DL (ref 0–0.75)
EKG IMPRESSION: NORMAL
EOSINOPHIL # BLD AUTO: 0.02 K/UL (ref 0–0.51)
EOSINOPHIL NFR BLD: 0.4 % (ref 0–6.9)
ERYTHROCYTE [DISTWIDTH] IN BLOOD BY AUTOMATED COUNT: 68.2 FL (ref 35.9–50)
ERYTHROCYTE [SEDIMENTATION RATE] IN BLOOD BY WESTERGREN METHOD: 1 MM/HOUR (ref 0–25)
GFR SERPLBLD CREATININE-BSD FMLA CKD-EPI: 68 ML/MIN/1.73 M 2
GLOBULIN SER CALC-MCNC: 2.7 G/DL (ref 1.9–3.5)
GLUCOSE BLD STRIP.AUTO-MCNC: 119 MG/DL (ref 65–99)
GLUCOSE SERPL-MCNC: 144 MG/DL (ref 65–99)
GLUCOSE UR STRIP.AUTO-MCNC: NEGATIVE MG/DL
HCT VFR BLD AUTO: 21.4 % (ref 37–47)
HGB BLD-MCNC: 6 G/DL (ref 12–16)
HGB BLD-MCNC: 6.9 G/DL (ref 12–16)
HGB BLD-MCNC: 8.1 G/DL (ref 12–16)
HYPOCHROMIA BLD QL SMEAR: ABNORMAL
IMM GRANULOCYTES # BLD AUTO: 0.02 K/UL (ref 0–0.11)
IMM GRANULOCYTES NFR BLD AUTO: 0.4 % (ref 0–0.9)
INR PPP: 8.76 (ref 0.87–1.13)
KETONES UR STRIP.AUTO-MCNC: NEGATIVE MG/DL
LACTATE SERPL-SCNC: 5.2 MMOL/L (ref 0.5–2)
LACTATE SERPL-SCNC: 6.6 MMOL/L (ref 0.5–2)
LEUKOCYTE ESTERASE UR QL STRIP.AUTO: NEGATIVE
LYMPHOCYTES # BLD AUTO: 1.67 K/UL (ref 1–4.8)
LYMPHOCYTES NFR BLD: 34.5 % (ref 22–41)
MACROCYTES BLD QL SMEAR: ABNORMAL
MAGNESIUM SERPL-MCNC: 2.2 MG/DL (ref 1.5–2.5)
MCH RBC QN AUTO: 26.3 PG (ref 27–33)
MCHC RBC AUTO-ENTMCNC: 28 G/DL (ref 32.2–35.5)
MCV RBC AUTO: 93.9 FL (ref 81.4–97.8)
MICRO URNS: NORMAL
MICROCYTES BLD QL SMEAR: ABNORMAL
MONOCYTES # BLD AUTO: 0.64 K/UL (ref 0–0.85)
MONOCYTES NFR BLD AUTO: 13.2 % (ref 0–13.4)
MORPHOLOGY BLD-IMP: NORMAL
NEUTROPHILS # BLD AUTO: 2.47 K/UL (ref 1.82–7.42)
NEUTROPHILS NFR BLD: 51.1 % (ref 44–72)
NITRITE UR QL STRIP.AUTO: NEGATIVE
NRBC # BLD AUTO: 0.03 K/UL
NRBC BLD-RTO: 0.6 /100 WBC (ref 0–0.2)
PH UR STRIP.AUTO: 5.5 [PH] (ref 5–8)
PHOSPHATE SERPL-MCNC: 4.4 MG/DL (ref 2.5–4.5)
PLATELET # BLD AUTO: 314 K/UL (ref 164–446)
PLATELET BLD QL SMEAR: NORMAL
PMV BLD AUTO: 9.1 FL (ref 9–12.9)
POIKILOCYTOSIS BLD QL SMEAR: NORMAL
POLYCHROMASIA BLD QL SMEAR: NORMAL
POTASSIUM SERPL-SCNC: 4.4 MMOL/L (ref 3.6–5.5)
PRODUCT TYPE UPROD: NORMAL
PROT SERPL-MCNC: 6 G/DL (ref 6–8.2)
PROT UR QL STRIP: NEGATIVE MG/DL
PROTHROMBIN TIME: 73.4 SEC (ref 12–14.6)
RBC # BLD AUTO: 2.28 M/UL (ref 4.2–5.4)
RBC BLD AUTO: PRESENT
RBC UR QL AUTO: NEGATIVE
RH BLD: NORMAL
SCHISTOCYTES BLD QL SMEAR: NORMAL
SODIUM SERPL-SCNC: 129 MMOL/L (ref 135–145)
SP GR UR STRIP.AUTO: 1.03
T4 FREE SERPL-MCNC: 1.44 NG/DL (ref 0.93–1.7)
TROPONIN T SERPL-MCNC: 45 NG/L (ref 6–19)
TSH SERPL DL<=0.005 MIU/L-ACNC: 7.1 UIU/ML (ref 0.38–5.33)
UNIT STATUS USTAT: NORMAL
UROBILINOGEN UR STRIP.AUTO-MCNC: 1 MG/DL
WBC # BLD AUTO: 4.8 K/UL (ref 4.8–10.8)

## 2023-10-06 PROCEDURE — 700117 HCHG RX CONTRAST REV CODE 255: Performed by: EMERGENCY MEDICINE

## 2023-10-06 PROCEDURE — 70496 CT ANGIOGRAPHY HEAD: CPT

## 2023-10-06 PROCEDURE — 86901 BLOOD TYPING SEROLOGIC RH(D): CPT

## 2023-10-06 PROCEDURE — 99285 EMERGENCY DEPT VISIT HI MDM: CPT

## 2023-10-06 PROCEDURE — 84443 ASSAY THYROID STIM HORMONE: CPT

## 2023-10-06 PROCEDURE — 83605 ASSAY OF LACTIC ACID: CPT | Mod: 91

## 2023-10-06 PROCEDURE — 70498 CT ANGIOGRAPHY NECK: CPT

## 2023-10-06 PROCEDURE — 36430 TRANSFUSION BLD/BLD COMPNT: CPT

## 2023-10-06 PROCEDURE — 770020 HCHG ROOM/CARE - TELE (206)

## 2023-10-06 PROCEDURE — 83735 ASSAY OF MAGNESIUM: CPT

## 2023-10-06 PROCEDURE — 700111 HCHG RX REV CODE 636 W/ 250 OVERRIDE (IP): Mod: JZ | Performed by: EMERGENCY MEDICINE

## 2023-10-06 PROCEDURE — 86140 C-REACTIVE PROTEIN: CPT

## 2023-10-06 PROCEDURE — 96365 THER/PROPH/DIAG IV INF INIT: CPT

## 2023-10-06 PROCEDURE — 80053 COMPREHEN METABOLIC PANEL: CPT

## 2023-10-06 PROCEDURE — 84100 ASSAY OF PHOSPHORUS: CPT

## 2023-10-06 PROCEDURE — 92610 EVALUATE SWALLOWING FUNCTION: CPT

## 2023-10-06 PROCEDURE — 36415 COLL VENOUS BLD VENIPUNCTURE: CPT

## 2023-10-06 PROCEDURE — 85730 THROMBOPLASTIN TIME PARTIAL: CPT

## 2023-10-06 PROCEDURE — P9016 RBC LEUKOCYTES REDUCED: HCPCS | Mod: 91

## 2023-10-06 PROCEDURE — 82962 GLUCOSE BLOOD TEST: CPT

## 2023-10-06 PROCEDURE — 700105 HCHG RX REV CODE 258: Performed by: HOSPITALIST

## 2023-10-06 PROCEDURE — 0042T CT-CEREBRAL PERFUSION ANALYSIS: CPT

## 2023-10-06 PROCEDURE — 96375 TX/PRO/DX INJ NEW DRUG ADDON: CPT

## 2023-10-06 PROCEDURE — 85025 COMPLETE CBC W/AUTO DIFF WBC: CPT

## 2023-10-06 PROCEDURE — 85018 HEMOGLOBIN: CPT | Mod: 91

## 2023-10-06 PROCEDURE — 85610 PROTHROMBIN TIME: CPT

## 2023-10-06 PROCEDURE — 86850 RBC ANTIBODY SCREEN: CPT

## 2023-10-06 PROCEDURE — 700102 HCHG RX REV CODE 250 W/ 637 OVERRIDE(OP): Performed by: HOSPITALIST

## 2023-10-06 PROCEDURE — 30233N1 TRANSFUSION OF NONAUTOLOGOUS RED BLOOD CELLS INTO PERIPHERAL VEIN, PERCUTANEOUS APPROACH: ICD-10-PCS | Performed by: EMERGENCY MEDICINE

## 2023-10-06 PROCEDURE — 86900 BLOOD TYPING SEROLOGIC ABO: CPT

## 2023-10-06 PROCEDURE — 85652 RBC SED RATE AUTOMATED: CPT

## 2023-10-06 PROCEDURE — 93005 ELECTROCARDIOGRAM TRACING: CPT | Performed by: EMERGENCY MEDICINE

## 2023-10-06 PROCEDURE — 86923 COMPATIBILITY TEST ELECTRIC: CPT

## 2023-10-06 PROCEDURE — A9270 NON-COVERED ITEM OR SERVICE: HCPCS | Performed by: HOSPITALIST

## 2023-10-06 PROCEDURE — 700111 HCHG RX REV CODE 636 W/ 250 OVERRIDE (IP): Mod: JZ | Performed by: HOSPITALIST

## 2023-10-06 PROCEDURE — 84484 ASSAY OF TROPONIN QUANT: CPT

## 2023-10-06 PROCEDURE — 700111 HCHG RX REV CODE 636 W/ 250 OVERRIDE (IP): Mod: JZ

## 2023-10-06 PROCEDURE — 700105 HCHG RX REV CODE 258: Performed by: EMERGENCY MEDICINE

## 2023-10-06 PROCEDURE — 81003 URINALYSIS AUTO W/O SCOPE: CPT

## 2023-10-06 PROCEDURE — 84439 ASSAY OF FREE THYROXINE: CPT

## 2023-10-06 PROCEDURE — 82010 KETONE BODYS QUAN: CPT

## 2023-10-06 PROCEDURE — 71045 X-RAY EXAM CHEST 1 VIEW: CPT

## 2023-10-06 PROCEDURE — 99223 1ST HOSP IP/OBS HIGH 75: CPT | Mod: AI | Performed by: HOSPITALIST

## 2023-10-06 PROCEDURE — 94760 N-INVAS EAR/PLS OXIMETRY 1: CPT

## 2023-10-06 RX ORDER — LORAZEPAM 2 MG/ML
0.5 INJECTION INTRAMUSCULAR
Status: DISCONTINUED | OUTPATIENT
Start: 2023-10-06 | End: 2023-10-11 | Stop reason: HOSPADM

## 2023-10-06 RX ORDER — MORPHINE SULFATE 4 MG/ML
1-4 INJECTION INTRAVENOUS
Status: DISCONTINUED | OUTPATIENT
Start: 2023-10-06 | End: 2023-10-11 | Stop reason: HOSPADM

## 2023-10-06 RX ORDER — LEVETIRACETAM 500 MG/5ML
1500 INJECTION, SOLUTION, CONCENTRATE INTRAVENOUS ONCE
Status: COMPLETED | OUTPATIENT
Start: 2023-10-06 | End: 2023-10-06

## 2023-10-06 RX ORDER — LEVOTHYROXINE SODIUM 0.07 MG/1
75 TABLET ORAL
Status: DISCONTINUED | OUTPATIENT
Start: 2023-10-07 | End: 2023-10-11 | Stop reason: HOSPADM

## 2023-10-06 RX ORDER — ACETAMINOPHEN 325 MG/1
650 TABLET ORAL EVERY 6 HOURS PRN
Status: DISCONTINUED | OUTPATIENT
Start: 2023-10-06 | End: 2023-10-11 | Stop reason: HOSPADM

## 2023-10-06 RX ORDER — LORAZEPAM 2 MG/ML
INJECTION INTRAMUSCULAR
Status: COMPLETED
Start: 2023-10-06 | End: 2023-10-06

## 2023-10-06 RX ORDER — OMEPRAZOLE 20 MG/1
20 CAPSULE, DELAYED RELEASE ORAL 2 TIMES DAILY
Status: DISCONTINUED | OUTPATIENT
Start: 2023-10-06 | End: 2023-10-11 | Stop reason: HOSPADM

## 2023-10-06 RX ORDER — SODIUM CHLORIDE, SODIUM LACTATE, POTASSIUM CHLORIDE, CALCIUM CHLORIDE 600; 310; 30; 20 MG/100ML; MG/100ML; MG/100ML; MG/100ML
1000 INJECTION, SOLUTION INTRAVENOUS ONCE
Status: COMPLETED | OUTPATIENT
Start: 2023-10-06 | End: 2023-10-06

## 2023-10-06 RX ORDER — BISACODYL 10 MG
10 SUPPOSITORY, RECTAL RECTAL
Status: DISCONTINUED | OUTPATIENT
Start: 2023-10-06 | End: 2023-10-11 | Stop reason: HOSPADM

## 2023-10-06 RX ORDER — LEVETIRACETAM 500 MG/5ML
500 INJECTION, SOLUTION, CONCENTRATE INTRAVENOUS EVERY 12 HOURS
Status: DISCONTINUED | OUTPATIENT
Start: 2023-10-06 | End: 2023-10-10

## 2023-10-06 RX ORDER — SODIUM CHLORIDE, SODIUM LACTATE, POTASSIUM CHLORIDE, CALCIUM CHLORIDE 600; 310; 30; 20 MG/100ML; MG/100ML; MG/100ML; MG/100ML
INJECTION, SOLUTION INTRAVENOUS CONTINUOUS
Status: DISCONTINUED | OUTPATIENT
Start: 2023-10-06 | End: 2023-10-08

## 2023-10-06 RX ORDER — HALOPERIDOL 5 MG/ML
1-2 INJECTION INTRAMUSCULAR EVERY 4 HOURS PRN
Status: DISCONTINUED | OUTPATIENT
Start: 2023-10-06 | End: 2023-10-11 | Stop reason: HOSPADM

## 2023-10-06 RX ORDER — POLYETHYLENE GLYCOL 3350 17 G/17G
1 POWDER, FOR SOLUTION ORAL
Status: DISCONTINUED | OUTPATIENT
Start: 2023-10-06 | End: 2023-10-11 | Stop reason: HOSPADM

## 2023-10-06 RX ORDER — AMOXICILLIN 250 MG
2 CAPSULE ORAL 2 TIMES DAILY
Status: DISCONTINUED | OUTPATIENT
Start: 2023-10-06 | End: 2023-10-11 | Stop reason: HOSPADM

## 2023-10-06 RX ORDER — ATORVASTATIN CALCIUM 40 MG/1
40 TABLET, FILM COATED ORAL DAILY
Status: DISCONTINUED | OUTPATIENT
Start: 2023-10-07 | End: 2023-10-11 | Stop reason: HOSPADM

## 2023-10-06 RX ADMIN — HALOPERIDOL LACTATE 1 MG: 5 INJECTION, SOLUTION INTRAMUSCULAR at 14:32

## 2023-10-06 RX ADMIN — FENTANYL CITRATE 25 MCG: 50 INJECTION, SOLUTION INTRAMUSCULAR; INTRAVENOUS at 11:30

## 2023-10-06 RX ADMIN — OMEPRAZOLE 20 MG: 20 CAPSULE, DELAYED RELEASE ORAL at 17:10

## 2023-10-06 RX ADMIN — SODIUM CHLORIDE, POTASSIUM CHLORIDE, SODIUM LACTATE AND CALCIUM CHLORIDE: 600; 310; 30; 20 INJECTION, SOLUTION INTRAVENOUS at 16:05

## 2023-10-06 RX ADMIN — LEVETIRACETAM 500 MG: 100 INJECTION, SOLUTION, CONCENTRATE INTRAVENOUS at 17:09

## 2023-10-06 RX ADMIN — PHYTONADIONE 10 MG: 10 INJECTION, EMULSION INTRAMUSCULAR; INTRAVENOUS; SUBCUTANEOUS at 14:30

## 2023-10-06 RX ADMIN — MORPHINE SULFATE 4 MG: 4 INJECTION, SOLUTION INTRAMUSCULAR; INTRAVENOUS at 17:11

## 2023-10-06 RX ADMIN — IOHEXOL 100 ML: 350 INJECTION, SOLUTION INTRAVENOUS at 10:09

## 2023-10-06 RX ADMIN — FENTANYL CITRATE 50 MCG: 50 INJECTION, SOLUTION INTRAMUSCULAR; INTRAVENOUS at 11:00

## 2023-10-06 RX ADMIN — IOHEXOL 40 ML: 350 INJECTION, SOLUTION INTRAVENOUS at 10:14

## 2023-10-06 RX ADMIN — LEVETIRACETAM 1500 MG: 100 INJECTION, SOLUTION, CONCENTRATE INTRAVENOUS at 10:06

## 2023-10-06 RX ADMIN — SODIUM CHLORIDE, POTASSIUM CHLORIDE, SODIUM LACTATE AND CALCIUM CHLORIDE 1000 ML: 600; 310; 30; 20 INJECTION, SOLUTION INTRAVENOUS at 10:40

## 2023-10-06 ASSESSMENT — PAIN DESCRIPTION - PAIN TYPE
TYPE: ACUTE PAIN

## 2023-10-06 ASSESSMENT — FIBROSIS 4 INDEX: FIB4 SCORE: 2.3

## 2023-10-06 NOTE — ED TRIAGE NOTES
Chief Complaint   Patient presents with    Possible Stroke     Pt brought in by family for L sided deficits that they estimate started around 1:00 am this morning. Pt called family for ride to the ER this morning. On arrival pt has L sided weakness and shortly after started having a seizure in the ER lobby that lasted around 1 minute. No known hx of seizures.          Pt taken from front lobby to [facility] [address] and ERP called to bedside. Pt postictal once in room. Family states that pt is DNR/DNI. Pt bagged and placed on monitor. PIV started. Christine, primary RN at bedside.

## 2023-10-06 NOTE — ASSESSMENT & PLAN NOTE
"New onset  IV keppra loaded in the ER and 500 mg IV BID until tolerating orals  Seizure precautions  IV ativan for seizures ordered  With tongue laceration  Consider Garett's paralysis\"    MRI showed small strokes. Neuro cleared for EGD and recommended anticoag when possiblee  When Neuro cleared, stroke mgmt optimized, GI to do EGD to w/u GI bleed and evaluate when anticoagulation can be restarted. Currently Neurop cleared from a stroke persepective. Cardiology recommend no further testing and is moderate risk for cardiac evant. GI plans for EGD tomorrow or the next day  Currently no bridge but will get venous Dopplers, r/o clots and any indication to acutely anticoagulate. Currently patient has been reversed. Daily INR ordered  Supplements ordered; Body mass index is 18.77 kg/m².   EGD and colonoscopy showed bleeding vessel Jozef lesions and colon AVM. Per Dr. Holloway hold anticoagulation at least a week  Observe, trend Hb    "

## 2023-10-06 NOTE — DISCHARGE PLANNING
PM&R referral from [facility] Seizure Encephalopathy. Ongoing work up. Therapy evaluations pending. [employer] provider. Physiatry consult pended at this time.

## 2023-10-06 NOTE — ASSESSMENT & PLAN NOTE
Hemoglobin is down to 6  Guaiac + stools  In the setting of INR 8.76  Transfuse one unit RBCs in the ER and serial hemoglobins ordered with transfusion for Hb less than 7  Reverse coagulopathy   PPI

## 2023-10-06 NOTE — ASSESSMENT & PLAN NOTE
"Hx of  Hold home BP meds due to hypotension\"    Vitals:    10/09/23 1600   BP: 130/72   Pulse: (!) 102   Resp: 17   Temp: 36.3 °C (97.3 °F)   SpO2: 92%     Stable. Restart BB first  Tachy might be from anemia and bleed, refrain from increasing BB at this point avoid hypotension  "

## 2023-10-06 NOTE — ASSESSMENT & PLAN NOTE
Severe lactic acidosis at 6.6  Secondary to seizure  Judicious IV fluids given her cardiomyopathy repeat lactic acid level ordered

## 2023-10-06 NOTE — ASSESSMENT & PLAN NOTE
INR is very elevated at 8.76 due to coumadin use.  In the setting of acute blood loss anemia  IV Vitamin K ordered   Repeat INR in the morning

## 2023-10-06 NOTE — ASSESSMENT & PLAN NOTE
Severe PAD   Dr. Stanford attempted a heart cath Dec 2022 and was unsuccessful due to severe arterial disease.

## 2023-10-06 NOTE — ASSESSMENT & PLAN NOTE
"Hold coumadin  Restart Toprol 25 mg once her blood pressure improves and she is able to tolerate orals. \"    Vitals:    10/09/23 1600   BP: 130/72   Pulse: (!) 102   Resp: 17   Temp: 36.3 °C (97.3 °F)   SpO2: 92%     Stable HR  Restart BB  "

## 2023-10-06 NOTE — ED NOTES
Chaperoned ERP for rectal exam. Pt medicated according to MAR for pain.   REFILLED  JUNEL X 3 NOTE SENT WITH RX TO KEEP SCHEDULED APPT.

## 2023-10-06 NOTE — ASSESSMENT & PLAN NOTE
Likely due to post-ictal state  She may require soft wrist restraints in order to maintain IV access for blood transfusion.

## 2023-10-06 NOTE — ASSESSMENT & PLAN NOTE
"She is on coumadin as an outpatient which will be held\"    Held and reversed  No acute anticoagulation criteria unless evidence of VTE or if MRI shows stroke with high CHADsvasc score  "

## 2023-10-06 NOTE — H&P
Hospital Medicine History & Physical Note    Date of Service  10/6/2023    Primary Care Physician  Sharee Cao, MARIO.    Consultants  none    Code Status  DNAR/DNI    Chief Complaint  Chief Complaint   Patient presents with    Possible Stroke     Pt brought in by family for L sided deficits that they estimate started around 1:00 am this morning. Pt called family for ride to the ER this morning. On arrival pt has L sided weakness and shortly after started having a seizure in the ER lobby that lasted around 1 minute. No known hx of seizures.        History of Presenting Illness  Daya Woods is a 83 y.o. female who presented 10/6/2023 with left sided weakness.  Ms. Woods has a complex medical history including coronary artery bypass graft and severe atherosclerotic disease for which a heart catheterization was attempted in December 2022 but unsuccessful as it was not possible to get through the atherosclerosis in the lower extremities.  She also has a history of atrial fibrillation on Coumadin therapy and a known DNR DNI status.  She lives with her grandson who is at bedside and has conveyed this history.  This morning she got up and complained of left hand cramping and her left arm was contracted she was concerned she might have had a stroke so they put her in the car and noticed that her left leg was little weak getting into the car.  She was mentally lucid during the drive.  When she got here she had a witnessed seizure in the lobby and was unresponsive afterwards.  She had a known DNR/DNI status therefore was not intubated.  Emergency room CT and CTA head were negative for acute processes though she has a lactic acidosis of 6.6 post seizure and INR of 8.76.  She has not hemoglobin of 6 and guaiac positive stool.  She will be admitted in very guarded condition to the telemetry floor with blood transfusion, seizure precautions, IV Keppra, IV fluids which will be given judiciously given her EF of 25%.   "Overall her prognosis is Very guarded.    I discussed the plan of care with her grandson Josh who she lives with.    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    Surgical History   has a past surgical history that includes appendectomy; multiple coronary artery bypass (2002); tonsillectomy and adenoidectomy; tubal ligation; other (Bilateral, 08/2014); recovery (8/26/2016); gyn surgery; transcatheter mitral valve repair (10/15/2018); carmina (10/15/2018); zzz cardiac cath (10/2018); and mitral valve replace.     Family History  family history includes Heart Attack (age of onset: 55) in her brother.   Family history reviewed with patient. There is no family history that is pertinent to the chief complaint.     Social History   reports that she quit smoking about 21 years ago. Her smoking use included cigarettes. She started smoking about 61 years ago. She has a 20.0 pack-year smoking history. She has never used smokeless tobacco. She reports current alcohol use. She reports that she does not use drugs.    Allergies  Allergies   Allergen Reactions    Codeine Unspecified     \"I just don't like it. It does weird things to me.\"    Hydrochlorothiazide Unspecified     Unsure of reaction    Isosorbide Mononitrate Rash     Rash    Keflex Rash     Rash    Sulfa Drugs Rash     rash    " Tape Unspecified     Tears skin off    Xarelto [Rivaroxaban] Rash     rash       Medications  Prior to Admission Medications   Prescriptions Last Dose Informant Patient Reported? Taking?   aspirin EC (ECOTRIN) 81 MG Tablet Delayed Response  Patient Yes No   Sig: Take 81 mg by mouth every day.   atorvastatin (LIPITOR) 40 MG Tab   No No   Sig: Take 1 Tablet by mouth every day.   furosemide (LASIX) 20 MG Tab   No No   Sig: Take 1 Tablet by mouth every day.   levothyroxine (SYNTHROID) 75 MCG Tab   No No   Sig: take 1 tablet by mouth every morning ON AN EMPTY STOMACH   lisinopril (PRINIVIL) 10 MG Tab   No No   Sig: take 1 tablet by mouth twice a day   magnesium oxide (MAG-OX) 400 MG Tab tablet   No No   Sig: Take 1 Tablet by mouth every day.   metoprolol SR (TOPROL XL) 25 MG TABLET SR 24 HR   No No   Sig: Take 1 Tablet by mouth see administration instructions. 1.5 tabs=37.5mg in AM 2 tabs= 50mg in PM   Patient taking differently: Take 37.5 mg by mouth every evening.   potassium chloride SA (K-DUR) 10 MEQ Tab CR   No No   Sig: Take 0.5 Tablets by mouth every day.   spironolactone (ALDACTONE) 25 MG Tab   No No   Sig: take 1/2 tablet by mouth once daily   warfarin (COUMADIN) 5 MG Tab  Patient Yes No   Sig: Take 5-7.5 mg by mouth every day. 5 mg on Mondays, wednesdays, and Fridays. All other days 7.5 mg      Facility-Administered Medications: None       Physical Exam  Temp:  [36.2 °C (97.1 °F)-36.3 °C (97.4 °F)] 36.2 °C (97.1 °F)  Pulse:  [60-92] 71  Resp:  [16] 16  BP: ()/(48-55) 97/53  SpO2:  [94 %-100 %] 94 %  Blood Pressure : 97/53   Temperature: 36.2 °C (97.1 °F)   Pulse: 71   Respiration: 16   Pulse Oximetry: 94 %       Physical Exam  Vitals and nursing note reviewed.   Constitutional:       General: She is in acute distress.      Appearance: She is ill-appearing and toxic-appearing.   Cardiovascular:      Rate and Rhythm: Bradycardia present. Rhythm irregular.   Abdominal:      General: There is no distension.  "     Tenderness: There is no abdominal tenderness.   Musculoskeletal:      Right lower leg: No edema.      Left lower leg: No edema.   Neurological:      Comments: She is writing in bed and not compliant with exam  She generally moves her extremities about equal though limited exam  She is speaking in short sentences though is a non-historian         Laboratory:  Recent Labs     10/06/23  0944   WBC 4.8   RBC 2.28*   HEMOGLOBIN 6.0*   HEMATOCRIT 21.4*   MCV 93.9   MCH 26.3*   MCHC 28.0*   RDW 68.2*   PLATELETCT 314   MPV 9.1     Recent Labs     10/06/23  0944   SODIUM 129*   POTASSIUM 4.4   CHLORIDE 94*   CO2 16*   GLUCOSE 144*   BUN 26*   CREATININE 0.86   CALCIUM 8.7     Recent Labs     10/06/23  0944   ALTSGPT 21   ASTSGOT 24   ALKPHOSPHAT 98   TBILIRUBIN 0.3   GLUCOSE 144*     Recent Labs     10/06/23  0944   APTT 61.2*   INR 8.76*     No results for input(s): \"NTPROBNP\" in the last 72 hours.      Recent Labs     10/06/23  0944   TROPONINT 45*       Imaging:  DX-CHEST-PORTABLE (1 VIEW)   Final Result      Cardiomegaly.      Probable mild interstitial edema.      CT-CEREBRAL PERFUSION ANALYSIS   Final Result      1.  Cerebral blood flow less than 30% likely representing completed infarct = 0 mL.      2.  T Max more than 6 seconds likely representing combination of completed infarct and ischemia = 0 mL.      3.  Mismatched volume likely representing ischemic brain/penumbra = None      4.  Please note that the cerebral perfusion was performed on the limited brain tissue around the basal ganglia region. Infarct/ischemia outside the CT perfusion sections can be missed in this study.      CT-CTA NECK WITH & W/O-POST PROCESSING   Final Result      1.  Severe atherosclerosis.   2.  Severe stenosis of the left carotid bifurcation and moderate proximal right ICA stenosis.   3.  Patent vertebral arteries.      CT-CTA HEAD WITH & W/O-POST PROCESS   Final Result      1.  No large vessel occlusion or aneurysm.   2.  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   3.  No acute intracranial abnormality.              Assessment/Plan:  Justification for Admission Status  I anticipate this patient will require at least two midnights for appropriate medical management, necessitating inpatient admission because new onset seizure with severe lactic acidosis    Patient will need a Telemetry bed on MEDICAL service .  The need is secondary to atrial fibrillation.    * Lactic acidosis- (present on admission)  Assessment & Plan  Severe lactic acidosis at 6.6  Secondary to seizure  Judicious IV fluids given her cardiomyopathy repeat lactic acid level ordered    Seizure ([facility])- (present on admission)  Assessment & Plan  New onset  IV keppra loaded in the ER and 500 mg IV BID until tolerating orals  Seizure precautions  IV ativan for seizures ordered  With tongue laceration  Consider Garett's paralysis    Anemia due to acute blood loss- (present on admission)  Assessment & Plan  Hemoglobin is down to 6  Guaiac + stools  In the setting of INR 8.76  Transfuse one unit RBCs in the ER and serial hemoglobins ordered with transfusion for Hb less than 7  Reverse coagulopathy   PPI    Acute metabolic encephalopathy- (present on admission)  Assessment & Plan  Likely due to post-ictal state  She may require soft wrist restraints in order to maintain IV access for blood transfusion.     Coagulopathy ([facility])- (present on admission)  Assessment & Plan  INR is very elevated at 8.76 due to coumadin use.  In the setting of acute blood loss anemia  IV Vitamin K ordered   Repeat INR in the morning    Chronic systolic CHF (congestive heart failure), NYHA class 3 ([facility])- (present on admission)  Assessment & Plan  Echo EF 25% Dec 2022  Without exacerbation     PAD (peripheral artery disease) ([facility])- (present on admission)  Assessment & Plan  Severe PAD   Dr. Stanford attempted a heart cath Dec 2022 and was unsuccessful due to severe arterial  disease.    Chronic anticoagulation- (present on admission)  Assessment & Plan  She is on coumadin as an outpatient which will be held    DNR (do not resuscitate)- (present on admission)  Assessment & Plan  As discussed with her grandson at bedside    Coronary artery disease involving coronary bypass graft of native heart without angina pectoris- (present on admission)  Assessment & Plan  Hx of  bypass    Chronic atrial fibrillation (HCC)- (present on admission)  Assessment & Plan  Hold coumadin  Restart Toprol 25 mg once her blood pressure improves and she is able to tolerate orals.     Essential hypertension- (present on admission)  Assessment & Plan  Hx of  Hold home BP meds due to hypotension        VTE prophylaxis: SCDs/TEDs

## 2023-10-06 NOTE — ED NOTES
Pt straight cathed for urine, 400mL urine returned. Sample collected and sent to lab. Blood consent form signed by pt's daughter

## 2023-10-06 NOTE — ED NOTES
Blood transfusion initiated with 2 RN sign off. Pt connected to all monitors. Pt's grandson at bedside

## 2023-10-06 NOTE — ED NOTES
Med rec PARTIALLY completed per patient's family member.   Will f/u to confirm last doses/abx if able.

## 2023-10-06 NOTE — ASSESSMENT & PLAN NOTE
"Echo EF 25% Dec 2022  Without exacerbation \"    BB restarted, others held because of hypotension  Restart anticoagulation when GI clears.  "

## 2023-10-06 NOTE — ED NOTES
Break RN: Patient provided with warm blankets. Repositioned in bed. Family at the bedside. Blood infusing.

## 2023-10-07 LAB
HGB BLD-MCNC: 7.4 G/DL (ref 12–16)
HGB BLD-MCNC: 7.6 G/DL (ref 12–16)
HGB BLD-MCNC: 7.7 G/DL (ref 12–16)
HGB BLD-MCNC: 7.8 G/DL (ref 12–16)
INR PPP: 1.75 (ref 0.87–1.13)
PROTHROMBIN TIME: 20.7 SEC (ref 12–14.6)

## 2023-10-07 PROCEDURE — A9270 NON-COVERED ITEM OR SERVICE: HCPCS | Performed by: INTERNAL MEDICINE

## 2023-10-07 PROCEDURE — 99222 1ST HOSP IP/OBS MODERATE 55: CPT | Performed by: INTERNAL MEDICINE

## 2023-10-07 PROCEDURE — 85610 PROTHROMBIN TIME: CPT

## 2023-10-07 PROCEDURE — 97165 OT EVAL LOW COMPLEX 30 MIN: CPT

## 2023-10-07 PROCEDURE — 99233 SBSQ HOSP IP/OBS HIGH 50: CPT | Performed by: INTERNAL MEDICINE

## 2023-10-07 PROCEDURE — 700102 HCHG RX REV CODE 250 W/ 637 OVERRIDE(OP): Performed by: INTERNAL MEDICINE

## 2023-10-07 PROCEDURE — 700111 HCHG RX REV CODE 636 W/ 250 OVERRIDE (IP): Mod: JZ | Performed by: HOSPITALIST

## 2023-10-07 PROCEDURE — 97163 PT EVAL HIGH COMPLEX 45 MIN: CPT

## 2023-10-07 PROCEDURE — 700102 HCHG RX REV CODE 250 W/ 637 OVERRIDE(OP): Performed by: HOSPITALIST

## 2023-10-07 PROCEDURE — G0316 PR PROLONGED IP/OBS E&M EA 15 MIN: HCPCS | Performed by: INTERNAL MEDICINE

## 2023-10-07 PROCEDURE — A9270 NON-COVERED ITEM OR SERVICE: HCPCS | Performed by: HOSPITALIST

## 2023-10-07 PROCEDURE — 36415 COLL VENOUS BLD VENIPUNCTURE: CPT

## 2023-10-07 PROCEDURE — 770020 HCHG ROOM/CARE - TELE (206)

## 2023-10-07 PROCEDURE — 700105 HCHG RX REV CODE 258: Performed by: HOSPITALIST

## 2023-10-07 PROCEDURE — 85018 HEMOGLOBIN: CPT

## 2023-10-07 RX ORDER — CHOLECALCIFEROL (VITAMIN D3) 125 MCG
1000 CAPSULE ORAL DAILY
Status: DISCONTINUED | OUTPATIENT
Start: 2023-10-07 | End: 2023-10-11 | Stop reason: HOSPADM

## 2023-10-07 RX ORDER — GAUZE BANDAGE 2" X 2"
100 BANDAGE TOPICAL DAILY
Status: DISCONTINUED | OUTPATIENT
Start: 2023-10-07 | End: 2023-10-11 | Stop reason: HOSPADM

## 2023-10-07 RX ORDER — METOPROLOL SUCCINATE 25 MG/1
25 TABLET, EXTENDED RELEASE ORAL
Status: DISCONTINUED | OUTPATIENT
Start: 2023-10-07 | End: 2023-10-11

## 2023-10-07 RX ADMIN — LEVETIRACETAM 500 MG: 100 INJECTION, SOLUTION, CONCENTRATE INTRAVENOUS at 05:52

## 2023-10-07 RX ADMIN — OMEPRAZOLE 20 MG: 20 CAPSULE, DELAYED RELEASE ORAL at 17:16

## 2023-10-07 RX ADMIN — LEVETIRACETAM 500 MG: 100 INJECTION, SOLUTION, CONCENTRATE INTRAVENOUS at 17:16

## 2023-10-07 RX ADMIN — OMEPRAZOLE 20 MG: 20 CAPSULE, DELAYED RELEASE ORAL at 05:53

## 2023-10-07 RX ADMIN — THERA TABS 1 TABLET: TAB at 17:16

## 2023-10-07 RX ADMIN — Medication 100 MG: at 17:16

## 2023-10-07 RX ADMIN — CYANOCOBALAMIN TAB 500 MCG 1000 MCG: 500 TAB at 17:16

## 2023-10-07 RX ADMIN — LEVOTHYROXINE SODIUM 75 MCG: 0.07 TABLET ORAL at 05:53

## 2023-10-07 RX ADMIN — SODIUM CHLORIDE, POTASSIUM CHLORIDE, SODIUM LACTATE AND CALCIUM CHLORIDE: 600; 310; 30; 20 INJECTION, SOLUTION INTRAVENOUS at 11:30

## 2023-10-07 RX ADMIN — ATORVASTATIN CALCIUM 40 MG: 40 TABLET, FILM COATED ORAL at 05:53

## 2023-10-07 RX ADMIN — METOPROLOL SUCCINATE 25 MG: 25 TABLET, EXTENDED RELEASE ORAL at 17:27

## 2023-10-07 ASSESSMENT — GAIT ASSESSMENTS
GAIT LEVEL OF ASSIST: CONTACT GUARD ASSIST
ASSISTIVE DEVICE: HAND HELD ASSIST
DISTANCE (FEET): 200

## 2023-10-07 ASSESSMENT — PATIENT HEALTH QUESTIONNAIRE - PHQ9
1. LITTLE INTEREST OR PLEASURE IN DOING THINGS: NOT AT ALL
2. FEELING DOWN, DEPRESSED, IRRITABLE, OR HOPELESS: NOT AT ALL
SUM OF ALL RESPONSES TO PHQ9 QUESTIONS 1 AND 2: 0

## 2023-10-07 ASSESSMENT — COGNITIVE AND FUNCTIONAL STATUS - GENERAL
TOILETING: A LITTLE
SUGGESTED CMS G CODE MODIFIER DAILY ACTIVITY: CK
MOBILITY SCORE: 22
WALKING IN HOSPITAL ROOM: A LITTLE
HELP NEEDED FOR BATHING: A LITTLE
DRESSING REGULAR UPPER BODY CLOTHING: A LITTLE
MOVING FROM LYING ON BACK TO SITTING ON SIDE OF FLAT BED: UNABLE
MOBILITY SCORE: 17
SUGGESTED CMS G CODE MODIFIER MOBILITY: CJ
DAILY ACTIVITIY SCORE: 19
MOVING FROM LYING ON BACK TO SITTING ON SIDE OF FLAT BED: A LITTLE
SUGGESTED CMS G CODE MODIFIER MOBILITY: CK
DRESSING REGULAR LOWER BODY CLOTHING: A LITTLE
WALKING IN HOSPITAL ROOM: A LITTLE
PERSONAL GROOMING: A LITTLE
MOVING TO AND FROM BED TO CHAIR: UNABLE

## 2023-10-07 ASSESSMENT — ENCOUNTER SYMPTOMS
FEVER: 0
GASTROINTESTINAL NEGATIVE: 1
MUSCULOSKELETAL NEGATIVE: 1
RESPIRATORY NEGATIVE: 1
EYES NEGATIVE: 1
SEIZURES: 1
WEIGHT LOSS: 1
CARDIOVASCULAR NEGATIVE: 1
CHILLS: 0

## 2023-10-07 ASSESSMENT — PAIN DESCRIPTION - PAIN TYPE
TYPE: ACUTE PAIN

## 2023-10-07 ASSESSMENT — LIFESTYLE VARIABLES
EVER FELT BAD OR GUILTY ABOUT YOUR DRINKING: NO
TOTAL SCORE: 0
HAVE YOU EVER FELT YOU SHOULD CUT DOWN ON YOUR DRINKING: NO
ON A TYPICAL DAY WHEN YOU DRINK ALCOHOL HOW MANY DRINKS DO YOU HAVE: 0
HAVE PEOPLE ANNOYED YOU BY CRITICIZING YOUR DRINKING: NO
AVERAGE NUMBER OF DAYS PER WEEK YOU HAVE A DRINK CONTAINING ALCOHOL: 0
CONSUMPTION TOTAL: NEGATIVE
TOTAL SCORE: 0
DOES PATIENT WANT TO STOP DRINKING: NO
EVER HAD A DRINK FIRST THING IN THE MORNING TO STEADY YOUR NERVES TO GET RID OF A HANGOVER: NO
HOW MANY TIMES IN THE PAST YEAR HAVE YOU HAD 5 OR MORE DRINKS IN A DAY: 0
TOTAL SCORE: 0
ALCOHOL_USE: NO

## 2023-10-07 ASSESSMENT — FIBROSIS 4 INDEX: FIB4 SCORE: 1.35

## 2023-10-07 ASSESSMENT — ACTIVITIES OF DAILY LIVING (ADL): TOILETING: INDEPENDENT

## 2023-10-07 NOTE — THERAPY
"Occupational Therapy   Initial Evaluation     Patient Name: Daya Woods  Age:  83 y.o., Sex:  female  Medical Record #: [id number]  Today's Date: 10/7/2023     Precautions  Precautions: (P) Fall Risk, Swallow Precautions  Comments: (P) seizure precautions    Assessment  Patient is 83 y.o. female who presents to acute for L sided deficits, once she was in the hospital she had a seizure that lasted ~1 min. Pt required SBA/CGA for BADLs, L hand held in flexion, however pt able to break it and use for ADLs. Question pts higher level cognition. Pt demo'd impaired balance, functional mobility, and activity tolerance. Will continue to follow while in house.     Plan    Occupational Therapy Initial Treatment Plan   Treatment Interventions: (P) Self Care / Activities of Daily Living, Neuro Re-Education / Balance, Therapeutic Exercises, Therapeutic Activity, Adaptive Equipment  Treatment Frequency: (P) 3 Times per Week  Duration: (P) Until Therapy Goals Met    DC Equipment Recommendations: (P) Tub / Shower Seat  Discharge Recommendations: (P) Recommend home health for continued occupational therapy services     Subjective    \"The underpants are not paper are they?\"     Objective       10/07/23 1525   Charge Group   OT Evaluation OT Evaluation Low   Total Time Spent   OT Evaluation (Minutes) 25   Initial Contact Note    Initial Contact Note Order Received and Verified, Occupational Therapy Evaluation in Progress with Full Report to Follow.   Prior Living Situation   Prior Services None   Housing / Facility [address]   Bathroom Set up Bathtub / Shower Combination;Walk In Shower   Equipment Owned Front-Wheel Walker   Lives with - Patient's Self Care Capacity Adult Children   Comments Lives w/ grandson and SO who works full time   Prior Level of ADL Function   Self Feeding Independent   Grooming / Hygiene Independent   Bathing Independent   Dressing Independent   Toileting Independent   Precautions   Precautions Fall " Risk;Swallow Precautions   Comments seizure precautions   Vitals   O2 (LPM) 0   O2 Delivery Device None - Room Air   Pain 0 - 10 Group   Therapist Pain Assessment Post Activity Pain Same as Prior to Activity;Nurse Notified  (no c/o pain during session)   Cognition    Cognition / Consciousness X   Level of Consciousness Alert   Comments pleasent and coopertive, appears [place], question higher level cognition   Active ROM Upper Body   Dominant Hand Right   Comments Reports that L hand is craping, able to use w/ BADL tasks   Coordination Upper Body   Coordination WDL   Balance Assessment   Sitting Balance (Static) Fair +   Sitting Balance (Dynamic) Fair   Standing Balance (Static) Fair   Standing Balance (Dynamic) Fair -   Weight Shift Sitting Fair   Weight Shift Standing Fair   Comments w/ HHA   Bed Mobility    Supine to Sit Supervised   Sit to Supine Supervised   Scooting Supervised   ADL Assessment   Grooming Standby Assist;Standing  (washed hands at sink)   Upper Body Dressing Supervision   Lower Body Dressing Standby Assist  (donned underpants)   Toileting Standby Assist   How much help from another person does the patient currently need...   Putting on and taking off regular lower body clothing? 3   Bathing (including washing, rinsing, and drying)? 3   Toileting, which includes using a toilet, bedpan, or urinal? 3   Putting on and taking off regular upper body clothing? 3   Taking care of personal grooming such as brushing teeth? 3   Eating meals? 4   6 Clicks Daily Activity Score 19   mRS Prior to admission   Prior to admission mRS 0   Modified Marshall (mRS)   Modified Marshall Score 1   Functional Mobility   Sit to Stand Standby Assist   Bed, Chair, Wheelchair Transfer Contact Guard Assist   Toilet Transfers Contact Guard Assist   Mobility within room and bathroom w/ HHA   Activity Tolerance   Sitting in Chair 2 min on toilet   Sitting Edge of Bed 5 min   Standing 7 min   Patient / Family Goals   Patient / Family  Goal #1 To go home   Short Term Goals   Short Term Goal # 1 Pt will demo high/low retrieval w/ SPV   Short Term Goal # 2 Pt will sequence 3 out 3 steps of ADL tasks w/ SPV   Education Group   Role of Occupational Therapist Patient Response Patient;Acceptance;Explanation;Demonstration;Verbal Demonstration;Action Demonstration   Occupational Therapy Initial Treatment Plan    Treatment Interventions Self Care / Activities of Daily Living;Neuro Re-Education / Balance;Therapeutic Exercises;Therapeutic Activity;Adaptive Equipment   Treatment Frequency 3 Times per Week   Duration Until Therapy Goals Met   Problem List   Problem List Decreased Active Daily Living Skills;Decreased Homemaking Skills;Decreased Functional Mobility;Decreased Activity Tolerance;Impaired Postural Control / Balance   Anticipated Discharge Equipment and Recommendations   DC Equipment Recommendations Tub / Shower Seat   Discharge Recommendations Recommend home health for continued occupational therapy services   Interdisciplinary Plan of Care Collaboration   IDT Collaboration with  Nursing   Patient Position at End of Therapy In Bed;Bed Alarm On;Call Light within Reach;Tray Table within Reach;Phone within Reach   Collaboration Comments report given   Session Information   Date / Session Number  10/7, 1 (1/3, 10/13)

## 2023-10-07 NOTE — DISCHARGE PLANNING
[facility]itional [facility] Coordination    Physiatry consult pended.  Would welcome PT/OT as clinically appropriate.  TCC monitoring.  Please reach out sooner if PMR consult requested for medical management.

## 2023-10-07 NOTE — CONSULTS
Gastroenterology Initial Consult Note               Author:  Roberta Young M.D. Date & Time Created: 10/7/2023 3:29 PM       Patient ID:  Name:             Daya Woods  YOB: 1940  Age:                 83 y.o.  female  MRN:               [id number]      Referring Provider:  Campos Penny MD        Presenting Chief Complaint:  anemia      History of Present Illness:    This is an  83 y.o. female with PAD, CAD, chronic systolic CHF, chronic atrial fibrillation, history of mitral valve repair brought in by family yesterday am for left sided deficits and then seizure in lobby.  Workup ensued and she was felt to have a completed stroke.    She was found to have a hemoglobin of 6.0 with normal MCV.  ERP completed rectal exam and stool was guaiac positive (unknown if melena).  She was transfused 2 unit PRBC. Patient takes ASA 81 mg and warfarin.  INR was 8.76 and now 1.75.  She received IV vitamin K    Family at bedside.  Patient states no dysphagia, no GERD, no abdominal pain, no early satiety but decreased appetite and 10 pound weight loss.  States she can bleed from her hemorrhoids but she sees blood on the toilet paper not necessarily toilet bowl.  Mild change in bowel habits and thinks her stool may have been dark.  She had a colonoscopy more than 10 years ago.  She denies taking any NSAIDs        Review of Systems:  Review of Systems   Constitutional:  Positive for weight loss. Negative for chills and fever.   HENT: Negative.     Eyes: Negative.    Respiratory: Negative.     Cardiovascular: Negative.    Gastrointestinal: Negative.    Genitourinary: Negative.    Musculoskeletal: Negative.    Skin: Negative.    Neurological:  Positive for seizures.             Past Medical History:  Past Medical History:   Diagnosis Date    Anesthesia     ponv    Anticoagulation monitoring, special range     CAD (coronary artery disease) 10/09/2018    PCI/ANA (Synergy 3.0 x 28mm) to the OM.    Cataract      Bilateral IOL    Chronic atrial fibrillation (HCC)     Managed with rate control and warfarin for anticoagulation     Coronary atherosclerosis of native coronary artery 12/2002    CABG x 5 (LIMA-D3, SVG-OM, SVG-RCA, SVG-distal LAD, SVG-D1). August 2016: LIMA-D3 and SVG-OM patent (interval occlusion of the SVG-RCA); chronic occluded SVG-LAD, SVG-D1.    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     MI, old 2002    Mitral regurgitation 10/2018    Status post MV repair with MitraClip.    Osteoarthritis     Pain     right knee, back    Raynaud's disease     Spinal stenosis 8/20/2015    Status post coronary artery bypass grafts x 5 08/2002    LIMA-D3, SVG-LAD, SVG-OM, SVG-RCA, SVG-distal LAD.      Active Hospital Problems    Diagnosis     Seizure, stroke like sx/seizure, GI bleed/anemia, Afib/anticoag, ICA stenosis/severe PVD,  ([facility]) [R56.9]     Lactic acidosis [E87.20]     Anemia due to acute blood loss [D62]     Coagulopathy ([facility]) [D68.9]     Acute metabolic encephalopathy [G93.41]     Chronic systolic CHF (congestive heart failure), NYHA class 3 ([facility]) [I50.22]     PAD (peripheral artery disease) ([facility]) [I73.9]     Chronic anticoagulation [Z79.01]     DNR (do not resuscitate) [Z66]     Coronary artery disease involving coronary bypass graft of native heart without angina pectoris [I25.810]     Chronic atrial fibrillation ([facility]) [I48.20]     Essential hypertension [I10]          Past Surgical History:  Past Surgical History:   Procedure Laterality Date    TRANSCATHETER MITRAL VALVE REPAIR  10/15/2018    Procedure: TRANSCATHETER MITRAL VALVE REPAIR- WITH POSSIBLE OPEN HEART AND ANY ASSOCIATED PROCEDURES;  Surgeon: Moy Don M.D.;  Location: SURGERY [facility];  Service: Cardiac    DEVON   10/15/2018    Procedure: DEVON;  Surgeon: Moy Don M.D.;  Location: SURGERY [facility];  Service: Cardiac    ZZZ CARDIAC CATH  10/2018    PCI to SVG to OM.     RECOVERY  8/26/2016    Procedure: CATH LAB-DEVON W/LHC W/POSSIBLE-JO-ANN/GRINSELL-LARGE GROUP;  Surgeon: Manuel Surgery;  Location: SURGERY PRE-POST PROC UNIT [facility] [place];  Service:     OTHER Bilateral 08/2014    cataract extraction with iols    MULTIPLE CORONARY ARTERY BYPASS  2002     CABG x 5    APPENDECTOMY      GYN SURGERY      c section     MITRAL VALVE REPLACE      MV clip X1    TONSILLECTOMY AND ADENOIDECTOMY      TUBAL LIGATION             Hospital Medications:  Current Facility-Administered Medications   Medication Dose Frequency Provider Last Rate Last Admin    levETIRAcetam (Keppra) injection 500 mg  500 mg Q12HRS Darwin Daniel M.D.   500 mg at 10/07/23 0552    lactated ringers infusion   Continuous Darwin Daniel M.D. 50 mL/hr at 10/07/23 1130 New Bag at 10/07/23 1130    omeprazole (PriLOSEC) capsule 20 mg  20 mg BID Darwin Daniel M.D.   20 mg at 10/07/23 0553    LORazepam (Ativan) injection 0.5 mg  0.5 mg Q5 MIN PRN Darwin Daniel M.D.        atorvastatin (Lipitor) tablet 40 mg  40 mg DAILY Darwin Daniel M.D.   40 mg at 10/07/23 0553    levothyroxine (Synthroid) tablet 75 mcg  75 mcg AM ES Darwin Daniel M.D.   75 mcg at 10/07/23 0553    senna-docusate (Pericolace Or Senokot S) 8.6-50 MG per tablet 2 Tablet  2 Tablet BID Darwin Daniel M.D.        And    polyethylene glycol/lytes (Miralax) PACKET 1 Packet  1 Packet QDAY PRN Darwin Daniel M.D.        And    magnesium hydroxide (Milk Of Magnesia) suspension 30 mL  30 mL QDAY PRN Darwin Daniel M.D.        And    bisacodyl (Dulcolax) suppository 10 mg  10 mg QDAY PRN Darwin Daniel M.D.        acetaminophen (Tylenol) tablet 650 mg  650 mg Q6HRS PRN Darwin Daniel M.D.        haloperidol lactate (Haldol) injection 1-2 mg  1-2 mg Q4HRS PRN Darwin Daniel M.D.   1 mg at 10/06/23 4864     "morphine 4 MG/ML injection 1-4 mg  1-4 mg Q2HRS PRN Darwin Daniel M.D.   4 mg at 10/06/23 1711   Last reviewed on 10/6/2023  2:48 PM by Brianna Arce, Pharmacy Intern       Current Outpatient Medications:  Medications Prior to Admission   Medication Sig Dispense Refill Last Dose    spironolactone (ALDACTONE) 25 MG Tab take 1/2 tablet by mouth once daily 45 Tablet 3 10/6/2023 at AM    levothyroxine (SYNTHROID) 75 MCG Tab take 1 tablet by mouth every morning ON AN EMPTY STOMACH 90 Tablet 3 10/6/2023 at AM    atorvastatin (LIPITOR) 40 MG Tab Take 1 Tablet by mouth every day. 100 Tablet 3 10/6/2023 at AM    furosemide (LASIX) 20 MG Tab Take 1 Tablet by mouth every day. 100 Tablet 3 10/6/2023 at AM    lisinopril (PRINIVIL) 10 MG Tab take 1 tablet by mouth twice a day 200 Tablet 3 10/6/2023 at AM    potassium chloride SA (K-DUR) 10 MEQ Tab CR Take 0.5 Tablets by mouth every day. 50 Tablet 3 10/6/2023 at AM    magnesium oxide (MAG-OX) 400 MG Tab tablet Take 1 Tablet by mouth every day. 100 Tablet 3 10/6/2023 at AM    metoprolol SR (TOPROL XL) 25 MG TABLET SR 24 HR Take 1 Tablet by mouth see administration instructions. 1.5 tabs=37.5mg in AM 2 tabs= 50mg in PM (Patient taking differently: Take 37.5 mg by mouth every evening.) 360 Tablet 3 10/5/2023 at PM    aspirin EC (ECOTRIN) 81 MG Tablet Delayed Response Take 81 mg by mouth every day.   10/6/2023 at AM    warfarin (COUMADIN) 5 MG Tab Take 5-7.5 mg by mouth every day. 5 mg on Mondays, wednesdays, and Fridays. All other days 7.5 mg   10/6/2023 at AM         Medication Allergies:  Allergies   Allergen Reactions    Codeine Unspecified     \"I just don't like it. It does weird things to me.\"    Hydrochlorothiazide Unspecified     Unsure of reaction    Isosorbide Mononitrate Rash     Rash    Keflex Rash     Rash    Sulfa Drugs Rash     rash    Tape Unspecified     Tears skin off    Xarelto [Rivaroxaban] Rash     rash         Family Medical History:  Family History   Problem " "Relation Age of Onset    Heart Attack Brother 55        heart attack         Social History:  Social History     Socioeconomic History    Marital status:      Spouse name: Not on file    Number of children: Not on file    Years of education: Not on file    Highest education level: Not on file   Occupational History    Not on file   Tobacco Use    Smoking status: Former     Current packs/day: 0.00     Average packs/day: 0.5 packs/day for 40.0 years (20.0 ttl pk-yrs)     Types: Cigarettes     Start date: 1962     Quit date: 2002     Years since quittin.6    Smokeless tobacco: Never   Vaping Use    Vaping Use: Never used   Substance and Sexual Activity    Alcohol use: Yes     Comment: occ    Drug use: No    Sexual activity: Not on file   Other Topics Concern    Not on file   Social History Narrative    Retired      Social Determinants of Health     Financial Resource Strain: Not on file   Food Insecurity: Not on file   Transportation Needs: Not on file   Physical Activity: Not on file   Stress: Not on file   Social Connections: Not on file   Intimate Partner Violence: Not on file   Housing Stability: Not on file         Vital signs:  Weight/BMI: Body mass index is 18.77 kg/m².  /53   Pulse (!) 104   Temp 36.5 °C (97.7 °F) (Temporal)   Resp 16   Ht 1.626 m (5' 4\")   Wt 49.6 kg (109 lb 5.6 oz)   SpO2 97%   Vitals:    10/06/23 2334 10/07/23 0308 10/07/23 0725 10/07/23 1212   BP: 109/74 96/41 112/58 118/53   Pulse: 92 79 98 (!) 104   Resp: 18 18 16 16   Temp: 36.7 °C (98.1 °F) 36.1 °C (97 °F) 36.1 °C (97 °F) 36.5 °C (97.7 °F)   TempSrc: Temporal Temporal Temporal Temporal   SpO2: 92% 93% 94% 97%   Weight:  49.6 kg (109 lb 5.6 oz)     Height:         Oxygen Therapy:  Pulse Oximetry: 97 %, O2 Delivery Device: None - Room Air    Intake/Output Summary (Last 24 hours) at 10/7/2023 1529  Last data filed at 10/7/2023 1000  Gross per 24 hour   Intake 1308.25 ml   Output --   Net " 1308.25 ml         Physical Exam:  Physical Exam  Constitutional:       Appearance: Normal appearance.   HENT:      Head: Normocephalic and atraumatic.      Nose: Nose normal.      Mouth/Throat:      Mouth: Mucous membranes are moist.   Eyes:      General: No scleral icterus.     Pupils: Pupils are equal, round, and reactive to light.   Cardiovascular:      Rate and Rhythm: Rhythm irregular.   Pulmonary:      Breath sounds: Normal breath sounds.   Abdominal:      General: Bowel sounds are normal.      Palpations: Abdomen is soft.      Tenderness: There is no abdominal tenderness.   Musculoskeletal:         General: No swelling or deformity.      Cervical back: Neck supple.   Lymphadenopathy:      Cervical: No cervical adenopathy.   Skin:     General: Skin is warm and dry.   Neurological:      Mental Status: She is alert and oriented to person, place, and time.                 Labs:  Recent Labs     10/06/23  0944   SODIUM 129*   POTASSIUM 4.4   CHLORIDE 94*   CO2 16*   BUN 26*   CREATININE 0.86   MAGNESIUM 2.2   PHOSPHORUS 4.4   CALCIUM 8.7     Recent Labs     10/06/23  0944   ALTSGPT 21   ASTSGOT 24   ALKPHOSPHAT 98   TBILIRUBIN 0.3   GLUCOSE 144*     Recent Labs     10/06/23  0944   WBC 4.8   NEUTSPOLYS 51.10   LYMPHOCYTES 34.50   MONOCYTES 13.20   EOSINOPHILS 0.40   BASOPHILS 0.40   ASTSGOT 24   ALTSGPT 21   ALKPHOSPHAT 98   TBILIRUBIN 0.3     Recent Labs     10/06/23  0944 10/06/23  1500 10/06/23  2128 10/07/23  0405 10/07/23  0919   RBC 2.28*  --   --   --   --    HEMOGLOBIN 6.0*   < > 8.1* 7.6* 7.7*   HEMATOCRIT 21.4*  --   --   --   --    PLATELETCT 314  --   --   --   --    PROTHROMBTM 73.4*  --   --  20.7*  --    APTT 61.2*  --   --   --   --    INR 8.76*  --   --  1.75*  --     < > = values in this interval not displayed.     Recent Results (from the past 24 hour(s))   HGB    Collection Time: 10/06/23  9:28 PM   Result Value Ref Range    Hemoglobin 8.1 (L) 12.0 - 16.0 g/dL   Prothrombin Time    Collection  Time: 10/07/23  4:05 AM   Result Value Ref Range    PT 20.7 (H) 12.0 - 14.6 sec    INR 1.75 (H) 0.87 - 1.13   HGB    Collection Time: 10/07/23  4:05 AM   Result Value Ref Range    Hemoglobin 7.6 (L) 12.0 - 16.0 g/dL   HGB    Collection Time: 10/07/23  9:19 AM   Result Value Ref Range    Hemoglobin 7.7 (L) 12.0 - 16.0 g/dL         Radiology Review:  DX-CHEST-PORTABLE (1 VIEW)   Final Result      Cardiomegaly.      Probable mild interstitial edema.      CT-CEREBRAL PERFUSION ANALYSIS   Final Result      1.  Cerebral blood flow less than 30% likely representing completed infarct = 0 mL.      2.  T Max more than 6 seconds likely representing combination of completed infarct and ischemia = 0 mL.      3.  Mismatched volume likely representing ischemic brain/penumbra = None      4.  Please note that the cerebral perfusion was performed on the limited brain tissue around the basal ganglia region. Infarct/ischemia outside the CT perfusion sections can be missed in this study.      CT-CTA NECK WITH & W/O-POST PROCESSING   Final Result      1.  Severe atherosclerosis.   2.  Severe stenosis of the left carotid bifurcation and moderate proximal right ICA stenosis.   3.  Patent vertebral arteries.      CT-CTA HEAD WITH & W/O-POST PROCESS   Final Result      1.  No large vessel occlusion or aneurysm.   2.  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   3.  No acute intracranial abnormality.      MR-BRAIN-W/O    (Results Pending)         MDM (Data Review):   -Records reviewed and summarized in current documentation  -I personally reviewed and interpreted the laboratory results  -I personally reviewed the radiology images    Assessment/Recommendations:    Anemia, suspect chronic blood loss  Stool positive for occult blood  Mild unintentional weight loss  Mild change in bowel habits  New onset seizure  Chronic anticoagulation with coagulopathy, reversed  Chronic systolic congestive heart failure,  EF 25% December 2022  CAD, status post CABG  Chronic atrial fibrillation  Mitral valve repair, MitraClip    Recs:  On omeprazole 20 mg twice daily    We will plan on EGD and colonoscopy once the MRI has been completed and Neuro eval clear.            Roberta oYung M.D.          Core Quality Measures   Reviewed items:  Labs, Medications and Radiology reports reviewed

## 2023-10-07 NOTE — PROGRESS NOTES
Device information for mitral clip uploaded to media. Information includes MRI compatibility. Hard copy kept in by patient in wallet.
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
## 2023-10-07 NOTE — PROGRESS NOTES
[facility] Medicine Daily Progress Note    Date of Service  10/7/2023    Chief Complaint  Daya Woods is a 83 y.o. female admitted 10/6/2023 with ***    Hospital Course  No notes on file    Interval Problem Update  ***    I have discussed this patient's plan of care and discharge plan at IDT rounds today with Case Management, Nursing, Nursing leadership, and other members of the IDT team.    Consultants/Specialty  {Other providers:[id number]}    Code Status  DNAR/DNI    Disposition  Medically Cleared  I have placed the appropriate orders for post-discharge needs.    Review of Systems  ROS     Physical Exam  Temp:  [36 °C (96.8 °F)-36.7 °C (98.1 °F)] 36.1 °C (97 °F)  Pulse:  [60-98] 98  Resp:  [12-18] 16  BP: ()/(41-74) 112/58  SpO2:  [90 %-100 %] 94 %    Physical Exam    Fluids    Intake/Output Summary (Last 24 hours) at 10/7/2023 0952  Last data filed at 10/7/2023 0600  Gross per 24 hour   Intake 1481.17 ml   Output --   Net 1481.17 ml       Laboratory  Recent Labs     10/06/23  0944 10/06/23  1500 10/06/23  2128 10/07/23  0405 10/07/23  0919   WBC 4.8  --   --   --   --    RBC 2.28*  --   --   --   --    HEMOGLOBIN 6.0*   < > 8.1* 7.6* 7.7*   HEMATOCRIT 21.4*  --   --   --   --    MCV 93.9  --   --   --   --    MCH 26.3*  --   --   --   --    MCHC 28.0*  --   --   --   --    RDW 68.2*  --   --   --   --    PLATELETCT 314  --   --   --   --    MPV 9.1  --   --   --   --     < > = values in this interval not displayed.     Recent Labs     10/06/23  0944   SODIUM 129*   POTASSIUM 4.4   CHLORIDE 94*   CO2 16*   GLUCOSE 144*   BUN 26*   CREATININE 0.86   CALCIUM 8.7     Recent Labs     10/06/23  0944 10/07/23  0405   APTT 61.2*  --    INR 8.76* 1.75*               Imaging  {Wetread or Wildcard:[id number]}     Assessment/Plan  * Seizure, stroke like sx/seizure, GI bleed/anemia, Afib/anticoag, ICA stenosis/severe PVD,  (HCC)- (present on admission)  Assessment & Plan  New onset  IV keppra loaded in the ER and 500 mg  IV BID until tolerating orals  Seizure precautions  IV ativan for seizures ordered  With tongue laceration  Consider Garett's paralysis    Acute metabolic encephalopathy- (present on admission)  Assessment & Plan  Likely due to post-ictal state  She may require soft wrist restraints in order to maintain IV access for blood transfusion.     Coagulopathy ([facility])- (present on admission)  Assessment & Plan  INR is very elevated at 8.76 due to coumadin use.  In the setting of acute blood loss anemia  IV Vitamin K ordered   Repeat INR in the morning    Anemia due to acute blood loss- (present on admission)  Assessment & Plan  Hemoglobin is down to 6  Guaiac + stools  In the setting of INR 8.76  Transfuse one unit RBCs in the ER and serial hemoglobins ordered with transfusion for Hb less than 7  Reverse coagulopathy   PPI    Lactic acidosis- (present on admission)  Assessment & Plan  Severe lactic acidosis at 6.6  Secondary to seizure  Judicious IV fluids given her cardiomyopathy repeat lactic acid level ordered    Chronic systolic CHF (congestive heart failure), NYHA class 3 ([facility])- (present on admission)  Assessment & Plan  Echo EF 25% Dec 2022  Without exacerbation     PAD (peripheral artery disease) ([facility])- (present on admission)  Assessment & Plan  Severe PAD   Dr. Stanford attempted a heart cath Dec 2022 and was unsuccessful due to severe arterial disease.    Chronic anticoagulation- (present on admission)  Assessment & Plan  She is on coumadin as an outpatient which will be held    DNR (do not resuscitate)- (present on admission)  Assessment & Plan  As discussed with her grandson at bedside    Coronary artery disease involving coronary bypass graft of native heart without angina pectoris- (present on admission)  Assessment & Plan  Hx of  bypass    Chronic atrial fibrillation ([facility])- (present on admission)  Assessment & Plan  Hold coumadin  Restart Toprol 25 mg once her blood pressure improves and she is able to tolerate  orals.     Essential hypertension- (present on admission)  Assessment & Plan  Hx of  Hold home BP meds due to hypotension         VTE prophylaxis:    ***Please fill out your VTE selection      I have performed a physical exam and reviewed and updated ROS and Plan today (10/7/2023). In review of yesterday's note (10/6/2023), there are no changes except as documented above.

## 2023-10-07 NOTE — CARE PLAN
The patient is Stable - Low risk of patient condition declining or worsening    Shift Goals  Clinical Goals: Neuro checks, trend hgb  Patient Goals: rest  Family Goals: updates if needed      Problem: Pain - Standard  Goal: Alleviation of pain or a reduction in pain to the patient’s comfort goal  Outcome: Progressing     Problem: Optimal Care of the Stroke Patient  Goal: Optimal emergency care for the stroke patient  Outcome: Progressing     Problem: Risk for Aspiration  Goal: Patient's risk for aspiration will be absent or decrease  Outcome: Progressing     Problem: Mobility - Stroke  Goal: Patient's capacity to carry out activities will improve  Outcome: Progressing

## 2023-10-07 NOTE — THERAPY
"Speech Language Pathology   Clinical Swallow Evaluation     Patient Name: Daya Woods  AGE:  83 y.o., SEX:  female  Medical Record #: [id number]  Date of Service: 10/6/2023      History of Present Illness  82 y/o female admitted 10/6 with left sided weakness. Witnessed seizure in the ED lobby and was unresponsive.     No hx SLP in [facility].    CT and CTA head were negative for acute processes though she has a lactic acidosis of 6.6 post seizure and INR of 8.76.    CMHx: Lactic acidosis, HTN, CAD, chronic afib, PAD, CHF, seizure, anemia, coagulopathy, acute metabolic encephalopathy  PMHx: Raynaud's disease, hypothyroidism, HLD, lupus, spinal stenosis, cardiomyopathy, s/p mitral valve repair    CXR 10/6:  \"Cardiomegaly.  Probable mild interstitial edema.\"    CT/CTA Head 10/6:  \"1.  No large vessel occlusion or aneurysm.  2.  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  3.  No acute intracranial abnormality.\"    General Information:  Vitals  O2 (LPM): 0  O2 Delivery Device: None - Room Air  Vitals Comments: pulse ox with very poor reading  Level of Consciousness: Awake, Drowsy  Patient Behaviors: Drowsy  Orientation: Self, , General place, Situation  Follows Directives: Yes - simple commands only      Prior Living Situation & Level of Function:  Communication: WFL  Swallowing: WFL       Oral Mechanism Evaluation:  Dentition: Good, Full dentures   Facial Symmetry: Equal  Facial Sensation: Equal     Labial Observations: WFL   Lingual Observations: Midline  Motor Speech: WFL            Laryngeal Function:  Secretion Management: Adequate  Voice Quality: WFL  Cough: Perceptually WNL       Subjective  Pt agreeable and cooperative with SLP evaluation tasks. Drowsy but roused and re-positioned herself. \"Yes\" to water.      Assessment  Current Method of Nutrition: NPO until cleared by speech pathology  Positioning: Semi-Reyes's (30-45 degrees)  Bolus Administration: SLP, Patient  O2 " "(LPM): 0 O2 Delivery Device: None - Room Air  Factor(s) Affecting Performance: Impaired endurance  Tracheostomy : No       Swallowing Trials:  Thin Liquid (TN0): WFL  Liquidised (LQ3): WFL  Easy to Chew (EC7): Impaired      Comments: Pt w/ appropriate self-feeding. Appropriate oral bolus stripping and containment. Total AP transit and effective bolus formation. Very prolonged mastication of EC7, stated it was \"sticky\" and difficult to swallow but declined liquid wash. No overt s/sx of aspiration noted w/ single and sequential sips of TN0 water. No increase in WOB, no cough/clear, no change in vocal quality. One to two swallows appreciated per bolus.         Clinical Impressions  Pt presents with mild oral phase dysphagia, likely acute, and no overt bedside indicators concerning for pharyngeal dysphagia this date. Will follow at a distance considering pt is not at her baseline with PO. Recommend SB/TN with assistance as needed and PO medication administration. Swallow outcomes can be further maximized with use of frequent, thorough oral care and mobilization as pt is able.         Recommendations  Soft and bite-sized with thin liquids  Instrumentation: None indicated at this time  Medication: As tolerated  Supervision:  (Feeding A as needed)  Positioning: Fully upright and midline during oral intake, Meals sitting upright in a chair, as tolerated  Risk Management : Small bites/sips, Slow rate of intake, Physical mobility, as tolerated  Oral Care: BID         SLP Treatment Plan  Treatment Plan: Dysphagia Treatment, Patient/Family/Caregiver Training  SLP Frequency: 2x Per Week  Estimated Duration: Until Therapy Goals Met      Anticipated Discharge Needs  Discharge Recommendations: Anticipate that the patient will have no further speech therapy needs after discharge from the hospital   Therapy Recommendations Upon DC: Not Indicated        Patient / Family Goals  Patient / Family Goal #1: \"yes\"  Short Term Goals  Short " Term Goal # 1: Pt will consume diet of SB/TN given A as needed with no overt s/sx of aspiration or worsening of lung stauts.      Lucille Mock, SLP

## 2023-10-08 ENCOUNTER — APPOINTMENT (OUTPATIENT)
Dept: RADIOLOGY | Facility: MEDICAL CENTER | Age: 83
DRG: 100 | End: 2023-10-08
Attending: INTERNAL MEDICINE
Payer: COMMERCIAL

## 2023-10-08 LAB
ALBUMIN SERPL BCP-MCNC: 2.4 G/DL (ref 3.2–4.9)
BUN SERPL-MCNC: 20 MG/DL (ref 8–22)
CALCIUM ALBUM COR SERPL-MCNC: 9.5 MG/DL (ref 8.5–10.5)
CALCIUM SERPL-MCNC: 8.2 MG/DL (ref 8.5–10.5)
CHLORIDE SERPL-SCNC: 104 MMOL/L (ref 96–112)
CO2 SERPL-SCNC: 20 MMOL/L (ref 20–33)
CREAT SERPL-MCNC: 0.63 MG/DL (ref 0.5–1.4)
ERYTHROCYTE [DISTWIDTH] IN BLOOD BY AUTOMATED COUNT: 61.4 FL (ref 35.9–50)
GFR SERPLBLD CREATININE-BSD FMLA CKD-EPI: 89 ML/MIN/1.73 M 2
GLUCOSE SERPL-MCNC: 94 MG/DL (ref 65–99)
HCT VFR BLD AUTO: 23.9 % (ref 37–47)
HGB BLD-MCNC: 6.7 G/DL (ref 12–16)
HGB BLD-MCNC: 7.3 G/DL (ref 12–16)
HGB BLD-MCNC: 7.5 G/DL (ref 12–16)
HGB BLD-MCNC: 9.1 G/DL (ref 12–16)
INR PPP: 1.25 (ref 0.87–1.13)
MCH RBC QN AUTO: 27.2 PG (ref 27–33)
MCHC RBC AUTO-ENTMCNC: 30.5 G/DL (ref 32.2–35.5)
MCV RBC AUTO: 89.2 FL (ref 81.4–97.8)
PHOSPHATE SERPL-MCNC: 2.9 MG/DL (ref 2.5–4.5)
PLATELET # BLD AUTO: 231 K/UL (ref 164–446)
PMV BLD AUTO: 9.2 FL (ref 9–12.9)
POTASSIUM SERPL-SCNC: 4.4 MMOL/L (ref 3.6–5.5)
PROTHROMBIN TIME: 15.8 SEC (ref 12–14.6)
RBC # BLD AUTO: 2.68 M/UL (ref 4.2–5.4)
SODIUM SERPL-SCNC: 133 MMOL/L (ref 135–145)
WBC # BLD AUTO: 5.2 K/UL (ref 4.8–10.8)

## 2023-10-08 PROCEDURE — 85610 PROTHROMBIN TIME: CPT

## 2023-10-08 PROCEDURE — 99418 PROLNG IP/OBS E/M EA 15 MIN: CPT | Performed by: INTERNAL MEDICINE

## 2023-10-08 PROCEDURE — 700111 HCHG RX REV CODE 636 W/ 250 OVERRIDE (IP): Mod: JZ | Performed by: HOSPITALIST

## 2023-10-08 PROCEDURE — 770020 HCHG ROOM/CARE - TELE (206)

## 2023-10-08 PROCEDURE — 99222 1ST HOSP IP/OBS MODERATE 55: CPT | Mod: FS | Performed by: NURSE PRACTITIONER

## 2023-10-08 PROCEDURE — 93970 EXTREMITY STUDY: CPT

## 2023-10-08 PROCEDURE — 99222 1ST HOSP IP/OBS MODERATE 55: CPT | Performed by: INTERNAL MEDICINE

## 2023-10-08 PROCEDURE — 72125 CT NECK SPINE W/O DYE: CPT

## 2023-10-08 PROCEDURE — 36430 TRANSFUSION BLD/BLD COMPNT: CPT

## 2023-10-08 PROCEDURE — 70551 MRI BRAIN STEM W/O DYE: CPT

## 2023-10-08 PROCEDURE — 99233 SBSQ HOSP IP/OBS HIGH 50: CPT | Performed by: INTERNAL MEDICINE

## 2023-10-08 PROCEDURE — 36415 COLL VENOUS BLD VENIPUNCTURE: CPT

## 2023-10-08 PROCEDURE — P9016 RBC LEUKOCYTES REDUCED: HCPCS

## 2023-10-08 PROCEDURE — 700101 HCHG RX REV CODE 250: Performed by: INTERNAL MEDICINE

## 2023-10-08 PROCEDURE — 85018 HEMOGLOBIN: CPT | Mod: 91

## 2023-10-08 PROCEDURE — 85027 COMPLETE CBC AUTOMATED: CPT

## 2023-10-08 PROCEDURE — 86923 COMPATIBILITY TEST ELECTRIC: CPT

## 2023-10-08 PROCEDURE — A9270 NON-COVERED ITEM OR SERVICE: HCPCS | Performed by: HOSPITALIST

## 2023-10-08 PROCEDURE — 700105 HCHG RX REV CODE 258: Performed by: HOSPITALIST

## 2023-10-08 PROCEDURE — 80069 RENAL FUNCTION PANEL: CPT

## 2023-10-08 PROCEDURE — 700102 HCHG RX REV CODE 250 W/ 637 OVERRIDE(OP): Performed by: INTERNAL MEDICINE

## 2023-10-08 PROCEDURE — A9270 NON-COVERED ITEM OR SERVICE: HCPCS | Performed by: INTERNAL MEDICINE

## 2023-10-08 PROCEDURE — 700102 HCHG RX REV CODE 250 W/ 637 OVERRIDE(OP): Performed by: HOSPITALIST

## 2023-10-08 RX ADMIN — ATORVASTATIN CALCIUM 40 MG: 40 TABLET, FILM COATED ORAL at 06:15

## 2023-10-08 RX ADMIN — LEVETIRACETAM 500 MG: 100 INJECTION, SOLUTION, CONCENTRATE INTRAVENOUS at 06:16

## 2023-10-08 RX ADMIN — CYANOCOBALAMIN TAB 500 MCG 1000 MCG: 500 TAB at 06:15

## 2023-10-08 RX ADMIN — Medication 100 MG: at 06:17

## 2023-10-08 RX ADMIN — LEVETIRACETAM 500 MG: 100 INJECTION, SOLUTION, CONCENTRATE INTRAVENOUS at 18:01

## 2023-10-08 RX ADMIN — POLYETHYLENE GLYCOL-3350 AND ELECTROLYTES 4 L: 236; 6.74; 5.86; 2.97; 22.74 POWDER, FOR SOLUTION ORAL at 18:01

## 2023-10-08 RX ADMIN — SODIUM CHLORIDE, POTASSIUM CHLORIDE, SODIUM LACTATE AND CALCIUM CHLORIDE: 600; 310; 30; 20 INJECTION, SOLUTION INTRAVENOUS at 06:20

## 2023-10-08 RX ADMIN — OMEPRAZOLE 20 MG: 20 CAPSULE, DELAYED RELEASE ORAL at 06:15

## 2023-10-08 RX ADMIN — THERA TABS 1 TABLET: TAB at 06:15

## 2023-10-08 RX ADMIN — OMEPRAZOLE 20 MG: 20 CAPSULE, DELAYED RELEASE ORAL at 18:01

## 2023-10-08 RX ADMIN — METOPROLOL SUCCINATE 25 MG: 25 TABLET, EXTENDED RELEASE ORAL at 06:15

## 2023-10-08 RX ADMIN — LEVOTHYROXINE SODIUM 75 MCG: 0.07 TABLET ORAL at 06:15

## 2023-10-08 ASSESSMENT — PATIENT HEALTH QUESTIONNAIRE - PHQ9
2. FEELING DOWN, DEPRESSED, IRRITABLE, OR HOPELESS: NOT AT ALL
1. LITTLE INTEREST OR PLEASURE IN DOING THINGS: NOT AT ALL
SUM OF ALL RESPONSES TO PHQ9 QUESTIONS 1 AND 2: 0

## 2023-10-08 ASSESSMENT — PAIN DESCRIPTION - PAIN TYPE
TYPE: ACUTE PAIN

## 2023-10-08 NOTE — CONSULTS
Brief chart review PMR consult note for discharge planning purposes    Patient is a 83-year-old female with a past medical history of A-fib on Coumadin, heart failure with ejection fraction 25%, left arm and leg weakness, who was admitted for stroke work-up.  Patient had a seizure, loaded with Keppra, found to have GI bleed.  MR brain did not demonstrate infarcts and corresponding vascular territories, however did show a tiny infarct in the right frontal cortex that is essentially an incidental finding. Patient was seen by PT and OT, and is walking household distances without AD at contact-guard assist, and climbing 5 stairs.  Patient is completing ADLs at supervision level.  Patient's NIH has decreased to 0.    Plan  -Not a candidate for IPR due to lack of therapeutic need for inpatient rehab program  -Recommend discharge home with home health and continued outpatient therapy  -PMR will sign off, please reconsult or reach out via Voalte if further evaluation or medical management is requested    Sánchez Loza,  MS  [facility] - Physical Medicine and Rehabilitation

## 2023-10-08 NOTE — PROGRESS NOTES
Monitor Summary: Afib 78-96, [place] -0.-, QRS -0.08, QT -0.-, with 6 beats of vtach per strip from the monitor room.

## 2023-10-08 NOTE — PROGRESS NOTES
Hospital Medicine Daily Progress Note    Date of Service  10/7/2023    Chief Complaint  Daya Woods is a 83 y.o. female admitted 10/6/2023 with Possible Stroke (Pt brought in by family for L sided deficits that they estimate started around 1:00 am this morning. Pt called family for ride to the ER this morning. On arrival pt has L sided weakness and shortly after started having a seizure in the ER lobby that lasted around 1 minute. No known hx of seizures. )        Hospital Course  No notes on file  Complicated patient.  Working up stroke like symptoms and seizure but also has GI bleed and anemia.   Interval Problem Update  10/7. I reviewed the chart along with vitals, labs, imaging, test (both pending and resulted) and recommendations from specialists and interdisciplinary team.  I spoke with Dr. Daniel to clarify. Patient has anemia and guaiac positive. Transfusion ordered. Patient was also reversed for a supratherapeutic INR of 8. It has gone down now to 1.75. Anticoagulation for Afib, son does not remember if she has clots but did say she has PVD. WIll order Dopplers.  MRI is still pending. Neurovascular is following appreciate their recommendations  I spoke with son and patient at length. Ultimately they do want the GI bleed to be worked up. I called and consulted Dr. Young.   Agree to EGD but will need Neuro/stroke clearance.     Patient is has a high medical complexity, complex decision making and is at high risk for complication, morbidity, and mortality, thus requiring the highest level of my preparedness for sudden, emergent intervention. Medical decision making is therefore complex. I provided  services, which included ordering labs and/or imaging, and discussing the case with various consultants.medication orders, frequent reevaluations of the patient's condition and response to treatment. Time was also devoted to counseling and coordinating care including review of records, pertinent lab data  and studies, as well as discussing diagnostic evaluation and work up, planned therapeutic interventions and future disposition of care. Where indicated, the assessment and plan reflect discussion of patient with consultants, other healthcare providers, family members, and additional research needed to obtain further information in formulating the plan of care for Daya Woods. Total time spent was 82 minutes.     I have discussed this patient's plan of care and discharge plan at IDT rounds today with Case Management, Nursing, Nursing leadership, and other members of the IDT team.    Consultants/Specialty  Neurology  GI    Code Status  DNAR/DNI    Disposition  Medically Cleared  I have placed the appropriate orders for post-discharge needs.    Review of Systems  Review of Systems   Unable to perform ROS: Mental acuity        Physical Exam  Temp:  [36 °C (96.8 °F)-36.8 °C (98.2 °F)] 36.8 °C (98.2 °F)  Pulse:  [] 96  Resp:  [12-18] 16  BP: ()/(41-74) 121/62  SpO2:  [90 %-99 %] 97 %    Physical Exam  Constitutional:       Comments: Elderly, thin   Neurological:      Comments: Cognitive deficits but seems baseline.         Fluids    Intake/Output Summary (Last 24 hours) at 10/7/2023 1701  Last data filed at 10/7/2023 1212  Gross per 24 hour   Intake 1508.25 ml   Output --   Net 1508.25 ml       Laboratory  Recent Labs     10/06/23  0944 10/06/23  1500 10/07/23  0405 10/07/23  0919 10/07/23  1525   WBC 4.8  --   --   --   --    RBC 2.28*  --   --   --   --    HEMOGLOBIN 6.0*   < > 7.6* 7.7* 7.8*   HEMATOCRIT 21.4*  --   --   --   --    MCV 93.9  --   --   --   --    MCH 26.3*  --   --   --   --    MCHC 28.0*  --   --   --   --    RDW 68.2*  --   --   --   --    PLATELETCT 314  --   --   --   --    MPV 9.1  --   --   --   --     < > = values in this interval not displayed.     Recent Labs     10/06/23  0944   SODIUM 129*   POTASSIUM 4.4   CHLORIDE 94*   CO2 16*   GLUCOSE 144*   BUN 26*   CREATININE 0.86  "  CALCIUM 8.7     Recent Labs     10/06/23  0944 10/07/23  0405   APTT 61.2*  --    INR 8.76* 1.75*               Imaging  DX-CHEST-PORTABLE (1 VIEW)   Final Result      Cardiomegaly.      Probable mild interstitial edema.      CT-CEREBRAL PERFUSION ANALYSIS   Final Result      1.  Cerebral blood flow less than 30% likely representing completed infarct = 0 mL.      2.  T Max more than 6 seconds likely representing combination of completed infarct and ischemia = 0 mL.      3.  Mismatched volume likely representing ischemic brain/penumbra = None      4.  Please note that the cerebral perfusion was performed on the limited brain tissue around the basal ganglia region. Infarct/ischemia outside the CT perfusion sections can be missed in this study.      CT-CTA NECK WITH & W/O-POST PROCESSING   Final Result      1.  Severe atherosclerosis.   2.  Severe stenosis of the left carotid bifurcation and moderate proximal right ICA stenosis.   3.  Patent vertebral arteries.      CT-CTA HEAD WITH & W/O-POST PROCESS   Final Result      1.  No large vessel occlusion or aneurysm.   2.  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   3.  No acute intracranial abnormality.      MR-BRAIN-W/O    (Results Pending)   US-EXTREMITY VENOUS LOWER BILAT    (Results Pending)        Assessment/Plan  * Seizure, stroke like sx/seizure, GI bleed/anemia, Afib/anticoag, ICA stenosis/severe PVD,  (HCC)- (present on admission)  Assessment & Plan  New onset  IV keppra loaded in the ER and 500 mg IV BID until tolerating orals  Seizure precautions  IV ativan for seizures ordered  With tongue laceration  Consider Garett's paralysis\"    MRI pending  When Neuro cleared, stroke mgmt optimized, GI to do EGD to w/u GI bleed and evaluate when anticoagulation can be restarted  Currently no bridge but will get venous Dopplers, r/o clots and any indication to acutely anticoagulate. Currently patient has been reversed. Daily INR " "ordered  Supplements ordered; Body mass index is 18.77 kg/m².      Acute metabolic encephalopathy- (present on admission)  Assessment & Plan  Likely due to post-ictal state  She may require soft wrist restraints in order to maintain IV access for blood transfusion.     Coagulopathy ([facility])- (present on admission)  Assessment & Plan  INR is very elevated at 8.76 due to coumadin use.  In the setting of acute blood loss anemia  IV Vitamin K ordered   Repeat INR in the morning    Anemia due to acute blood loss- (present on admission)  Assessment & Plan  Hemoglobin is down to 6  Guaiac + stools  In the setting of INR 8.76  Transfuse one unit RBCs in the ER and serial hemoglobins ordered with transfusion for Hb less than 7  Reverse coagulopathy   PPI    Lactic acidosis- (present on admission)  Assessment & Plan  Severe lactic acidosis at 6.6  Secondary to seizure  Judicious IV fluids given her cardiomyopathy repeat lactic acid level ordered    Chronic systolic CHF (congestive heart failure), NYHA class 3 ([facility])- (present on admission)  Assessment & Plan  Echo EF 25% Dec 2022  Without exacerbation \"    BB restarted, others held because of hypotension  Restart anticoagulation when GI clears.    PAD (peripheral artery disease) ([facility])- (present on admission)  Assessment & Plan  Severe PAD   Dr. Stanford attempted a heart cath Dec 2022 and was unsuccessful due to severe arterial disease.    Chronic anticoagulation- (present on admission)  Assessment & Plan  She is on coumadin as an outpatient which will be held\"    Held and reversed  No acute anticoagulation criteria unless evidence of VTE or if MRI shows stroke with high CHADsvasc score    DNR (do not resuscitate)- (present on admission)  Assessment & Plan  As discussed with her grandson at bedside    Coronary artery disease involving coronary bypass graft of native heart without angina pectoris- (present on admission)  Assessment & Plan  Hx of  bypass    Chronic atrial " "fibrillation (HCC)- (present on admission)  Assessment & Plan  Hold coumadin  Restart Toprol 25 mg once her blood pressure improves and she is able to tolerate orals. \"    Vitals:    10/07/23 1541   BP: 121/62   Pulse: 96   Resp: 16   Temp: 36.8 °C (98.2 °F)   SpO2:      Stable HR  Restart BB    Essential hypertension- (present on admission)  Assessment & Plan  Hx of  Hold home BP meds due to hypotension\"    Vitals:    10/07/23 1541   BP: 121/62   Pulse: 96   Resp: 16   Temp: 36.8 °C (98.2 °F)   SpO2:      Stable. Restart BB first         VTE prophylaxis: SCDs    I have performed a physical exam and reviewed and updated ROS and Plan today (10/7/2023). In review of yesterday's note (10/6/2023), there are no changes except as documented above.        "

## 2023-10-08 NOTE — CARE PLAN
The patient is Stable - Low risk of patient condition declining or worsening    Shift Goals  Clinical Goals: Stable neuro exams, monitor labs  Patient Goals: Rest  Family Goals: JAYA    Progress made toward(s) clinical / shift goals:  Patient is AAOx4, she is able to communicate her needs. Bed low, locked and call light in reach.    Problem: Pain - Standard  Goal: Alleviation of pain or a reduction in pain to the patient’s comfort goal  Outcome: Progressing  Note: Patient had no complaints of pain, pain scale in place, will continue to monitor     Problem: Optimal Care of the Stroke Patient  Goal: Optimal acute care for the stroke patient  Outcome: Progressing  Note: Core measures in place, education provided on stroke and risk factors  - Vital signs and neuro checks performed and documented per order  - NIHSS completed and documented per order  - Continuous telemetry monitoring for 72 hours or until discontinued by provider    Problem: Knowledge Deficit - Stroke Education  Goal: Patient's knowledge of stroke and risk factors will improve  Outcome: Progressing  Note: Patient understands education provided and POC  1.  Stroke education booklet provided  2.  Education regarding EMS activation, need for follow up, medication prescribed at discharge, risk factors for stroke/lifestyle modifications, warning signs and symptoms of stroke provided     Problem: Neuro Status  Goal: Neuro status will remain stable or improve  Outcome: Progressing  Note: Q4 hour neuro checks in place, she remains AAOx4, will continue to monitor       Patient is not progressing towards the following goals: N/A

## 2023-10-08 NOTE — CONSULTS
Chief Complaint   Patient presents with    Possible Stroke     Pt brought in by family for L sided deficits that they estimate started around 1:00 am this morning. Pt called family for ride to the ER this morning. On arrival pt has L sided weakness and shortly after started having a seizure in the ER lobby that lasted around 1 minute. No known hx of seizures.          Neurology Initial Consult H&P  Neurohospitalist Service, [facility]    History of present illness:  Daya Woods is a 83 y.o. female with a PMHx of CABG, CHF with EF 25%, and severe atherosclerotic disease, as well as atrial fibrillation on Coumadin who presented 10/06/2023 left hand cramping and contracture. In the ED the patient experienced a witness seizure and became unresponsive. Noncontrast CT head negative for acute findings. CTA head/nec negative for LVO, revealed severe stenosis of the left carotid bifurcation. CT Perfusion negative for CBF defect. Patient subsequently found to be experiencing a GI bleed with supratherapeutic INR. Patient was started on Keppra at that time as well. MRI brain was obtained which revealed a tiny incidental infarct in the right frontal lobe which would not correlate to patient's presentation. Neurology has been consulted for further evaluation of the above.     Referring Provider: Campos Penny M.D. has consulted Neurology for further evaluation    Past medical history:   Past Medical History:   Diagnosis Date    Anesthesia     ponv    Anticoagulation monitoring, special range     CAD (coronary artery disease) 10/09/2018    PCI/ANA (Synergy 3.0 x 28mm) to the OM.    Cataract     Bilateral IOL    Chronic atrial fibrillation (HCC)     Managed with rate control and warfarin for anticoagulation     Coronary atherosclerosis of native coronary artery 12/2002    CABG x 5 (LIMA-D3, SVG-OM, SVG-RCA, SVG-distal LAD, SVG-D1). August 2016: LIMA-D3 and SVG-OM patent (interval occlusion of the  SVG-RCA); chronic occluded SVG-LAD, SVG-D1.    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     MI, old 2002    Mitral regurgitation 10/2018    Status post MV repair with MitraClip.    Osteoarthritis     Pain     right knee, back    Raynaud's disease     Spinal stenosis 8/20/2015    Status post coronary artery bypass grafts x 5 08/2002    LIMA-D3, SVG-LAD, SVG-OM, SVG-RCA, SVG-distal LAD.        Past surgical history:   Past Surgical History:   Procedure Laterality Date    TRANSCATHETER MITRAL VALVE REPAIR  10/15/2018    Procedure: TRANSCATHETER MITRAL VALVE REPAIR- WITH POSSIBLE OPEN HEART AND ANY ASSOCIATED PROCEDURES;  Surgeon: Moy Don M.D.;  Location: SURGERY [facility];  Service: Cardiac    DEVON  10/15/2018    Procedure: DEVON;  Surgeon: Moy Don M.D.;  Location: SURGERY [facility];  Service: Cardiac    Z CARDIAC CATH  10/2018    PCI to SVG to OM.     RECOVERY  8/26/2016    Procedure: CATH LAB-DEVON W/LHC W/POSSIBLE-JO-ANN/GRINSELL-LARGE GROUP;  Surgeon: Manuel Surgery;  Location: SURGERY PRE-POST PROC UNIT [facility];  Service:     OTHER Bilateral 08/2014    cataract extraction with iols    MULTIPLE CORONARY ARTERY BYPASS  2002     CABG x 5    APPENDECTOMY      GYN SURGERY      c section     MITRAL VALVE REPLACE      MV clip X1    TONSILLECTOMY AND ADENOIDECTOMY      TUBAL LIGATION         Family history:   Family History   Problem Relation Age of Onset    Heart Attack Brother 55        heart attack       Social history:   Social History     Socioeconomic History    Marital status:      Spouse name: Not on file    Number of children: Not on file    Years of education: Not on file    Highest education level: Not on file   Occupational History    Not on file  "  Tobacco Use    Smoking status: Former     Current packs/day: 0.00     Average packs/day: 0.5 packs/day for 40.0 years (20.0 ttl pk-yrs)     Types: Cigarettes     Start date: 1962     Quit date: 2002     Years since quittin.6    Smokeless tobacco: Never   Vaping Use    Vaping Use: Never used   Substance and Sexual Activity    Alcohol use: Yes     Comment: occ    Drug use: No    Sexual activity: Not on file   Other Topics Concern    Not on file   Social History Narrative    Retired      Social Determinants of Health     Financial Resource Strain: Not on file   Food Insecurity: Not on file   Transportation Needs: Not on file   Physical Activity: Not on file   Stress: Not on file   Social Connections: Not on file   Intimate Partner Violence: Not on file   Housing Stability: Not on file       Current medications:   Current Facility-Administered Medications   Medication Dose    thiamine (Vitamin B-1) tablet 100 mg  100 mg    multivitamin tablet 1 Tablet  1 Tablet    cyanocobalamin (Vitamin B-12) tablet 1,000 mcg  1,000 mcg    metoprolol SR (Toprol XL) tablet 25 mg  25 mg    levETIRAcetam (Keppra) injection 500 mg  500 mg    lactated ringers infusion      omeprazole (PriLOSEC) capsule 20 mg  20 mg    LORazepam (Ativan) injection 0.5 mg  0.5 mg    atorvastatin (Lipitor) tablet 40 mg  40 mg    levothyroxine (Synthroid) tablet 75 mcg  75 mcg    senna-docusate (Pericolace Or Senokot S) 8.6-50 MG per tablet 2 Tablet  2 Tablet    And    polyethylene glycol/lytes (Miralax) PACKET 1 Packet  1 Packet    And    magnesium hydroxide (Milk Of Magnesia) suspension 30 mL  30 mL    And    bisacodyl (Dulcolax) suppository 10 mg  10 mg    acetaminophen (Tylenol) tablet 650 mg  650 mg    haloperidol lactate (Haldol) injection 1-2 mg  1-2 mg    morphine 4 MG/ML injection 1-4 mg  1-4 mg       Medication Allergy:  Allergies   Allergen Reactions    Codeine Unspecified     \"I just don't like it. It does weird things " "to me.\"    Hydrochlorothiazide Unspecified     Unsure of reaction    Isosorbide Mononitrate Rash     Rash    Keflex Rash     Rash    Sulfa Drugs Rash     rash    Tape Unspecified     Tears skin off    Xarelto [Rivaroxaban] Rash     rash       Review of systems:   Unable to obtain ROS due to acuity    Physical examination:   Vitals:    10/07/23 1913 10/07/23 2300 10/08/23 0359 10/08/23 0720   BP: 102/49  120/64 124/66   Pulse:   91 89   Resp: 18 18 18 18   Temp: 37.2 °C (99 °F)  36.2 °C (97.2 °F) 36 °C (96.8 °F)   TempSrc: Temporal Temporal Temporal Temporal   SpO2: 94% 94% 94% 95%   Weight:       Height:         General: Patient is ill appearing, toxic appearing  HEENT: Normocephalic, no signs of acute trauma.   Neck: supple, no meningeal signs or carotid bruits. There is normal range of motion. No tenderness on exam.   Chest: clear to auscultation. No cough.   CV: RRR, no murmurs.   Skin: no signs of acute rashes or trauma.   Musculoskeletal: joints exhibit full range of motion, without any pain to palpation. There are no signs of joint or muscle swelling. There is no tenderness to deep palpation of muscles.   Psychiatric: No hallucinatory behavior. Denies symptoms of depression or suicidal ideation. Mood and affect appear normal on exam.     NEUROLOGICAL EXAM:   Mental status, orientation: Arouses to voice, oriented to self, place, and time   Speech and language: Paucity of speech, clear.The patient is able to name, repeat and comprehend.   Memory: There is intact recollection of recent and remote events.   Cranial nerve exam: Pupils are 3-4 mm bilaterally and equally reactive to light and accommodation. Visual fields are intact by confrontation. There is no nystagmus on primary or secondary gaze. Intact full EOM in all directions of gaze. Face appears symmetric. Sensation in the face is intact to light touch. Uvula is midline. Tongue is midline. Shoulder shrug is intact bilaterally.   Motor exam: Strength is 4+/5 " in all extremities. Tone is normal.   Sensory exam reveals normal sense of light touch, proprioception, vibration and pinprick in all extremities.   Deep tendon reflexes:  2+ throughout. Plantar responses are flexor. There is no clonus.   Coordination: shows a normal finger-nose-finger. No ataxia noted  Gait: Not assessed due to patient preference    NIHSS: National Institutes of Health Stroke Scale    [0] 1a:Level of Consciousness    0-alert 1-drowsy   2-stupor   3-coma  [0] 1b:LOC Questions                  0-both  1-one      2-neither  [0] 1c:LOC Commands                   0-both  1-one      2-neither  [0] 2: Best Gaze                     0-nl    1-partial  2-forced  [0] 3: Visual Fields                   0-nl    1-partial  2-complete 3-bilat  [0] 4: Facial Paresis                0-nl    1-minor    2-partial  3-full  MOTOR                       0-nl  [0] 5: Right Arm           1-drift  [0] 6: Left Arm             2-some effort vs gravity  [0] 7: Right Leg           3-no effort vs gravity  [0] 8: Left Leg             4-no movement                             x-untestable  [0] 9: Limb Ataxia                    0-abs   1-1_limb   2-2+_limbs       x-untestable  [0] 10:Sensory                        0-nl    1-partial  2-dense  [0] 11:Best Language/Aphasia         0-nl    1-mild/mod 2-severe   3-mute  [0] 12:Dysarthria                     0-nl    1-mild/mod 2-severe       x-untestable  [0] 13:Neglect/Inattention            0-none  1-partial  2-complete  [0] TOTAL      ANCILLARY DATA REVIEWED:     Lab Data Review:  Recent Results (from the past 24 hour(s))   HGB    Collection Time: 10/07/23  9:19 AM   Result Value Ref Range    Hemoglobin 7.7 (L) 12.0 - 16.0 g/dL   HGB    Collection Time: 10/07/23  3:25 PM   Result Value Ref Range    Hemoglobin 7.8 (L) 12.0 - 16.0 g/dL   HGB    Collection Time: 10/07/23  9:15 PM   Result Value Ref Range    Hemoglobin 7.4 (L) 12.0 - 16.0 g/dL   Renal Function Panel    Collection Time:  10/08/23  3:59 AM   Result Value Ref Range    Sodium 133 (L) 135 - 145 mmol/L    Potassium 4.4 3.6 - 5.5 mmol/L    Chloride 104 96 - 112 mmol/L    Co2 20 20 - 33 mmol/L    Glucose 94 65 - 99 mg/dL    Creatinine 0.63 0.50 - 1.40 mg/dL    Bun 20 8 - 22 mg/dL    Calcium 8.2 (L) 8.5 - 10.5 mg/dL    Correct Calcium 9.5 8.5 - 10.5 mg/dL    Phosphorus 2.9 2.5 - 4.5 mg/dL    Albumin 2.4 (L) 3.2 - 4.9 g/dL   Prothrombin Time    Collection Time: 10/08/23  3:59 AM   Result Value Ref Range    PT 15.8 (H) 12.0 - 14.6 sec    INR 1.25 (H) 0.87 - 1.13   CBC WITHOUT DIFFERENTIAL    Collection Time: 10/08/23  3:59 AM   Result Value Ref Range    WBC 5.2 4.8 - 10.8 K/uL    RBC 2.68 (L) 4.20 - 5.40 M/uL    Hemoglobin 7.3 (L) 12.0 - 16.0 g/dL    Hematocrit 23.9 (L) 37.0 - 47.0 %    MCV 89.2 81.4 - 97.8 fL    MCH 27.2 27.0 - 33.0 pg    MCHC 30.5 (L) 32.2 - 35.5 g/dL    RDW 61.4 (H) 35.9 - 50.0 fL    Platelet Count 231 164 - 446 K/uL    MPV 9.2 9.0 - 12.9 fL   ESTIMATED GFR    Collection Time: 10/08/23  3:59 AM   Result Value Ref Range    GFR (CKD-EPI) 89 >60 mL/min/1.73 m 2       Labs reviewed by me.       Imaging reviewed by me:     DX-CHEST-PORTABLE (1 VIEW)   Final Result      Cardiomegaly.      Probable mild interstitial edema.      CT-CEREBRAL PERFUSION ANALYSIS   Final Result      1.  Cerebral blood flow less than 30% likely representing completed infarct = 0 mL.      2.  T Max more than 6 seconds likely representing combination of completed infarct and ischemia = 0 mL.      3.  Mismatched volume likely representing ischemic brain/penumbra = None      4.  Please note that the cerebral perfusion was performed on the limited brain tissue around the basal ganglia region. Infarct/ischemia outside the CT perfusion sections can be missed in this study.      CT-CTA NECK WITH & W/O-POST PROCESSING   Final Result      1.  Severe atherosclerosis.   2.  Severe stenosis of the left carotid bifurcation and moderate proximal right ICA stenosis.    3.  Patent vertebral arteries.      CT-CTA HEAD WITH & W/O-POST PROCESS   Final Result      1.  No large vessel occlusion or aneurysm.   2.  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   3.  No acute intracranial abnormality.      MR-BRAIN-W/O    (Results Pending)   US-EXTREMITY VENOUS LOWER BILAT    (Results Pending)         Presumed mechanism by TOAST:  __Large Artery Atherosclerosis  __Small Vessel (Lacunar)  X_Cardioembolic  __Other (Sickle Cell, Vasculitis, Hypercoagulable)  __Unknown    Modified Moreno Scale (MRS): 0 = No symptoms      ASSESSMENT AND PLAN:    Assessment and Plan:     83 y.o. female with extensive vascular history as well as Afib on Coumadin, and HFrEF with EF 25% who presented with left arm and leg weakness. Experienced subsequent seizure in the ED on arrival. Loaded with Keppra at that time. Found to have a GI bleed as well as supratherapeutic INR. Noncontrast CT head negative for acute findings. CTA head/neck negative for LVO, demonstrates a left carotid severe stenosis at the bifurcation. This is asymptomatic as there was no perfusion defect and MRI brain does not demonstrate infarcts in the corresponding vascular territories. MRI brain revealed a tiny infarct in the right frontal cortex that is incidental and does not correlate to the the patient's presentation. Likely a cardioembolic phenomenon secondary to NOAC being held for GI bleed.      Problem list:   Tiny right frontal cortex infarct     Recommendations:  -q4h and PRN neuro assessment. VS per nursing/unit protocol.   -BP goal is normotension, 100-130/60-80. Antihypertensives per primary team. No role for required hypertensive response.   -Telemetry; Known history of Afib, no need for TTE or Zio from neurology perspective  -LDL 45, HgbA1c ?  -Patient will need to resume anticoagulation with NOAC or resumption of Coumadin once cleared by medicine and GI due to recent GI hemorrhage  -Continue  atorvastatin 40mg qhs. At long term LDL goal < 70  -BG management per primary team. BG goal .    -PT/OT/SLP eval and treat.   -DVT PPX: SCDs.   -[facility] as outpatient    Case reviewed and plan created with Dr. Dar Hussein, Acute Neurology. Neurology will sign off at this time. Please call with any questions.       JACQUES Watts.  Vascular Neurology, [facility]

## 2023-10-08 NOTE — CONSULTS
Cardiology Initial Consult Note    Reason for Consult:  Asked by Dr Campos Penny M.D. to see this patient with acute CVA and GI bleed--perioperative risk assessment  Patient's PCP: PHILLIP Pires    CC:   Chief Complaint   Patient presents with    Possible Stroke     Pt brought in by family for L sided deficits that they estimate started around 1:00 am this morning. Pt called family for ride to the ER this morning. On arrival pt has L sided weakness and shortly after started having a seizure in the ER lobby that lasted around 1 minute. No known hx of seizures.        HPI:   This is an 83-year-old woman with past medical history significant for coronary artery disease status post CABGx5 in 2002 (2/5 grafts open by cardiac catheterization 8/26/16 LIMA-diag patent, SVG-OM status post PCI 10/9/2018, all others occluded), ischemic cardiomyopathy HFrEF LVEF 25%, permanent atrial fibrillation on Coumadin, history of mitral regurgitation status post MitraClip severe atherosclerotic disease including PAD and carotid artery stenosis who initially presented to the hospital on 10/6/2023 with contractures and had a witnessed seizure and became unresponsive.  She was started on Keppra and had imaging of the brain.  MRI brain showed tiny incidental infarct in the right frontal lobe.    Hospital course complicated by acute anemia concerning for GI bleed.  Hemoglobin was as low as 6.0 and she required PRBC transfusion.  Of note, INR was markedly elevated on admission and as high as 8.7.  Coagulopathy was reversed with vitamin K.  She was evaluated by GI with plans for EGD and colonoscopy.  She is now being seen in cardiac consultation for perioperative risk assessment given cardiac history.    Daughter is present at bedside.  Patient states that currently she has no major cardiac complaints.  Denies having orthopnea or PND.  No active chest pain or dyspnea.  She tells me that she lives at home with her grandson.  Is  able to perform ADLs without chest pain or dyspnea.    Medications / Drug list prior to admission:  No current facility-administered medications on file prior to encounter.     Current Outpatient Medications on File Prior to Encounter   Medication Sig Dispense Refill    spironolactone (ALDACTONE) 25 MG Tab take 1/2 tablet by mouth once daily 45 Tablet 3    levothyroxine (SYNTHROID) 75 MCG Tab take 1 tablet by mouth every morning ON AN EMPTY STOMACH 90 Tablet 3    atorvastatin (LIPITOR) 40 MG Tab Take 1 Tablet by mouth every day. 100 Tablet 3    furosemide (LASIX) 20 MG Tab Take 1 Tablet by mouth every day. 100 Tablet 3    lisinopril (PRINIVIL) 10 MG Tab take 1 tablet by mouth twice a day 200 Tablet 3    potassium chloride SA (K-DUR) 10 MEQ Tab CR Take 0.5 Tablets by mouth every day. 50 Tablet 3    magnesium oxide (MAG-OX) 400 MG Tab tablet Take 1 Tablet by mouth every day. 100 Tablet 3    metoprolol SR (TOPROL XL) 25 MG TABLET SR 24 HR Take 1 Tablet by mouth see administration instructions. 1.5 tabs=37.5mg in AM 2 tabs= 50mg in PM (Patient taking differently: Take 37.5 mg by mouth every evening.) 360 Tablet 3    aspirin EC (ECOTRIN) 81 MG Tablet Delayed Response Take 81 mg by mouth every day.      warfarin (COUMADIN) 5 MG Tab Take 5-7.5 mg by mouth every day. 5 mg on Mondays, wednesdays, and Fridays. All other days 7.5 mg         Current list of administered Medications:    Current Facility-Administered Medications:     thiamine (Vitamin B-1) tablet 100 mg, 100 mg, Oral, DAILY, Campos Penny M.D., 100 mg at 10/08/23 0617    multivitamin tablet 1 Tablet, 1 Tablet, Oral, DAILY, Campos Penny M.D., 1 Tablet at 10/08/23 0615    cyanocobalamin (Vitamin B-12) tablet 1,000 mcg, 1,000 mcg, Oral, DAILY, Campos Penny M.D., 1,000 mcg at 10/08/23 0615    metoprolol SR (Toprol XL) tablet 25 mg, 25 mg, Oral, Q DAY, Campos Penny M.D., 25 mg at 10/08/23 0615    levETIRAcetam (Keppra) injection 500 mg, 500 mg,  Intravenous, Q12HRS, Darwin Daniel M.D., 500 mg at 10/08/23 0616    lactated ringers infusion, , Intravenous, Continuous, Darwin Daniel M.D., Last Rate: 50 mL/hr at 10/08/23 0659, Rate Verify at 10/08/23 0659    omeprazole (PriLOSEC) capsule 20 mg, 20 mg, Oral, BID, Darwin Daniel M.D., 20 mg at 10/08/23 0615    LORazepam (Ativan) injection 0.5 mg, 0.5 mg, Intravenous, Q5 MIN PRN, Darwin Daniel M.D.    atorvastatin (Lipitor) tablet 40 mg, 40 mg, Oral, DAILY, Darwin Daniel M.D., 40 mg at 10/08/23 0615    levothyroxine (Synthroid) tablet 75 mcg, 75 mcg, Oral, AM ES, Darwin Daniel M.D., 75 mcg at 10/08/23 0615    senna-docusate (Pericolace Or Senokot S) 8.6-50 MG per tablet 2 Tablet, 2 Tablet, Oral, BID **AND** polyethylene glycol/lytes (Miralax) PACKET 1 Packet, 1 Packet, Oral, QDAY PRN **AND** magnesium hydroxide (Milk Of Magnesia) suspension 30 mL, 30 mL, Oral, QDAY PRN **AND** bisacodyl (Dulcolax) suppository 10 mg, 10 mg, Rectal, QDAY PRN, Darwin Daniel M.D.    acetaminophen (Tylenol) tablet 650 mg, 650 mg, Oral, Q6HRS PRN, Darwin Daniel M.D.    haloperidol lactate (Haldol) injection 1-2 mg, 1-2 mg, Intravenous, Q4HRS PRN, Darwin Daniel M.D., 1 mg at 10/06/23 1432    morphine 4 MG/ML injection 1-4 mg, 1-4 mg, Intravenous, Q2HRS PRN, Darwin Daniel M.D., 4 mg at 10/06/23 1711    Past Medical History:   Diagnosis Date    Anesthesia     ponv    Anticoagulation monitoring, special range     CAD (coronary artery disease) 10/09/2018    PCI/ANA (Synergy 3.0 x 28mm) to the OM.    Cataract     Bilateral IOL    Chronic atrial fibrillation (HCC)     Managed with rate control and warfarin for anticoagulation     Coronary atherosclerosis of native coronary artery 12/2002    CABG x 5 (LIMA-D3, SVG-OM, SVG-RCA, SVG-distal LAD, SVG-D1). August 2016: LIMA-D3 and SVG-OM patent (interval occlusion of the SVG-RCA); chronic occluded SVG-LAD, SVG-D1.    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     MI, old     Mitral regurgitation 10/2018    Status post MV repair with MitraClip.    Osteoarthritis     Pain     right knee, back    Raynaud's disease     Spinal stenosis 2015    Status post coronary artery bypass grafts x 5 2002    LIMA-D3, SVG-LAD, SVG-OM, SVG-RCA, SVG-distal LAD.        Past Surgical History:   Procedure Laterality Date    TRANSCATHETER MITRAL VALVE REPAIR  10/15/2018    Procedure: TRANSCATHETER MITRAL VALVE REPAIR- WITH POSSIBLE OPEN HEART AND ANY ASSOCIATED PROCEDURES;  Surgeon: Moy Don M.D.;  Location: SURGERY [facility];  Service: Cardiac    DEVON  10/15/2018    Procedure: DEVON;  Surgeon: Moy Don M.D.;  Location: SURGERY [facility];  Service: Cardiac    ZZZ CARDIAC CATH  10/2018    PCI to SVG to OM.     RECOVERY  2016    Procedure: CATH LAB-DEVON W/LHC W/POSSIBLE-JO-ANN/GRINSELL-LARGE GROUP;  Surgeon: Toddly Surgery;  Location: SURGERY PRE-POST PROC UNIT [facility];  Service:     OTHER Bilateral 2014    cataract extraction with iols    MULTIPLE CORONARY ARTERY BYPASS       CABG x 5    APPENDECTOMY      GYN SURGERY      c section     MITRAL VALVE REPLACE      MV clip X1    TONSILLECTOMY AND ADENOIDECTOMY      TUBAL LIGATION         Family History   Problem Relation Age of Onset    Heart Attack Brother 55        heart attack     Patient family history was personally reviewed, no pertinent family history to current presentation    Social History     Tobacco Use    Smoking status: Former     Current packs/day: 0.00     Average packs/day: 0.5 packs/day for 40.0 years (20.0 ttl pk-yrs)     Types: Cigarettes     Start date: 1962     Quit date: 2002     Years since quittin.6    Smokeless tobacco: Never   Vaping Use    Vaping Use: Never used  "  Substance Use Topics    Alcohol use: Yes     Comment: occ    Drug use: No       ALLERGIES:  Allergies   Allergen Reactions    Codeine Unspecified     \"I just don't like it. It does weird things to me.\"    Hydrochlorothiazide Unspecified     Unsure of reaction    Isosorbide Mononitrate Rash     Rash    Keflex Rash     Rash    Sulfa Drugs Rash     rash    Tape Unspecified     Tears skin off    Xarelto [Rivaroxaban] Rash     rash       Review of systems:  A complete review of symptoms was reviewed with the patient. This is reviewed in H&P and PMH. ALL OTHERS reviewed and negative    Physical exam:  Patient Vitals for the past 24 hrs:   BP Temp Temp src Pulse Resp SpO2   10/08/23 0720 124/66 36 °C (96.8 °F) Temporal 89 18 95 %   10/08/23 0359 120/64 36.2 °C (97.2 °F) Temporal 91 18 94 %   10/07/23 2300 -- -- Temporal -- 18 94 %   10/07/23 1913 102/49 37.2 °C (99 °F) Temporal -- 18 94 %   10/07/23 1541 121/62 36.8 °C (98.2 °F) Temporal 96 16 --   10/07/23 1212 118/53 36.5 °C (97.7 °F) Temporal (!) 104 16 97 %     General: Not in acute distress, lying comfortably in bed  EYES: No jaundice  HEENT: OP clear   Neck:  No carotid bruits, no elevation in JVP  CVS: Irregularly irregular, Normal S1, S2. No murmurs, rubs or gallops  Resp: Normal respiratory effort, lungs CTA bilaterally. No rales or rhonchi  Abdomen: Soft, non-distended, non-tender to palpation, no guarding or rigidity  Neurological: Alert and oriented x3, moves all extremities  Psychiatric: Appropriate affect  Extremities:   Extremities warm, pulses intact, no edema in bilateral lower extremities.      Data:  Laboratory studies personally reviewed by me:  Recent Results (from the past 24 hour(s))   HGB    Collection Time: 10/07/23  3:25 PM   Result Value Ref Range    Hemoglobin 7.8 (L) 12.0 - 16.0 g/dL   HGB    Collection Time: 10/07/23  9:15 PM   Result Value Ref Range    Hemoglobin 7.4 (L) 12.0 - 16.0 g/dL   Renal Function Panel    Collection Time: 10/08/23  " 3:59 AM   Result Value Ref Range    Sodium 133 (L) 135 - 145 mmol/L    Potassium 4.4 3.6 - 5.5 mmol/L    Chloride 104 96 - 112 mmol/L    Co2 20 20 - 33 mmol/L    Glucose 94 65 - 99 mg/dL    Creatinine 0.63 0.50 - 1.40 mg/dL    Bun 20 8 - 22 mg/dL    Calcium 8.2 (L) 8.5 - 10.5 mg/dL    Correct Calcium 9.5 8.5 - 10.5 mg/dL    Phosphorus 2.9 2.5 - 4.5 mg/dL    Albumin 2.4 (L) 3.2 - 4.9 g/dL   Prothrombin Time    Collection Time: 10/08/23  3:59 AM   Result Value Ref Range    PT 15.8 (H) 12.0 - 14.6 sec    INR 1.25 (H) 0.87 - 1.13   CBC WITHOUT DIFFERENTIAL    Collection Time: 10/08/23  3:59 AM   Result Value Ref Range    WBC 5.2 4.8 - 10.8 K/uL    RBC 2.68 (L) 4.20 - 5.40 M/uL    Hemoglobin 7.3 (L) 12.0 - 16.0 g/dL    Hematocrit 23.9 (L) 37.0 - 47.0 %    MCV 89.2 81.4 - 97.8 fL    MCH 27.2 27.0 - 33.0 pg    MCHC 30.5 (L) 32.2 - 35.5 g/dL    RDW 61.4 (H) 35.9 - 50.0 fL    Platelet Count 231 164 - 446 K/uL    MPV 9.2 9.0 - 12.9 fL   ESTIMATED GFR    Collection Time: 10/08/23  3:59 AM   Result Value Ref Range    GFR (CKD-EPI) 89 >60 mL/min/1.73 m 2   HGB    Collection Time: 10/08/23  9:07 AM   Result Value Ref Range    Hemoglobin 7.5 (L) 12.0 - 16.0 g/dL       Imaging:  US-EXTREMITY VENOUS LOWER BILAT   Final Result      MR-BRAIN-W/O   Final Result      1.  Punctate, acute right frontal lobe infarct.   2.  Small, remote appearing microhemorrhage that is either in the superior left cerebellum or the inferior left occipital lobe. Additional smaller remote appearing microhemorrhages in the superior cerebellum versus inferior occipital lobes.   3.  White matter disease likely representing microvascular ischemic changes.   4.  Atrophy.   5.  Questionable odontoid process fracture that may be new from CTA neck on 10/6/2023. Recommend CT cervical spine.      Findings conveyed to Dr. Penny at 10/8/2023 9:05 AM via Voalte messaging.      DX-CHEST-PORTABLE (1 VIEW)   Final Result      Cardiomegaly.      Probable mild interstitial  edema.      CT-CEREBRAL PERFUSION ANALYSIS   Final Result      1.  Cerebral blood flow less than 30% likely representing completed infarct = 0 mL.      2.  T Max more than 6 seconds likely representing combination of completed infarct and ischemia = 0 mL.      3.  Mismatched volume likely representing ischemic brain/penumbra = None      4.  Please note that the cerebral perfusion was performed on the limited brain tissue around the basal ganglia region. Infarct/ischemia outside the CT perfusion sections can be missed in this study.      CT-CTA NECK WITH & W/O-POST PROCESSING   Final Result      1.  Severe atherosclerosis.   2.  Severe stenosis of the left carotid bifurcation and moderate proximal right ICA stenosis.   3.  Patent vertebral arteries.      CT-CTA HEAD WITH & W/O-POST PROCESS   Final Result      1.  No large vessel occlusion or aneurysm.   2.  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   3.  No acute intracranial abnormality.      CT-CSPINE WITHOUT PLUS RECONS    (Results Pending)           EKG tracings personally reviewed by me shows atrial fibrillation, left bundle branch block, prior anterior infarct, no acute ischemic changes.    Echo 10/2022:  Compared to the images of the prior study, there has been no   significant change.   Severely reduced left ventricular systolic function.  Global hypokinesis and apical aneurysm.   Known transcatheter mitral edge to edge repair which is functioning   normally wiht 2+ mitral regurgitation.    Echo 2021:  Compared to the images of the prior study done 5/12/20, no significant   changes are noted.   Severely reduced left ventricular systolic function. Left ventricular   ejection fraction is visually estimated to be 25%.  Unable to determine diastolic function due to mitral valve repair.  Status post MitraClip. Transvalvular gradient of 4 mmHg, heart rate 75   bpm.  Estimated right ventricular systolic pressure  is 36  mmHg.    All pertinent features of laboratory and imaging reviewed including primary images where applicable      Principal Problem:    Seizure, stroke like sx/seizure, GI bleed/anemia, CHF EF 25%, Afib/anticoag, ICA stenosis/severe PVD,  (HCC) (POA: Yes)  Active Problems:    Essential hypertension (POA: Yes)      Overview: Essential    Chronic atrial fibrillation (HCC) (POA: Yes)      Overview: Managed with rate control and warfarin for anticoagulation    Coronary artery disease involving coronary bypass graft of native heart without angina pectoris (POA: Yes)    DNR (do not resuscitate) (POA: Yes)      Overview: IMO load March 2020    Chronic anticoagulation (POA: Yes)    PAD (peripheral artery disease) (HCC) (POA: Yes)    Chronic systolic CHF (congestive heart failure), NYHA class 3 (HCC) (POA: Yes)    Lactic acidosis (POA: Yes)    Anemia due to acute blood loss (POA: Yes)    Coagulopathy (HCC) (POA: Yes)    Acute metabolic encephalopathy (POA: Yes)  Resolved Problems:    * No resolved hospital problems. *      Assessment / Plan:  This is an 83-year-old woman with past medical history significant for coronary artery disease status post CABG, ischemic cardiomyopathy HFrEF LVEF 25%, permanent atrial fibrillation on Coumadin, history of mitral regurgitation status post MitraClip severe atherosclerotic disease including PAD and carotid artery stenosis who was admitted with acute seizures found to have incidental finding of CVA with hospital course complicated by severe anemia and coagulopathy.    Perioperative risk assessment: Plan for EGD/colonoscopy  CAD status post CABG x 5 (LIMA-diag patent, SVG-OM status post PCI 10/9/2018)  Ischemic cardiomyopathy  HFrEF EF 25%  Permanent AF on OAC  MR status post MitraClip  PAD  Carotid artery stenosis  CVA  Acute anemia concerning for GI bleed    Recommendations:  Cardiology consulted for perioperative risk assessment for upcoming EGD/colonoscopy.  From a cardiac standpoint,  there is no evidence of acute coronary syndrome or acute decompensated heart failure.  She has no major cardiac complaints and denies having chest pain or dyspnea at this time and is euvolemic on exam.  She has a significant cardiac history including severe atherosclerosis and coronary artery disease.  Attempts were made at cardiac catheterization earlier this year but due to significant atherosclerotic burden/severe peripheral arterial disease, access to coronary vascular system could not be obtained.  There was inability to pass equipment due to severe distal aortic and left iliac stenosis.  She is at moderate risk to undergo proposed low risk procedure.  Her cardiac risk factors are nonmodifiable. Do not recommend any additional perioperative testing before the procedure.  Recommend continuing beta-blocker therapy in the perioperative period  Many of her cardiac medications including spironolactone and lisinopril are on hold due to concern for hypotension in the setting of anemia.  Once anemia and concern for GI bleed have been addressed, restart as tolerated based on BP  OAC remains on hold due to anemia.  No additional cardiac recommendations at this time.  Please call with any additional questions or concerns.      I personally discussed her case with  Dr Campos Penny M.D.    Future Appointments   Date Time Provider Department Center   10/8/2023 12:30 PM [facility] CT 3 [facility]   11/9/2023 10:15 AM LEATHA Palmer       It is my pleasure to participate in the care of Ms. Woods.  Please do not hesitate to contact me with questions or concerns.    Manjinder Sanchez MD  Cardiologist, [facility]    10/8/2023    Please note that this dictation was created using voice recognition software. I have made every reasonable attempt to correct obvious errors, but it is possible there are errors of grammar and possibly content that I did not discover before  finalizing the note.

## 2023-10-08 NOTE — PROGRESS NOTES
Hospital Medicine Daily Progress Note    Date of Service  10/8/2023    Chief Complaint  Daya Woods is a 83 y.o. female admitted 10/6/2023 with Possible Stroke (Pt brought in by family for L sided deficits that they estimate started around 1:00 am this morning. Pt called family for ride to the ER this morning. On arrival pt has L sided weakness and shortly after started having a seizure in the ER lobby that lasted around 1 minute. No known hx of seizures. )        Hospital Course  No notes on file  Complicated patient.  Working up stroke like symptoms and seizure but also has GI bleed and anemia.   Interval Problem Update  10/7. I reviewed the chart along with vitals, labs, imaging, test (both pending and resulted) and recommendations from specialists and interdisciplinary team.  I spoke with Dr. Daniel to clarify. Patient has anemia and guaiac positive. Transfusion ordered. Patient was also reversed for a supratherapeutic INR of 8. It has gone down now to 1.75. Anticoagulation for Afib, son does not remember if she has clots but did say she has PVD. WIll order Dopplers.  MRI is still pending. Neurovascular is following appreciate their recommendations  I spoke with son and patient at length. Ultimately they do want the GI bleed to be worked up. I called and consulted Dr. Young.   Agree to EGD but will need Neuro/stroke clearance.   10/8. I spoke with Dr. Sanchez for cardiac consultation. Reviewed his recs. Had cardiac cath failure because of severe CAD so has moderate risk. Transfuse if below 7. Discussed with  Neurology. Stroke is small. Clear from Neuro perspective for EGD with anesthesia. Discussed with GI. Plan for EGD tomorrow or the next day.  I spoke and updated family and patient.     Patient is has a high medical complexity, complex decision making and is at high risk for complication, morbidity, and mortality, thus requiring the highest level of my preparedness for sudden, emergent intervention.  Medical decision making is therefore complex. I provided  services, which included ordering labs and/or imaging, and discussing the case with various consultants.medication orders, frequent reevaluations of the patient's condition and response to treatment. Time was also devoted to counseling and coordinating care including review of records, pertinent lab data and studies, as well as discussing diagnostic evaluation and work up, planned therapeutic interventions and future disposition of care. Where indicated, the assessment and plan reflect discussion of patient with consultants, other healthcare providers, family members, and additional research needed to obtain further information in formulating the plan of care for Daya Woods. Total time spent was 66 minutes.     I have discussed this patient's plan of care and discharge plan at IDT rounds today with Case Management, Nursing, Nursing leadership, and other members of the IDT team.    Consultants/Specialty  Neurology  GI    Code Status  DNAR/DNI    Disposition  Medically Cleared  I have placed the appropriate orders for post-discharge needs.    Review of Systems  Review of Systems   Unable to perform ROS: Mental acuity        Physical Exam  Temp:  [36 °C (96.8 °F)-37.2 °C (99 °F)] 37.1 °C (98.8 °F)  Pulse:  [83-96] 83  Resp:  [16-18] 18  BP: (102-124)/(49-66) 111/58  SpO2:  [94 %-97 %] 97 %    Physical Exam  Constitutional:       Comments: Elderly, thin   Neurological:      Motor: Weakness present.      Comments: Cognitive deficits but seems baseline.         Fluids    Intake/Output Summary (Last 24 hours) at 10/8/2023 1507  Last data filed at 10/8/2023 0900  Gross per 24 hour   Intake 1418.75 ml   Output --   Net 1418.75 ml         Laboratory  Recent Labs     10/06/23  0944 10/06/23  1500 10/07/23  2115 10/08/23  0359 10/08/23  0907   WBC 4.8  --   --  5.2  --    RBC 2.28*  --   --  2.68*  --    HEMOGLOBIN 6.0*   < > 7.4* 7.3* 7.5*   HEMATOCRIT 21.4*   --   --  23.9*  --    MCV 93.9  --   --  89.2  --    MCH 26.3*  --   --  27.2  --    MCHC 28.0*  --   --  30.5*  --    RDW 68.2*  --   --  61.4*  --    PLATELETCT 314  --   --  231  --    MPV 9.1  --   --  9.2  --     < > = values in this interval not displayed.       Recent Labs     10/06/23  0944 10/08/23  0359   SODIUM 129* 133*   POTASSIUM 4.4 4.4   CHLORIDE 94* 104   CO2 16* 20   GLUCOSE 144* 94   BUN 26* 20   CREATININE 0.86 0.63   CALCIUM 8.7 8.2*       Recent Labs     10/06/23  0944 10/07/23  0405 10/08/23  0359   APTT 61.2*  --   --    INR 8.76* 1.75* 1.25*                 Imaging  CT-CSPINE WITHOUT PLUS RECONS   Final Result         1. No acute fracture from C1 through T1 is visualized.   2. There is cystic and degenerative change in the odontoid process. No displaced fracture is seen.         US-EXTREMITY VENOUS LOWER BILAT   Final Result      MR-BRAIN-W/O   Final Result      1.  Punctate, acute right frontal lobe infarct.   2.  Small, remote appearing microhemorrhage that is either in the superior left cerebellum or the inferior left occipital lobe. Additional smaller remote appearing microhemorrhages in the superior cerebellum versus inferior occipital lobes.   3.  White matter disease likely representing microvascular ischemic changes.   4.  Atrophy.   5.  Questionable odontoid process fracture that may be new from CTA neck on 10/6/2023. Recommend CT cervical spine.      Findings conveyed to Dr. Penny at 10/8/2023 9:05 AM via [employer] messaging.      DX-CHEST-PORTABLE (1 VIEW)   Final Result      Cardiomegaly.      Probable mild interstitial edema.      CT-CEREBRAL PERFUSION ANALYSIS   Final Result      1.  Cerebral blood flow less than 30% likely representing completed infarct = 0 mL.      2.  T Max more than 6 seconds likely representing combination of completed infarct and ischemia = 0 mL.      3.  Mismatched volume likely representing ischemic brain/penumbra = None      4.  Please note that the  "cerebral perfusion was performed on the limited brain tissue around the basal ganglia region. Infarct/ischemia outside the CT perfusion sections can be missed in this study.      CT-CTA NECK WITH & W/O-POST PROCESSING   Final Result      1.  Severe atherosclerosis.   2.  Severe stenosis of the left carotid bifurcation and moderate proximal right ICA stenosis.   3.  Patent vertebral arteries.      CT-CTA HEAD WITH & W/O-POST PROCESS   Final Result      1.  No large vessel occlusion or aneurysm.   2.  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   3.  No acute intracranial abnormality.             Assessment/Plan  * Seizure, stroke like sx/seizure, GI bleed/anemia, CHF EF 25%, Afib/anticoag, ICA stenosis/severe PVD,  (HCC)- (present on admission)  Assessment & Plan  New onset  IV keppra loaded in the ER and 500 mg IV BID until tolerating orals  Seizure precautions  IV ativan for seizures ordered  With tongue laceration  Consider Garett's paralysis\"    MRI pending  When Neuro cleared, stroke mgmt optimized, GI to do EGD to w/u GI bleed and evaluate when anticoagulation can be restarted. Currently Neurop cleared from a stroke persepective. Cardiology recommend no further testing and is moderate risk for cardiac evant. GI plans for EGD tomorrow or the next day  Currently no bridge but will get venous Dopplers, r/o clots and any indication to acutely anticoagulate. Currently patient has been reversed. Daily INR ordered  Supplements ordered; Body mass index is 18.77 kg/m².      Acute metabolic encephalopathy- (present on admission)  Assessment & Plan  Likely due to post-ictal state  She may require soft wrist restraints in order to maintain IV access for blood transfusion.     Coagulopathy (HCC)- (present on admission)  Assessment & Plan  INR is very elevated at 8.76 due to coumadin use.  In the setting of acute blood loss anemia  IV Vitamin K ordered   Repeat INR in the morning    Anemia " "due to acute blood loss- (present on admission)  Assessment & Plan  Hemoglobin is down to 6  Guaiac + stools  In the setting of INR 8.76  Transfuse one unit RBCs in the ER and serial hemoglobins ordered with transfusion for Hb less than 7  Reverse coagulopathy   PPI    Lactic acidosis- (present on admission)  Assessment & Plan  Severe lactic acidosis at 6.6  Secondary to seizure  Judicious IV fluids given her cardiomyopathy repeat lactic acid level ordered    Chronic systolic CHF (congestive heart failure), NYHA class 3 ([facility])- (present on admission)  Assessment & Plan  Echo EF 25% Dec 2022  Without exacerbation \"    BB restarted, others held because of hypotension  Restart anticoagulation when GI clears.    PAD (peripheral artery disease) ([facility])- (present on admission)  Assessment & Plan  Severe PAD   Dr. Stanford attempted a heart cath Dec 2022 and was unsuccessful due to severe arterial disease.    Chronic anticoagulation- (present on admission)  Assessment & Plan  She is on coumadin as an outpatient which will be held\"    Held and reversed  No acute anticoagulation criteria unless evidence of VTE or if MRI shows stroke with high CHADsvasc score    DNR (do not resuscitate)- (present on admission)  Assessment & Plan  As discussed with her grandson at bedside    Coronary artery disease involving coronary bypass graft of native heart without angina pectoris- (present on admission)  Assessment & Plan  Hx of  bypass    Chronic atrial fibrillation ([facility])- (present on admission)  Assessment & Plan  Hold coumadin  Restart Toprol 25 mg once her blood pressure improves and she is able to tolerate orals. \"    Vitals:    10/08/23 1155   BP: 111/58   Pulse: 83   Resp: 18   Temp: 37.1 °C (98.8 °F)   SpO2: 97%     Stable HR  Restart BB    Essential hypertension- (present on admission)  Assessment & Plan  Hx of  Hold home BP meds due to hypotension\"    Vitals:    10/08/23 1155   BP: 111/58   Pulse: 83   Resp: 18   Temp: 37.1 °C " (98.8 °F)   SpO2: 97%     Stable. Restart BB first         VTE prophylaxis: SCDs    I have performed a physical exam and reviewed and updated ROS and Plan today (10/8/2023). In review of yesterday's note (10/7/2023), there are no changes except as documented above.

## 2023-10-09 ENCOUNTER — ANESTHESIA (OUTPATIENT)
Dept: SURGERY | Facility: MEDICAL CENTER | Age: 83
DRG: 100 | End: 2023-10-09
Payer: COMMERCIAL

## 2023-10-09 ENCOUNTER — ANESTHESIA EVENT (OUTPATIENT)
Dept: SURGERY | Facility: MEDICAL CENTER | Age: 83
DRG: 100 | End: 2023-10-09
Payer: COMMERCIAL

## 2023-10-09 LAB
ALBUMIN SERPL BCP-MCNC: 2.7 G/DL (ref 3.2–4.9)
BUN SERPL-MCNC: 16 MG/DL (ref 8–22)
CALCIUM ALBUM COR SERPL-MCNC: 8.5 MG/DL (ref 8.5–10.5)
CALCIUM SERPL-MCNC: 7.5 MG/DL (ref 8.5–10.5)
CHLORIDE SERPL-SCNC: 102 MMOL/L (ref 96–112)
CO2 SERPL-SCNC: 21 MMOL/L (ref 20–33)
CREAT SERPL-MCNC: 0.63 MG/DL (ref 0.5–1.4)
ERYTHROCYTE [DISTWIDTH] IN BLOOD BY AUTOMATED COUNT: 55.2 FL (ref 35.9–50)
ERYTHROCYTE [DISTWIDTH] IN BLOOD BY AUTOMATED COUNT: 56.8 FL (ref 35.9–50)
GFR SERPLBLD CREATININE-BSD FMLA CKD-EPI: 89 ML/MIN/1.73 M 2
GLUCOSE SERPL-MCNC: 111 MG/DL (ref 65–99)
HCT VFR BLD AUTO: 26.9 % (ref 37–47)
HCT VFR BLD AUTO: 27.2 % (ref 37–47)
HGB BLD-MCNC: 8.5 G/DL (ref 12–16)
HGB BLD-MCNC: 8.6 G/DL (ref 12–16)
INR PPP: 1.28 (ref 0.87–1.13)
MCH RBC QN AUTO: 27.5 PG (ref 27–33)
MCH RBC QN AUTO: 28 PG (ref 27–33)
MCHC RBC AUTO-ENTMCNC: 31.3 G/DL (ref 32.2–35.5)
MCHC RBC AUTO-ENTMCNC: 32 G/DL (ref 32.2–35.5)
MCV RBC AUTO: 87.6 FL (ref 81.4–97.8)
MCV RBC AUTO: 88 FL (ref 81.4–97.8)
PHOSPHATE SERPL-MCNC: 2.8 MG/DL (ref 2.5–4.5)
PLATELET # BLD AUTO: 202 K/UL (ref 164–446)
PLATELET # BLD AUTO: 205 K/UL (ref 164–446)
PMV BLD AUTO: 9.1 FL (ref 9–12.9)
PMV BLD AUTO: 9.2 FL (ref 9–12.9)
POTASSIUM SERPL-SCNC: 3.9 MMOL/L (ref 3.6–5.5)
PROTHROMBIN TIME: 16.2 SEC (ref 12–14.6)
RBC # BLD AUTO: 3.07 M/UL (ref 4.2–5.4)
RBC # BLD AUTO: 3.09 M/UL (ref 4.2–5.4)
SODIUM SERPL-SCNC: 134 MMOL/L (ref 135–145)
WBC # BLD AUTO: 5.6 K/UL (ref 4.8–10.8)
WBC # BLD AUTO: 6.6 K/UL (ref 4.8–10.8)

## 2023-10-09 PROCEDURE — 85610 PROTHROMBIN TIME: CPT

## 2023-10-09 PROCEDURE — A9270 NON-COVERED ITEM OR SERVICE: HCPCS | Performed by: HOSPITALIST

## 2023-10-09 PROCEDURE — 160002 HCHG RECOVERY MINUTES (STAT): Performed by: SPECIALIST

## 2023-10-09 PROCEDURE — 85018 HEMOGLOBIN: CPT | Mod: 91

## 2023-10-09 PROCEDURE — 160208 HCHG ENDO MINUTES - EA ADDL 1 MIN LEVEL 4: Performed by: SPECIALIST

## 2023-10-09 PROCEDURE — 160203 HCHG ENDO MINUTES - 1ST 30 MINS LEVEL 4: Performed by: SPECIALIST

## 2023-10-09 PROCEDURE — 160048 HCHG OR STATISTICAL LEVEL 1-5: Performed by: SPECIALIST

## 2023-10-09 PROCEDURE — 700102 HCHG RX REV CODE 250 W/ 637 OVERRIDE(OP): Performed by: HOSPITALIST

## 2023-10-09 PROCEDURE — 160009 HCHG ANES TIME/MIN: Performed by: SPECIALIST

## 2023-10-09 PROCEDURE — 700111 HCHG RX REV CODE 636 W/ 250 OVERRIDE (IP): Mod: JZ | Performed by: HOSPITALIST

## 2023-10-09 PROCEDURE — 36415 COLL VENOUS BLD VENIPUNCTURE: CPT

## 2023-10-09 PROCEDURE — 99233 SBSQ HOSP IP/OBS HIGH 50: CPT | Performed by: INTERNAL MEDICINE

## 2023-10-09 PROCEDURE — 99418 PROLNG IP/OBS E/M EA 15 MIN: CPT | Performed by: INTERNAL MEDICINE

## 2023-10-09 PROCEDURE — 80069 RENAL FUNCTION PANEL: CPT

## 2023-10-09 PROCEDURE — 43255 EGD CONTROL BLEEDING ANY: CPT | Performed by: SPECIALIST

## 2023-10-09 PROCEDURE — 85027 COMPLETE CBC AUTOMATED: CPT

## 2023-10-09 PROCEDURE — 700111 HCHG RX REV CODE 636 W/ 250 OVERRIDE (IP): Performed by: ANESTHESIOLOGY

## 2023-10-09 PROCEDURE — 160035 HCHG PACU - 1ST 60 MINS PHASE I: Performed by: SPECIALIST

## 2023-10-09 PROCEDURE — 770006 HCHG ROOM/CARE - MED/SURG/GYN SEMI*

## 2023-10-09 PROCEDURE — 0W3P8ZZ CONTROL BLEEDING IN GASTROINTESTINAL TRACT, VIA NATURAL OR ARTIFICIAL OPENING ENDOSCOPIC: ICD-10-PCS | Performed by: SPECIALIST

## 2023-10-09 PROCEDURE — 45382 COLONOSCOPY W/CONTROL BLEED: CPT | Performed by: SPECIALIST

## 2023-10-09 PROCEDURE — 700105 HCHG RX REV CODE 258: Performed by: ANESTHESIOLOGY

## 2023-10-09 RX ORDER — MIDAZOLAM HYDROCHLORIDE 1 MG/ML
INJECTION INTRAMUSCULAR; INTRAVENOUS PRN
Status: DISCONTINUED | OUTPATIENT
Start: 2023-10-09 | End: 2023-10-09 | Stop reason: SURG

## 2023-10-09 RX ORDER — SODIUM CHLORIDE, SODIUM LACTATE, POTASSIUM CHLORIDE, CALCIUM CHLORIDE 600; 310; 30; 20 MG/100ML; MG/100ML; MG/100ML; MG/100ML
INJECTION, SOLUTION INTRAVENOUS CONTINUOUS
Status: DISCONTINUED | OUTPATIENT
Start: 2023-10-09 | End: 2023-10-09 | Stop reason: HOSPADM

## 2023-10-09 RX ORDER — ONDANSETRON 2 MG/ML
4 INJECTION INTRAMUSCULAR; INTRAVENOUS
Status: DISCONTINUED | OUTPATIENT
Start: 2023-10-09 | End: 2023-10-09 | Stop reason: HOSPADM

## 2023-10-09 RX ORDER — SODIUM CHLORIDE, SODIUM LACTATE, POTASSIUM CHLORIDE, CALCIUM CHLORIDE 600; 310; 30; 20 MG/100ML; MG/100ML; MG/100ML; MG/100ML
INJECTION, SOLUTION INTRAVENOUS
Status: DISCONTINUED | OUTPATIENT
Start: 2023-10-09 | End: 2023-10-09 | Stop reason: SURG

## 2023-10-09 RX ORDER — OXYCODONE HCL 5 MG/5 ML
5 SOLUTION, ORAL ORAL
Status: DISCONTINUED | OUTPATIENT
Start: 2023-10-09 | End: 2023-10-09 | Stop reason: HOSPADM

## 2023-10-09 RX ORDER — OXYCODONE HCL 5 MG/5 ML
10 SOLUTION, ORAL ORAL
Status: DISCONTINUED | OUTPATIENT
Start: 2023-10-09 | End: 2023-10-09 | Stop reason: HOSPADM

## 2023-10-09 RX ORDER — HALOPERIDOL 5 MG/ML
1 INJECTION INTRAMUSCULAR
Status: DISCONTINUED | OUTPATIENT
Start: 2023-10-09 | End: 2023-10-09 | Stop reason: HOSPADM

## 2023-10-09 RX ADMIN — LEVETIRACETAM 500 MG: 100 INJECTION, SOLUTION, CONCENTRATE INTRAVENOUS at 05:54

## 2023-10-09 RX ADMIN — OMEPRAZOLE 20 MG: 20 CAPSULE, DELAYED RELEASE ORAL at 18:08

## 2023-10-09 RX ADMIN — LEVOTHYROXINE SODIUM 75 MCG: 0.07 TABLET ORAL at 05:54

## 2023-10-09 RX ADMIN — SODIUM CHLORIDE, POTASSIUM CHLORIDE, SODIUM LACTATE AND CALCIUM CHLORIDE: 600; 310; 30; 20 INJECTION, SOLUTION INTRAVENOUS at 08:00

## 2023-10-09 RX ADMIN — PROPOFOL 50 MCG/KG/MIN: 10 INJECTION, EMULSION INTRAVENOUS at 08:29

## 2023-10-09 RX ADMIN — LEVETIRACETAM 500 MG: 100 INJECTION, SOLUTION, CONCENTRATE INTRAVENOUS at 18:08

## 2023-10-09 RX ADMIN — FENTANYL CITRATE 50 MCG: 50 INJECTION, SOLUTION INTRAMUSCULAR; INTRAVENOUS at 08:02

## 2023-10-09 RX ADMIN — PROPOFOL 50 MG: 10 INJECTION, EMULSION INTRAVENOUS at 08:03

## 2023-10-09 RX ADMIN — MIDAZOLAM 1 MG: 1 INJECTION, SOLUTION INTRAMUSCULAR; INTRAVENOUS at 08:02

## 2023-10-09 ASSESSMENT — PAIN DESCRIPTION - PAIN TYPE
TYPE: ACUTE PAIN
TYPE: SURGICAL PAIN

## 2023-10-09 ASSESSMENT — FIBROSIS 4 INDEX: FIB4 SCORE: 2.07

## 2023-10-09 NOTE — PROCEDURES
OPERATIVE REPORT        PATIENT: Daya Woods  1940      PREOPERATIVE DIAGNOSIS/INDICATION: Hematochezia    POSTOPERATIVE DIAGNOSES: Blood throughout colon, Bleeding AVM right side of colon,  pan diverticulosis    PROCEDURE: COLONOSCOPY with Gold Probe    PHYSICIAN: Lizz Holloway MD    CONSENT:  OBTAINED. The risks, benefits and alternatives of the procedure were discussed in details. The risks include and are not limited to bleeding, infection, perforation, missed lesions, and sedations risks (cardiopulmonary compromise and allergic reaction to medications).    ANESTHESIA:  Per anesthesiologist.    LOCATION: [facility]    DESCRIPTION:  The patient presented to the procedure room.  After routine checkup was performed, patient was brought into endoscopy suite.  Patient was placed on his left lateral decubitus position.  Patient was sedated by anesthesia. Vital signs were monitored throughout procedure.  Oxygenation support was provided throughout procedure. Digital rectal examination was performed.  Then, a colonoscope was inserted into patient's anus, advanced to the cecum under direct visualization.  Once the site was reached and examined, the colonoscope was withdrawn and procedure was terminated. Withdrawal time was at least 6 minutes to ensure adequate examination.    The patient tolerated the procedure well.  There were no immediate complications.    The quality of the bowel preparation was very poor.      FINDINGS:  Blood throughout colon. Actively bleeding AVM in ascending colon. Treated with Gold probe and bleeding stopped. pan diverticulosis. Did not see one diverticula that was actively bleeding.   Internal hemorrhoids - large and external hemorrhoids    Pan diverticulosis  Bleeding AVM  Internal and external hemorrhoids  Blood throughout colon, Bleeding AVM right side of colon,  pan diverticulosis    RECOMMENDATIONS:  Follow h/h  If re bleeds likely diverticular in nature or AVM we could not see  due to poor prep  Anusol HC BID    This note has been transcribed with digital voice recognition software and although it has been reviewed may contain grammatical or word errors

## 2023-10-09 NOTE — CARE PLAN
The patient is Stable - Low risk of patient condition declining or worsening    Shift Goals  Clinical Goals: Stable neuro exams  Patient Goals: Get MRI  Family Goals: JAYA    Progress made toward(s) clinical / shift goals:    Problem: Pain - Standard  Goal: Alleviation of pain or a reduction in pain to the patient’s comfort goal  Outcome: Progressing  Note: Pt educated on 0-10 pain scale, educated on both pharmacological and non-pharmacological methods of pain control. No complaints of pain.       Problem: Knowledge Deficit - Stroke Education  Goal: Patient's knowledge of stroke and risk factors will improve  Outcome: Progressing  Note: Pt verbalizes understanding of plan of care and asks appropriate questions.       Problem: Neuro Status  Goal: Neuro status will remain stable or improve  Outcome: Progressing  Note: Neuro status remains stable.

## 2023-10-09 NOTE — ANESTHESIA POSTPROCEDURE EVALUATION
Patient: Daya Woods    Procedure Summary     Date: 10/09/23 Room / Location: [facility] ROOM 26 / SURGERY SAME DAY [facility]    Anesthesia Start: 0800 Anesthesia Stop: 0915    Procedures:       GASTROSCOPY (Esophagus)      COLONOSCOPY (Anus)      EGD, WITH CAUTERIZATION (Esophagus)      EGD, WITH CLIP PLACEMENT (Esophagus)      COLONOSCOPY, WITH BIPOLAR CAUTERIZATION (Anus) Diagnosis: (gastric erosions in antrum,kasi's bleeding ulcer,hiatal hernia, diverticula, hemorrhoids, AVMs colon )    Surgeons: Lizz Holloway M.D. Responsible Provider: Tobey Gansert, M.D.    Anesthesia Type: MAC ASA Status: 4 - Emergent          Final Anesthesia Type: MAC  Last vitals  BP   Blood Pressure : 131/80    Temp   36.4 °C (97.5 °F)    Pulse   (!) 57   Resp   (!) 22    SpO2   92 %      Anesthesia Post Evaluation    Patient location during evaluation: PACU  Patient participation: complete - patient participated  Level of consciousness: awake and alert    Airway patency: patent  Anesthetic complications: no  Cardiovascular status: hemodynamically stable  Respiratory status: acceptable  Hydration status: euvolemic    PONV: none          No notable events documented.     Nurse Pain Score: 0 (NPRS)

## 2023-10-09 NOTE — ANESTHESIA TIME REPORT
Anesthesia Start and Stop Event Times     Date Time Event    10/9/2023 0745 Ready for Procedure     0800 Anesthesia Start     0915 Anesthesia Stop        Responsible Staff  10/09/23    Name Role Begin End    Tobey Gansert, M.D. Anesth 0800 0915        Overtime Reason:  no overtime (within assigned shift)    Comments:

## 2023-10-09 NOTE — PROCEDURES
OPERATIVE REPORT    PATIENT:   Daya Woods   1940       PREOPERATIVE DIAGNOSES/INDICATIONS: GI bleed    POSTOPERATIVE DIAGNOSIS: Schatzki ring, hiatal hernia, two small bleeding Jozef' s ulcers, visible vessel in stomach - bleeding. Clean based gastric ulcers in antrum    PROCEDURE:  ESOPHAGOGASTRODUODENOSCOPY with control of bleeding    PHYSICIAN:  Lizz Holloway MD    ANESTHESIA:  Per anesthesiologist.    LOCATION: [facility]    CONSENT:  OBTAINED. The risks, benefits and alternatives of the procedure were discussed in details. The risks include and are not limited to bleeding, infection, perforation, missed lesions, and sedations risks (cardiopulmonary compromise and allergic reaction to medications).    DESCRIPTION: The patient presented to the procedure room.  After routine checkup was performed, patient was brought into the endoscopy suite.  Patient was placed on his left lateral decubitus position. A bite block was placed in patient's mouth. Patient was sedated by anesthesia.  Vital signs were monitored throughout the procedure.  Oxygenation support was provided throughout the procedure. Upper endoscope was inserted into patient's mouth and advanced to the second portion of the duodenum under direct visualization.      Once the site was reached and examined, the upper endoscope was withdrawn.  Retroflexion was made within the stomach.  The stomach was decompressed, scope was withdrawn and the procedure was terminated.    The patient tolerated the procedure well.  There were no immediate complications.    OPERATIVE FINDINGS:    1. Esophagus: non-obstructing Schatzki ring  2. Stomach: Hiatal hernia. Two small Jozef's ulcers, bleeding. Treated with Gold Bicap. One AVM in stomach. Gold probe and Hemoclip. There were two clean based ulcers in antrum.   3. Duodenum: normal    IMPRESSION/RECOMMENDATIONS:  Schatzki ring  Hiatal hernia   Two small bleeding Jozef' s ulcers  visible vessel in stomach -  bleeding.   Clean based gastric ulcers in antrum    PPI for three months  Follow h/h  Check for H. Pylori in the stool     This note has been transcribed with digital voice recognition software and although it has been reviewed may contain grammatical or word errors

## 2023-10-09 NOTE — PROGRESS NOTES
Infusion of blood complete at 2100. Infusing NS to flush. Vitals remain stable, Stool is bloody but much clearer and lighter.

## 2023-10-09 NOTE — PROGRESS NOTES
Monitor Summary: A-fib , QRS .11, with frequent PVC,rare couplet&triplet,occasional bigem,4 beats v-tach per strip from monitor room

## 2023-10-09 NOTE — ANESTHESIA PREPROCEDURE EVALUATION
Case: [id number] Date/Time: 10/09/23 0835    Procedures:       GASTROSCOPY (Esophagus)      COLONOSCOPY (Anus)    Anesthesia type: General    Pre-op diagnosis: ANEMIA    Location: [facility] ROOM 26 / SURGERY SAME DAY [facility]    Surgeons: Lizz Holloway M.D.          Relevant Problems   NEURO   (positive) Seizure, stroke like sx/seizure, GI bleed/anemia, CHF EF 25%, Afib/anticoag, ICA stenosis/severe PVD,  (HCC)      CARDIAC   (positive) Chronic atrial fibrillation (HCC)   (positive) Coronary artery disease involving coronary bypass graft of native heart with unstable angina pectoris (HCC)   (positive) Coronary artery disease involving coronary bypass graft of native heart without angina pectoris   (positive) Essential hypertension   (positive) Hypertensive urgency   (positive) Ischemic heart disease due to coronary artery obstruction ([facility])   (positive) PAD (peripheral artery disease) ([facility])   (positive) Raynaud's disease   (positive) Stented coronary artery   (positive) Systolic CHF with reduced left ventricular function, NYHA class 2 (HCC)   (positive) Unstable angina (HCC)      ENDO   (positive) Hypothyroidism       Physical Exam    Airway   Mallampati: II  TM distance: >3 FB  Neck ROM: full       Cardiovascular - normal exam  Rhythm: regular  Rate: normal  (-) murmur     Dental - normal exam      Very poor dentition   Pulmonary - normal exam  Breath sounds clear to auscultation     Abdominal    Neurological - normal exam                 Anesthesia Plan    ASA 4- EMERGENT   ASA physical status 4 criteria: CVA or TIA - recent (< 3 months)ASA physical status emergent criteria: acute hemorrhage    Plan - MAC               Induction: intravenous      Pertinent diagnostic labs and testing reviewed    Informed Consent:    Anesthetic plan and risks discussed with patient.    Use of blood products discussed with: patient whom consented to blood products.

## 2023-10-09 NOTE — CARE PLAN
The patient is Stable - Low risk of patient condition declining or worsening    Shift Goals  Clinical Goals: stable neuro  Patient Goals: eat  Family Goals: JAYA    Progress made toward(s) clinical / shift goals:  neuro stable. Pt on clear liquid diet     Problem: Pain - Standard  Goal: Alleviation of pain or a reduction in pain to the patient’s comfort goal  Description: Target End Date:  Prior to discharge or change in level of care    Document on Vitals flowsheet    1.  Document pain using the appropriate pain scale per order or unit policy  2.  Educate and implement non-pharmacologic comfort measures (i.e. relaxation, distraction, massage, cold/heat therapy, etc.)  3.  Pain management medications as ordered  4.  Reassess pain after pain med administration per policy  5.  If opiods administered assess patient's response to pain medication is appropriate per POSS sedation scale  6.  Follow pain management plan developed in collaboration with patient and interdisciplinary team (including palliative care or pain specialists if applicable)  Outcome: Progressing     Problem: Neuro Status  Goal: Neuro status will remain stable or improve  Description: Target End Date:  Prior to discharge or change in level of care    Document on Neuro assessment in the Assessment flowsheet    1.  Assess and monitor neurologic status per provider order/protocol/unit policy  2.  Assess level of consciousness and orientation  3.  Assess for speech, dysarthria, dysphagia, facial symmetry  4.  Assess visual field, eye movements, gaze preference, pupil reaction and size  5.  Assess muscle strength and motor response in all four extremities  6.  Assess for sensation (numbness and tingling)  7.  Assess basic neuro reflexes (cough, gag, corneal)  8.  Identify changes in neuro status and report to provider for testing/treatment orders  Outcome: Progressing     Problem: Risk for Aspiration  Goal: Patient's risk for aspiration will be absent or  decrease  Description: Target End Date:  Prior to discharge or change in level of care    1.   Complete dysphagia screening on admission  2.   NPO until dysphagia screening complete or medically cleared  3.   Collaborate with Speech Therapy, Clinical Dietitian and interdisciplinary team  4.   Implement aspiration precautions  5.   Assist patient up to chair for meals  6.   Elevate head of bed 90 degrees if patient is unable to get out of bed  7.   Encourage small bites  8.   Ensure foods/liquids are of appropriate consistency  9.   Assess for any signs/symptoms of aspiration  10. Assess breath sounds and vital signs after oral intake  Outcome: Progressing     Patient is not progressing towards the following goals:

## 2023-10-09 NOTE — PROGRESS NOTES
Monitor summary: Afib 89-97, AK --, QRS 0.07, QT --, with rare triplets, PVCs and couplets per strip from monitor room.

## 2023-10-09 NOTE — CARE PLAN
Problem: Pain - Standard  Goal: Alleviation of pain or a reduction in pain to the patient’s comfort goal  Outcome: Progressing     Problem: Optimal Care of the Stroke Patient  Goal: Optimal emergency care for the stroke patient  Outcome: Progressing  Goal: Optimal acute care for the stroke patient  Outcome: Progressing     Problem: Knowledge Deficit - Stroke Education  Goal: Patient's knowledge of stroke and risk factors will improve  Outcome: Progressing     Problem: Psychosocial - Patient Condition  Goal: Patient's ability to verbalize feelings about condition will improve  Outcome: Progressing  Goal: Patient's ability to re-evaluate and adapt role responsibilities will improve  Outcome: Progressing     Problem: Discharge Planning - Stroke  Goal: Ensure Stroke Core Measures are met prior to discharge  Outcome: Progressing  Goal: Patient’s continuum of care needs will be met  Outcome: Progressing     Problem: Neuro Status  Goal: Neuro status will remain stable or improve  Outcome: Progressing     Problem: Hemodynamic Monitoring  Goal: Patient's hemodynamics, fluid balance and neurologic status will be stable or improve  Outcome: Progressing     Problem: Respiratory - Stroke Patient  Goal: Patient will achieve/maintain optimum respiratory rate/effort  Outcome: Progressing     Problem: Dysphagia  Goal: Dysphagia will improve  Outcome: Progressing     Problem: Risk for Aspiration  Goal: Patient's risk for aspiration will be absent or decrease  Outcome: Progressing     Problem: Urinary Elimination  Goal: Establish and maintain regular urinary output  Outcome: Progressing     Problem: Bowel Elimination  Goal: Establish and maintain regular bowel function  Outcome: Progressing     Problem: Mobility - Stroke  Goal: Patient's capacity to carry out activities will improve  Outcome: Progressing  Goal: Spasticity will be prevented or improved  Outcome: Progressing  Goal: Subluxation will be prevented or improved  Outcome:  Progressing   The patient is Watcher - Medium risk of patient condition declining or worsening    Shift Goals  Clinical Goals: Colonoscopy, EGD tomorrow, stable H&H, bowel prep.  Patient Goals: Clear stools, rest.  Family Goals: JAYA    Progress made toward(s) clinical / shift goals:  yes    Hemoglobin improved to 9.1. Stool is almost clear.

## 2023-10-09 NOTE — OR NURSING
0910 Patient arrived to PACU. Report received from anesthesia and OR RN. Patient on 6L of oxygen via mask. Placed on monitor. Oral airway in place and Dc'ed.      0930 Patient awake on room air, declines any needs for pain or nausea. Patient placed dentures back in.     0947 Report given to Coleen PATEL.     1007 Patient with transport back to room, daughter at bedside.

## 2023-10-09 NOTE — PROGRESS NOTES
Hospital Medicine Daily Progress Note    Date of Service  10/9/2023    Chief Complaint  Daya Woods is a 83 y.o. female admitted 10/6/2023 with Possible Stroke (Pt brought in by family for L sided deficits that they estimate started around 1:00 am this morning. Pt called family for ride to the ER this morning. On arrival pt has L sided weakness and shortly after started having a seizure in the ER lobby that lasted around 1 minute. No known hx of seizures. )        Hospital Course  No notes on file  Complicated patient.  Working up stroke like symptoms and seizure but also has GI bleed and anemia.   Interval Problem Update  10/7. I reviewed the chart along with vitals, labs, imaging, test (both pending and resulted) and recommendations from specialists and interdisciplinary team.  I spoke with Dr. Daniel to clarify. Patient has anemia and guaiac positive. Transfusion ordered. Patient was also reversed for a supratherapeutic INR of 8. It has gone down now to 1.75. Anticoagulation for Afib, son does not remember if she has clots but did say she has PVD. WIll order Dopplers.  MRI is still pending. Neurovascular is following appreciate their recommendations  I spoke with son and patient at length. Ultimately they do want the GI bleed to be worked up. I called and consulted Dr. Young.   Agree to EGD but will need Neuro/stroke clearance.   10/8. I spoke with Dr. Sanchez for cardiac consultation. Reviewed his recs. Had cardiac cath failure because of severe CAD so has moderate risk. Transfuse if below 7. Discussed with  Neurology. Stroke is small. Clear from Neuro perspective for EGD with anesthesia. Discussed with GI. Plan for EGD tomorrow or the next day.  I spoke and updated family and patient.     Patient is has a high medical complexity, complex decision making and is at high risk for complication, morbidity, and mortality, thus requiring the highest level of my preparedness for sudden, emergent intervention.  Medical decision making is therefore complex. I provided  services, which included ordering labs and/or imaging, and discussing the case with various consultants.medication orders, frequent reevaluations of the patient's condition and response to treatment. Time was also devoted to counseling and coordinating care including review of records, pertinent lab data and studies, as well as discussing diagnostic evaluation and work up, planned therapeutic interventions and future disposition of care. Where indicated, the assessment and plan reflect discussion of patient with consultants, other healthcare providers, family members, and additional research needed to obtain further information in formulating the plan of care for Daya Woods. Total time spent was 66 minutes.   10/9. Moderate cardiac risk per Cardiology. No further testing. OK to do procedure from a Neurovascular perspective; stroke is small.   Anticoagulation recommended per Neurology and Cardiology. EGD today, awaiting GI clearance to start anticoagulation. Monitor patient 24 hours after procedure. Offer Home Health Care    I have discussed this patient's plan of care and discharge plan at IDT rounds today with Case Management, Nursing, Nursing leadership, and other members of the IDT team.    Consultants/Specialty  Neurology  GI    Code Status  DNAR/DNI    Disposition  Medically Cleared  I have placed the appropriate orders for post-discharge needs.    Review of Systems  Review of Systems   Unable to perform ROS: Mental acuity        Physical Exam  Temp:  [36.4 °C (97.5 °F)-37.1 °C (98.8 °F)] 36.4 °C (97.5 °F)  Pulse:  [] 62  Resp:  [17-20] 18  BP: (108-134)/(53-71) 114/56  SpO2:  [90 %-100 %] 95 %    Physical Exam  Constitutional:       Comments: Elderly, thin   Neurological:      Motor: Weakness present.      Comments: Cognitive deficits but seems baseline.         Fluids    Intake/Output Summary (Last 24 hours) at 10/9/2023 0718  Last data  filed at 10/9/2023 0159  Gross per 24 hour   Intake 1095.03 ml   Output 1200 ml   Net -104.97 ml         Laboratory  Recent Labs     10/06/23  0944 10/06/23  1500 10/08/23  0359 10/08/23  0907 10/08/23  1542 10/08/23  2237 10/09/23  0317   WBC 4.8  --  5.2  --   --   --  5.6   RBC 2.28*  --  2.68*  --   --   --  3.07*   HEMOGLOBIN 6.0*   < > 7.3*   < > 6.7* 9.1* 8.6*   HEMATOCRIT 21.4*  --  23.9*  --   --   --  26.9*   MCV 93.9  --  89.2  --   --   --  87.6   MCH 26.3*  --  27.2  --   --   --  28.0   MCHC 28.0*  --  30.5*  --   --   --  32.0*   RDW 68.2*  --  61.4*  --   --   --  55.2*   PLATELETCT 314  --  231  --   --   --  205   MPV 9.1  --  9.2  --   --   --  9.2    < > = values in this interval not displayed.       Recent Labs     10/06/23  0944 10/08/23  0359 10/09/23  0317   SODIUM 129* 133* 134*   POTASSIUM 4.4 4.4 3.9   CHLORIDE 94* 104 102   CO2 16* 20 21   GLUCOSE 144* 94 111*   BUN 26* 20 16   CREATININE 0.86 0.63 0.63   CALCIUM 8.7 8.2* 7.5*       Recent Labs     10/06/23  0944 10/07/23  0405 10/08/23  0359 10/09/23  0317   APTT 61.2*  --   --   --    INR 8.76* 1.75* 1.25* 1.28*                 Imaging  CT-CSPINE WITHOUT PLUS RECONS   Final Result         1. No acute fracture from C1 through T1 is visualized.   2. There is cystic and degenerative change in the odontoid process. No displaced fracture is seen.         US-EXTREMITY VENOUS LOWER BILAT   Final Result      MR-BRAIN-W/O   Final Result      1.  Punctate, acute right frontal lobe infarct.   2.  Small, remote appearing microhemorrhage that is either in the superior left cerebellum or the inferior left occipital lobe. Additional smaller remote appearing microhemorrhages in the superior cerebellum versus inferior occipital lobes.   3.  White matter disease likely representing microvascular ischemic changes.   4.  Atrophy.   5.  Questionable odontoid process fracture that may be new from CTA neck on 10/6/2023. Recommend CT cervical spine.     "  Findings conveyed to Dr. Penny at 10/8/2023 9:05 AM via [employer] messaging.      DX-CHEST-PORTABLE (1 VIEW)   Final Result      Cardiomegaly.      Probable mild interstitial edema.      CT-CEREBRAL PERFUSION ANALYSIS   Final Result      1.  Cerebral blood flow less than 30% likely representing completed infarct = 0 mL.      2.  T Max more than 6 seconds likely representing combination of completed infarct and ischemia = 0 mL.      3.  Mismatched volume likely representing ischemic brain/penumbra = None      4.  Please note that the cerebral perfusion was performed on the limited brain tissue around the basal ganglia region. Infarct/ischemia outside the CT perfusion sections can be missed in this study.      CT-CTA NECK WITH & W/O-POST PROCESSING   Final Result      1.  Severe atherosclerosis.   2.  Severe stenosis of the left carotid bifurcation and moderate proximal right ICA stenosis.   3.  Patent vertebral arteries.      CT-CTA HEAD WITH & W/O-POST PROCESS   Final Result      1.  No large vessel occlusion or aneurysm.   2.  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   3.  No acute intracranial abnormality.             Assessment/Plan  * Seizure, stroke like sx/seizure, GI bleed/anemia, CHF EF 25%, Afib/anticoag, ICA stenosis/severe PVD,  (HCC)- (present on admission)  Assessment & Plan  New onset  IV keppra loaded in the ER and 500 mg IV BID until tolerating orals  Seizure precautions  IV ativan for seizures ordered  With tongue laceration  Consider Garett's paralysis\"    MRI pending  When Neuro cleared, stroke mgmt optimized, GI to do EGD to w/u GI bleed and evaluate when anticoagulation can be restarted. Currently Neurop cleared from a stroke persepective. Cardiology recommend no further testing and is moderate risk for cardiac evant. GI plans for EGD tomorrow or the next day  Currently no bridge but will get venous Dopplers, r/o clots and any indication to acutely " "anticoagulate. Currently patient has been reversed. Daily INR ordered  Supplements ordered; Body mass index is 18.77 kg/m².      Acute metabolic encephalopathy- (present on admission)  Assessment & Plan  Likely due to post-ictal state  She may require soft wrist restraints in order to maintain IV access for blood transfusion.     Coagulopathy (HCC)- (present on admission)  Assessment & Plan  INR is very elevated at 8.76 due to coumadin use.  In the setting of acute blood loss anemia  IV Vitamin K ordered   Repeat INR in the morning    Anemia due to acute blood loss- (present on admission)  Assessment & Plan  Hemoglobin is down to 6  Guaiac + stools  In the setting of INR 8.76  Transfuse one unit RBCs in the ER and serial hemoglobins ordered with transfusion for Hb less than 7  Reverse coagulopathy   PPI    Lactic acidosis- (present on admission)  Assessment & Plan  Severe lactic acidosis at 6.6  Secondary to seizure  Judicious IV fluids given her cardiomyopathy repeat lactic acid level ordered    Chronic systolic CHF (congestive heart failure), NYHA class 3 ([facility])- (present on admission)  Assessment & Plan  Echo EF 25% Dec 2022  Without exacerbation \"    BB restarted, others held because of hypotension  Restart anticoagulation when GI clears.    PAD (peripheral artery disease) ([facility])- (present on admission)  Assessment & Plan  Severe PAD   Dr. Stanford attempted a heart cath Dec 2022 and was unsuccessful due to severe arterial disease.    Chronic anticoagulation- (present on admission)  Assessment & Plan  She is on coumadin as an outpatient which will be held\"    Held and reversed  No acute anticoagulation criteria unless evidence of VTE or if MRI shows stroke with high CHADsvasc score    DNR (do not resuscitate)- (present on admission)  Assessment & Plan  As discussed with her grandson at bedside    Coronary artery disease involving coronary bypass graft of native heart without angina pectoris- (present on " "admission)  Assessment & Plan  Hx of  bypass    Chronic atrial fibrillation (HCC)- (present on admission)  Assessment & Plan  Hold coumadin  Restart Toprol 25 mg once her blood pressure improves and she is able to tolerate orals. \"    Vitals:    10/08/23 1155   BP: 111/58   Pulse: 83   Resp: 18   Temp: 37.1 °C (98.8 °F)   SpO2: 97%     Stable HR  Restart BB    Essential hypertension- (present on admission)  Assessment & Plan  Hx of  Hold home BP meds due to hypotension\"    Vitals:    10/08/23 1155   BP: 111/58   Pulse: 83   Resp: 18   Temp: 37.1 °C (98.8 °F)   SpO2: 97%     Stable. Restart BB first         VTE prophylaxis: SCDs    I have performed a physical exam and reviewed and updated ROS and Plan today (10/9/2023). In review of yesterday's note (10/8/2023), there are no changes except as documented above.        "

## 2023-10-10 LAB
ALBUMIN SERPL BCP-MCNC: 2.5 G/DL (ref 3.2–4.9)
BUN SERPL-MCNC: 14 MG/DL (ref 8–22)
CALCIUM ALBUM COR SERPL-MCNC: 9 MG/DL (ref 8.5–10.5)
CALCIUM SERPL-MCNC: 7.8 MG/DL (ref 8.5–10.5)
CHLORIDE SERPL-SCNC: 103 MMOL/L (ref 96–112)
CO2 SERPL-SCNC: 21 MMOL/L (ref 20–33)
CREAT SERPL-MCNC: 0.63 MG/DL (ref 0.5–1.4)
ERYTHROCYTE [DISTWIDTH] IN BLOOD BY AUTOMATED COUNT: 57.7 FL (ref 35.9–50)
FERRITIN SERPL-MCNC: 121 NG/ML (ref 10–291)
GFR SERPLBLD CREATININE-BSD FMLA CKD-EPI: 89 ML/MIN/1.73 M 2
GLUCOSE SERPL-MCNC: 98 MG/DL (ref 65–99)
HCT VFR BLD AUTO: 28.2 % (ref 37–47)
HGB BLD-MCNC: 8.7 G/DL (ref 12–16)
HGB BLD-MCNC: 8.9 G/DL (ref 12–16)
INR PPP: 1.23 (ref 0.87–1.13)
IRON SATN MFR SERPL: 7 % (ref 15–55)
IRON SERPL-MCNC: 20 UG/DL (ref 40–170)
MCH RBC QN AUTO: 28.3 PG (ref 27–33)
MCHC RBC AUTO-ENTMCNC: 31.6 G/DL (ref 32.2–35.5)
MCV RBC AUTO: 89.5 FL (ref 81.4–97.8)
PHOSPHATE SERPL-MCNC: 3.6 MG/DL (ref 2.5–4.5)
PLATELET # BLD AUTO: 190 K/UL (ref 164–446)
PMV BLD AUTO: 9.3 FL (ref 9–12.9)
POTASSIUM SERPL-SCNC: 3.8 MMOL/L (ref 3.6–5.5)
PROTHROMBIN TIME: 15.7 SEC (ref 12–14.6)
RBC # BLD AUTO: 3.15 M/UL (ref 4.2–5.4)
SODIUM SERPL-SCNC: 134 MMOL/L (ref 135–145)
TIBC SERPL-MCNC: 286 UG/DL (ref 250–450)
UIBC SERPL-MCNC: 266 UG/DL (ref 110–370)
WBC # BLD AUTO: 7.1 K/UL (ref 4.8–10.8)

## 2023-10-10 PROCEDURE — 80069 RENAL FUNCTION PANEL: CPT

## 2023-10-10 PROCEDURE — 82728 ASSAY OF FERRITIN: CPT

## 2023-10-10 PROCEDURE — 83540 ASSAY OF IRON: CPT

## 2023-10-10 PROCEDURE — 85018 HEMOGLOBIN: CPT

## 2023-10-10 PROCEDURE — 97116 GAIT TRAINING THERAPY: CPT

## 2023-10-10 PROCEDURE — 700102 HCHG RX REV CODE 250 W/ 637 OVERRIDE(OP): Performed by: HOSPITALIST

## 2023-10-10 PROCEDURE — 770020 HCHG ROOM/CARE - TELE (206)

## 2023-10-10 PROCEDURE — 700111 HCHG RX REV CODE 636 W/ 250 OVERRIDE (IP): Mod: JZ | Performed by: HOSPITALIST

## 2023-10-10 PROCEDURE — 85027 COMPLETE CBC AUTOMATED: CPT

## 2023-10-10 PROCEDURE — 700102 HCHG RX REV CODE 250 W/ 637 OVERRIDE(OP): Performed by: INTERNAL MEDICINE

## 2023-10-10 PROCEDURE — A9270 NON-COVERED ITEM OR SERVICE: HCPCS | Performed by: INTERNAL MEDICINE

## 2023-10-10 PROCEDURE — 99233 SBSQ HOSP IP/OBS HIGH 50: CPT | Performed by: STUDENT IN AN ORGANIZED HEALTH CARE EDUCATION/TRAINING PROGRAM

## 2023-10-10 PROCEDURE — 85610 PROTHROMBIN TIME: CPT

## 2023-10-10 PROCEDURE — A9270 NON-COVERED ITEM OR SERVICE: HCPCS | Performed by: HOSPITALIST

## 2023-10-10 PROCEDURE — 83550 IRON BINDING TEST: CPT

## 2023-10-10 PROCEDURE — 99232 SBSQ HOSP IP/OBS MODERATE 35: CPT | Performed by: NURSE PRACTITIONER

## 2023-10-10 PROCEDURE — 97530 THERAPEUTIC ACTIVITIES: CPT

## 2023-10-10 RX ADMIN — Medication 100 MG: at 04:33

## 2023-10-10 RX ADMIN — THERA TABS 1 TABLET: TAB at 04:34

## 2023-10-10 RX ADMIN — CYANOCOBALAMIN TAB 500 MCG 1000 MCG: 500 TAB at 04:34

## 2023-10-10 RX ADMIN — LEVETIRACETAM 500 MG: 100 INJECTION, SOLUTION, CONCENTRATE INTRAVENOUS at 04:33

## 2023-10-10 RX ADMIN — OMEPRAZOLE 20 MG: 20 CAPSULE, DELAYED RELEASE ORAL at 04:33

## 2023-10-10 RX ADMIN — LEVOTHYROXINE SODIUM 75 MCG: 0.07 TABLET ORAL at 04:33

## 2023-10-10 RX ADMIN — METOPROLOL SUCCINATE 25 MG: 25 TABLET, EXTENDED RELEASE ORAL at 04:34

## 2023-10-10 RX ADMIN — OMEPRAZOLE 20 MG: 20 CAPSULE, DELAYED RELEASE ORAL at 18:19

## 2023-10-10 RX ADMIN — ATORVASTATIN CALCIUM 40 MG: 40 TABLET, FILM COATED ORAL at 04:34

## 2023-10-10 ASSESSMENT — PAIN DESCRIPTION - PAIN TYPE
TYPE: ACUTE PAIN
TYPE: OTHER (COMMENT)
TYPE: ACUTE PAIN
TYPE: ACUTE PAIN

## 2023-10-10 ASSESSMENT — GAIT ASSESSMENTS
DISTANCE (FEET): 200
ASSISTIVE DEVICE: HAND HELD ASSIST
DEVIATION: TRENDELENBERG;DECREASED BASE OF SUPPORT
GAIT LEVEL OF ASSIST: STANDBY ASSIST

## 2023-10-10 ASSESSMENT — COGNITIVE AND FUNCTIONAL STATUS - GENERAL
MOBILITY SCORE: 23
SUGGESTED CMS G CODE MODIFIER MOBILITY: CI
CLIMB 3 TO 5 STEPS WITH RAILING: A LITTLE

## 2023-10-10 NOTE — DISCHARGE PLANNING
Met with pt and daughter Marely. Pt lives in a one level home with 5 steps . Pt has good family support per Marely, grandchildren live with pt and all are willing to help pt at home. Per PT, no HH services is recommended. Discussed with MD and he will dc pt. IMM given to pt and daughter.     PCP is Sharee CONTRERAS.   Uses [facility].     Care Transition Team Assessment    Information Source  Orientation Level: Oriented to place, Oriented to person, Oriented to situation  Information Given By: Patient, Other (Comments) (Daughter)  Informant's Name: Marely  Who is responsible for making decisions for patient? : Patient    Readmission Evaluation  Is this a readmission?: No    Elopement Risk  Legal Hold: No  Ambulatory or Self Mobile in Wheelchair: No-Not an Elopement Risk  Elopement Risk: Not at Risk for Elopement    Interdisciplinary Discharge Planning  Does Admitting Nurse Feel This Could be a Complex Discharge?: No  Primary Care Physician: Sharee CONTRERAS  Lives with - Patient's Self Care Capacity: Adult Children  Patient or legal guardian wants to designate a caregiver: No  Housing / Facility: 1 Story House (has 5 steps)  Do You Take your Prescribed Medications Regularly: Yes  Able to Return to Previous ADL's: Yes  Mobility Issues: No  Prior Services: None, Home-Independent  Patient Prefers to be Discharged to:: Home  Assistance Needed: No  Durable Medical Equipment: Not Applicable    Discharge Preparedness  What is your plan after discharge?: Home with help  What are your discharge supports?: Child  Prior Functional Level: Ambulatory, Drives Self, Independent with Activities of Daily Living, Independent with Medication Management  Difficulity with ADLs: None  Difficulity with IADLs: Driving    Functional Assesment  Prior Functional Level: Ambulatory, Drives Self, Independent with Activities of Daily Living, Independent with Medication Management    Finances  Financial Barriers to  Discharge: No  Prescription Coverage: Yes    Vision / Hearing Impairment  Vision Impairment : Yes  Hearing Impairment : No    Values / Beliefs / Concerns  Values / Beliefs Concerns : No    Advance Directive  Advance Directive?: POLST    Domestic Abuse  Have you ever been the victim of abuse or violence?: No  Physical Abuse or Sexual Abuse: No  Verbal Abuse or Emotional Abuse: No  Possible Abuse/Neglect Reported to:: Not Applicable         Discharge Risks or Barriers  Discharge risks or barriers?: No  Patient risk factors: Other (comment)    Anticipated Discharge Information  Discharge Disposition: Discharged to home/self care (01)

## 2023-10-10 NOTE — PROGRESS NOTES
Monitor Summary: A-fib , QRS .09, with frequent PVC, couplets, triplets, and  bigem, per strip from monitor room.

## 2023-10-10 NOTE — DISCHARGE PLANNING
"Case Management Discharge Planning    Admission Date: 10/6/2023  GMLOS: 4.4  ALOS: 4    6-Clicks ADL Score: 19  6-Clicks Mobility Score: 23      Anticipated Discharge Dispo: Discharge Disposition: Discharged to home/self care (01)  HOME -no HH recommendations from therapy services.    DME Needed: none    Action(s) Taken: Discussed with IDT, per MD , ok to dc home. Received a message from PT as well and no HH needs.     Met with pt and daughter. Explained about dc today. Pt signed IMM. Daughter said that pt does not have any clothes, CM offerred to get clothes for pt. Then daughter said \"she has pajamas to wear\".    Escalations Completed: none    Medically Clear: yes    Next Steps: none    Barriers to Discharge: none    Is the patient up for discharge tomorrow: today        "

## 2023-10-10 NOTE — DISCHARGE PLANNING
Would appreciate updated TX evals once appropriate.     1223-Daya is not requiring 2 of 3 disciplines.  TCC will no longer follow.  Please reach out to myself with any questions.

## 2023-10-10 NOTE — CARE PLAN
The patient is Stable - Low risk of patient condition declining or worsening    Shift Goals  Clinical Goals: stable neuro assessment  Patient Goals: comfort  Family Goals: speak with GI provider    Progress made toward(s) clinical / shift goals:  neuro assessment stable, reports comfort   Problem: Pain - Standard  Goal: Alleviation of pain or a reduction in pain to the patient’s comfort goal  Description: Target End Date:  Prior to discharge or change in level of care    Document on Vitals flowsheet    1.  Document pain using the appropriate pain scale per order or unit policy  2.  Educate and implement non-pharmacologic comfort measures (i.e. relaxation, distraction, massage, cold/heat therapy, etc.)  3.  Pain management medications as ordered  4.  Reassess pain after pain med administration per policy  5.  If opiods administered assess patient's response to pain medication is appropriate per POSS sedation scale  6.  Follow pain management plan developed in collaboration with patient and interdisciplinary team (including palliative care or pain specialists if applicable)  Outcome: Progressing     Problem: Knowledge Deficit - Stroke Education  Goal: Patient's knowledge of stroke and risk factors will improve  Description: Target End Date:  1-3 days or as soon as patient condition allows    Document in Patient Education    1.  Stroke education booklet provided  2.  Education regarding EMS activation, need for follow up, medication prescribed at discharge, risk factors for stroke/lifestyle modifications, warning signs and symptoms of stroke provided  Outcome: Progressing     Problem: Respiratory - Stroke Patient  Goal: Patient will achieve/maintain optimum respiratory rate/effort  Description: Target End Date:  Prior to discharge or change in level of care    Document on Assessment flowsheet    1.  Assess and monitor respiratory rate, rhythm, depth and effort of respiration  2.  Oxygenation assessed throughout shift  (recommendation of >94% for new stroke patients)  3.  Oxygen administered and/or titrated per order  4.  Collaboration with RT to administer medication/treatments per order  5.  Patient educated on importance of turning, coughing, and deep breathing  6.  Patient positioned for maximum ventilatory efficiency  7.  Airway suctioning provided as needed  8.  Incentive spirometry encouraged 5-10 times every hour or while awake  Outcome: Progressing     Problem: Urinary Elimination  Goal: Establish and maintain regular urinary output  Description: Target End Date:  Prior to discharge or change in level of care    Document on I/O and Assessment flowsheets    1.  Evaluate need to continue indwelling catheter every shift  2.  Assess signs and symptoms of urinary retention  3.  Assess post-void residual volumes  4.  Implement bladder training program  5.  Encourage scheduled voidings  6.  Assist patient to sit on bedside commode or toilet for voiding  7.  Educate patient and family/caregiver on use and purpose of urine collection devices (document in Patient Education)  Outcome: Progressing     Problem: Bowel Elimination  Goal: Establish and maintain regular bowel function  Description: Target End Date:  Prior to discharge or change in level of care    1.   Note date of last BM  2.   Educate about diet, fluid intake, medication and activity to promote bowel function  3.   Educate signs and symptoms of constipation and interventions to implement  4.   Pharmacologic bowel management per provider order  5.   Regular toileting schedule  6.   Upright position for toileting  7.   High fiber diet  8.   Encourage hydration  9.   Collaborate with Clinical Dietician  10. Care and maintenance of ostomy if applicable  Outcome: Progressing       Patient is not progressing towards the following goals:

## 2023-10-10 NOTE — PROGRESS NOTES
Hospital Medicine Daily Progress Note    Date of Service  10/9/2023    Chief Complaint  Daya Woods is a 83 y.o. female admitted 10/6/2023 with Possible Stroke (Pt brought in by family for L sided deficits that they estimate started around 1:00 am this morning. Pt called family for ride to the ER this morning. On arrival pt has L sided weakness and shortly after started having a seizure in the ER lobby that lasted around 1 minute. No known hx of seizures. )        Hospital Course  No notes on file  Complicated patient.  Working up stroke like symptoms and seizure but also has GI bleed and anemia.   Interval Problem Update  10/7. I reviewed the chart along with vitals, labs, imaging, test (both pending and resulted) and recommendations from specialists and interdisciplinary team.  I spoke with Dr. Daniel to clarify. Patient has anemia and guaiac positive. Transfusion ordered. Patient was also reversed for a supratherapeutic INR of 8. It has gone down now to 1.75. Anticoagulation for Afib, son does not remember if she has clots but did say she has PVD. WIll order Dopplers.  MRI is still pending. Neurovascular is following appreciate their recommendations  I spoke with son and patient at length. Ultimately they do want the GI bleed to be worked up. I called and consulted Dr. Young.   Agree to EGD but will need Neuro/stroke clearance.   10/8. I spoke with Dr. Sanchez for cardiac consultation. Reviewed his recs. Had cardiac cath failure because of severe CAD so has moderate risk. Transfuse if below 7. Discussed with  Neurology. Stroke is small. Clear from Neuro perspective for EGD with anesthesia. Discussed with GI. Plan for EGD tomorrow or the next day.  I spoke and updated family and patient.   10/9. Patient had EGD and colonoscopy today.  I spoke with Dr. Holloway at length. Colon AVMs and Jozef lesions and bleeding vesselts. She noted plenty of blood. She recommended for now no anticoagulation for at least a  week. No indication right now for IR and AVM was cauterized.   I spent time with family updating and explaining the plan. Atthis time Hb stable will observe for a day or 2. If there is active bleed or symptomatic hypotension/anemia that suggest active bleed will have bleeding scan or CTA and IR. If slower bleed will update GI if rescoping needed. If patient is otherwise stable looking to do skilled.    Patient is has a high medical complexity, complex decision making and is at high risk for complication, morbidity, and mortality, thus requiring the highest level of my preparedness for sudden, emergent intervention. Medical decision making is therefore complex. I provided  services, which included ordering labs and/or imaging, and discussing the case with various consultants.medication orders, frequent reevaluations of the patient's condition and response to treatment. Time was also devoted to counseling and coordinating care including review of records, pertinent lab data and studies, as well as discussing diagnostic evaluation and work up, planned therapeutic interventions and future disposition of care. Where indicated, the assessment and plan reflect discussion of patient with consultants, other healthcare providers, family members, and additional research needed to obtain further information in formulating the plan of care for Daya Woods. Total time spent was 65 minutes.     I have discussed this patient's plan of care and discharge plan at IDT rounds today with Case Management, Nursing, Nursing leadership, and other members of the IDT team.    Consultants/Specialty  Neurology  GI    Code Status  DNAR/DNI    Disposition  Medically Cleared  I have placed the appropriate orders for post-discharge needs.    Review of Systems  Review of Systems   Unable to perform ROS: Mental acuity        Physical Exam  Temp:  [36.3 °C (97.3 °F)-37 °C (98.6 °F)] 36.3 °C (97.3 °F)  Pulse:  [] 102  Resp:  [16-22]  17  BP: (108-137)/(53-80) 130/72  SpO2:  [90 %-100 %] 92 %    Physical Exam  Vitals and nursing note reviewed.   Constitutional:       General: She is not in acute distress.     Appearance: She is ill-appearing.      Comments: Elderly, thin   HENT:      Head: Normocephalic and atraumatic.      Right Ear: External ear normal.      Left Ear: External ear normal.      Nose: Nose normal.      Mouth/Throat:      Mouth: Mucous membranes are moist.   Eyes:      General: No scleral icterus.     Conjunctiva/sclera: Conjunctivae normal.   Cardiovascular:      Rate and Rhythm: Normal rate and regular rhythm.      Pulses: Normal pulses.      Heart sounds: No murmur heard.     No friction rub. No gallop.   Pulmonary:      Effort: Pulmonary effort is normal. No respiratory distress.      Breath sounds: Normal breath sounds. No stridor. No wheezing, rhonchi or rales.   Chest:      Chest wall: No tenderness.   Abdominal:      General: Abdomen is flat. Bowel sounds are normal. There is no distension.      Palpations: Abdomen is soft.      Tenderness: There is no abdominal tenderness. There is no guarding or rebound.   Musculoskeletal:         General: No swelling, tenderness or deformity. Normal range of motion.      Cervical back: Normal range of motion and neck supple. No rigidity.   Skin:     General: Skin is warm.      Coloration: Skin is pale. Skin is not jaundiced.   Neurological:      General: No focal deficit present.      Mental Status: She is alert and oriented to person, place, and time. Mental status is at baseline.      Motor: Weakness present.      Comments: Cognitive deficits but seems baseline.   Psychiatric:         Mood and Affect: Mood normal.         Behavior: Behavior normal.         Thought Content: Thought content normal.         Judgment: Judgment normal.         Fluids    Intake/Output Summary (Last 24 hours) at 10/9/2023 1819  Last data filed at 10/9/2023 1800  Gross per 24 hour   Intake 1235.03 ml   Output  1200 ml   Net 35.03 ml         Laboratory  Recent Labs     10/08/23  0359 10/08/23  0907 10/08/23  2237 10/09/23  0317 10/09/23  1058   WBC 5.2  --   --  5.6  --    RBC 2.68*  --   --  3.07*  --    HEMOGLOBIN 7.3*   < > 9.1* 8.6* 8.6*   HEMATOCRIT 23.9*  --   --  26.9*  --    MCV 89.2  --   --  87.6  --    MCH 27.2  --   --  28.0  --    MCHC 30.5*  --   --  32.0*  --    RDW 61.4*  --   --  55.2*  --    PLATELETCT 231  --   --  205  --    MPV 9.2  --   --  9.2  --     < > = values in this interval not displayed.       Recent Labs     10/08/23  0359 10/09/23  0317   SODIUM 133* 134*   POTASSIUM 4.4 3.9   CHLORIDE 104 102   CO2 20 21   GLUCOSE 94 111*   BUN 20 16   CREATININE 0.63 0.63   CALCIUM 8.2* 7.5*       Recent Labs     10/07/23  0405 10/08/23  0359 10/09/23  0317   INR 1.75* 1.25* 1.28*                 Imaging  CT-CSPINE WITHOUT PLUS RECONS   Final Result         1. No acute fracture from C1 through T1 is visualized.   2. There is cystic and degenerative change in the odontoid process. No displaced fracture is seen.         US-EXTREMITY VENOUS LOWER BILAT   Final Result      MR-BRAIN-W/O   Final Result      1.  Punctate, acute right frontal lobe infarct.   2.  Small, remote appearing microhemorrhage that is either in the superior left cerebellum or the inferior left occipital lobe. Additional smaller remote appearing microhemorrhages in the superior cerebellum versus inferior occipital lobes.   3.  White matter disease likely representing microvascular ischemic changes.   4.  Atrophy.   5.  Questionable odontoid process fracture that may be new from CTA neck on 10/6/2023. Recommend CT cervical spine.      Findings conveyed to Dr. Penny at 10/8/2023 9:05 AM via [employer] messaging.      DX-CHEST-PORTABLE (1 VIEW)   Final Result      Cardiomegaly.      Probable mild interstitial edema.      CT-CEREBRAL PERFUSION ANALYSIS   Final Result      1.  Cerebral blood flow less than 30% likely representing completed infarct  "= 0 mL.      2.  T Max more than 6 seconds likely representing combination of completed infarct and ischemia = 0 mL.      3.  Mismatched volume likely representing ischemic brain/penumbra = None      4.  Please note that the cerebral perfusion was performed on the limited brain tissue around the basal ganglia region. Infarct/ischemia outside the CT perfusion sections can be missed in this study.      CT-CTA NECK WITH & W/O-POST PROCESSING   Final Result      1.  Severe atherosclerosis.   2.  Severe stenosis of the left carotid bifurcation and moderate proximal right ICA stenosis.   3.  Patent vertebral arteries.      CT-CTA HEAD WITH & W/O-POST PROCESS   Final Result      1.  No large vessel occlusion or aneurysm.   2.  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   3.  No acute intracranial abnormality.             Assessment/Plan  * Seizure, stroke like sx/seizure, GI bleed/anemia, CHF EF 25%, Afib/anticoag, ICA stenosis/severe PVD,  (HCC)- (present on admission)  Assessment & Plan  New onset  IV keppra loaded in the ER and 500 mg IV BID until tolerating orals  Seizure precautions  IV ativan for seizures ordered  With tongue laceration  Consider Garett's paralysis\"    MRI showed small strokes. Neuro cleared for EGD and recommended anticoag when possiblee  When Neuro cleared, stroke mgmt optimized, GI to do EGD to w/u GI bleed and evaluate when anticoagulation can be restarted. Currently Neurop cleared from a stroke persepective. Cardiology recommend no further testing and is moderate risk for cardiac evant. GI plans for EGD tomorrow or the next day  Currently no bridge but will get venous Dopplers, r/o clots and any indication to acutely anticoagulate. Currently patient has been reversed. Daily INR ordered  Supplements ordered; Body mass index is 18.77 kg/m².   EGD and colonoscopy showed bleeding vessel Jozef lesions and colon AVM. Per Dr. Holloway hold anticoagulation at least a " "week  Observe, trend Hb      Acute metabolic encephalopathy- (present on admission)  Assessment & Plan  Likely due to post-ictal state  She may require soft wrist restraints in order to maintain IV access for blood transfusion.     Coagulopathy ([facility])- (present on admission)  Assessment & Plan  INR is very elevated at 8.76 due to coumadin use.  In the setting of acute blood loss anemia  IV Vitamin K ordered   Repeat INR in the morning    Anemia due to acute blood loss- (present on admission)  Assessment & Plan  Hemoglobin is down to 6  Guaiac + stools  In the setting of INR 8.76  Transfuse one unit RBCs in the ER and serial hemoglobins ordered with transfusion for Hb less than 7  Reverse coagulopathy   PPI    Lactic acidosis- (present on admission)  Assessment & Plan  Severe lactic acidosis at 6.6  Secondary to seizure  Judicious IV fluids given her cardiomyopathy repeat lactic acid level ordered    Chronic systolic CHF (congestive heart failure), NYHA class 3 ([facility])- (present on admission)  Assessment & Plan  Echo EF 25% Dec 2022  Without exacerbation \"    BB restarted, others held because of hypotension  Restart anticoagulation when GI clears.    PAD (peripheral artery disease) ([facility])- (present on admission)  Assessment & Plan  Severe PAD   Dr. Stanford attempted a heart cath Dec 2022 and was unsuccessful due to severe arterial disease.    Chronic anticoagulation- (present on admission)  Assessment & Plan  She is on coumadin as an outpatient which will be held\"    Held and reversed  No acute anticoagulation criteria unless evidence of VTE or if MRI shows stroke with high CHADsvasc score    DNR (do not resuscitate)- (present on admission)  Assessment & Plan  As discussed with her grandson at bedside    Coronary artery disease involving coronary bypass graft of native heart without angina pectoris- (present on admission)  Assessment & Plan  Hx of  bypass    Chronic atrial fibrillation ([facility])- (present on " "admission)  Assessment & Plan  Hold coumadin  Restart Toprol 25 mg once her blood pressure improves and she is able to tolerate orals. \"    Vitals:    10/09/23 1600   BP: 130/72   Pulse: (!) 102   Resp: 17   Temp: 36.3 °C (97.3 °F)   SpO2: 92%     Stable HR  Restart BB    Essential hypertension- (present on admission)  Assessment & Plan  Hx of  Hold home BP meds due to hypotension\"    Vitals:    10/09/23 1600   BP: 130/72   Pulse: (!) 102   Resp: 17   Temp: 36.3 °C (97.3 °F)   SpO2: 92%     Stable. Restart BB first  Tachy might be from anemia and bleed, refrain from increasing BB at this point avoid hypotension         VTE prophylaxis: SCDs    I have performed a physical exam and reviewed and updated ROS and Plan today (10/9/2023). In review of yesterday's note (10/8/2023), there are no changes except as documented above.        "

## 2023-10-10 NOTE — THERAPY
Physical Therapy   Daily Treatment     Patient Name: Daya Woods  Age:  83 y.o., Sex:  female  Medical Record #: [id number]  Today's Date: 10/10/2023     Precautions  Precautions: Fall Risk;Swallow Precautions  Comments: seizure precautions    Assessment    Pt seen for PT treatment session. Pt demonstrated good functional mobility and gait. Continues to demonstrate impaired activity tolerance and increased WOB during gait despite SpO2 remaining 93-95% on RA (EF is 25%). Overall pt is functionally capable of DC home when medically cleared. Has good social support from family upon DC home.     Plan    Treatment Plan Status: Modify Current Treatment Plan  Type of Treatment: Bed Mobility, Equipment, Gait Training, Manual Therapy, Neuro Re-Education / Balance, Self Care / Home Evaluation, Stair Training, Therapeutic Activities, Therapeutic Exercise  Treatment Frequency: 4 Times per Week  Treatment Duration: Until Therapy Goals Met    DC Equipment Recommendations: None (has FWW in storage at home, family to obtain)  Discharge Recommendations:  (return home with support from family.)       10/10/23 1105   Precautions   Precautions Fall Risk;Swallow Precautions   Comments seizure precautions   Vitals   O2 Delivery Device Room air w/o2 available   Vitals Comments initally on 2L O2 however SpO2 at 99%. Ambulated on RA with SpO2 ranging from 93-95%. intermittent rest breaks and SOB noted, likely related to rEF.   Cognition    Speech/ Communication Hard of Hearing   Level of Consciousness Alert   Comments pleasant and receptive to PT. Dtr present at baseline and report her Son and DIL whom pt lives with are able to assist as needed upon DC home   Balance   Sitting Balance (Static) Good   Sitting Balance (Dynamic) Good   Standing Balance (Static) Fair   Standing Balance (Dynamic) Fair   Weight Shift Sitting Good   Weight Shift Standing Fair   Skilled Intervention Verbal Cuing   Comments w/ HHA as SPC (baseline device).    Bed Mobility    Supine to Sit Modified Independent   Sit to Supine Modified Independent   Scooting Modified Independent   Skilled Intervention Verbal Cuing   Gait Analysis   Gait Level Of Assist Standby Assist   Assistive Device Hand Held Assist   Distance (Feet) 200   # of Times Distance was Traveled 1   Deviation Trendelenberg;Decreased Base Of Support  (dec step length)   # of Stairs Climbed 0   Weight Bearing Status no restrictions   Skilled Intervention Verbal Cuing   Comments has already performed stairs on inital eval at Mod I level. Overall would benefit from FWW for energy conservation in community, SPC in home   Functional Mobility   Sit to Stand Modified Independent   Bed, Chair, Wheelchair Transfer Modified Independent   How much difficulty does the patient currently have...   Turning over in bed (including adjusting bedclothes, sheets and blankets)? 4   Sitting down on and standing up from a chair with arms (e.g., wheelchair, bedside commode, etc.) 4   Moving from lying on back to sitting on the side of the bed? 4   How much help from another person does the patient currently need...   Moving to and from a bed to a chair (including a wheelchair)? 4   Need to walk in a hospital room? 4   Climbing 3-5 steps with a railing? 3   6 clicks Mobility Score 23   Short Term Goals    Short Term Goal # 1 Pt will perform supine<>sit from flat HOB/no railng with supervision wtihi n6 visits to ensure independent mobiltiy at home.   Goal Outcome # 1 Goal met   Short Term Goal # 2 Pt will perform sit<>stand with no device and supervision within 6 visits to ensure independent mobility at home.   Goal Outcome # 2 Goal met   Short Term Goal # 3 Pt will ambulate x 150ft with FWW and supervision wihtin 6 visits to ensure household mobility.   Goal Outcome # 3 Progressing as expected  (200 ft with light HHA, expect pt to be Mod I with FWW any distance. Limited by impaired activity tolerance and EF 25%)   Physical Therapy  Treatment Plan   Physical Therapy Treatment Plan Modify Current Treatment Plan   Reason For Discharge Discharge Secondary to Goals Met   Anticipated Discharge Equipment and Recommendations   DC Equipment Recommendations None  (has FWW in storage at home, family to obtain)   Discharge Recommendations   (return home with support from family.)   Interdisciplinary Plan of Care Collaboration   IDT Collaboration with  Nursing;Family / Caregiver   Patient Position at End of Therapy Seated;In Bed;Call Light within Reach;Tray Table within Reach;Family / Friend in Room   Collaboration Comments aware of PT session, family most concerned with increased WOB and rash following keppra doses   Session Information   Date / Session Number  10/10- 2 (DC goals met)

## 2023-10-10 NOTE — PROGRESS NOTES
..Gastroenterology Progress Note               Author:  CARLOS Espinal Date & Time Created: 10/10/2023 7:17 AM       Patient ID:  Name:             Daya Woods  YOB: 1940  Age:                 83 y.o.  female  MRN:               [id number]        Medical Decision Making, by Problem:  Active Hospital Problems    Diagnosis     Seizure, stroke like sx/seizure, GI bleed/anemia, CHF EF 25%, Afib/anticoag, ICA stenosis/severe PVD,  ([facility]) [R56.9]     Lactic acidosis [E87.20]     Anemia due to acute blood loss [D62]     Coagulopathy ([facility]) [D68.9]     Acute metabolic encephalopathy [G93.41]     Chronic systolic CHF (congestive heart failure), NYHA class 3 ([facility]) [I50.22]     PAD (peripheral artery disease) ([facility]) [I73.9]     Chronic anticoagulation [Z79.01]     DNR (do not resuscitate) [Z66]     Coronary artery disease involving coronary bypass graft of native heart without angina pectoris [I25.810]     Chronic atrial fibrillation ([facility]) [I48.20]     Essential hypertension [I10]            Presenting Chief Complaint:  Anemia      Interval History:  10/11/2023: Patient seen eating breakfast. No complaints. Has not had a BM so H. Pylori not collected. Normotensive, tachycardic, Hgb 8.9  Dr. Holloway updated the family on EGD findings via telephone yesterday    10/9/2023: EGD with Dr. Holloway:  IMPRESSION:  Schatzki ring  Hiatal hernia   Two small bleeding Jozef' s ulcers  visible vessel in stomach - bleeding.   Clean based gastric ulcers in antrum        Hospital Medications:  Current Facility-Administered Medications   Medication Dose Frequency Provider Last Rate Last Admin    thiamine (Vitamin B-1) tablet 100 mg  100 mg DAILY Campos Penny M.D.   100 mg at 10/10/23 0433    multivitamin tablet 1 Tablet  1 Tablet DAILY Campos Penny M.D.   1 Tablet at 10/10/23 0434    cyanocobalamin (Vitamin B-12) tablet 1,000 mcg  1,000 mcg DAILY Campos Penny M.D.   1,000 mcg at 10/10/23 0434     metoprolol SR (Toprol XL) tablet 25 mg  25 mg Q DAY Campos Penny M.D.   25 mg at 10/10/23 0434    levETIRAcetam (Keppra) injection 500 mg  500 mg Q12HRS Darwin Daniel M.D.   500 mg at 10/10/23 0433    omeprazole (PriLOSEC) capsule 20 mg  20 mg BID Darwin Daniel M.D.   20 mg at 10/10/23 0433    LORazepam (Ativan) injection 0.5 mg  0.5 mg Q5 MIN PRN Darwin Daniel M.D.        atorvastatin (Lipitor) tablet 40 mg  40 mg DAILY Darwin Daniel M.D.   40 mg at 10/10/23 0434    levothyroxine (Synthroid) tablet 75 mcg  75 mcg AM ES Darwin Daniel M.D.   75 mcg at 10/10/23 0433    senna-docusate (Pericolace Or Senokot S) 8.6-50 MG per tablet 2 Tablet  2 Tablet BID Darwin Daniel M.D.        And    polyethylene glycol/lytes (Miralax) PACKET 1 Packet  1 Packet QDAY PRN Darwin Daniel M.D.        And    magnesium hydroxide (Milk Of Magnesia) suspension 30 mL  30 mL QDAY PRN Darwin Daniel M.D.        And    bisacodyl (Dulcolax) suppository 10 mg  10 mg QDAY PRN Darwin Daniel M.D.        acetaminophen (Tylenol) tablet 650 mg  650 mg Q6HRS PRN Darwin Daniel M.D.        haloperidol lactate (Haldol) injection 1-2 mg  1-2 mg Q4HRS PRN Darwin Daniel M.D.   1 mg at 10/06/23 1432    morphine 4 MG/ML injection 1-4 mg  1-4 mg Q2HRS PRN Darwin Daniel M.D.   4 mg at 10/06/23 1711   Last reviewed on 10/9/2023  7:45 AM by Luana Verma R.N.       Review of Systems:  Review of Systems   Constitutional:  Negative for chills, fever and malaise/fatigue.   HENT:  Negative for hearing loss.    Eyes:  Negative for blurred vision.   Respiratory:  Negative for cough and shortness of breath.    Cardiovascular:  Negative for chest pain and leg swelling.   Gastrointestinal:  Negative for abdominal pain, blood in stool, constipation, diarrhea, heartburn, melena, nausea and vomiting.   Genitourinary:  Negative for dysuria.   Musculoskeletal:  Negative for back pain.   Skin:  Negative for rash.   Neurological:  Negative for dizziness and  "weakness.   Psychiatric/Behavioral:  Negative for depression.    All other systems reviewed and are negative.        Vital signs:  Weight/BMI: Body mass index is 20.23 kg/m².  /70   Pulse (!) 104   Temp 36.6 °C (97.9 °F) (Temporal)   Resp 18   Ht 1.626 m (5' 4\")   Wt 51.8 kg (114 lb 3.2 oz)   SpO2 94%   Vitals:    10/09/23 1937 10/09/23 2327 10/10/23 0300 10/10/23 0434   BP: 124/75 137/73  117/70   Pulse: (!) 106 84  (!) 104   Resp: 18 18 18    Temp: 37 °C (98.6 °F) 36.6 °C (97.9 °F)     TempSrc: Temporal Temporal Temporal    SpO2: 98% 94%     Weight:       Height:         Oxygen Therapy:  Pulse Oximetry: 94 %, O2 (LPM): 3, O2 Delivery Device: Nasal Cannula    Intake/Output Summary (Last 24 hours) at 10/10/2023 0717  Last data filed at 10/9/2023 2000  Gross per 24 hour   Intake 500 ml   Output 0 ml   Net 500 ml         Physical Exam:  Physical Exam  Vitals and nursing note reviewed.   Constitutional:       General: She is not in acute distress.     Appearance: Normal appearance.   HENT:      Head: Normocephalic and atraumatic.      Right Ear: External ear normal.      Left Ear: External ear normal.      Nose: Nose normal.      Mouth/Throat:      Mouth: Mucous membranes are moist.      Pharynx: Oropharynx is clear.   Eyes:      General: No scleral icterus.  Cardiovascular:      Rate and Rhythm: Normal rate and regular rhythm.      Pulses: Normal pulses.      Heart sounds: Normal heart sounds.   Pulmonary:      Effort: Pulmonary effort is normal. No respiratory distress.      Breath sounds: Normal breath sounds.   Abdominal:      General: Abdomen is flat. Bowel sounds are normal. There is no distension.      Palpations: Abdomen is soft.      Tenderness: There is no abdominal tenderness.   Musculoskeletal:         General: Normal range of motion.      Cervical back: Normal range of motion.   Skin:     General: Skin is warm and dry.      Capillary Refill: Capillary refill takes less than 2 seconds. "   Neurological:      Mental Status: She is alert. Mental status is at baseline.   Psychiatric:         Mood and Affect: Mood normal.         Behavior: Behavior normal.             Labs:  Recent Labs     10/08/23  0359 10/09/23  0317 10/10/23  0325   SODIUM 133* 134* 134*   POTASSIUM 4.4 3.9 3.8   CHLORIDE 104 102 103   CO2 20 21 21   BUN 20 16 14   CREATININE 0.63 0.63 0.63   PHOSPHORUS 2.9 2.8 3.6   CALCIUM 8.2* 7.5* 7.8*     Recent Labs     10/08/23  0359 10/09/23  0317 10/10/23  0325   GLUCOSE 94 111* 98     Recent Labs     10/09/23  0317 10/09/23  2131 10/10/23  0325   WBC 5.6 6.6 7.1     Recent Labs     10/08/23  0359 10/08/23  0907 10/09/23  0317 10/09/23  1058 10/09/23  1804 10/09/23  2131 10/10/23  0325   RBC 2.68*  --  3.07*  --   --  3.09* 3.15*   HEMOGLOBIN 7.3*   < > 8.6*   < > 8.6* 8.5* 8.9*   HEMATOCRIT 23.9*  --  26.9*  --   --  27.2* 28.2*   PLATELETCT 231  --  205  --   --  202 190   PROTHROMBTM 15.8*  --  16.2*  --   --   --  15.7*   INR 1.25*  --  1.28*  --   --   --  1.23*    < > = values in this interval not displayed.     Recent Results (from the past 24 hour(s))   HGB    Collection Time: 10/09/23 10:58 AM   Result Value Ref Range    Hemoglobin 8.6 (L) 12.0 - 16.0 g/dL   HGB    Collection Time: 10/09/23  6:04 PM   Result Value Ref Range    Hemoglobin 8.6 (L) 12.0 - 16.0 g/dL   CBC WITHOUT DIFFERENTIAL    Collection Time: 10/09/23  9:31 PM   Result Value Ref Range    WBC 6.6 4.8 - 10.8 K/uL    RBC 3.09 (L) 4.20 - 5.40 M/uL    Hemoglobin 8.5 (L) 12.0 - 16.0 g/dL    Hematocrit 27.2 (L) 37.0 - 47.0 %    MCV 88.0 81.4 - 97.8 fL    MCH 27.5 27.0 - 33.0 pg    MCHC 31.3 (L) 32.2 - 35.5 g/dL    RDW 56.8 (H) 35.9 - 50.0 fL    Platelet Count 202 164 - 446 K/uL    MPV 9.1 9.0 - 12.9 fL   Prothrombin Time    Collection Time: 10/10/23  3:25 AM   Result Value Ref Range    PT 15.7 (H) 12.0 - 14.6 sec    INR 1.23 (H) 0.87 - 1.13   CBC WITHOUT DIFFERENTIAL    Collection Time: 10/10/23  3:25 AM   Result Value Ref  Range    WBC 7.1 4.8 - 10.8 K/uL    RBC 3.15 (L) 4.20 - 5.40 M/uL    Hemoglobin 8.9 (L) 12.0 - 16.0 g/dL    Hematocrit 28.2 (L) 37.0 - 47.0 %    MCV 89.5 81.4 - 97.8 fL    MCH 28.3 27.0 - 33.0 pg    MCHC 31.6 (L) 32.2 - 35.5 g/dL    RDW 57.7 (H) 35.9 - 50.0 fL    Platelet Count 190 164 - 446 K/uL    MPV 9.3 9.0 - 12.9 fL   Renal Function Panel    Collection Time: 10/10/23  3:25 AM   Result Value Ref Range    Sodium 134 (L) 135 - 145 mmol/L    Potassium 3.8 3.6 - 5.5 mmol/L    Chloride 103 96 - 112 mmol/L    Co2 21 20 - 33 mmol/L    Glucose 98 65 - 99 mg/dL    Creatinine 0.63 0.50 - 1.40 mg/dL    Bun 14 8 - 22 mg/dL    Calcium 7.8 (L) 8.5 - 10.5 mg/dL    Correct Calcium 9.0 8.5 - 10.5 mg/dL    Phosphorus 3.6 2.5 - 4.5 mg/dL    Albumin 2.5 (L) 3.2 - 4.9 g/dL   ESTIMATED GFR    Collection Time: 10/10/23  3:25 AM   Result Value Ref Range    GFR (CKD-EPI) 89 >60 mL/min/1.73 m 2       Radiology Review:  CT-CSPINE WITHOUT PLUS RECONS   Final Result         1. No acute fracture from C1 through T1 is visualized.   2. There is cystic and degenerative change in the odontoid process. No displaced fracture is seen.         US-EXTREMITY VENOUS LOWER BILAT   Final Result      MR-BRAIN-W/O   Final Result      1.  Punctate, acute right frontal lobe infarct.   2.  Small, remote appearing microhemorrhage that is either in the superior left cerebellum or the inferior left occipital lobe. Additional smaller remote appearing microhemorrhages in the superior cerebellum versus inferior occipital lobes.   3.  White matter disease likely representing microvascular ischemic changes.   4.  Atrophy.   5.  Questionable odontoid process fracture that may be new from CTA neck on 10/6/2023. Recommend CT cervical spine.      Findings conveyed to Dr. Penny at 10/8/2023 9:05 AM via [employer] messaging.      DX-CHEST-PORTABLE (1 VIEW)   Final Result      Cardiomegaly.      Probable mild interstitial edema.      CT-CEREBRAL PERFUSION ANALYSIS   Final  Result      1.  Cerebral blood flow less than 30% likely representing completed infarct = 0 mL.      2.  T Max more than 6 seconds likely representing combination of completed infarct and ischemia = 0 mL.      3.  Mismatched volume likely representing ischemic brain/penumbra = None      4.  Please note that the cerebral perfusion was performed on the limited brain tissue around the basal ganglia region. Infarct/ischemia outside the CT perfusion sections can be missed in this study.      CT-CTA NECK WITH & W/O-POST PROCESSING   Final Result      1.  Severe atherosclerosis.   2.  Severe stenosis of the left carotid bifurcation and moderate proximal right ICA stenosis.   3.  Patent vertebral arteries.      CT-CTA HEAD WITH & W/O-POST PROCESS   Final Result      1.  No large vessel occlusion or aneurysm.   2.  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   3.  No acute intracranial abnormality.            MDM (Data Review):   -Records reviewed and summarized in current documentation  -I personally reviewed and interpreted the laboratory results  -I personally reviewed the radiology images    Assessment/Assessment/Recommendations:  Anemia, suspect chronic blood loss  Stool positive for occult blood  Mild unintentional weight loss  Mild change in bowel habits  New onset seizure  Chronic anticoagulation with coagulopathy, reversed  Chronic systolic congestive heart failure, EF 25% December 2022  CAD, status post CABG  Chronic atrial fibrillation  Mitral valve repair, MitraClip    Recommendations:  PPI for three months  Follow h/h  Check for H. Pylori in the stool, if no BM in hospital may do at PCP office    GI team signing off. Please reconsult for any questions or concerns.     Core Quality Measures   Reviewed items::  Labs, Medications and Radiology reports reviewed

## 2023-10-10 NOTE — PROGRESS NOTES
Monitor Summary: Afib , MD --, QRS 0.08, QT --, with frequent PVCs, triplets, couplets and bigeminy per strip from monitor room.

## 2023-10-11 VITALS
SYSTOLIC BLOOD PRESSURE: 120 MMHG | BODY MASS INDEX: 19.42 KG/M2 | DIASTOLIC BLOOD PRESSURE: 65 MMHG | HEART RATE: 101 BPM | RESPIRATION RATE: 18 BRPM | TEMPERATURE: 97.3 F | HEIGHT: 64 IN | OXYGEN SATURATION: 95 % | WEIGHT: 113.76 LBS

## 2023-10-11 LAB
ANION GAP SERPL CALC-SCNC: 11 MMOL/L (ref 7–16)
BASOPHILS # BLD AUTO: 0.3 % (ref 0–1.8)
BASOPHILS # BLD: 0.02 K/UL (ref 0–0.12)
BUN SERPL-MCNC: 13 MG/DL (ref 8–22)
CALCIUM SERPL-MCNC: 8.1 MG/DL (ref 8.5–10.5)
CHLORIDE SERPL-SCNC: 100 MMOL/L (ref 96–112)
CO2 SERPL-SCNC: 21 MMOL/L (ref 20–33)
CREAT SERPL-MCNC: 0.72 MG/DL (ref 0.5–1.4)
EOSINOPHIL # BLD AUTO: 0.08 K/UL (ref 0–0.51)
EOSINOPHIL NFR BLD: 1.1 % (ref 0–6.9)
ERYTHROCYTE [DISTWIDTH] IN BLOOD BY AUTOMATED COUNT: 58.3 FL (ref 35.9–50)
GFR SERPLBLD CREATININE-BSD FMLA CKD-EPI: 84 ML/MIN/1.73 M 2
GLUCOSE SERPL-MCNC: 96 MG/DL (ref 65–99)
HCT VFR BLD AUTO: 28.5 % (ref 37–47)
HGB BLD-MCNC: 8.8 G/DL (ref 12–16)
IMM GRANULOCYTES # BLD AUTO: 0.03 K/UL (ref 0–0.11)
IMM GRANULOCYTES NFR BLD AUTO: 0.4 % (ref 0–0.9)
INR PPP: 1.25 (ref 0.87–1.13)
LACTATE SERPL-SCNC: 1.6 MMOL/L (ref 0.5–2)
LYMPHOCYTES # BLD AUTO: 0.85 K/UL (ref 1–4.8)
LYMPHOCYTES NFR BLD: 12 % (ref 22–41)
MAGNESIUM SERPL-MCNC: 1.8 MG/DL (ref 1.5–2.5)
MCH RBC QN AUTO: 27.8 PG (ref 27–33)
MCHC RBC AUTO-ENTMCNC: 30.9 G/DL (ref 32.2–35.5)
MCV RBC AUTO: 90.2 FL (ref 81.4–97.8)
MONOCYTES # BLD AUTO: 0.52 K/UL (ref 0–0.85)
MONOCYTES NFR BLD AUTO: 7.4 % (ref 0–13.4)
NEUTROPHILS # BLD AUTO: 5.56 K/UL (ref 1.82–7.42)
NEUTROPHILS NFR BLD: 78.8 % (ref 44–72)
NRBC # BLD AUTO: 0.04 K/UL
NRBC BLD-RTO: 0.6 /100 WBC (ref 0–0.2)
PLATELET # BLD AUTO: 196 K/UL (ref 164–446)
PMV BLD AUTO: 9.9 FL (ref 9–12.9)
POTASSIUM SERPL-SCNC: 3.9 MMOL/L (ref 3.6–5.5)
PROTHROMBIN TIME: 15.8 SEC (ref 12–14.6)
RBC # BLD AUTO: 3.16 M/UL (ref 4.2–5.4)
SODIUM SERPL-SCNC: 132 MMOL/L (ref 135–145)
WBC # BLD AUTO: 7.1 K/UL (ref 4.8–10.8)

## 2023-10-11 PROCEDURE — 85610 PROTHROMBIN TIME: CPT

## 2023-10-11 PROCEDURE — 83735 ASSAY OF MAGNESIUM: CPT

## 2023-10-11 PROCEDURE — 700102 HCHG RX REV CODE 250 W/ 637 OVERRIDE(OP): Performed by: HOSPITALIST

## 2023-10-11 PROCEDURE — 99239 HOSP IP/OBS DSCHRG MGMT >30: CPT | Performed by: STUDENT IN AN ORGANIZED HEALTH CARE EDUCATION/TRAINING PROGRAM

## 2023-10-11 PROCEDURE — 85025 COMPLETE CBC W/AUTO DIFF WBC: CPT

## 2023-10-11 PROCEDURE — A9270 NON-COVERED ITEM OR SERVICE: HCPCS | Performed by: HOSPITALIST

## 2023-10-11 PROCEDURE — 80048 BASIC METABOLIC PNL TOTAL CA: CPT

## 2023-10-11 PROCEDURE — A9270 NON-COVERED ITEM OR SERVICE: HCPCS | Performed by: INTERNAL MEDICINE

## 2023-10-11 PROCEDURE — A9270 NON-COVERED ITEM OR SERVICE: HCPCS | Performed by: STUDENT IN AN ORGANIZED HEALTH CARE EDUCATION/TRAINING PROGRAM

## 2023-10-11 PROCEDURE — 700102 HCHG RX REV CODE 250 W/ 637 OVERRIDE(OP): Performed by: STUDENT IN AN ORGANIZED HEALTH CARE EDUCATION/TRAINING PROGRAM

## 2023-10-11 PROCEDURE — 700102 HCHG RX REV CODE 250 W/ 637 OVERRIDE(OP): Performed by: INTERNAL MEDICINE

## 2023-10-11 PROCEDURE — 92526 ORAL FUNCTION THERAPY: CPT

## 2023-10-11 PROCEDURE — 83605 ASSAY OF LACTIC ACID: CPT

## 2023-10-11 RX ORDER — LANOLIN ALCOHOL/MO/W.PET/CERES
100 CREAM (GRAM) TOPICAL DAILY
Qty: 30 TABLET | Refills: 0 | Status: SHIPPED | OUTPATIENT
Start: 2023-10-12 | End: 2023-10-11 | Stop reason: SDUPTHER

## 2023-10-11 RX ORDER — METOPROLOL SUCCINATE 25 MG/1
50 TABLET, EXTENDED RELEASE ORAL
Status: DISCONTINUED | OUTPATIENT
Start: 2023-10-12 | End: 2023-10-11 | Stop reason: HOSPADM

## 2023-10-11 RX ORDER — OMEPRAZOLE 20 MG/1
20 CAPSULE, DELAYED RELEASE ORAL 2 TIMES DAILY
Qty: 60 CAPSULE | Refills: 0 | Status: SHIPPED | OUTPATIENT
Start: 2023-10-11 | End: 2023-11-10

## 2023-10-11 RX ORDER — LISINOPRIL 2.5 MG/1
5 TABLET ORAL DAILY
Qty: 60 TABLET | Refills: 0 | Status: SHIPPED | OUTPATIENT
Start: 2023-10-11 | End: 2023-10-19

## 2023-10-11 RX ORDER — LISINOPRIL 2.5 MG/1
5 TABLET ORAL DAILY
Qty: 60 TABLET | Refills: 0 | Status: SHIPPED | OUTPATIENT
Start: 2023-10-11 | End: 2023-10-11 | Stop reason: SDUPTHER

## 2023-10-11 RX ORDER — LANOLIN ALCOHOL/MO/W.PET/CERES
100 CREAM (GRAM) TOPICAL DAILY
Qty: 30 TABLET | Refills: 0 | Status: SHIPPED | OUTPATIENT
Start: 2023-10-12 | End: 2023-11-11

## 2023-10-11 RX ORDER — METOPROLOL SUCCINATE 25 MG/1
25 TABLET, EXTENDED RELEASE ORAL ONCE
Status: COMPLETED | OUTPATIENT
Start: 2023-10-11 | End: 2023-10-11

## 2023-10-11 RX ORDER — METOPROLOL SUCCINATE 50 MG/1
50 TABLET, EXTENDED RELEASE ORAL DAILY
Qty: 30 TABLET | Refills: 0 | Status: SHIPPED | OUTPATIENT
Start: 2023-10-12 | End: 2023-11-11

## 2023-10-11 RX ORDER — METOPROLOL SUCCINATE 50 MG/1
50 TABLET, EXTENDED RELEASE ORAL DAILY
Qty: 30 TABLET | Refills: 0 | Status: SHIPPED | OUTPATIENT
Start: 2023-10-12 | End: 2023-10-11 | Stop reason: SDUPTHER

## 2023-10-11 RX ORDER — OMEPRAZOLE 20 MG/1
20 CAPSULE, DELAYED RELEASE ORAL 2 TIMES DAILY
Qty: 60 CAPSULE | Refills: 0 | Status: SHIPPED | OUTPATIENT
Start: 2023-10-11 | End: 2023-10-11 | Stop reason: SDUPTHER

## 2023-10-11 RX ADMIN — METOPROLOL SUCCINATE 25 MG: 25 TABLET, EXTENDED RELEASE ORAL at 04:24

## 2023-10-11 RX ADMIN — THERA TABS 1 TABLET: TAB at 04:25

## 2023-10-11 RX ADMIN — Medication 100 MG: at 04:24

## 2023-10-11 RX ADMIN — OMEPRAZOLE 20 MG: 20 CAPSULE, DELAYED RELEASE ORAL at 04:24

## 2023-10-11 RX ADMIN — LEVOTHYROXINE SODIUM 75 MCG: 0.07 TABLET ORAL at 04:23

## 2023-10-11 RX ADMIN — METOPROLOL SUCCINATE 25 MG: 25 TABLET, EXTENDED RELEASE ORAL at 08:27

## 2023-10-11 RX ADMIN — ATORVASTATIN CALCIUM 40 MG: 40 TABLET, FILM COATED ORAL at 04:24

## 2023-10-11 RX ADMIN — CYANOCOBALAMIN TAB 500 MCG 1000 MCG: 500 TAB at 04:23

## 2023-10-11 ASSESSMENT — PAIN DESCRIPTION - PAIN TYPE: TYPE: ACUTE PAIN

## 2023-10-11 ASSESSMENT — ENCOUNTER SYMPTOMS
HEARTBURN: 0
BLOOD IN STOOL: 0
WEAKNESS: 0
DIARRHEA: 0
ABDOMINAL PAIN: 0
NAUSEA: 0
CONSTIPATION: 0
SHORTNESS OF BREATH: 0
VOMITING: 0
COUGH: 0
BLURRED VISION: 0
DIZZINESS: 0
FEVER: 0
CHILLS: 0
BACK PAIN: 0
DEPRESSION: 0

## 2023-10-11 ASSESSMENT — FIBROSIS 4 INDEX: FIB4 SCORE: 2.23

## 2023-10-11 NOTE — CARE PLAN
The patient is Stable - Low risk of patient condition declining or worsening    Shift Goals  Clinical Goals: stable neuro assessment  Patient Goals: comfort  Family Goals: speak with GI provider    Progress made toward(s) clinical / shift goals:  Patient is alert and oriented. No acute pain claimed. Walking steady with standby assist toward the bathroom. No acute change noted overnight.    Patient is not progressing towards the following goals:       3-0 Chromic on a MH x 1

## 2023-10-11 NOTE — THERAPY
"Speech Language Pathology   Daily Treatment     Patient Name: Dyaa Woods  AGE:  83 y.o., SEX:  female  Medical Record #: [id number]  Date of Service: 10/11/2023      Precautions:  Precautions: Fall Risk         Assessment  ***      Clinical Impressions  ***      Recommendations          Dysphagia Treatment  Diet Consistency: regular/thins  Instrumentation: None indicated at this time  Medication: As tolerated  Supervision: Independent  Positioning: Fully upright and midline during oral intake  Risk Management : Small bites/sips                     SLP Treatment Plan  Treatment Plan: None Indicated  SLP Frequency: N/A - Evaluation Only  Estimated Duration: N/A - Evaluation Only      Anticipated Discharge Needs  Discharge Recommendations: Anticipate that the patient will have no further speech therapy needs after discharge from the hospital  Therapy Recommendations Upon DC: Not Indicated      Patient / Family Goals  Patient / Family Goal #1: \"yes\"  Goal #1 Outcome: Goal met  Short Term Goals  Short Term Goal # 1: Pt will consume diet of SB/TN given A as needed with no overt s/sx of aspiration or worsening of lung stauts.  Goal Outcome # 1: Goal met      RUY Landry  "

## 2023-10-11 NOTE — DISCHARGE SUMMARY
Discharge Summary    CHIEF COMPLAINT ON ADMISSION  Chief Complaint   Patient presents with    Possible Stroke     Pt brought in by family for L sided deficits that they estimate started around 1:00 am this morning. Pt called family for ride to the ER this morning. On arrival pt has L sided weakness and shortly after started having a seizure in the ER lobby that lasted around 1 minute. No known hx of seizures.        Reason for Admission  Possible stroke     Admission Date  10/6/2023    CODE STATUS  Prior    HPI & HOSPITAL COURSE  Daya Woods is a 83 y.o. female with with hx of Afib on Coumadin, CAD status post CABG x 5, and HFrEF with EF 25% admitted 10/6/2023 with stroke like symptoms with L sided deficits. Experienced subsequent seizure in the ED on arrival. Loaded with Keppra at that time. Found to have a GI bleed as well as supratherapeutic INR. Noncontrast CT head negative for acute findings. CTA head/neck negative for LVO, demonstrates a left carotid severe stenosis at the bifurcation. This is asymptomatic as there was no perfusion defect and MRI brain does not demonstrate infarcts in the corresponding vascular territories. MRI brain revealed a tiny infarct in the right frontal cortex that is incidental and does not correlate to the the patient's presentation. Likely a cardioembolic phenomenon secondary to NOAC being held for GI bleed. LDL 45.   Neurology consulted, resume NOAC when cleared by GI, continue statin. Referred to stroke bridge clinic  GI consulted for GIB,  EGD showed Two small bleeding Jozef' s ulcers, visible vessel in stomach - bleeding. Clean based gastric ulcers in antrum; Schatzki ring; Hiatal hernia  Colonoscopy showed : Bleeding AVM; Pan diverticulosis  Hold OAC for 1 week per GI. GI is ok for patient to resume Asprin.   Patient has high complexity. High risks of recurrent of stroke while off warfarin, also high risks of GI bleeding once she starts warfarin. I discussed the above  risks with the patient and the daughter at bedside. They were advised to go to ER if there are signs of new GI bleeding or stroke. They agreed      Therefore, she is discharged in good and stable condition to home with organized home healthcare and close outpatient follow-up.    The patient met 2-midnight criteria for an inpatient stay at the time of discharge.    Discharge Date  10/11/2023    FOLLOW UP ITEMS POST DISCHARGE  - Follow up with primary care physician in 1 week. Recheck CBC, CMP  - Follow up with neurology as instructed    -You can continue take aspirin, please continue to take omeprazole twice daily twice a day given recent GI bleeding.  Please avoid NSAIDs.     -Continue to hold warfarin for a week from the GI bleeding.  Please discuss with your primary care if you have concerns.  Please follow-up with your primary care physician and monitor INR    - Please take the medications as instructed    - Go to the local Emergency Department if you have any worsening condition.       DISCHARGE DIAGNOSES  Principal Problem:    Seizure, stroke like sx/seizure, GI bleed/anemia, CHF EF 25%, Afib/anticoag, ICA stenosis/severe PVD,  ([facility]) (POA: Yes)  Active Problems:    Essential hypertension (POA: Yes)      Overview: Essential    Chronic atrial fibrillation ([facility]) (POA: Yes)      Overview: Managed with rate control and warfarin for anticoagulation    Coronary artery disease involving coronary bypass graft of native heart without angina pectoris (POA: Yes)    DNR (do not resuscitate) (POA: Yes)      Overview: IMO load March 2020    Chronic anticoagulation (POA: Yes)    PAD (peripheral artery disease) (HCC) (POA: Yes)    Chronic systolic CHF (congestive heart failure), NYHA class 3 ([facility]) (POA: Yes)    Lactic acidosis (POA: Yes)    Anemia due to acute blood loss (POA: Yes)    Coagulopathy (HCC) (POA: Yes)    Acute metabolic encephalopathy (POA: Yes)  Resolved Problems:    * No resolved hospital problems. *      FOLLOW  UP  Future Appointments   Date Time Provider Department Center   11/9/2023 10:15 AM LEATHA Palmer None     [facility]  [address] [place] [address]  [phone]    Follow up  Please call your primary care provider to schedule a hospital follow up. Thank you.    [facility]  [address] [place] [address]   [phone]  Go on 10/18/2023  Please arrive at 8:00 am on 10/18/2023 for ongoing heart failure treatment   [facility] on [facility], located at [address] [place] [address]   [phone]   Hours are Monday through Friday 8am-5pm. As we see patients on a first-come, first-serve basis, the wait varies depending on patient volume. We will do everything we can to get to you as soon as possible, but the wait may be upwards of three hours.   [facility] and [facility]. RTC bus routes [address] and [address] have stops on [facility], near the building on [facility].    [facility]  [address] [place] [id number]  [phone]    Follow up in 1 week(s)  Please call your primary care provider to schedule, recent hospitalization follow up, recheck lab cbc, cmp      MEDICATIONS ON DISCHARGE     Medication List        START taking these medications        Instructions   cyanocobalamin 1000 MCG Tabs  Start taking on: October 12, 2023  Commonly known as: Vitamin B12   Take 1 Tablet by mouth every day for 30 days.  Dose: 1,000 mcg     multivitamin Tabs  Start taking on: October 12, 2023   Take 1 Tablet by mouth every day for 30 days.  Dose: 1 Tablet     omeprazole 20 MG delayed-release capsule  Commonly known as: PriLOSEC   Take 1 Capsule by mouth 2 times a day for 30 days.  Dose: 20 mg     thiamine 100 MG tablet  Start taking on: October 12, 2023  Commonly known as: Thiamine   Take 1 Tablet by mouth  "every day for 30 days.  Dose: 100 mg            CHANGE how you take these medications        Instructions   lisinopril 2.5 MG Tabs  What changed:   medication strength  how much to take  how to take this  when to take this  additional instructions  Commonly known as: Prinivil   Take 2 Tablets by mouth every day for 30 days.  Dose: 5 mg     metoprolol SR 50 MG Tb24  Start taking on: October 12, 2023  What changed:   medication strength  how much to take  when to take this  additional instructions  Commonly known as: Toprol XL   Take 1 Tablet by mouth every day for 30 days.  Dose: 50 mg            CONTINUE taking these medications        Instructions   aspirin EC 81 MG Tbec  Commonly known as: Ecotrin   Take 81 mg by mouth every day.  Dose: 81 mg     atorvastatin 40 MG Tabs  Commonly known as: Lipitor   Take 1 Tablet by mouth every day.  Dose: 40 mg     furosemide 20 MG Tabs  Commonly known as: Lasix   Take 1 Tablet by mouth every day.  Dose: 20 mg     levothyroxine 75 MCG Tabs  Commonly known as: Synthroid   take 1 tablet by mouth every morning ON AN EMPTY STOMACH  Dose: 75 mcg     magnesium oxide 400 MG Tabs tablet  Commonly known as: Mag-Ox   Take 1 Tablet by mouth every day.  Dose: 400 mg     potassium chloride SA 10 MEQ Tbcr  Commonly known as: K-Dur   Take 0.5 Tablets by mouth every day.  Dose: 5 mEq     spironolactone 25 MG Tabs  Commonly known as: Aldactone   take 1/2 tablet by mouth once daily            STOP taking these medications      warfarin 5 MG Tabs  Commonly known as: Coumadin              Allergies  Allergies   Allergen Reactions    Codeine Unspecified     \"I just don't like it. It does weird things to me.\"    Hydrochlorothiazide Unspecified     Unsure of reaction    Isosorbide Mononitrate Rash     Rash    Keflex Rash     Rash    Sulfa Drugs Rash     rash    Tape Unspecified     Tears skin off    Xarelto [Rivaroxaban] Rash     rash       DIET  No orders of the defined types were placed in this " encounter.      ACTIVITY  As tolerated.  Weight bearing as tolerated    CONSULTATIONS  Gi, neurology    PROCEDURES  EGD, colonoscopy    LABORATORY  Lab Results   Component Value Date    SODIUM 132 (L) 10/11/2023    POTASSIUM 3.9 10/11/2023    CHLORIDE 100 10/11/2023    CO2 21 10/11/2023    GLUCOSE 96 10/11/2023    BUN 13 10/11/2023    CREATININE 0.72 10/11/2023        Lab Results   Component Value Date    WBC 7.1 10/11/2023    HEMOGLOBIN 8.8 (L) 10/11/2023    HEMATOCRIT 28.5 (L) 10/11/2023    PLATELETCT 196 10/11/2023        Total time of the discharge process exceeds 36 minutes.

## 2023-10-11 NOTE — PROGRESS NOTES
Discharge instructions reviewed and signed, all questions answered, PIV removed, medications sent to outside pharmacy.

## 2023-10-11 NOTE — PROGRESS NOTES
Monitor Summary: Afib , ID --, QRS 0.06, QT --, with rare PVCs, couplets, triplets and bigeminy per strip from monitor room.

## 2023-10-11 NOTE — DISCHARGE PLANNING
Patient medically cleared for DC Home.  OT recommending HHOT & shower chair.  [facility] , only agency servicing [facility], not contracted with insurance as per [place] with  agency.  DCP discussed with patient's daughter, Marely, who will f/u with PCP for OT needs and stated that she will purchase shower chair.

## 2023-10-11 NOTE — PROGRESS NOTES
Hospital Medicine Daily Progress Note    Date of Service  10/10/2023    Chief Complaint  Daya Woods is a 83 y.o. female admitted 10/6/2023 with Possible Stroke (Pt brought in by family for L sided deficits that they estimate started around 1:00 am this morning. Pt called family for ride to the ER this morning. On arrival pt has L sided weakness and shortly after started having a seizure in the ER lobby that lasted around 1 minute. No known hx of seizures. )        Hospital Course  No notes on file  Complicated patient.  Working up stroke like symptoms and seizure but also has GI bleed and anemia.   Interval Problem Update  10/7. I reviewed the chart along with vitals, labs, imaging, test (both pending and resulted) and recommendations from specialists and interdisciplinary team.  I spoke with Dr. Daniel to clarify. Patient has anemia and guaiac positive. Transfusion ordered. Patient was also reversed for a supratherapeutic INR of 8. It has gone down now to 1.75. Anticoagulation for Afib, son does not remember if she has clots but did say she has PVD. WIll order Dopplers.  MRI is still pending. Neurovascular is following appreciate their recommendations  I spoke with son and patient at length. Ultimately they do want the GI bleed to be worked up. I called and consulted Dr. Young.   Agree to EGD but will need Neuro/stroke clearance.   10/8. I spoke with Dr. Sanchez for cardiac consultation. Reviewed his recs. Had cardiac cath failure because of severe CAD so has moderate risk. Transfuse if below 7. Discussed with  Neurology. Stroke is small. Clear from Neuro perspective for EGD with anesthesia. Discussed with GI. Plan for EGD tomorrow or the next day.  I spoke and updated family and patient.   10/9. Patient had EGD and colonoscopy today.  I spoke with Dr. Holloway at length. Colon AVMs and Jozef lesions and bleeding vesselts. She noted plenty of blood. She recommended for now no anticoagulation for at least  a week. No indication right now for IR and AVM was cauterized.   I spent time with family updating and explaining the plan. Atthis time Hb stable will observe for a day or 2. If there is active bleed or symptomatic hypotension/anemia that suggest active bleed will have bleeding scan or CTA and IR. If slower bleed will update GI if rescoping needed. If patient is otherwise stable looking to do skilled.    10/10  No further bleeding, EGD on 10/9 showed bleeding AVM's and kasi lesions, treated. Hold AC for at least 1 week per GI.  Stable vitals, pulse from 70s to 101, respiratory rate 18-19 systolic blood pressure 100 139 pulse ox 95 to 99%.  Hemoglobin stable in the eights, sodium 134 normal renal function corrected calcium 9, low albumin, will check iron.   Tolerating liquid diet, advance to soft. Suspect Discharge on 10/11 if no further bleeding    I have discussed this patient's plan of care and discharge plan at IDT rounds today with Case Management, Nursing, Nursing leadership, and other members of the IDT team.    Consultants/Specialty  Neurology  GI    Code Status  DNAR/DNI    Disposition  The patient is not medically cleared for discharge to home or a post-acute facility.      I have placed the appropriate orders for post-discharge needs.    Review of Systems  Review of Systems   Unable to perform ROS: Mental acuity        Physical Exam  Temp:  [36 °C (96.8 °F)-37.1 °C (98.8 °F)] 36.5 °C (97.7 °F)  Pulse:  [] 91  Resp:  [18-19] 18  BP: (100-139)/(49-71) 100/49  SpO2:  [95 %-99 %] 96 %    Physical Exam  Vitals and nursing note reviewed.   Constitutional:       General: She is not in acute distress.     Appearance: She is not ill-appearing.      Comments: Elderly, thin   HENT:      Head: Normocephalic and atraumatic.      Right Ear: External ear normal.      Left Ear: External ear normal.      Nose: Nose normal.      Mouth/Throat:      Mouth: Mucous membranes are moist.   Eyes:      General: No scleral  icterus.     Conjunctiva/sclera: Conjunctivae normal.   Cardiovascular:      Rate and Rhythm: Normal rate and regular rhythm.      Pulses: Normal pulses.      Heart sounds: No murmur heard.     No friction rub. No gallop.   Pulmonary:      Effort: Pulmonary effort is normal. No respiratory distress.      Breath sounds: Normal breath sounds. No stridor. No wheezing, rhonchi or rales.   Chest:      Chest wall: No tenderness.   Abdominal:      General: Abdomen is flat. Bowel sounds are normal. There is no distension.      Palpations: Abdomen is soft.      Tenderness: There is no abdominal tenderness. There is no guarding or rebound.   Musculoskeletal:         General: No swelling, tenderness or deformity. Normal range of motion.      Cervical back: Normal range of motion and neck supple. No rigidity.   Skin:     General: Skin is warm.      Coloration: Skin is pale. Skin is not jaundiced.   Neurological:      General: No focal deficit present.      Mental Status: She is alert and oriented to person, place, and time. Mental status is at baseline.      Comments: Cognitive deficits but seems baseline.   Psychiatric:         Mood and Affect: Mood normal.         Behavior: Behavior normal.         Thought Content: Thought content normal.         Judgment: Judgment normal.         Fluids    Intake/Output Summary (Last 24 hours) at 10/10/2023 2346  Last data filed at 10/10/2023 1500  Gross per 24 hour   Intake 200 ml   Output --   Net 200 ml         Laboratory  Recent Labs     10/09/23  0317 10/09/23  1058 10/09/23  2131 10/10/23  0325 10/10/23  0909   WBC 5.6  --  6.6 7.1  --    RBC 3.07*  --  3.09* 3.15*  --    HEMOGLOBIN 8.6*   < > 8.5* 8.9* 8.7*   HEMATOCRIT 26.9*  --  27.2* 28.2*  --    MCV 87.6  --  88.0 89.5  --    MCH 28.0  --  27.5 28.3  --    MCHC 32.0*  --  31.3* 31.6*  --    RDW 55.2*  --  56.8* 57.7*  --    PLATELETCT 205  --  202 190  --    MPV 9.2  --  9.1 9.3  --     < > = values in this interval not displayed.        Recent Labs     10/08/23  0359 10/09/23  0317 10/10/23  0325   SODIUM 133* 134* 134*   POTASSIUM 4.4 3.9 3.8   CHLORIDE 104 102 103   CO2 20 21 21   GLUCOSE 94 111* 98   BUN 20 16 14   CREATININE 0.63 0.63 0.63   CALCIUM 8.2* 7.5* 7.8*       Recent Labs     10/08/23  0359 10/09/23  0317 10/10/23  0325   INR 1.25* 1.28* 1.23*                 Imaging  CT-CSPINE WITHOUT PLUS RECONS   Final Result         1. No acute fracture from C1 through T1 is visualized.   2. There is cystic and degenerative change in the odontoid process. No displaced fracture is seen.         US-EXTREMITY VENOUS LOWER BILAT   Final Result      MR-BRAIN-W/O   Final Result      1.  Punctate, acute right frontal lobe infarct.   2.  Small, remote appearing microhemorrhage that is either in the superior left cerebellum or the inferior left occipital lobe. Additional smaller remote appearing microhemorrhages in the superior cerebellum versus inferior occipital lobes.   3.  White matter disease likely representing microvascular ischemic changes.   4.  Atrophy.   5.  Questionable odontoid process fracture that may be new from CTA neck on 10/6/2023. Recommend CT cervical spine.      Findings conveyed to Dr. Penny at 10/8/2023 9:05 AM via Vo[employer] messaging.      DX-CHEST-PORTABLE (1 VIEW)   Final Result      Cardiomegaly.      Probable mild interstitial edema.      CT-CEREBRAL PERFUSION ANALYSIS   Final Result      1.  Cerebral blood flow less than 30% likely representing completed infarct = 0 mL.      2.  T Max more than 6 seconds likely representing combination of completed infarct and ischemia = 0 mL.      3.  Mismatched volume likely representing ischemic brain/penumbra = None      4.  Please note that the cerebral perfusion was performed on the limited brain tissue around the basal ganglia region. Infarct/ischemia outside the CT perfusion sections can be missed in this study.      CT-CTA NECK WITH & W/O-POST PROCESSING   Final Result      1.   "Severe atherosclerosis.   2.  Severe stenosis of the left carotid bifurcation and moderate proximal right ICA stenosis.   3.  Patent vertebral arteries.      CT-CTA HEAD WITH & W/O-POST PROCESS   Final Result      1.  No large vessel occlusion or aneurysm.   2.  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   3.  No acute intracranial abnormality.             Assessment/Plan  * Seizure, stroke like sx/seizure, GI bleed/anemia, CHF EF 25%, Afib/anticoag, ICA stenosis/severe PVD,  (HCC)- (present on admission)  Assessment & Plan  New onset  IV keppra loaded in the ER and 500 mg IV BID until tolerating orals  Seizure precautions  IV ativan for seizures ordered  With tongue laceration  Consider Garett's paralysis\"    MRI showed small strokes. Neuro cleared for EGD and recommended anticoag when possiblee  When Neuro cleared, stroke mgmt optimized, GI to do EGD to w/u GI bleed and evaluate when anticoagulation can be restarted. Currently Neurop cleared from a stroke persepective. Cardiology recommend no further testing and is moderate risk for cardiac evant. GI plans for EGD tomorrow or the next day  Currently no bridge but will get venous Dopplers, r/o clots and any indication to acutely anticoagulate. Currently patient has been reversed. Daily INR ordered  Supplements ordered; Body mass index is 18.77 kg/m².   EGD and colonoscopy showed bleeding vessel Jozef lesions and colon AVM. Per Dr. Holloway hold anticoagulation at least a week  Observe, trend Hb      Acute metabolic encephalopathy- (present on admission)  Assessment & Plan  Likely due to post-ictal state  She may require soft wrist restraints in order to maintain IV access for blood transfusion.     Coagulopathy (HCC)- (present on admission)  Assessment & Plan  INR is very elevated at 8.76 due to coumadin use.  In the setting of acute blood loss anemia  IV Vitamin K ordered   Repeat INR in the morning    Anemia due to acute blood " "loss- (present on admission)  Assessment & Plan  Hemoglobin is down to 6  Guaiac + stools  In the setting of INR 8.76  Transfuse one unit RBCs in the ER and serial hemoglobins ordered with transfusion for Hb less than 7  Reverse coagulopathy   PPI    Lactic acidosis- (present on admission)  Assessment & Plan  Severe lactic acidosis at 6.6  Secondary to seizure  Judicious IV fluids given her cardiomyopathy repeat lactic acid level ordered    Chronic systolic CHF (congestive heart failure), NYHA class 3 ([facility])- (present on admission)  Assessment & Plan  Echo EF 25% Dec 2022  Without exacerbation \"    BB restarted, others held because of hypotension  Restart anticoagulation when GI clears.    PAD (peripheral artery disease) ([facility])- (present on admission)  Assessment & Plan  Severe PAD   Dr. Stanford attempted a heart cath Dec 2022 and was unsuccessful due to severe arterial disease.    Chronic anticoagulation- (present on admission)  Assessment & Plan  She is on coumadin as an outpatient which will be held\"    Held and reversed  No acute anticoagulation criteria unless evidence of VTE or if MRI shows stroke with high CHADsvasc score    DNR (do not resuscitate)- (present on admission)  Assessment & Plan  As discussed with her grandson at bedside    Coronary artery disease involving coronary bypass graft of native heart without angina pectoris- (present on admission)  Assessment & Plan  Hx of  bypass    Chronic atrial fibrillation ([facility])- (present on admission)  Assessment & Plan  Hold coumadin  Restart Toprol 25 mg once her blood pressure improves and she is able to tolerate orals. \"    Vitals:    10/09/23 1600   BP: 130/72   Pulse: (!) 102   Resp: 17   Temp: 36.3 °C (97.3 °F)   SpO2: 92%     Stable HR  Restart BB    Essential hypertension- (present on admission)  Assessment & Plan  Hx of  Hold home BP meds due to hypotension\"    Vitals:    10/09/23 1600   BP: 130/72   Pulse: (!) 102   Resp: 17   Temp: 36.3 °C (97.3 °F) "   SpO2: 92%     Stable. Restart BB first  Tachy might be from anemia and bleed, refrain from increasing BB at this point avoid hypotension         VTE prophylaxis: SCDs    I have performed a physical exam and reviewed and updated ROS and Plan today (10/10/2023). In review of yesterday's note (10/9/2023), there are no changes except as documented above.    Patient is has a high medical complexity, complex decision making and is at high risk for complication, morbidity, and mortality, thus requiring the highest level of my preparedness for sudden, emergent intervention. Medical decision making is therefore complex. I provided  services, which included ordering labs and/or imaging, and discussing the case with various consultants.medication orders, frequent reevaluations of the patient's condition and response to treatment. Time was also devoted to counseling and coordinating care including review of records, pertinent lab data and studies, as well as discussing diagnostic evaluation and work up, planned therapeutic interventions and future disposition of care. Where indicated, the assessment and plan reflect discussion of patient with consultants, other healthcare providers, family members, and additional research needed to obtain further information in formulating the plan of care for Daya Woods. Total time spent was 52 minutes.

## 2023-10-11 NOTE — DISCHARGE INSTRUCTIONS
Ischemic Stroke  Discharge Instructions    You experienced an Ischemic Stroke.  Ischemic stroke is the most common type of stroke and happens when an artery in the brain becomes blocked by a plaque fragment or blood clot. Typically, these blockages travel from the heart or larger arteries that supply the brain.  The brain needs a constant supply of blood, which carries oxygen and nutrients it needs to function.  A stroke occurs when one of these arteries to the brain is either blocked or bursts. As a result, part of the brain does not get the blood it needs, so it starts to die.     Treatment Received:  Mechanical Thrombectomy    You had a Mechanical Thrombectomy.  A Mechanical Thrombectomy is a procedure to remove blood clots from the brain after an ischemic stroke.  The procedure involves making a small incision in the groin, and threading thin tubes (catheters) through your blood vessels to the clot. A tiny device at the catheter's tip grabs the clot and removes it, restoring blood flow to the brain.    Home Care Instructions:    Catheter insertion site care  If you have a bandage, make sure you:  Wash your hands with soap and water for at least 20 seconds before and after you change your bandage. If you cannot use soap and water, use hand .  Change your bandage as told by your doctor.  Check the site every day for signs of infection. Check for:  More redness, swelling, or pain.  Fluid or blood.  Warmth.  Pus or a bad smell.  Activity  Do not lift anything that is heavier than 5 lb (2.3 kg)  Rest  For the first 2-3 days after your procedure, or as long as told:  Try not to climb stairs.  Do not squat.  Return to your normal activities when your provider says that it is safe.  Get up to take short walks every 1 to 2 hours. Ask for help if you feel weak or unsteady.  Do exercises as told by your doctor.    General instructions  Take over-the-counter and prescription medicines as directed by your  "provider.  Do not smoke or use any products that contain nicotine or tobacco.   Keep all follow-up visits.    Contact your provider if:  You have more redness, swelling, or pain around the site where the catheter was put in.  You have a fever.    Stroke Risk Factors    You are at increased risk of having another stroke event.  See your Patient Stroke Guide to help reduce your stroke risk. These are your specific risk factors:  Age - Over 55  Atrial Fibrillation     Get help right away if you have any signs of a stroke.  \"BE FAST\" is an easy way to remember the main warning signs of a stroke:  B - Balance. Dizziness, sudden trouble walking, or loss of balance.  E - Eyes. Trouble seeing or a change in how you see.  F - Face. Sudden weakness or loss of feeling in the face. The face or eyelid may droop on one side.  A - Arms. Weakness or loss of feeling in an arm. This happens all of a sudden and most often on one side of the body.  S - Speech. Sudden trouble speaking, slurred speech, or trouble understanding what people say.  T - Time. Time to call emergency services. Write down what time symptoms started.      - Follow up with primary care physician in 1 week. Recheck CBC, CMP  - Follow up with neurology as instructed    -You can continue take aspirin, please continue to take omeprazole twice daily twice a day given recent GI bleeding.  Please avoid NSAIDs.     -Continue to hold warfarin for a week from the GI bleeding.  Please discuss with your primary care if you have concerns.  Please follow-up with your primary care physician and monitor INR    - Please take the medications as instructed    - Go to the local Emergency Department if you have any worsening condition.     "

## 2023-10-11 NOTE — PROGRESS NOTES
Monitor Summary: Afib  QRS 0.08 with frequent PVCs, couplets,4/5 beats of VT per strip from monitor room.

## 2023-10-13 ENCOUNTER — TELEPHONE (OUTPATIENT)
Dept: CARDIOLOGY | Facility: MEDICAL CENTER | Age: 83
End: 2023-10-13
Payer: COMMERCIAL

## 2023-10-13 DIAGNOSIS — I50.23 ACUTE ON CHRONIC SYSTOLIC CHF (CONGESTIVE HEART FAILURE) (HCC): ICD-10-CM

## 2023-10-13 NOTE — TELEPHONE ENCOUNTER
"Returned call. Marely not available, spoke to grandsimin Moreno who was with patient. Swelling in ankles started yesterday. Today seems better but not resolved. Josh reports patient has more swelling throughout body, and patient reports feeling \"puffy\" all over. No \"pitting edema\" noted per Josh.SOB symptoms noted with extra activity not at rest. Josh states this is normal for her however states patient is \"over-doing it\" since discharge from hospital. He also reports patient is not drinking enough water and states urine output is \"a dribble\" per patient. Patient had 16 ounces yesterday. No VS available and patient does not weigh herself daily. Patient unable to start warfarin until cleared by GI next week due to GI bleed. No other changes noted to medications. Educated on fluid intake and interventions to help LE swelling such as leg elevation. ER precautions given for worsening symptoms.     JM: Please review above and advise if any recommendations or med changes for increased swelling and decreased urination. Recent discharge from hospital for GI bleed. Thank you.   "

## 2023-10-13 NOTE — TELEPHONE ENCOUNTER
"    Caller: Marely Woods (daughter)    Topic/issue: MEDICAL ADVICE    Per Marely;    Daya had a GI & colon bleed and she was just discharged from [facility] on Wednesday. Marely states that last night patient was complaining about the swelling of her legs and now she just looks over all \"puffy\".  Daya is not allowed to restart her WARFARIN until Monday but being in CHF Marely is concerned about the swelling. Please advise.    Thank you,  Yasmani DUNNE    Callback Number: [phone]    "

## 2023-10-13 NOTE — TELEPHONE ENCOUNTER
Called and spoke to Josh. Relayed below recommendations from SERAFIN. They were agreeable and will continue to monitor patient. Advised of same day clinic-Patient lives in [place] and they would prefer to stay local and not come into town unless urgently needed. They will call next week and let us know how she is doing.     Lab orders faxed to [facility]. They will get labs done next Weds with her other INR labs.

## 2023-10-13 NOTE — TELEPHONE ENCOUNTER
DC Summary reviewed:  Daya Woods is a 83 y.o. female with with hx of Afib on Coumadin, CAD status post CABG x 5, and HFrEF with EF 25% admitted 10/6/2023 with stroke like symptoms with L sided deficits. Experienced subsequent seizure in the ED on arrival. Loaded with Keppra at that time. Found to have a GI bleed as well as supratherapeutic INR. Noncontrast CT head negative for acute findings. CTA head/neck negative for LVO, demonstrates a left carotid severe stenosis at the bifurcation. This is asymptomatic as there was no perfusion defect and MRI brain does not demonstrate infarcts in the corresponding vascular territories. MRI brain revealed a tiny infarct in the right frontal cortex that is incidental and does not correlate to the the patient's presentation. Likely a cardioembolic phenomenon secondary to NOAC being held for GI bleed. LDL 45.   Neurology consulted, resume NOAC when cleared by GI, continue statin. Referred to stroke bridge clinic  GI consulted for GIB,  EGD showed Two small bleeding Jozef' s ulcers, visible vessel in stomach - bleeding. Clean based gastric ulcers in antrum; Schatzki ring; Hiatal hernia  Colonoscopy showed : Bleeding AVM; Pan diverticulosis  Hold OAC for 1 week per GI. GI is ok for patient to resume Asprin.   Patient has high complexity. High risks of recurrent of stroke while off warfarin, also high risks of GI bleeding once she starts warfarin. I discussed the above risks with the patient and the daughter at bedside. They were advised to go to ER if there are signs of new GI bleeding or stroke. They agreed        She was restarted on her daily lasix 20mg PO daily. Please have her take one extra dose of lasix 20mg PO today (this afternoon along with an extra 5mEq of potassium), and then back to her normal dose of 20mg PO Daily. She can take another extra 20mg PO dose of lasix and kcl 5mEq PO every day as needed if her swelling gets worse, and then next week get a BMP,  magnesium, CBC without diff.     Keep her f/u with JA in a couple weeks and let's call in Monday and see if she needs to come in for a Same Day clinic appointment for CHF. Thank you!

## 2023-10-13 NOTE — DOCUMENTATION QUERY
[facility]                                                                       Query Response Note      PATIENT:               CARROLL JAMES  ACCT #:                  [id number]  MRN:                     [id number]  :                      1940  ADMIT DATE:       10/6/2023 9:36 AM  DISCH DATE:        10/11/2023 11:50 AM  RESPONDING  PROVIDER #:        [id number]           QUERY TEXT:    Based on the findings above please specify if there is a correlating diagnosis or if the lab values are insignificant.          The patient's Clinical Indicators include:  Findings: 10/6-10/9 Epic Labs: 10/6 sodium: 129 10/8 sodium 133 10/9 sodium 134    Treatment Labs monitor IV fluids    Risk Factors: home medications: lasix 20mg spironolactone 25 mg       Marley Tarango RN BSN  Clinical Documentation   [email or web address]  Connect via [email or web address] Experience  Options provided:   -- Hyponatremia clinically significant   -- Lab values not clinically significant   -- Other explanation, (please specify other explanation)      Query created by: Marley Tovar on 10/9/2023 7:32 AM    RESPONSE TEXT:    Lab values not clinically significant          Electronically signed by:  HERLINDA MARRERO MD 10/12/2023 8:54 PM

## 2023-10-19 ENCOUNTER — APPOINTMENT (OUTPATIENT)
Dept: RADIOLOGY | Facility: MEDICAL CENTER | Age: 83
DRG: 291 | End: 2023-10-19
Attending: EMERGENCY MEDICINE
Payer: COMMERCIAL

## 2023-10-19 ENCOUNTER — HOSPITAL ENCOUNTER (INPATIENT)
Facility: MEDICAL CENTER | Age: 83
LOS: 8 days | DRG: 291 | End: 2023-10-27
Attending: EMERGENCY MEDICINE | Admitting: STUDENT IN AN ORGANIZED HEALTH CARE EDUCATION/TRAINING PROGRAM
Payer: COMMERCIAL

## 2023-10-19 ENCOUNTER — TELEPHONE (OUTPATIENT)
Dept: CARDIOLOGY | Facility: MEDICAL CENTER | Age: 83
End: 2023-10-19

## 2023-10-19 DIAGNOSIS — E87.1 HYPONATREMIA: ICD-10-CM

## 2023-10-19 DIAGNOSIS — D62 ANEMIA DUE TO ACUTE BLOOD LOSS: ICD-10-CM

## 2023-10-19 DIAGNOSIS — L89.152 PRESSURE ULCER OF COCCYGEAL REGION, STAGE II (HCC): ICD-10-CM

## 2023-10-19 DIAGNOSIS — I50.9 OTHER CONGESTIVE HEART FAILURE (HCC): ICD-10-CM

## 2023-10-19 DIAGNOSIS — D68.9 COAGULOPATHY (HCC): ICD-10-CM

## 2023-10-19 DIAGNOSIS — I50.22 CHRONIC SYSTOLIC CHF (CONGESTIVE HEART FAILURE), NYHA CLASS 3 (HCC): ICD-10-CM

## 2023-10-19 DIAGNOSIS — I50.43 CHF (CONGESTIVE HEART FAILURE), NYHA CLASS I, ACUTE ON CHRONIC, COMBINED (HCC): ICD-10-CM

## 2023-10-19 PROBLEM — R79.89 ELEVATED TROPONIN: Status: ACTIVE | Noted: 2023-10-19

## 2023-10-19 PROBLEM — J90 PLEURAL EFFUSION: Status: ACTIVE | Noted: 2023-10-19

## 2023-10-19 PROBLEM — E87.20 LACTIC ACIDOSIS: Status: RESOLVED | Noted: 2023-10-06 | Resolved: 2023-10-19

## 2023-10-19 PROBLEM — D72.829 LEUKOCYTOSIS: Status: ACTIVE | Noted: 2023-10-19

## 2023-10-19 PROBLEM — J96.01 ACUTE HYPOXIC RESPIRATORY FAILURE (HCC): Status: ACTIVE | Noted: 2023-10-19

## 2023-10-19 PROBLEM — N30.00 ACUTE CYSTITIS WITHOUT HEMATURIA: Status: ACTIVE | Noted: 2023-10-19

## 2023-10-19 LAB
ALBUMIN SERPL BCP-MCNC: 2.4 G/DL (ref 3.2–4.9)
ALBUMIN/GLOB SERPL: 0.8 G/DL
ALP SERPL-CCNC: 99 U/L (ref 30–99)
ALT SERPL-CCNC: 17 U/L (ref 2–50)
ANION GAP SERPL CALC-SCNC: 9 MMOL/L (ref 7–16)
APPEARANCE UR: ABNORMAL
AST SERPL-CCNC: 29 U/L (ref 12–45)
BACTERIA #/AREA URNS HPF: ABNORMAL /HPF
BASOPHILS # BLD AUTO: 0.1 % (ref 0–1.8)
BASOPHILS # BLD: 0.01 K/UL (ref 0–0.12)
BILIRUB SERPL-MCNC: 0.5 MG/DL (ref 0.1–1.5)
BILIRUB UR QL STRIP.AUTO: NEGATIVE
BUN SERPL-MCNC: 24 MG/DL (ref 8–22)
CALCIUM ALBUM COR SERPL-MCNC: 9.1 MG/DL (ref 8.5–10.5)
CALCIUM SERPL-MCNC: 7.8 MG/DL (ref 8.5–10.5)
CHLORIDE SERPL-SCNC: 92 MMOL/L (ref 96–112)
CO2 SERPL-SCNC: 23 MMOL/L (ref 20–33)
COLOR UR: YELLOW
CREAT SERPL-MCNC: 1.22 MG/DL (ref 0.5–1.4)
EKG IMPRESSION: NORMAL
EOSINOPHIL # BLD AUTO: 0 K/UL (ref 0–0.51)
EOSINOPHIL NFR BLD: 0 % (ref 0–6.9)
EPI CELLS #/AREA URNS HPF: NEGATIVE /HPF
ERYTHROCYTE [DISTWIDTH] IN BLOOD BY AUTOMATED COUNT: 56.2 FL (ref 35.9–50)
GFR SERPLBLD CREATININE-BSD FMLA CKD-EPI: 44 ML/MIN/1.73 M 2
GLOBULIN SER CALC-MCNC: 3 G/DL (ref 1.9–3.5)
GLUCOSE SERPL-MCNC: 101 MG/DL (ref 65–99)
GLUCOSE UR STRIP.AUTO-MCNC: NEGATIVE MG/DL
HCT VFR BLD AUTO: 27.7 % (ref 37–47)
HGB BLD-MCNC: 8.7 G/DL (ref 12–16)
HYALINE CASTS #/AREA URNS LPF: ABNORMAL /LPF
IMM GRANULOCYTES # BLD AUTO: 0.08 K/UL (ref 0–0.11)
IMM GRANULOCYTES NFR BLD AUTO: 0.4 % (ref 0–0.9)
INR PPP: 1.2 (ref 0.87–1.13)
KETONES UR STRIP.AUTO-MCNC: NEGATIVE MG/DL
LEUKOCYTE ESTERASE UR QL STRIP.AUTO: ABNORMAL
LYMPHOCYTES # BLD AUTO: 0.36 K/UL (ref 1–4.8)
LYMPHOCYTES NFR BLD: 2 % (ref 22–41)
MAGNESIUM SERPL-MCNC: 2.2 MG/DL (ref 1.5–2.5)
MCH RBC QN AUTO: 26.9 PG (ref 27–33)
MCHC RBC AUTO-ENTMCNC: 31.4 G/DL (ref 32.2–35.5)
MCV RBC AUTO: 85.5 FL (ref 81.4–97.8)
MICRO URNS: ABNORMAL
MONOCYTES # BLD AUTO: 0.73 K/UL (ref 0–0.85)
MONOCYTES NFR BLD AUTO: 4 % (ref 0–13.4)
NEUTROPHILS # BLD AUTO: 16.9 K/UL (ref 1.82–7.42)
NEUTROPHILS NFR BLD: 93.5 % (ref 44–72)
NITRITE UR QL STRIP.AUTO: NEGATIVE
NRBC # BLD AUTO: 0 K/UL
NRBC BLD-RTO: 0 /100 WBC (ref 0–0.2)
NT-PROBNP SERPL IA-MCNC: ABNORMAL PG/ML (ref 0–125)
PH UR STRIP.AUTO: 5.5 [PH] (ref 5–8)
PLATELET # BLD AUTO: 185 K/UL (ref 164–446)
PMV BLD AUTO: 9.9 FL (ref 9–12.9)
POTASSIUM SERPL-SCNC: 4.7 MMOL/L (ref 3.6–5.5)
PROT SERPL-MCNC: 5.4 G/DL (ref 6–8.2)
PROT UR QL STRIP: 30 MG/DL
PROTHROMBIN TIME: 15.3 SEC (ref 12–14.6)
RBC # BLD AUTO: 3.24 M/UL (ref 4.2–5.4)
RBC # URNS HPF: ABNORMAL /HPF
RBC UR QL AUTO: ABNORMAL
SODIUM SERPL-SCNC: 124 MMOL/L (ref 135–145)
SP GR UR STRIP.AUTO: 1.01
TROPONIN T SERPL-MCNC: 102 NG/L (ref 6–19)
TROPONIN T SERPL-MCNC: 107 NG/L (ref 6–19)
UROBILINOGEN UR STRIP.AUTO-MCNC: 0.2 MG/DL
WBC # BLD AUTO: 18.1 K/UL (ref 4.8–10.8)
WBC #/AREA URNS HPF: ABNORMAL /HPF

## 2023-10-19 PROCEDURE — 71045 X-RAY EXAM CHEST 1 VIEW: CPT

## 2023-10-19 PROCEDURE — 81001 URINALYSIS AUTO W/SCOPE: CPT

## 2023-10-19 PROCEDURE — 87186 SC STD MICRODIL/AGAR DIL: CPT

## 2023-10-19 PROCEDURE — 85610 PROTHROMBIN TIME: CPT

## 2023-10-19 PROCEDURE — 93005 ELECTROCARDIOGRAM TRACING: CPT | Performed by: EMERGENCY MEDICINE

## 2023-10-19 PROCEDURE — 96375 TX/PRO/DX INJ NEW DRUG ADDON: CPT

## 2023-10-19 PROCEDURE — 96374 THER/PROPH/DIAG INJ IV PUSH: CPT

## 2023-10-19 PROCEDURE — 83880 ASSAY OF NATRIURETIC PEPTIDE: CPT

## 2023-10-19 PROCEDURE — 85025 COMPLETE CBC W/AUTO DIFF WBC: CPT

## 2023-10-19 PROCEDURE — 87077 CULTURE AEROBIC IDENTIFY: CPT

## 2023-10-19 PROCEDURE — 99223 1ST HOSP IP/OBS HIGH 75: CPT | Mod: AI | Performed by: STUDENT IN AN ORGANIZED HEALTH CARE EDUCATION/TRAINING PROGRAM

## 2023-10-19 PROCEDURE — 99285 EMERGENCY DEPT VISIT HI MDM: CPT

## 2023-10-19 PROCEDURE — 700111 HCHG RX REV CODE 636 W/ 250 OVERRIDE (IP): Mod: JZ | Performed by: EMERGENCY MEDICINE

## 2023-10-19 PROCEDURE — 80053 COMPREHEN METABOLIC PANEL: CPT

## 2023-10-19 PROCEDURE — 770001 HCHG ROOM/CARE - MED/SURG/GYN PRIV*

## 2023-10-19 PROCEDURE — 87086 URINE CULTURE/COLONY COUNT: CPT

## 2023-10-19 PROCEDURE — 36415 COLL VENOUS BLD VENIPUNCTURE: CPT

## 2023-10-19 PROCEDURE — 93005 ELECTROCARDIOGRAM TRACING: CPT

## 2023-10-19 PROCEDURE — 84484 ASSAY OF TROPONIN QUANT: CPT

## 2023-10-19 PROCEDURE — 83735 ASSAY OF MAGNESIUM: CPT

## 2023-10-19 RX ORDER — ATORVASTATIN CALCIUM 40 MG/1
40 TABLET, FILM COATED ORAL DAILY
Status: DISCONTINUED | OUTPATIENT
Start: 2023-10-20 | End: 2023-10-20

## 2023-10-19 RX ORDER — METOPROLOL SUCCINATE 25 MG/1
50 TABLET, EXTENDED RELEASE ORAL DAILY
Status: DISCONTINUED | OUTPATIENT
Start: 2023-10-20 | End: 2023-10-27 | Stop reason: HOSPADM

## 2023-10-19 RX ORDER — SPIRONOLACTONE 25 MG/1
12.5 TABLET ORAL DAILY
Status: DISCONTINUED | OUTPATIENT
Start: 2023-10-20 | End: 2023-10-27 | Stop reason: HOSPADM

## 2023-10-19 RX ORDER — LISINOPRIL 2.5 MG/1
2.5 TABLET ORAL 2 TIMES DAILY
COMMUNITY

## 2023-10-19 RX ORDER — WARFARIN SODIUM 5 MG/1
5 TABLET ORAL DAILY
Status: DISCONTINUED | OUTPATIENT
Start: 2023-10-20 | End: 2023-10-20

## 2023-10-19 RX ORDER — ONDANSETRON 4 MG/1
4 TABLET, ORALLY DISINTEGRATING ORAL EVERY 4 HOURS PRN
Status: DISCONTINUED | OUTPATIENT
Start: 2023-10-19 | End: 2023-10-27 | Stop reason: HOSPADM

## 2023-10-19 RX ORDER — AMOXICILLIN 250 MG
2 CAPSULE ORAL 2 TIMES DAILY
Status: DISCONTINUED | OUTPATIENT
Start: 2023-10-20 | End: 2023-10-27 | Stop reason: HOSPADM

## 2023-10-19 RX ORDER — WARFARIN SODIUM 5 MG/1
5 TABLET ORAL DAILY
Status: ON HOLD | COMMUNITY
End: 2023-10-27 | Stop reason: SDUPTHER

## 2023-10-19 RX ORDER — FUROSEMIDE 10 MG/ML
80 INJECTION INTRAMUSCULAR; INTRAVENOUS
Status: DISCONTINUED | OUTPATIENT
Start: 2023-10-20 | End: 2023-10-19

## 2023-10-19 RX ORDER — ACETAMINOPHEN 325 MG/1
650 TABLET ORAL EVERY 6 HOURS PRN
Status: DISCONTINUED | OUTPATIENT
Start: 2023-10-19 | End: 2023-10-27 | Stop reason: HOSPADM

## 2023-10-19 RX ORDER — ONDANSETRON 2 MG/ML
4 INJECTION INTRAMUSCULAR; INTRAVENOUS EVERY 4 HOURS PRN
Status: DISCONTINUED | OUTPATIENT
Start: 2023-10-19 | End: 2023-10-27 | Stop reason: HOSPADM

## 2023-10-19 RX ORDER — BISACODYL 10 MG
10 SUPPOSITORY, RECTAL RECTAL
Status: DISCONTINUED | OUTPATIENT
Start: 2023-10-19 | End: 2023-10-27 | Stop reason: HOSPADM

## 2023-10-19 RX ORDER — LEVOTHYROXINE SODIUM 0.07 MG/1
75 TABLET ORAL
Status: DISCONTINUED | OUTPATIENT
Start: 2023-10-20 | End: 2023-10-27 | Stop reason: HOSPADM

## 2023-10-19 RX ORDER — FUROSEMIDE 20 MG/1
20 TABLET ORAL 2 TIMES DAILY
COMMUNITY
End: 2023-11-03 | Stop reason: SDUPTHER

## 2023-10-19 RX ORDER — FUROSEMIDE 10 MG/ML
80 INJECTION INTRAMUSCULAR; INTRAVENOUS ONCE
Status: COMPLETED | OUTPATIENT
Start: 2023-10-19 | End: 2023-10-19

## 2023-10-19 RX ORDER — POLYETHYLENE GLYCOL 3350 17 G/17G
1 POWDER, FOR SOLUTION ORAL
Status: DISCONTINUED | OUTPATIENT
Start: 2023-10-19 | End: 2023-10-27 | Stop reason: HOSPADM

## 2023-10-19 RX ORDER — POTASSIUM CHLORIDE 750 MG/1
5 TABLET, EXTENDED RELEASE ORAL 2 TIMES DAILY
COMMUNITY

## 2023-10-19 RX ORDER — OMEPRAZOLE 20 MG/1
20 CAPSULE, DELAYED RELEASE ORAL 2 TIMES DAILY
Status: DISCONTINUED | OUTPATIENT
Start: 2023-10-20 | End: 2023-10-27 | Stop reason: HOSPADM

## 2023-10-19 RX ORDER — ACETAMINOPHEN 500 MG
500 TABLET ORAL
COMMUNITY

## 2023-10-19 RX ORDER — FUROSEMIDE 10 MG/ML
40 INJECTION INTRAMUSCULAR; INTRAVENOUS
Status: DISCONTINUED | OUTPATIENT
Start: 2023-10-20 | End: 2023-10-21

## 2023-10-19 RX ORDER — CEFTRIAXONE 2 G/1
2000 INJECTION, POWDER, FOR SOLUTION INTRAMUSCULAR; INTRAVENOUS ONCE
Status: COMPLETED | OUTPATIENT
Start: 2023-10-19 | End: 2023-10-19

## 2023-10-19 RX ORDER — LISINOPRIL 5 MG/1
2.5 TABLET ORAL 2 TIMES DAILY
Status: DISCONTINUED | OUTPATIENT
Start: 2023-10-20 | End: 2023-10-27 | Stop reason: HOSPADM

## 2023-10-19 RX ORDER — ASPIRIN 81 MG/1
81 TABLET ORAL DAILY
Status: DISCONTINUED | OUTPATIENT
Start: 2023-10-20 | End: 2023-10-27 | Stop reason: HOSPADM

## 2023-10-19 RX ADMIN — CEFTRIAXONE SODIUM 2000 MG: 2 INJECTION, POWDER, FOR SOLUTION INTRAMUSCULAR; INTRAVENOUS at 22:50

## 2023-10-19 RX ADMIN — FUROSEMIDE 80 MG: 10 INJECTION INTRAMUSCULAR; INTRAVENOUS at 20:52

## 2023-10-19 ASSESSMENT — ENCOUNTER SYMPTOMS
DIARRHEA: 0
HEMOPTYSIS: 0
SPUTUM PRODUCTION: 0
SHORTNESS OF BREATH: 1
ABDOMINAL PAIN: 0
DIZZINESS: 0
WHEEZING: 0
BACK PAIN: 0
CHILLS: 0
TINGLING: 0
DEPRESSION: 0
HEADACHES: 0
NECK PAIN: 0
NAUSEA: 0
FEVER: 0
PALPITATIONS: 0
COUGH: 0
ORTHOPNEA: 0
VOMITING: 0
BRUISES/BLEEDS EASILY: 0

## 2023-10-19 ASSESSMENT — FIBROSIS 4 INDEX: FIB4 SCORE: 2.16

## 2023-10-19 ASSESSMENT — LIFESTYLE VARIABLES: SUBSTANCE_ABUSE: 0

## 2023-10-19 NOTE — ED TRIAGE NOTES
"Chief Complaint   Patient presents with    Sent by MD     Recent admission here.     Family reports she has declined in the past week with low BPs.  She is fatigued, sleeping constantly, poor PO intake.  Increased leg swelling and cough. Hx of CHF.     Weakness    Leg Swelling    Cough     Pt to triage via w/c for above.   Has been off of warfarin since admission. Had one dose last night. Hasn't had INR checked.     /62   Pulse (!) 104   Temp 36.3 °C (97.4 °F) (Temporal)   Resp 14   Ht 1.626 m (5' 4\")   Wt 49.9 kg (110 lb)   LMP  (LMP Unknown)   SpO2 98%   BMI 18.88 kg/m²     "

## 2023-10-19 NOTE — TELEPHONE ENCOUNTER
SERAFIN     Caller: Genesis Woods - patients daughter     Topic/issue: Patient daughter called because while in the hospital they had the patient go off her Warfarin medication until Monday. She saw her primary on Monday who advised her to continue to stay off the medication. She had some lab work done and that doctor advised the patient to reach out to her cardiologist.     Patient's blood pressure is low, she has been very fatugued, and experiancing swelling, but no other symptoms.     Please advise.     Callback Number: The patients daughter would like a call at [phone] or her son Josh at [phone]       Thank you,    Ashley DENNIS

## 2023-10-19 NOTE — TELEPHONE ENCOUNTER
Called and spoke with both Genesis and Josh,  Josh is with patient.  Patient had a procedure recently and had some complication including a bleed.   Patient had been holding warfarin and was told to follow up with PCP Monday.  Patient was told to continue holding warfarin due to not having INR results.     Patient has declined in the past week. Grandson reports blood pressures of 60s/30s, 70s/40s and 90s/60s.  Patient is very fatigued, sleeping constantly and has poor intake.  PCP recommended patient follow up with cardiology or go to ED.  According to patients daughter, patients labs from yesterday show her kidney function has greatly decreased.  Daughter requested labs be faxed to our office    Based off patient's symptoms, vitals and complex situation I advised patient go to ED for evaluation and treatment, grandson verbalized understanding.    JM- please let me know if you have further recommendations

## 2023-10-20 ENCOUNTER — APPOINTMENT (OUTPATIENT)
Dept: CARDIOLOGY | Facility: MEDICAL CENTER | Age: 83
DRG: 291 | End: 2023-10-20
Attending: STUDENT IN AN ORGANIZED HEALTH CARE EDUCATION/TRAINING PROGRAM
Payer: COMMERCIAL

## 2023-10-20 ENCOUNTER — APPOINTMENT (OUTPATIENT)
Dept: RADIOLOGY | Facility: MEDICAL CENTER | Age: 83
DRG: 291 | End: 2023-10-20
Payer: COMMERCIAL

## 2023-10-20 LAB
ALBUMIN SERPL BCP-MCNC: 2 G/DL (ref 3.2–4.9)
ALBUMIN/GLOB SERPL: 0.7 G/DL
ALP SERPL-CCNC: 89 U/L (ref 30–99)
ALT SERPL-CCNC: 17 U/L (ref 2–50)
ANION GAP SERPL CALC-SCNC: 11 MMOL/L (ref 7–16)
AST SERPL-CCNC: 26 U/L (ref 12–45)
BILIRUB SERPL-MCNC: 0.5 MG/DL (ref 0.1–1.5)
BUN SERPL-MCNC: 26 MG/DL (ref 8–22)
CALCIUM ALBUM COR SERPL-MCNC: 9.2 MG/DL (ref 8.5–10.5)
CALCIUM SERPL-MCNC: 7.6 MG/DL (ref 8.5–10.5)
CHLORIDE SERPL-SCNC: 94 MMOL/L (ref 96–112)
CHOLEST SERPL-MCNC: 60 MG/DL (ref 100–199)
CO2 SERPL-SCNC: 22 MMOL/L (ref 20–33)
CREAT SERPL-MCNC: 1.26 MG/DL (ref 0.5–1.4)
ERYTHROCYTE [DISTWIDTH] IN BLOOD BY AUTOMATED COUNT: 55.2 FL (ref 35.9–50)
GFR SERPLBLD CREATININE-BSD FMLA CKD-EPI: 43 ML/MIN/1.73 M 2
GLOBULIN SER CALC-MCNC: 2.9 G/DL (ref 1.9–3.5)
GLUCOSE SERPL-MCNC: 103 MG/DL (ref 65–99)
HCT VFR BLD AUTO: 26.7 % (ref 37–47)
HDLC SERPL-MCNC: 19 MG/DL
HGB BLD-MCNC: 8.5 G/DL (ref 12–16)
LDLC SERPL CALC-MCNC: 27 MG/DL
MCH RBC QN AUTO: 27 PG (ref 27–33)
MCHC RBC AUTO-ENTMCNC: 31.8 G/DL (ref 32.2–35.5)
MCV RBC AUTO: 84.8 FL (ref 81.4–97.8)
PLATELET # BLD AUTO: 175 K/UL (ref 164–446)
PMV BLD AUTO: 9.8 FL (ref 9–12.9)
POTASSIUM SERPL-SCNC: 4.5 MMOL/L (ref 3.6–5.5)
PROT SERPL-MCNC: 4.9 G/DL (ref 6–8.2)
RBC # BLD AUTO: 3.15 M/UL (ref 4.2–5.4)
SODIUM SERPL-SCNC: 127 MMOL/L (ref 135–145)
TRIGL SERPL-MCNC: 69 MG/DL (ref 0–149)
WBC # BLD AUTO: 17.8 K/UL (ref 4.8–10.8)

## 2023-10-20 PROCEDURE — 80061 LIPID PANEL: CPT

## 2023-10-20 PROCEDURE — 80053 COMPREHEN METABOLIC PANEL: CPT

## 2023-10-20 PROCEDURE — 700102 HCHG RX REV CODE 250 W/ 637 OVERRIDE(OP): Performed by: STUDENT IN AN ORGANIZED HEALTH CARE EDUCATION/TRAINING PROGRAM

## 2023-10-20 PROCEDURE — 99232 SBSQ HOSP IP/OBS MODERATE 35: CPT | Performed by: INTERNAL MEDICINE

## 2023-10-20 PROCEDURE — 99222 1ST HOSP IP/OBS MODERATE 55: CPT | Performed by: INTERNAL MEDICINE

## 2023-10-20 PROCEDURE — 700111 HCHG RX REV CODE 636 W/ 250 OVERRIDE (IP): Performed by: STUDENT IN AN ORGANIZED HEALTH CARE EDUCATION/TRAINING PROGRAM

## 2023-10-20 PROCEDURE — A9270 NON-COVERED ITEM OR SERVICE: HCPCS

## 2023-10-20 PROCEDURE — 770001 HCHG ROOM/CARE - MED/SURG/GYN PRIV*

## 2023-10-20 PROCEDURE — 51702 INSERT TEMP BLADDER CATH: CPT

## 2023-10-20 PROCEDURE — 700102 HCHG RX REV CODE 250 W/ 637 OVERRIDE(OP)

## 2023-10-20 PROCEDURE — 36415 COLL VENOUS BLD VENIPUNCTURE: CPT

## 2023-10-20 PROCEDURE — A9270 NON-COVERED ITEM OR SERVICE: HCPCS | Performed by: STUDENT IN AN ORGANIZED HEALTH CARE EDUCATION/TRAINING PROGRAM

## 2023-10-20 PROCEDURE — 303105 HCHG CATHETER EXTRA

## 2023-10-20 PROCEDURE — 85027 COMPLETE CBC AUTOMATED: CPT

## 2023-10-20 RX ORDER — GUAIFENESIN 200 MG/10ML
10 LIQUID ORAL EVERY 4 HOURS PRN
Status: DISCONTINUED | OUTPATIENT
Start: 2023-10-20 | End: 2023-10-27 | Stop reason: HOSPADM

## 2023-10-20 RX ORDER — WARFARIN SODIUM 5 MG/1
5 TABLET ORAL DAILY
Status: DISCONTINUED | OUTPATIENT
Start: 2023-10-21 | End: 2023-10-24

## 2023-10-20 RX ADMIN — LISINOPRIL 2.5 MG: 5 TABLET ORAL at 05:32

## 2023-10-20 RX ADMIN — FUROSEMIDE 40 MG: 10 INJECTION, SOLUTION INTRAVENOUS at 09:00

## 2023-10-20 RX ADMIN — CEFTRIAXONE SODIUM 1000 MG: 10 INJECTION, POWDER, FOR SOLUTION INTRAVENOUS at 18:39

## 2023-10-20 RX ADMIN — OMEPRAZOLE 20 MG: 20 CAPSULE, DELAYED RELEASE ORAL at 17:42

## 2023-10-20 RX ADMIN — SPIRONOLACTONE 12.5 MG: 25 TABLET ORAL at 05:33

## 2023-10-20 RX ADMIN — ACETAMINOPHEN 650 MG: 325 TABLET, FILM COATED ORAL at 20:26

## 2023-10-20 RX ADMIN — DOCUSATE SODIUM 50 MG AND SENNOSIDES 8.6 MG 2 TABLET: 8.6; 5 TABLET, FILM COATED ORAL at 05:16

## 2023-10-20 RX ADMIN — DOCUSATE SODIUM 50 MG AND SENNOSIDES 8.6 MG 2 TABLET: 8.6; 5 TABLET, FILM COATED ORAL at 17:42

## 2023-10-20 RX ADMIN — GUAIFENESIN 200 MG: 100 SOLUTION ORAL at 05:48

## 2023-10-20 RX ADMIN — LEVOTHYROXINE SODIUM 75 MCG: 0.07 TABLET ORAL at 05:17

## 2023-10-20 RX ADMIN — WARFARIN SODIUM 5 MG: 5 TABLET ORAL at 05:18

## 2023-10-20 RX ADMIN — GUAIFENESIN 200 MG: 100 SOLUTION ORAL at 20:26

## 2023-10-20 RX ADMIN — GUAIFENESIN 200 MG: 100 SOLUTION ORAL at 10:46

## 2023-10-20 RX ADMIN — ACETAMINOPHEN 650 MG: 325 TABLET, FILM COATED ORAL at 03:05

## 2023-10-20 RX ADMIN — OMEPRAZOLE 20 MG: 20 CAPSULE, DELAYED RELEASE ORAL at 05:16

## 2023-10-20 RX ADMIN — ASPIRIN 81 MG: 81 TABLET, COATED ORAL at 05:16

## 2023-10-20 RX ADMIN — ATORVASTATIN CALCIUM 40 MG: 40 TABLET, FILM COATED ORAL at 05:18

## 2023-10-20 RX ADMIN — METOPROLOL SUCCINATE 50 MG: 50 TABLET, EXTENDED RELEASE ORAL at 05:18

## 2023-10-20 RX ADMIN — Medication 1 LOZENGE: at 21:22

## 2023-10-20 ASSESSMENT — LIFESTYLE VARIABLES
AVERAGE NUMBER OF DAYS PER WEEK YOU HAVE A DRINK CONTAINING ALCOHOL: 1
TOTAL SCORE: 0
EVER FELT BAD OR GUILTY ABOUT YOUR DRINKING: NO
EVER HAD A DRINK FIRST THING IN THE MORNING TO STEADY YOUR NERVES TO GET RID OF A HANGOVER: NO
ALCOHOL_USE: NO
ON A TYPICAL DAY WHEN YOU DRINK ALCOHOL HOW MANY DRINKS DO YOU HAVE: 1
SUBSTANCE_ABUSE: 0
TOTAL SCORE: 0
TOTAL SCORE: 0
HAVE PEOPLE ANNOYED YOU BY CRITICIZING YOUR DRINKING: NO
CONSUMPTION TOTAL: NEGATIVE
DOES PATIENT WANT TO STOP DRINKING: NO
HOW MANY TIMES IN THE PAST YEAR HAVE YOU HAD 5 OR MORE DRINKS IN A DAY: 0
HAVE YOU EVER FELT YOU SHOULD CUT DOWN ON YOUR DRINKING: NO

## 2023-10-20 ASSESSMENT — COGNITIVE AND FUNCTIONAL STATUS - GENERAL
TOILETING: A LITTLE
CLIMB 3 TO 5 STEPS WITH RAILING: A LITTLE
MOVING FROM LYING ON BACK TO SITTING ON SIDE OF FLAT BED: A LITTLE
SUGGESTED CMS G CODE MODIFIER MOBILITY: CK
WALKING IN HOSPITAL ROOM: A LITTLE
MOBILITY SCORE: 19
SUGGESTED CMS G CODE MODIFIER DAILY ACTIVITY: CI
STANDING UP FROM CHAIR USING ARMS: A LITTLE
DAILY ACTIVITIY SCORE: 23
MOVING TO AND FROM BED TO CHAIR: A LITTLE

## 2023-10-20 ASSESSMENT — CHA2DS2 SCORE
SEX: FEMALE
AGE 65 TO 74: NO
HYPERTENSION: YES
VASCULAR DISEASE: YES
CHF OR LEFT VENTRICULAR DYSFUNCTION: YES
CHA2DS2 VASC SCORE: 8
DIABETES: NO
PRIOR STROKE OR TIA OR THROMBOEMBOLISM: YES
AGE 75 OR GREATER: YES

## 2023-10-20 ASSESSMENT — PATIENT HEALTH QUESTIONNAIRE - PHQ9
2. FEELING DOWN, DEPRESSED, IRRITABLE, OR HOPELESS: NOT AT ALL
1. LITTLE INTEREST OR PLEASURE IN DOING THINGS: NOT AT ALL
SUM OF ALL RESPONSES TO PHQ9 QUESTIONS 1 AND 2: 0
SUM OF ALL RESPONSES TO PHQ9 QUESTIONS 1 AND 2: 0
1. LITTLE INTEREST OR PLEASURE IN DOING THINGS: NOT AT ALL
2. FEELING DOWN, DEPRESSED, IRRITABLE, OR HOPELESS: NOT AT ALL

## 2023-10-20 ASSESSMENT — ENCOUNTER SYMPTOMS
VOMITING: 0
ABDOMINAL PAIN: 0
CHILLS: 0
NECK PAIN: 0
WHEEZING: 0
BRUISES/BLEEDS EASILY: 0
BACK PAIN: 0
DIZZINESS: 0
SHORTNESS OF BREATH: 1
TINGLING: 0
NAUSEA: 0
ORTHOPNEA: 0
SPUTUM PRODUCTION: 0
PALPITATIONS: 0
HEADACHES: 0
HEMOPTYSIS: 0
FEVER: 0
COUGH: 0
DEPRESSION: 0
DIARRHEA: 0

## 2023-10-20 ASSESSMENT — PAIN DESCRIPTION - PAIN TYPE
TYPE: ACUTE PAIN;CHRONIC PAIN
TYPE: ACUTE PAIN

## 2023-10-20 ASSESSMENT — FIBROSIS 4 INDEX: FIB4 SCORE: 2.92

## 2023-10-20 NOTE — ED NOTES
Med rec complete per patients daughter/grandson over phone  Allergies reviewed.   Family denies any outpatient antibiotics in the last 30 days.   Anticoagulants taken in the last 14 days? Yes   Anticoagulant: Warfarin 5 mg, Last dose: 10/18/23 @ 2020.     Patient was advised by MD to change Lisinopril 2.5 mg BID to ONCE daily in the AM, Metoprolol Succinate 50 mg QPM to 25 mg (1/2 tablet) QPM and to restart Warfarin 5 mg daily at her appointment today (10/19/23)     Preferred pharmacy for this visit - [facility] in [place], JOE Newton CPhT

## 2023-10-20 NOTE — ASSESSMENT & PLAN NOTE
Resolved    10/21: UCx's with preliminary growth of pansensitive E.Coli  Course of Ceftriaxone completed this admission

## 2023-10-20 NOTE — ED PROVIDER NOTES
ED Provider Note    CHIEF COMPLAINT  Chief Complaint   Patient presents with    Sent by MD     Recent admission here.     Family reports she has declined in the past week with low BPs.  She is fatigued, sleeping constantly, poor PO intake.  Increased leg swelling and cough. Hx of CHF.     Weakness    Leg Swelling    Cough       EXTERNAL RECORDS REVIEWED  Outpatient Notes   [facility] [place], cardiology    HPI/ROS  LIMITATION TO HISTORY   Select: : None  OUTSIDE HISTORIAN(S):  Family      Daya Woods is a 83 y.o. female who presents here for evaluation of fatigue, intermittent shortness of breath, and leg swelling.  Patient is here with family, who states that she has been getting progressively more weak and increased swelling over the last few days.  Patient has no fever chills or vomiting, and no chest pain.  Patient has no abdominal pain.    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    FAMILY HISTORY  Family History   Problem Relation Age of Onset    Heart Attack Brother 55        heart attack       SOCIAL HISTORY  Social History     Tobacco Use    Smoking status: Former     Current packs/day: 0.00     Average packs/day: 0.5 packs/day for 40.0 years (20.0 ttl pk-yrs)     Types: Cigarettes     Start date: 1962     Quit date: 2002     Years since quittin.6    Smokeless tobacco: Never   Vaping Use    Vaping Use: Never used   Substance and Sexual Activity    Alcohol use: Yes     Comment: occ    Drug use: No    Sexual activity: Not on file       CURRENT MEDICATIONS  Home Medications       Reviewed by Reji Mar R.N. (Registered Nurse) on 10/19/23 at 1616  Med List Status: Partial     Medication Last Dose Status   aspirin EC (ECOTRIN) 81 MG Tablet Delayed Response  Active   atorvastatin (LIPITOR) 40 MG Tab  Active   cyanocobalamin (VITAMIN B12) 1000 MCG Tab  Active   furosemide (LASIX) 20 MG Tab  Active   levothyroxine (SYNTHROID) 75 MCG Tab  Active   lisinopril (PRINIVIL) 2.5 MG Tab  Active   magnesium oxide (MAG-OX) 400 MG Tab tablet  Active   metoprolol SR (TOPROL XL) 50 MG TABLET SR 24 HR  Active   multivitamin Tab  Active   omeprazole (PRILOSEC) 20 MG delayed-release capsule  Active   potassium chloride SA (K-DUR) 10 MEQ Tab CR  Active   spironolactone (ALDACTONE) 25 MG Tab  Active   thiamine (THIAMINE) 100 MG tablet  Active                    ALLERGIES  Allergies   Allergen Reactions    Codeine Unspecified     \"I just don't like it. It does weird things to me.\"    Hydrochlorothiazide Unspecified     Unsure of reaction    Isosorbide Mononitrate Rash     Rash    Keflex Rash     Rash    Sulfa Drugs Rash     rash    Tape Unspecified     Tears skin off    Xarelto [Rivaroxaban] Rash     rash       PHYSICAL EXAM  VITAL SIGNS: /66   Pulse (!) 109   " "Temp 36.3 °C (97.4 °F) (Temporal)   Resp 15   Ht 1.626 m (5' 4\")   Wt 49.9 kg (110 lb)   LMP  (LMP Unknown)   SpO2 95%   BMI 18.88 kg/m²    Constitutional: Elderly appearing, mild distress  HEENT: Normocephalic, atraumatic. Posterior pharynx clear and dry  Eyes:  EOMI. Normal sclera.  Neck: Supple, Full range of motion, nontender.  Chest/Pulmonary: clear to ausculation. Symmetrical expansion.   Cardio: Regular rate and rhythm with no murmur.   Abdomen: Soft, nontender. No peritoneal signs. No guarding. No palpable masses.  Musculoskeletal: No deformity, no edema, neurovascular intact.   Neuro: Clear speech, appropriate, cooperative, cranial nerves II-XII grossly intact.  Psych: Normal mood and affect      DIAGNOSTIC STUDIES / PROCEDURES  Results for orders placed or performed during the hospital encounter of 10/19/23   CBC with Differential   Result Value Ref Range    WBC 18.1 (H) 4.8 - 10.8 K/uL    RBC 3.24 (L) 4.20 - 5.40 M/uL    Hemoglobin 8.7 (L) 12.0 - 16.0 g/dL    Hematocrit 27.7 (L) 37.0 - 47.0 %    MCV 85.5 81.4 - 97.8 fL    MCH 26.9 (L) 27.0 - 33.0 pg    MCHC 31.4 (L) 32.2 - 35.5 g/dL    RDW 56.2 (H) 35.9 - 50.0 fL    Platelet Count 185 164 - 446 K/uL    MPV 9.9 9.0 - 12.9 fL    Neutrophils-Polys 93.50 (H) 44.00 - 72.00 %    Lymphocytes 2.00 (L) 22.00 - 41.00 %    Monocytes 4.00 0.00 - 13.40 %    Eosinophils 0.00 0.00 - 6.90 %    Basophils 0.10 0.00 - 1.80 %    Immature Granulocytes 0.40 0.00 - 0.90 %    Nucleated RBC 0.00 0.00 - 0.20 /100 WBC    Neutrophils (Absolute) 16.90 (H) 1.82 - 7.42 K/uL    Lymphs (Absolute) 0.36 (L) 1.00 - 4.80 K/uL    Monos (Absolute) 0.73 0.00 - 0.85 K/uL    Eos (Absolute) 0.00 0.00 - 0.51 K/uL    Baso (Absolute) 0.01 0.00 - 0.12 K/uL    Immature Granulocytes (abs) 0.08 0.00 - 0.11 K/uL    NRBC (Absolute) 0.00 K/uL   Complete Metabolic Panel (CMP)   Result Value Ref Range    Sodium 124 (L) 135 - 145 mmol/L    Potassium 4.7 3.6 - 5.5 mmol/L    Chloride 92 (L) 96 - 112 mmol/L "    Co2 23 20 - 33 mmol/L    Anion Gap 9.0 7.0 - 16.0    Glucose 101 (H) 65 - 99 mg/dL    Bun 24 (H) 8 - 22 mg/dL    Creatinine 1.22 0.50 - 1.40 mg/dL    Calcium 7.8 (L) 8.5 - 10.5 mg/dL    Correct Calcium 9.1 8.5 - 10.5 mg/dL    AST(SGOT) 29 12 - 45 U/L    ALT(SGPT) 17 2 - 50 U/L    Alkaline Phosphatase 99 30 - 99 U/L    Total Bilirubin 0.5 0.1 - 1.5 mg/dL    Albumin 2.4 (L) 3.2 - 4.9 g/dL    Total Protein 5.4 (L) 6.0 - 8.2 g/dL    Globulin 3.0 1.9 - 3.5 g/dL    A-G Ratio 0.8 g/dL   Troponins NOW   Result Value Ref Range    Troponin T 107 (H) 6 - 19 ng/L   Troponins in two (2) hours   Result Value Ref Range    Troponin T 102 (H) 6 - 19 ng/L   ESTIMATED GFR   Result Value Ref Range    GFR (CKD-EPI) 44 (A) >60 mL/min/1.73 m 2   proBrain Natriuretic Peptide, NT   Result Value Ref Range    NT-proBNP 11592 (H) 0 - 125 pg/mL   PT/INR   Result Value Ref Range    PT 15.3 (H) 12.0 - 14.6 sec    INR 1.20 (H) 0.87 - 1.13   URINALYSIS,CULTURE IF INDICATED    Specimen: Urine, Clean Catch   Result Value Ref Range    Color Yellow     Character Cloudy (A)     Specific Gravity 1.012 <1.035    Ph 5.5 5.0 - 8.0    Glucose Negative Negative mg/dL    Ketones Negative Negative mg/dL    Protein 30 (A) Negative mg/dL    Bilirubin Negative Negative    Urobilinogen, Urine 0.2 Negative    Nitrite Negative Negative    Leukocyte Esterase Large (A) Negative    Occult Blood Small (A) Negative    Micro Urine Req Microscopic    URINE MICROSCOPIC (W/UA)   Result Value Ref Range    WBC Packed (A) /hpf    RBC 2-5 (A) /hpf    Bacteria Many (A) None /hpf    Epithelial Cells Negative /hpf    Hyaline Cast 0-2 /lpf   Magnesium   Result Value Ref Range    Magnesium 2.2 1.5 - 2.5 mg/dL   EKG   Result Value Ref Range    Report       [facility] Emergency Dept[facility]    Test Date:  2023-10-19  Pt Name:    CARROLL JAMES                 Department: ER  MRN:        [id number]                      Room:  Gender:     Female                        Technician: [id number]  :        [id number]                   Requested By:ER TRIAGE PROTOCOL  Order #:    [id number]                    Reading MD:    Measurements  Intervals                                Axis  Rate:       96                           P:          0  ND:         0                            QRS:        218  QRSD:       115                          T:          17  QT:         352  QTc:        445    Interpretive Statements  Atrial fibrillation  Nonspecific intraventricular conduction delay  Anterior infarct, old  Compared to ECG 10/06/2023 09:41:15  Intraventricular conduction delay now present  Myocardial infarct finding now present  Left bundle-branch block no longer present  Left ventricular hypertrophy no longer present  Q waves no longer present           RADIOLOGY  I have independently interpreted the diagnostic imaging associated with this visit and am waiting the final reading from the radiologist.   My preliminary interpretation is as follows: see below  Radiologist interpretation:   DX-CHEST-PORTABLE (1 VIEW)   Final Result         1. Small left pleural effusion.   2. No pulmonary infiltrates or consolidations are noted.      EC-ECHOCARDIOGRAM COMPLETE W/O CONT    (Results Pending)         COURSE & MEDICAL DECISION MAKING    Patient will be admitted to the hospitalist service for further evaluation and treatment.  Patient has BNP of over 25,000, is given IV Lasix here.  In addition her white blood cell count is 18,000, so she was given Rocephin here as well.  Patient will be admitted in guarded condition.    CRITICAL CARE  The very real possibility of a deterioration of this patient's condition required the highest level of my preparedness for sudden, emergent intervention.  I provided critical care services, which included medication orders, frequent reevaluations of the patient's condition and response to treatment, ordering and reviewing test results, and discussing the case with various  consultants.  The critical care time associated with the care of the patient was 36 minutes. Review chart for interventions. This time is exclusive of any other billable procedures.       INITIAL ASSESSMENT, COURSE AND PLAN  Care Narrative: See above    DISPOSITION AND DISCUSSIONS  I have discussed management of the patient with the following physicians and JOSIAS's: Hospitalist service        FINAL DIAGNOSIS  1. Other congestive heart failure (HCC)    2.      UTI  3.       Critical care time 36 minutes.        Electronically signed by: Gerson Retana D.O., 10/19/2023 7:58 PM

## 2023-10-20 NOTE — PROGRESS NOTES
[facility] Internal Medicine Daily Progress Note    Date of Service  10/20/2023    [facility] Team: [facility] IM Blue Team   Attending: Helga Ortiz M.d.  Senior Resident: Dr. Verma  Intern:  Dr. Miles  Contact Number: [phone]    Chief Complaint  Daya Woods is a 83 y.o. female admitted 10/19/2023 with SOB and LE edema.    Hospital Course  Daya Woods is a 83 y.o. female with past medical history of atrial fibrillation on anticoagulation, hypertension, hyperlipidemia, CHF with ejection fraction of 25%, recent hospital admission for possible stroke versus seizure, which was complicated by GI bleed  who presented 10/19/2023 with shortness of breath and lower extremity edema. In the ED, labs were significant for leukocytosis of 18,000, BNP greater than 26,000, elevated troponin of 107 and on repeat 102.  Chest x-ray did reveal some small left-sided pleural effusions. She received 80mg IV dose of Lasix during ED workup. Upon admission 10/20/23, patient's home medications were resumed and echocardiogram was ordered for heart failure exacerbation. Cultures were drawn and IV Ceftriaxone was administered for acute cystitis. Elevated troponins found on admission were suspected secondary to persistent Afib and they were followed until stable. Cardiology was consulted for medical optimization in setting of heart failure, discussed plan for continued diuresis.     Interval Problem Update  Today, patient says she largely at her baseline health regarding her shortness of breath. She denies chest pain, leg swelling, dysuria, abdominal tenderness, hematuria. She does complain of a sore buttocks after sitting in hospital bed since admission.     Discussed patient's care with Cardiology team, who saw patient at bedside.     I have discussed this patient's plan of care and discharge plan at IDT rounds today with Case Management, Nursing, Nursing leadership, and other members of the IDT  team.    Consultants/Specialty  cardiology    Code Status  DNAR/DNI    Disposition  The patient is not medically cleared for discharge to home or a post-acute facility.      I have placed the appropriate orders for post-discharge needs.    Review of Systems  Review of Systems   Constitutional:  Negative for chills and fever.   HENT:  Negative for ear pain, hearing loss and tinnitus.    Respiratory:  Positive for shortness of breath. Negative for cough, hemoptysis, sputum production and wheezing.    Cardiovascular:  Negative for chest pain, palpitations, orthopnea and leg swelling.   Gastrointestinal:  Negative for abdominal pain, diarrhea, nausea and vomiting.   Genitourinary:  Negative for frequency, hematuria and urgency.   Musculoskeletal:  Negative for back pain and neck pain.   Neurological:  Negative for dizziness, tingling and headaches.   Endo/Heme/Allergies:  Negative for environmental allergies. Does not bruise/bleed easily.   Psychiatric/Behavioral:  Negative for depression, substance abuse and suicidal ideas.         Physical Exam  Temp:  [36.1 °C (97 °F)-37 °C (98.6 °F)] 36.1 °C (97 °F)  Pulse:  [] 99  Resp:  [13-30] 16  BP: ()/(43-75) 89/44  SpO2:  [88 %-100 %] 93 %    Physical Exam  Constitutional:       General: She is not in acute distress.     Appearance: Normal appearance. She is normal weight. She is not ill-appearing, toxic-appearing or diaphoretic.   HENT:      Head: Normocephalic and atraumatic.      Nose: Nose normal.      Mouth/Throat:      Mouth: Mucous membranes are moist.   Eyes:      Extraocular Movements: Extraocular movements intact.      Pupils: Pupils are equal, round, and reactive to light.   Cardiovascular:      Rate and Rhythm: Normal rate and regular rhythm.      Pulses: Normal pulses.      Heart sounds: Normal heart sounds. No murmur heard.     No friction rub. No gallop.   Pulmonary:      Effort: Pulmonary effort is normal. No respiratory distress.      Breath  sounds: No stridor. No wheezing, rhonchi or rales.   Chest:      Chest wall: No tenderness.   Abdominal:      General: Abdomen is flat. There is no distension.      Palpations: Abdomen is soft. There is no mass.      Tenderness: There is no abdominal tenderness. There is no right CVA tenderness, left CVA tenderness, guarding or rebound.      Hernia: No hernia is present.   Musculoskeletal:         General: No swelling, tenderness, deformity or signs of injury.      Right lower leg: Edema present.      Left lower leg: Edema present.      Comments:      Skin:     General: Skin is warm and dry.      Capillary Refill: Capillary refill takes less than 2 seconds.      Coloration: Skin is not jaundiced or pale.      Findings: No bruising, erythema, lesion or rash.   Neurological:      General: No focal deficit present.      Mental Status: She is alert and oriented to person, place, and time. Mental status is at baseline.      Cranial Nerves: No cranial nerve deficit.      Sensory: No sensory deficit.      Motor: Weakness present.      Coordination: Coordination normal.   Psychiatric:         Mood and Affect: Mood normal.         Behavior: Behavior normal.         Fluids    Intake/Output Summary (Last 24 hours) at 10/20/2023 1348  Last data filed at 10/20/2023 0817  Gross per 24 hour   Intake 1040 ml   Output 600 ml   Net 440 ml       Laboratory  Recent Labs     10/19/23  1628 10/20/23  0039   WBC 18.1* 17.8*   RBC 3.24* 3.15*   HEMOGLOBIN 8.7* 8.5*   HEMATOCRIT 27.7* 26.7*   MCV 85.5 84.8   MCH 26.9* 27.0   MCHC 31.4* 31.8*   RDW 56.2* 55.2*   PLATELETCT 185 175   MPV 9.9 9.8     Recent Labs     10/19/23  1628 10/20/23  0039   SODIUM 124* 127*   POTASSIUM 4.7 4.5   CHLORIDE 92* 94*   CO2 23 22   GLUCOSE 101* 103*   BUN 24* 26*   CREATININE 1.22 1.26   CALCIUM 7.8* 7.6*     Recent Labs     10/19/23  1831   INR 1.20*         Recent Labs     10/20/23  0039   TRIGLYCERIDE 69   HDL 19*   LDL 27       Imaging  IR-US GUIDED PIV    Final Result    Ultrasound-guided PERIPHERAL IV INSERTION performed by    qualified nursing staff as above.      DX-CHEST-PORTABLE (1 VIEW)   Final Result         1. Small left pleural effusion.   2. No pulmonary infiltrates or consolidations are noted.      EC-ECHOCARDIOGRAM COMPLETE W/O CONT    (Results Pending)        Assessment/Plan  Problem Representation:    * CHF (congestive heart failure), NYHA class I, acute on chronic, combined (HCC)- (present on admission)  Assessment & Plan  EF 25% (2022)  BNP on admission greater than 25,000  - Continue Metoprolol, Spironolactone, Lisinopril    - IV Furosemide started  - Cardiology consult pending  - Echocardiogram pending    Elevated troponin  Assessment & Plan  Patient presented with a troponin of 107, and on repeat 102.  EKG without ST segment elevation  Likely secondary to persistent atrial fibrillation.      Leukocytosis  Assessment & Plan  Secondary to UTI  Continue ceftriaxone    Acute hypoxic respiratory failure (HCC)  Assessment & Plan  Currently requiring 2 L supplemental oxygen.  Not oxygen dependent at home  Likely secondary to pleural effusion secondary to CHF exacerbation    Pleural effusion  Assessment & Plan  CXR: Small left pleural effusion.  Continue with IV Lasix    Acute cystitis without hematuria  Assessment & Plan  Continue with ceftriaxone IV  Follow-up cultures    Hyponatremia- (present on admission)  Assessment & Plan  Likely hyper bulimic hyponatremia secondary to volume overload given her CHF.  However patient is chronically hyponatremic  Continue with Lasix    Coronary artery disease involving coronary bypass graft of native heart with unstable angina pectoris (HCC)- (present on admission)  Assessment & Plan  Continue with aspirin and statin    Chronic anticoagulation- (present on admission)  Assessment & Plan  Currently on warfarin for atrial fibrillation  Her warfarin dose has been held for the last week given her GI bleed on previous  admission.  On chart review, GI stated that it was okay for her to resume her anticoagulation yesterday    Chronic atrial fibrillation (HCC)- (present on admission)  Assessment & Plan  PMH Afib and tachycardia on admission    -Resume home dose warfarin  -Resume metoprolol 50 mg daily         VTE prophylaxis: therapeutic anticoagulation with warfarin    I have performed a physical exam and reviewed and updated ROS and Plan today (10/20/2023). In review of yesterday's note (10/19/2023), there are no changes except as documented above.

## 2023-10-20 NOTE — PROGRESS NOTES
Inpatient Anticoagulation Service Note for 10/20/2023      Reason for Anticoagulation: Atrial Fibrillation           Hemoglobin Value: (!) 8.7  Hematocrit Value: (!) 27.7  Lab Platelet Value: 185  Target INR: 2.0 to 3.0    INR from last 7 days       Date/Time INR Value    10/19/23 1831 1.2          Dose from last 7 days       Date/Time Dose (mg)    10/20/23 0007 5          Average Dose (mg): 35  Significant Interactions: Not Applicable  Bridge Therapy: No     Reversal Agent Administered: Not Applicable  Comments: Pt previously admitted 10/6 - 10/11, with supratherapeutic INR in setting of GIB. Warfarin was reversed and held at the time, and has now been cleared for resumption.    Plan:  Continue home dosing of warfarin 5 mg daily. Recheck INR within 24 hrs. Advise close monitoring while restarting warfarin given recent GIB  Education Material Provided?: No    Pharmacist suggested discharge dosing: Continue home dosing of warfarin 5 mg daily. Recheck INR within 24 hrs     Manish Arellano, KenaD

## 2023-10-20 NOTE — PROGRESS NOTES
Patient had no IV this am upon receiving report.IV has been placed and furosemide has been administered.

## 2023-10-20 NOTE — ASSESSMENT & PLAN NOTE
PMH Afib and tachycardia on admission    -Resume home dose warfarin  -Resume metoprolol 50 mg daily

## 2023-10-20 NOTE — ASSESSMENT & PLAN NOTE
Currently on warfarin for atrial fibrillation  Her warfarin dose has been held for the last week given her GI bleed on previous admission.  On chart review, GI stated that it was okay for her to resume her anticoagulation currently.

## 2023-10-20 NOTE — ASSESSMENT & PLAN NOTE
EF 25% (2022)  BNP on admission greater than 25,000  Takes Lasix 20 PO BID at home, but was on 60 IV earlier this admission, gradually titrated down.  10/21: Echo with EF 20%, chronic issues and worsening tricuspid and mitral regurgitation.    - Continue Metoprolol, Spironolactone, Lisinopril  - Back on home PO Lasix 20  - Cardiology with recommendation for continued medical management  - Monitor weight and I/Os

## 2023-10-20 NOTE — ASSESSMENT & PLAN NOTE
I have discussed case with the resident. I agree with resident note and discussion as written herein.     Kenzie Silva MD     Patient presented with a troponin of 107, and on repeat 102, now stable  EKG without ST segment elevation  Likely secondary to persistent atrial fibrillation.

## 2023-10-20 NOTE — CARE PLAN
The patient is Stable - Low risk of patient condition declining or worsening    Shift Goals  Clinical Goals: Manage BLE edema, monitor SOB and take diuretics,  Patient Goals: Rest  Family Goals: Get better and go home    Progress made toward(s) clinical / shift goals:    Problem: Fall Risk  Goal: Patient will remain free from falls  Outcome: Progressing  Note: Patient's bed is in the lowest position with call light within reach. Patient verbalized understanding with use of call light.     Problem: Respiratory  Goal: Patient will achieve/maintain optimum respiratory ventilation and gas exchange  Outcome: Progressing   Patient O2 sat is above 88 % on 2 L of Oxygen, Patient remains stable at this time.

## 2023-10-20 NOTE — PROGRESS NOTES
Inpatient Anticoagulation Service Note for 10/20/2023      Reason for Anticoagulation: Atrial Fibrillation   [id number] VASc Score: 8  HAS-BLED Score: 4  Target INR: 2.0 to 3.0    Hemoglobin Value: (!) 8.5  Hematocrit Value: (!) 26.7  Lab Platelet Value: 175      INR from last 7 days       Date/Time INR Value    10/19/23 1831 1.2          Dose from last 7 days       Date/Time Dose (mg)    10/20/23 0518 5               Home dosing is 5 mg daily  Significant Interactions: Antiplatelet Medications (ASA 81 mg), Antibiotics  Bridge Therapy: Not at this time. Pt is a high bleed risk given recent GI bleed and concurrent antiplatelet therapy.    Reversal Agent Administered: Not Applicable    Assessment:  Pt has extensive cardiac history including CABG x 5, CAD, HF, Mitral valve repair, and small infarct noted on MRI. Pt was recently hospitalized with stroke/seizure symptoms and found to have an INR of 8.76 which was reversed w/ Vitamin K. Pt also found to have bleeding AVM during GI scope. GI recommended holding warfarin for 7 days post bleed. Pt resumed warfarin on 10/18. Pt currently has an acute infection and HF exacerbation which can have significant impact on INR values. Pt currently sub-therapeutic at 1.2.    Plan:  Pt received dose at 0518 today. No other doses for today. INR and CBC ordered for the AM. Continue 5 mg daily and adjust based off INR values.  Education Material Provided?: No,     Pharmacist suggested discharge dosing: Pt home dosing is 5 mg daily. Follow-up INR & dose adjustment would need to be done with 48 hrs of discharge.     Mann Ngo, PharmD

## 2023-10-20 NOTE — H&P
Hospital Medicine History & Physical Note    Date of Service  10/19/2023    Primary Care Physician  Sharee Cao, MARIO.        Code Status  DNAR/DNI    Chief Complaint  Chief Complaint   Patient presents with    Sent by MD     Recent admission here.     Family reports she has declined in the past week with low BPs.  She is fatigued, sleeping constantly, poor PO intake.  Increased leg swelling and cough. Hx of CHF.     Weakness    Leg Swelling    Cough       History of Presenting Illness  Daya Woods is a 83 y.o. female with past medical history of atrial fibrillation on anticoagulation, hypertension, hyperlipidemia, CHF with ejection fraction of 25%, recent hospital admission for possible stroke versus seizure, which was complicated by GI bleed  who presented 10/19/2023 with shortness of breath and lower extremity edema.  Daughter was at bedside, and was able to provide a great deal of the patient's history.  She has noticed that her mother has had worsening lower extremity edema for several days now.  She has also had some worsening shortness of breath as well.  Patient is not oxygen dependent at home, but currently requires 2 L supplemental oxygen.  Patient currently denies any fevers, chills, nausea, vomiting.  She has no suprapubic abdominal pain.  However she does report dysuria.  In the emergency department, labs were significant for leukocytosis of 18,000, BNP greater than 26,000, elevated troponin of 107 and on repeat 102.  Chest x-ray did reveal some small left-sided pleural effusions.  Patient will be admitted for management of CHF exacerbation, and UTI.      I discussed the plan of care with patient and family.    Review of Systems  Review of Systems   Constitutional:  Negative for chills and fever.   HENT:  Negative for ear pain, hearing loss and tinnitus.    Respiratory:  Positive for shortness of breath. Negative for cough, hemoptysis, sputum production and wheezing.    Cardiovascular:   Positive for leg swelling. Negative for chest pain, palpitations and orthopnea.   Gastrointestinal:  Negative for abdominal pain, diarrhea, nausea and vomiting.   Genitourinary:  Positive for dysuria. Negative for frequency, hematuria and urgency.   Musculoskeletal:  Negative for back pain and neck pain.   Neurological:  Negative for dizziness, tingling and headaches.   Endo/Heme/Allergies:  Negative for environmental allergies. Does not bruise/bleed easily.   Psychiatric/Behavioral:  Negative for depression, substance abuse and suicidal ideas.        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    Surgical History   has a past surgical history that includes appendectomy; multiple coronary artery bypass (2002); tonsillectomy and adenoidectomy; tubal ligation; other (Bilateral, 08/2014); recovery (8/26/2016); gyn surgery; transcatheter mitral valve repair (10/15/2018); carmina (10/15/2018); [facility] cardiac cath (10/2018); mitral valve replace; pr upper gi endoscopy,diagnosis (N/A, 10/9/2023); pr colonoscopy,diagnostic (N/A, 10/9/2023); pr upper gi endoscopy,ctrl bleed (N/A, 10/9/2023); gastroscopy with endostat (N/A, 10/9/2023); and colonoscopy with endostat (N/A, 10/9/2023).     Family History  family history includes Heart Attack (age of onset: 55) in her brother.   Family history reviewed with patient. There is no family history that  "is pertinent to the chief complaint.     Social History   reports that she quit smoking about 21 years ago. Her smoking use included cigarettes. She started smoking about 61 years ago. She has a 20.0 pack-year smoking history. She has never used smokeless tobacco. She reports current alcohol use. She reports that she does not use drugs.    Allergies  Allergies   Allergen Reactions    Codeine Unspecified     \"I just don't like it. It does weird things to me.\"    Isosorbide Mononitrate Rash     Rash    Keflex Rash     Rash    Sulfa Drugs Rash     rash    Tape Unspecified     Tears skin off    Xarelto [Rivaroxaban] Rash     rash    Hydrochlorothiazide Unspecified     Unsure of reaction       Medications  Prior to Admission Medications   Prescriptions Last Dose Informant Patient Reported? Taking?   aspirin EC (ECOTRIN) 81 MG Tablet Delayed Response  Family Member Yes No   Sig: Take 81 mg by mouth every day.   atorvastatin (LIPITOR) 40 MG Tab  Family Member No No   Sig: Take 1 Tablet by mouth every day.   cyanocobalamin (VITAMIN B12) 1000 MCG Tab   No No   Sig: Take 1 Tablet by mouth every day for 30 days.   furosemide (LASIX) 20 MG Tab  Family Member No No   Sig: Take 1 Tablet by mouth every day.   levothyroxine (SYNTHROID) 75 MCG Tab  Family Member No No   Sig: take 1 tablet by mouth every morning ON AN EMPTY STOMACH   lisinopril (PRINIVIL) 2.5 MG Tab   No No   Sig: Take 2 Tablets by mouth every day for 30 days.   magnesium oxide (MAG-OX) 400 MG Tab tablet  Family Member No No   Sig: Take 1 Tablet by mouth every day.   metoprolol SR (TOPROL XL) 50 MG TABLET SR 24 HR   No No   Sig: Take 1 Tablet by mouth every day for 30 days.   multivitamin Tab   No No   Sig: Take 1 Tablet by mouth every day for 30 days.   omeprazole (PRILOSEC) 20 MG delayed-release capsule   No No   Sig: Take 1 Capsule by mouth 2 times a day for 30 days.   potassium chloride SA (K-DUR) 10 MEQ Tab CR  Family Member No No   Sig: Take 0.5 Tablets by " mouth every day.   spironolactone (ALDACTONE) 25 MG Tab  Family Member No No   Sig: take 1/2 tablet by mouth once daily   thiamine (THIAMINE) 100 MG tablet   No No   Sig: Take 1 Tablet by mouth every day for 30 days.      Facility-Administered Medications: None       Physical Exam  Temp:  [36.3 °C (97.4 °F)] 36.3 °C (97.4 °F)  Pulse:  [101-156] 109  Resp:  [13-26] 18  BP: (106-138)/(58-75) 116/58  SpO2:  [88 %-98 %] 91 %  Blood Pressure : 113/75   Temperature: 36.3 °C (97.4 °F)   Pulse: (!) 111   Respiration: 17   Pulse Oximetry: 92 %       Physical Exam  Constitutional:       General: She is not in acute distress.     Appearance: Normal appearance. She is normal weight. She is not ill-appearing, toxic-appearing or diaphoretic.   HENT:      Head: Normocephalic and atraumatic.      Nose: Nose normal.      Mouth/Throat:      Mouth: Mucous membranes are moist.   Eyes:      Extraocular Movements: Extraocular movements intact.      Pupils: Pupils are equal, round, and reactive to light.   Cardiovascular:      Rate and Rhythm: Normal rate and regular rhythm.      Pulses: Normal pulses.      Heart sounds: Normal heart sounds. No murmur heard.     No friction rub. No gallop.   Pulmonary:      Effort: Pulmonary effort is normal. No respiratory distress.      Breath sounds: No stridor. No wheezing, rhonchi or rales.   Chest:      Chest wall: No tenderness.   Abdominal:      General: Abdomen is flat. There is no distension.      Palpations: Abdomen is soft. There is no mass.      Tenderness: There is no abdominal tenderness. There is no guarding or rebound.      Hernia: No hernia is present.   Musculoskeletal:         General: No swelling, tenderness, deformity or signs of injury.      Right lower leg: Edema present.      Left lower leg: Edema present.      Comments:      Skin:     General: Skin is warm and dry.      Capillary Refill: Capillary refill takes less than 2 seconds.      Coloration: Skin is not jaundiced or pale.       Findings: No bruising, erythema, lesion or rash.   Neurological:      General: No focal deficit present.      Mental Status: She is alert. Mental status is at baseline. She is disoriented.      Cranial Nerves: No cranial nerve deficit.      Sensory: No sensory deficit.      Motor: No weakness.      Coordination: Coordination normal.   Psychiatric:         Mood and Affect: Mood normal.         Behavior: Behavior normal.         Laboratory:  Recent Labs     10/19/23  1628   WBC 18.1*   RBC 3.24*   HEMOGLOBIN 8.7*   HEMATOCRIT 27.7*   MCV 85.5   MCH 26.9*   MCHC 31.4*   RDW 56.2*   PLATELETCT 185   MPV 9.9     Recent Labs     10/19/23  1628   SODIUM 124*   POTASSIUM 4.7   CHLORIDE 92*   CO2 23   GLUCOSE 101*   BUN 24*   CREATININE 1.22   CALCIUM 7.8*     Recent Labs     10/19/23  1628   ALTSGPT 17   ASTSGOT 29   ALKPHOSPHAT 99   TBILIRUBIN 0.5   GLUCOSE 101*     Recent Labs     10/19/23  1831   INR 1.20*     Recent Labs     10/19/23  1628   NTPROBNP 80828*         Recent Labs     10/19/23  1628 10/19/23  1831   TROPONINT 107* 102*       Imaging:  DX-CHEST-PORTABLE (1 VIEW)   Final Result         1. Small left pleural effusion.   2. No pulmonary infiltrates or consolidations are noted.      EC-ECHOCARDIOGRAM COMPLETE W/O CONT    (Results Pending)       X-Ray:  I have personally reviewed the images and compared with prior images.  EKG:  I have personally reviewed the images and compared with prior images.    Assessment/Plan:  Justification for Admission Status  I anticipate this patient will require at least two midnights for appropriate medical management, necessitating inpatient admission because volume overload secondary to CHF exacerbation, pleural effusions, requiring IV Lasix, which requires frequent BMP monitoring, management of hyponatremia, management of UTI requiring IV antibiotics    Patient will need a Med/Surg bed on MEDICAL service .  The need is secondary to CHF exacerbation, pleural effusions, lower  extremity edema, UTI, hyponatremia.    * CHF (congestive heart failure), NYHA class I, acute on chronic, combined (HCC)- (present on admission)  Assessment & Plan  Last echocardiogram done in 2022 showed an ejection fraction of 25%  BNP on admission greater than 25,000  Resume home dose metoprolol and Aldactone and lisinopril  Given her heart failure exacerbation, will start patient on IV Lasix  Patient already given a dose of 80 mg IV in the emergency department  Follow-up echocardiogram    Acute cystitis without hematuria  Assessment & Plan  Continue with ceftriaxone IV  Follow-up cultures    Coronary artery disease involving coronary bypass graft of native heart with unstable angina pectoris (HCC)- (present on admission)  Assessment & Plan  Continue with aspirin and statin    Elevated troponin  Assessment & Plan  Patient presented with a troponin of 107, and on repeat 102.  EKG without ST segment elevation  Likely secondary to persistent atrial fibrillation.      Leukocytosis  Assessment & Plan  Secondary to UTI  Continue ceftriaxone    Acute hypoxic respiratory failure (HCC)  Assessment & Plan  Currently requiring 2 L supplemental oxygen.  Not oxygen dependent at home  Likely secondary to pleural effusion secondary to CHF exacerbation    Pleural effusion  Assessment & Plan  CXR: Small left pleural effusion.  Continue with IV Lasix    Chronic anticoagulation- (present on admission)  Assessment & Plan  Currently on warfarin for atrial fibrillation  Her warfarin dose has been held for the last week given her GI bleed on previous admission.  On chart review, GI stated that it was okay for her to resume her anticoagulation yesterday    Chronic atrial fibrillation (HCC)- (present on admission)  Assessment & Plan  Patient currently in atrial fibrillation, with heart rate greater than 100.  Resume home dose warfarin  Resume metoprolol 50 mg daily    Hyponatremia- (present on admission)  Assessment & Plan  Likely hyper  bulimic hyponatremia secondary to volume overload given her CHF.  However patient is chronically hyponatremic  Continue with Lasix        VTE prophylaxis: therapeutic anticoagulation with Warfarin

## 2023-10-20 NOTE — PROGRESS NOTES
4 Eyes Skin Assessment Completed by JORGE Ellis and JORGE Cesar.    Head WDL  Ears WDL  Nose WDL  Mouth WDL  Neck WDL  Breast/Chest WDL  Shoulder Blades WDL  Spine WDL  (R) Arm/Elbow/Hand Bruising  (L) Arm/Elbow/Hand Bruising  Abdomen WDL  Groin WDL  Scrotum/Coccyx/Buttocks WDL  (R) Leg Edema  (L) Leg Edema  (R) Heel/Foot/Toe Edema  (L) Heel/Foot/Toe Edema          Devices In Places Tele Box and Pulse Ox      Interventions In Place Pillows    Possible Skin Injury No    Pictures Uploaded Into Epic N/A  Wound Consult Placed N/A  RN Wound Prevention Protocol Ordered No

## 2023-10-20 NOTE — HOSPITAL COURSE
Daya Woods is a 83 y.o. female with past medical history of atrial fibrillation on anticoagulation, hypertension, hyperlipidemia, CHF with ejection fraction of 25%, recent hospital admission for possible stroke versus seizure, which was complicated by GI bleed  who presented 10/19/2023 with shortness of breath and lower extremity edema.     In the ED, labs were significant for leukocytosis of 18,000, BNP greater than 26,000, elevated troponin of 107 and on repeat 102.  Chest x-ray did reveal some small left-sided pleural effusions. She received 80mg IV dose of Lasix during ED workup.     Upon admission 10/20/23, patient's home medications were resumed and echocardiogram was ordered for heart failure exacerbation. Cultures were drawn and IV Ceftriaxone was administered for acute cystitis. Elevated troponins found on admission were suspected secondary to persistent Afib and they were followed until stable. Cardiology was consulted for medical optimization in setting of heart failure, discussed plan for continued diuresis, and fluid status was closely monitored. Echocardiogram identifying chronic cardiac issues as well as worsened tricuspid and mitral regurgitation.  Patient's symptoms of shortness of breath abdominal and leg swelling improved with diuresis.

## 2023-10-20 NOTE — ASSESSMENT & PLAN NOTE
Likely hyper volemic hyponatremia secondary to volume overload given her CHF.  However patient is chronically hyponatremic  Continue with Lasix

## 2023-10-20 NOTE — CONSULTS
Reason of Consult: Congestive heart failure    Consulting Physician: Dr. Miles    HPI:  83-year-old female cardiac patient of Dr. Jeramie Lilly with a history of ischemic cardiomyopathy with systolic heart failure, CAD status post multivessel CABG and PCI in the past, permanent atrial fibrillation on oral anticoagulation, severe mitral regurgitation status post MitraClip 2018 and hypertension.  Presents shortly after recent discharge from the hospital for hemorrhagic shock and acute GI bleeding wherein she received 3 units of transfusion and endoscopic therapy.  She has been reinitiated on her Coumadin subsequently with obvious bleeding.  Presents with a chief complaint of ongoing dyspnea on exertion, fatigue, failure to thrive and low blood pressure.  She is noted to have a urinary tract infection, ongoing but stable anemia that is significant, and was admitted with profound hyponatremia that is slowly improving with diuresis.  Does not smoke drink excessively or do drugs or have family history of precocious CAD.    Past Medical History:   Diagnosis Date    Anesthesia     ponv    Anticoagulation monitoring, special range     CAD (coronary artery disease) 10/09/2018    PCI/ANA (Synergy 3.0 x 28mm) to the OM.    Cataract     Bilateral IOL    Chronic atrial fibrillation (HCC)     Managed with rate control and warfarin for anticoagulation     Congestive heart failure (HCC)     Coronary atherosclerosis of native coronary artery 12/2002    CABG x 5 (LIMA-D3, SVG-OM, SVG-RCA, SVG-distal LAD, SVG-D1). August 2016: LIMA-D3 and SVG-OM patent (interval occlusion of the SVG-RCA); chronic occluded SVG-LAD, SVG-D1.    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     MI, old 2002    Mitral regurgitation  10/2018    Status post MV repair with MitraClip.    Osteoarthritis     Pain     right knee, back    Raynaud's disease     Spinal stenosis 2015    Status post coronary artery bypass grafts x 5 2002    LIMA-D3, SVG-LAD, SVG-OM, SVG-RCA, SVG-distal LAD.        Social History     Socioeconomic History    Marital status:      Spouse name: Not on file    Number of children: Not on file    Years of education: Not on file    Highest education level: Not on file   Occupational History    Not on file   Tobacco Use    Smoking status: Former     Current packs/day: 0.00     Average packs/day: 0.5 packs/day for 40.0 years (20.0 ttl pk-yrs)     Types: Cigarettes     Start date: 1962     Quit date: 2002     Years since quittin.6    Smokeless tobacco: Never   Vaping Use    Vaping Use: Never used   Substance and Sexual Activity    Alcohol use: Yes     Comment: occ    Drug use: No    Sexual activity: Not on file   Other Topics Concern    Not on file   Social History Narrative    Retired      Social Determinants of Health     Financial Resource Strain: Not on file   Food Insecurity: Not on file   Transportation Needs: Not on file   Physical Activity: Not on file   Stress: Not on file   Social Connections: Not on file   Intimate Partner Violence: Not on file   Housing Stability: Not on file       No current facility-administered medications on file prior to encounter.     Current Outpatient Medications on File Prior to Encounter   Medication Sig Dispense Refill    furosemide (LASIX) 20 MG Tab Take 20 mg by mouth 2 times a day.      lisinopril (PRINIVIL) 2.5 MG Tab Take 2.5 mg by mouth 2 times a day.      potassium chloride SA (K-DUR) 10 MEQ Tab CR Take 5 mEq by mouth 2 times a day. 5 mEq = 1/2 tablet      warfarin (COUMADIN) 5 MG Tab Take 5 mg by mouth every day.      acetaminophen (TYLENOL) 500 MG Tab Take 500 mg by mouth 1 time a day as needed.      spironolactone (ALDACTONE) 25 MG Tab take  1/2 tablet by mouth once daily (Patient taking differently: Take 12.5 mg by mouth every day.) 45 Tablet 3    levothyroxine (SYNTHROID) 75 MCG Tab take 1 tablet by mouth every morning ON AN EMPTY STOMACH 90 Tablet 3    metoprolol SR (TOPROL XL) 50 MG TABLET SR 24 HR Take 1 Tablet by mouth every day for 30 days. 30 Tablet 0    cyanocobalamin (VITAMIN B12) 1000 MCG Tab Take 1 Tablet by mouth every day for 30 days. 30 Tablet 0    multivitamin Tab Take 1 Tablet by mouth every day for 30 days. 30 Tablet 0    omeprazole (PRILOSEC) 20 MG delayed-release capsule Take 1 Capsule by mouth 2 times a day for 30 days. 60 Capsule 0    thiamine (THIAMINE) 100 MG tablet Take 1 Tablet by mouth every day for 30 days. 30 Tablet 0    atorvastatin (LIPITOR) 40 MG Tab Take 1 Tablet by mouth every day. 100 Tablet 3    magnesium oxide (MAG-OX) 400 MG Tab tablet Take 1 Tablet by mouth every day. 100 Tablet 3    aspirin EC (ECOTRIN) 81 MG Tablet Delayed Response Take 81 mg by mouth every day.         Current Facility-Administered Medications   Medication Dose Frequency Provider Last Rate Last Admin    MD Alert...Warfarin per Pharmacy   PHARMACY TO DOSE Yair Rahman M.D.        menthol (Halls) lozenge 1 Lozenge  1 Lozenge Q2HRS PRN Nini Bravo, A.P.R.N.        guaiFENesin (Robitussin) 100 MG/5ML liquid 200 mg  10 mL Q4HRS PRN Nini Bravo, A.P.R.N.   200 mg at 10/20/23 1046    [START ON 10/21/2023] warfarin (Coumadin) tablet 5 mg  5 mg DAILY AT 1800 Jt Miles M.D.        senna-docusate (Pericolace Or Senokot S) 8.6-50 MG per tablet 2 Tablet  2 Tablet BID Yair Rahman M.D.   2 Tablet at 10/20/23 0516    And    polyethylene glycol/lytes (Miralax) PACKET 1 Packet  1 Packet QDAY PRN Yair Rahman M.D.        And    magnesium hydroxide (Milk Of Magnesia) suspension 30 mL  30 mL QDAY PRN Yair Rahman M.D.        And    bisacodyl (Dulcolax) suppository 10 mg  10 mg QDAY PRN Yair Rahman M.D.        acetaminophen  "(Tylenol) tablet 650 mg  650 mg Q6HRS PRN Yair Rahman M.D.   650 mg at 10/20/23 0305    ondansetron (Zofran) syringe/vial injection 4 mg  4 mg Q4HRS PRN Yair Rahman M.D.        ondansetron (Zofran ODT) dispertab 4 mg  4 mg Q4HRS PRN Yair Rahman M.D.        aspirin EC tablet 81 mg  81 mg DAILY Yair Rahman M.D.   81 mg at 10/20/23 0516    atorvastatin (Lipitor) tablet 40 mg  40 mg DAILY Yair Rahman M.D.   40 mg at 10/20/23 0518    levothyroxine (Synthroid) tablet 75 mcg  75 mcg AM ES Yair Rahman M.D.   75 mcg at 10/20/23 0517    lisinopril (Prinivil) tablet 2.5 mg  2.5 mg BID Yair Rahman M.D.   2.5 mg at 10/20/23 0532    metoprolol SR (Toprol XL) tablet 50 mg  50 mg DAILY Yair Rahman M.D.   50 mg at 10/20/23 0518    omeprazole (PriLOSEC) capsule 20 mg  20 mg BID Yair Rahman M.D.   20 mg at 10/20/23 0516    spironolactone (Aldactone) tablet 12.5 mg  12.5 mg DAILY Yair Rahman M.D.   12.5 mg at 10/20/23 0533    cefTRIAXone (Rocephin) syringe 1,000 mg  1,000 mg Q24HRS Yair Rahman M.D.        furosemide (Lasix) injection 40 mg  40 mg BID DIURETIC Yair Rahman M.D.   40 mg at 10/20/23 0900   Last reviewed on 10/20/2023  2:33 AM by Rhonda Escamilla R.N.     Codeine, Isosorbide mononitrate, Keflex, Sulfa drugs, Tape, Xarelto [rivaroxaban], and Hydrochlorothiazide    Family History   Problem Relation Age of Onset    Heart Attack Brother 55        heart attack       ROS: As per HPI all other systems reviewed and negative     Physical Exam   Blood pressure 93/51, pulse 95, temperature 36.7 °C (98.1 °F), temperature source Temporal, resp. rate 16, height 1.626 m (5' 4\"), weight 50.6 kg (111 lb 8.8 oz), SpO2 94 %, not currently breastfeeding.    Constitutional: Cachectic.  Appears well-developed.   HENT: Normocephalic and atraumatic. No scleral icterus.   Neck: No JVD present.   Cardiovascular: Normal rate.  Irregular rhythm.  Pulmonary/Chest: Normal chest " rise  Abdominal: S/NT/ND BS+   Musculoskeletal:  Pulses present. No atrophy. Strength normal.  Extremities: Exhibits edema. No clubbing or cyanosis.   Skin: Skin is warm and dry.   Neuro: Non-focal, CN 2-12 intact grossly      Intake/Output Summary (Last 24 hours) at 10/20/2023 1633  Last data filed at 10/20/2023 1500  Gross per 24 hour   Intake 1160 ml   Output 600 ml   Net 560 ml       Recent Labs     10/19/23  1628 10/20/23  0039   WBC 18.1* 17.8*   RBC 3.24* 3.15*   HEMOGLOBIN 8.7* 8.5*   HEMATOCRIT 27.7* 26.7*   MCV 85.5 84.8   MCH 26.9* 27.0   MCHC 31.4* 31.8*   RDW 56.2* 55.2*   PLATELETCT 185 175   MPV 9.9 9.8     Recent Labs     10/19/23  1628 10/20/23  0039   SODIUM 124* 127*   POTASSIUM 4.7 4.5   CHLORIDE 92* 94*   CO2 23 22   GLUCOSE 101* 103*   BUN 24* 26*   CREATININE 1.22 1.26   CALCIUM 7.8* 7.6*     Recent Labs     10/19/23  1831   INR 1.20*             Recent Labs     10/20/23  0039   TRIGLYCERIDE 69   HDL 19*   LDL 27         Imaging reviewed    Impressions:  1.  Failure to thrive  2.  Urinary tract infection  3.  Acute decompensated heart failure reduced ejection fraction  4.  Hyponatremia, hypervolemic  5.  Recent GI bleed status post endoscopic intervention  6.  Blood loss anemia, stable  7.  Permanent atrial fibrillation, rate controlled  8.  Chronic oral anticoagulation  9.  Ischemic cardiomyopathy, LVEF 25%  10.  Mitral regurgitation status post MitraClip    Recommendations:  Overall frail with multiple medical comorbidities not feeling well with volume overload likely related to blood product administration as well as acute decompensation correlating with her UTI.  Recommend continued efforts at diuresis and supportive care directed at her anemia.  Continue her outpatient cardiac medical regimen and guideline directed medical therapy as tolerated.  Her failure to thrive is multifactorial it would appear and may not be fully correctable, she has evidence of malnutrition as well and I  recommend holding off on her statin therapy as her lipid profile is profoundly suppressed.  After she convalesces I would recommend follow-up with her outpatient cardiologist Dr. Lilly.    Thank you for the consultation. Cardiology will sign off. Please call with any questions.    Apollo Stanford MD, FACC, [facility]  Division of Interventional Cardiology  [facility]

## 2023-10-20 NOTE — CARE PLAN
The patient is Stable - Low risk of patient condition declining or worsening    Shift Goals  Clinical Goals: safety, maintain skin integrity, pain mgt, VS and hemodynamic monitoring  Patient Goals: sleep and snack  Family Goals: JAYA-not present    Progress made toward(s) clinical / shift goals:      Problem: Fall Risk  Goal: Patient will remain free from falls  Outcome: Progressing  Note: Fall Prevention Protocol  To be followed on all patients at risk for fall  Patient Name: Daya Woods    Guidelines for patients at risk to fall  1. Environmental precautions in use:       A.  treaded slipper socks are on patient       B.  Bed is locked and in lowest position       C.  Personal belongings, wastebasket, call bell, are in easy reach       D.  Transferred patient toward stronger side       E.  Report is given to CNA regarding patient's fall risk    The following are considered:      1.  The side rail closest to bathroom is down      2.  Bed rails:  [facility] Fall program emphasizes bed rail reduction.  Bed rails contribute to patient fall risk by creating barriers to patient transfer in and out of beds.  Use of bed rails must be assessed specifically to individual patient needs.  When possible, use alternative pillows and positioning devices to avail the use of bed rails.  (Remember:  Four (4) side rails are considered a restraint).    Guidelines for high fall risk patients    Fall risk guidelines as outlined above        A.  Fall risk armband on patient      B.  Fall risk sign is placed by the door - High/Moderate Fall Risk      C.  Frequent toileting - Indwelling Catheter present    Consider the following:        A.  Patient room is close to the nurse's station      B.  Bed alarm on - Frame alarm on. Bed strip alarm not available.      C.  Physical Therapy/Occupational Therapy consultation for strengthening and mobility, including assessment of home care needs      D.  Physician and / or pharmacy consultation  for discussion of potential medication modification or discontinuation           Problem: Pain - Standard  Goal: Alleviation of pain or a reduction in pain to the patient’s comfort goal  Outcome: Progressing  Note: Patient educated on POC.  Documented pain using the appropriate pain scale (0-10 pain scale and Non-verbal descriptors)  Education provided on non-pharmacologic comfort measures (relaxation, distraction, leg massagers-SCD, heat therapy, repositioning)  Administered pain management medications as ordered (PRN Tylenol)  Reassessed pain after pain medication administration per policy     Problem: Hemodynamics  Goal: Patient's hemodynamics, fluid balance and neurologic status will be stable or improve  Outcome: Progressing  Note: Patient educated on POC. Pt will remain NAEO.  Monitor vital signs (q4h or as needed)  Pulse oximetry and continuous cardiac monitor are in use. report abnormalities to the provider  Hemodynamic monitoring   Manage IV infusions (administered Ceftriaxone as ordered)  Monitored intake and output q4h through the shift  Daily weights monitored  Peripheral pulses and capillary refill were assessed  Color and body temperature were assessed  Positioned patient for maximum circulation/cardiac output  Signs/symptoms of excessive bleeding were monitored  Assessed mental status, restlessness and changes in level of consciousness  Monitored temperature (report fever or hypothermia to provider immediately).      Problem: Respiratory  Goal: Patient will achieve/maintain optimum respiratory ventilation and gas exchange  Outcome: Progressing  Note: Patient educated on POC.  Assessed and monitored rate, rhythm, depth and effort of respiration  Breath sounds assessed q shift and/or as needed  Assessed O2 saturation. Wean off O2 need.  Positioned patient for maximum ventilatory efficiency  Turn, cough, and deep breath with splinting to improve effectiveness  Encouraged use of incentive spirometer and  to cough after use  Continuous Pulse Oximeter in place for monitoring.   RA at 94%. Denies SOB, C/P.      Problem: Urinary Elimination  Goal: Establish and maintain regular urinary output  Outcome: Progressing  Note: Updated pt on POC. Coleman Catheter in place. Pt on diuretics with 1500 mL fluid restriction.       Problem: Bowel Elimination  Goal: Establish and maintain regular bowel function  Outcome: Progressing  Flowsheets (Taken 10/20/2023 0249)  Last BM: 10/16/23  Note: Patient educated on POC.  Last day of BM: 10/16 per pt  Education provided about diet, fluid intake 1500 mL restriction, medication and activity to promote bowel function  Pharmacologic bowel management is administered as ordered

## 2023-10-20 NOTE — ASSESSMENT & PLAN NOTE
Initially requiring 2 L supplemental oxygen, now down to 0.5L as of 10/25/23  Not oxygen dependent at home  Likely secondary to pleural effusion secondary to CHF exacerbation

## 2023-10-20 NOTE — PROGRESS NOTES
Patient's daughter called requesting to bring patient's service dog for a visit. RN checked with Charge nurse and celestino for service dog to visit. This nurse called daughter back and advised.

## 2023-10-20 NOTE — DIETARY
"Nutrition services: Day 1 of admit.  Daya Woods is a 83 y.o. female with admitting DX of CHF (congestive heart failure), NYHA class I, acute on chronic, combined     Consult received for Malnutrition Screening Tool: 2 (weight loss of 2-13 lbs reported in past 6 months and poor appetite reported)    Attempted to visit pt at bedside, though pt with MD during attempted visit.     Assessment:  Height: 162.6 cm (5' 4\")  Weight: 50.6 kg (111 lb 8.8 oz)  Body mass index is 19.15 kg/m²., BMI classification: WNL, though higher would be desirable for age group  Diet/Intake: Cardiac with 1500mL fluid restriction     Evaluation:   Hx of CHF, acute cystitis, acute hypoxic respiratory failure, coronary artery disease involving coronary bypass graft  No meals charted yet per ADLs. Pt symptoms appear to have started a few days PTA with noted poor appetite, anticipate likely poor intake for past 2-3 days.  Per chart review, weight hx with unknown measurement sources  Wt Readings from Last 4 Encounters:   10/20/23 50.6 kg (111 lb 8.8 oz)   10/11/23 51.6 kg (113 lb 12.1 oz)   05/09/23 49.9 kg (110 lb)   01/24/23 51.7 kg (114 lb)   Weight appears to be relatively stable within past 10 months per chart review. However, some weight loss may be masked by edema detailed below.   Skin: no pressure injuries noted, presents with pitting 2+ bilateral LE edema.   Pertinent labs: Na 127, glucose 103, BUN 26, albumin 2  Pertinent meds: lipitor, lasix, synthroid, prinivil, prilosec, senokot, aldactone, warfarin  Last BM 10/16    Malnutrition Risk: At risk with edema, will continue to monitor to rule out or confirm malnutrition.     Problem: Nutritional:  Goal: Achieve adequate nutritional intake    Recommendations/Plan:  Patient will consume 50% or more  Encourage intake of meals  Document intake of all meals and/or supplements as % taken in ADL's to provide interdisciplinary communication across all shifts.   Monitor weight.  Nutrition " rep will continue to see patient for ongoing meal and snack preferences.     RD following

## 2023-10-21 ENCOUNTER — APPOINTMENT (OUTPATIENT)
Dept: CARDIOLOGY | Facility: MEDICAL CENTER | Age: 83
DRG: 291 | End: 2023-10-21
Attending: STUDENT IN AN ORGANIZED HEALTH CARE EDUCATION/TRAINING PROGRAM
Payer: COMMERCIAL

## 2023-10-21 LAB
ALBUMIN SERPL BCP-MCNC: 2.1 G/DL (ref 3.2–4.9)
ALBUMIN/GLOB SERPL: 0.7 G/DL
ALP SERPL-CCNC: 97 U/L (ref 30–99)
ALT SERPL-CCNC: 21 U/L (ref 2–50)
ANION GAP SERPL CALC-SCNC: 9 MMOL/L (ref 7–16)
AST SERPL-CCNC: 33 U/L (ref 12–45)
BACTERIA UR CULT: ABNORMAL
BACTERIA UR CULT: ABNORMAL
BILIRUB SERPL-MCNC: 0.3 MG/DL (ref 0.1–1.5)
BUN SERPL-MCNC: 27 MG/DL (ref 8–22)
CALCIUM ALBUM COR SERPL-MCNC: 9.3 MG/DL (ref 8.5–10.5)
CALCIUM SERPL-MCNC: 7.8 MG/DL (ref 8.5–10.5)
CHLORIDE SERPL-SCNC: 92 MMOL/L (ref 96–112)
CO2 SERPL-SCNC: 23 MMOL/L (ref 20–33)
CREAT SERPL-MCNC: 1.25 MG/DL (ref 0.5–1.4)
ERYTHROCYTE [DISTWIDTH] IN BLOOD BY AUTOMATED COUNT: 54.9 FL (ref 35.9–50)
GFR SERPLBLD CREATININE-BSD FMLA CKD-EPI: 43 ML/MIN/1.73 M 2
GLOBULIN SER CALC-MCNC: 3.2 G/DL (ref 1.9–3.5)
GLUCOSE SERPL-MCNC: 110 MG/DL (ref 65–99)
HCT VFR BLD AUTO: 30.6 % (ref 37–47)
HGB BLD-MCNC: 9.6 G/DL (ref 12–16)
INR PPP: 1.78 (ref 0.87–1.13)
LV EJECT FRACT  99904: 20
LV EJECT FRACT MOD 2C 99903: 17.85
LV EJECT FRACT MOD 4C 99902: 29.83
LV EJECT FRACT MOD BP 99901: 14.56
MCH RBC QN AUTO: 26.2 PG (ref 27–33)
MCHC RBC AUTO-ENTMCNC: 31.4 G/DL (ref 32.2–35.5)
MCV RBC AUTO: 83.6 FL (ref 81.4–97.8)
PLATELET # BLD AUTO: 180 K/UL (ref 164–446)
PMV BLD AUTO: 10 FL (ref 9–12.9)
POTASSIUM SERPL-SCNC: 3.9 MMOL/L (ref 3.6–5.5)
PROT SERPL-MCNC: 5.3 G/DL (ref 6–8.2)
PROTHROMBIN TIME: 21 SEC (ref 12–14.6)
RBC # BLD AUTO: 3.66 M/UL (ref 4.2–5.4)
SIGNIFICANT IND 70042: ABNORMAL
SITE SITE: ABNORMAL
SODIUM SERPL-SCNC: 124 MMOL/L (ref 135–145)
SODIUM SERPL-SCNC: 125 MMOL/L (ref 135–145)
SOURCE SOURCE: ABNORMAL
WBC # BLD AUTO: 12.2 K/UL (ref 4.8–10.8)

## 2023-10-21 PROCEDURE — 700102 HCHG RX REV CODE 250 W/ 637 OVERRIDE(OP): Performed by: STUDENT IN AN ORGANIZED HEALTH CARE EDUCATION/TRAINING PROGRAM

## 2023-10-21 PROCEDURE — 700102 HCHG RX REV CODE 250 W/ 637 OVERRIDE(OP)

## 2023-10-21 PROCEDURE — A9270 NON-COVERED ITEM OR SERVICE: HCPCS

## 2023-10-21 PROCEDURE — 99232 SBSQ HOSP IP/OBS MODERATE 35: CPT | Performed by: INTERNAL MEDICINE

## 2023-10-21 PROCEDURE — 770020 HCHG ROOM/CARE - TELE (206)

## 2023-10-21 PROCEDURE — 36415 COLL VENOUS BLD VENIPUNCTURE: CPT

## 2023-10-21 PROCEDURE — 51798 US URINE CAPACITY MEASURE: CPT

## 2023-10-21 PROCEDURE — 93306 TTE W/DOPPLER COMPLETE: CPT

## 2023-10-21 PROCEDURE — 700117 HCHG RX CONTRAST REV CODE 255: Performed by: STUDENT IN AN ORGANIZED HEALTH CARE EDUCATION/TRAINING PROGRAM

## 2023-10-21 PROCEDURE — 85027 COMPLETE CBC AUTOMATED: CPT

## 2023-10-21 PROCEDURE — 84295 ASSAY OF SERUM SODIUM: CPT

## 2023-10-21 PROCEDURE — 80053 COMPREHEN METABOLIC PANEL: CPT

## 2023-10-21 PROCEDURE — 85610 PROTHROMBIN TIME: CPT

## 2023-10-21 PROCEDURE — 700111 HCHG RX REV CODE 636 W/ 250 OVERRIDE (IP): Mod: JZ | Performed by: STUDENT IN AN ORGANIZED HEALTH CARE EDUCATION/TRAINING PROGRAM

## 2023-10-21 PROCEDURE — 93306 TTE W/DOPPLER COMPLETE: CPT | Mod: 26 | Performed by: INTERNAL MEDICINE

## 2023-10-21 PROCEDURE — A9270 NON-COVERED ITEM OR SERVICE: HCPCS | Performed by: STUDENT IN AN ORGANIZED HEALTH CARE EDUCATION/TRAINING PROGRAM

## 2023-10-21 PROCEDURE — 700101 HCHG RX REV CODE 250: Performed by: STUDENT IN AN ORGANIZED HEALTH CARE EDUCATION/TRAINING PROGRAM

## 2023-10-21 RX ORDER — FUROSEMIDE 10 MG/ML
60 INJECTION INTRAMUSCULAR; INTRAVENOUS
Status: DISCONTINUED | OUTPATIENT
Start: 2023-10-21 | End: 2023-10-21

## 2023-10-21 RX ORDER — FUROSEMIDE 10 MG/ML
60 INJECTION INTRAMUSCULAR; INTRAVENOUS
Status: DISCONTINUED | OUTPATIENT
Start: 2023-10-21 | End: 2023-10-22

## 2023-10-21 RX ADMIN — GUAIFENESIN 200 MG: 100 SOLUTION ORAL at 14:34

## 2023-10-21 RX ADMIN — ACETAMINOPHEN 650 MG: 325 TABLET, FILM COATED ORAL at 20:55

## 2023-10-21 RX ADMIN — DOCUSATE SODIUM 50 MG AND SENNOSIDES 8.6 MG 2 TABLET: 8.6; 5 TABLET, FILM COATED ORAL at 05:16

## 2023-10-21 RX ADMIN — METOPROLOL SUCCINATE 50 MG: 50 TABLET, EXTENDED RELEASE ORAL at 05:19

## 2023-10-21 RX ADMIN — SPIRONOLACTONE 12.5 MG: 25 TABLET ORAL at 05:19

## 2023-10-21 RX ADMIN — FUROSEMIDE 40 MG: 10 INJECTION, SOLUTION INTRAVENOUS at 05:20

## 2023-10-21 RX ADMIN — LISINOPRIL 2.5 MG: 5 TABLET ORAL at 05:33

## 2023-10-21 RX ADMIN — GUAIFENESIN 200 MG: 100 SOLUTION ORAL at 05:21

## 2023-10-21 RX ADMIN — WARFARIN SODIUM 5 MG: 5 TABLET ORAL at 18:32

## 2023-10-21 RX ADMIN — OMEPRAZOLE 20 MG: 20 CAPSULE, DELAYED RELEASE ORAL at 05:17

## 2023-10-21 RX ADMIN — Medication 1 LOZENGE: at 15:47

## 2023-10-21 RX ADMIN — ASPIRIN 81 MG: 81 TABLET, COATED ORAL at 05:16

## 2023-10-21 RX ADMIN — GUAIFENESIN 200 MG: 100 SOLUTION ORAL at 20:55

## 2023-10-21 RX ADMIN — LISINOPRIL 2.5 MG: 5 TABLET ORAL at 18:32

## 2023-10-21 RX ADMIN — CEFTRIAXONE SODIUM 1000 MG: 10 INJECTION, POWDER, FOR SOLUTION INTRAVENOUS at 18:34

## 2023-10-21 RX ADMIN — FUROSEMIDE 60 MG: 10 INJECTION, SOLUTION INTRAVENOUS at 15:47

## 2023-10-21 RX ADMIN — OMEPRAZOLE 20 MG: 20 CAPSULE, DELAYED RELEASE ORAL at 18:32

## 2023-10-21 RX ADMIN — LEVOTHYROXINE SODIUM 75 MCG: 0.07 TABLET ORAL at 05:15

## 2023-10-21 RX ADMIN — Medication 1 LOZENGE: at 05:21

## 2023-10-21 RX ADMIN — HUMAN ALBUMIN MICROSPHERES AND PERFLUTREN 3 ML: 10; .22 INJECTION, SOLUTION INTRAVENOUS at 14:30

## 2023-10-21 RX ADMIN — Medication 1 LOZENGE: at 20:58

## 2023-10-21 ASSESSMENT — PAIN DESCRIPTION - PAIN TYPE
TYPE: ACUTE PAIN

## 2023-10-21 ASSESSMENT — ENCOUNTER SYMPTOMS
NECK PAIN: 0
SHORTNESS OF BREATH: 1
DEPRESSION: 0
NAUSEA: 0
VOMITING: 0
DIZZINESS: 0
WEAKNESS: 1
WHEEZING: 1
ORTHOPNEA: 0
SPUTUM PRODUCTION: 0
BRUISES/BLEEDS EASILY: 0
DIARRHEA: 0
FEVER: 0
COUGH: 0
HEADACHES: 0
PALPITATIONS: 0
HEMOPTYSIS: 0
BACK PAIN: 0
ABDOMINAL PAIN: 0
CHILLS: 0
TINGLING: 0

## 2023-10-21 ASSESSMENT — FIBROSIS 4 INDEX: FIB4 SCORE: 3.24

## 2023-10-21 ASSESSMENT — LIFESTYLE VARIABLES: SUBSTANCE_ABUSE: 0

## 2023-10-21 NOTE — CARE PLAN
The patient is Stable - Low risk of patient condition declining or worsening    Shift Goals  Clinical Goals: Safety, monitor urine output post galan cath DC, I/O  Patient Goals: Breathe better, Rest  Family Goals: JAYA    Progress made toward(s) clinical / shift goals:    Problem: Urinary Elimination  Goal: Establish and maintain regular urinary output  Outcome: Progressing  Note:   1. Evaluated need to continue indwelling catheter every shift   2. Assessed signs and symptoms of urinary retention   3. Assessed post-void residual volumes   4. Implemented bladder training program   5. Encouraged scheduled voidings   6. Assist patient to sit on bedside commode or toilet for voiding   7. Educated patient and family/caregiver on use and purpose of urine collection devices     Problem: Bowel Elimination  Goal: Establish and maintain regular bowel function  Outcome: Progressing  Flowsheets (Taken 10/21/2023 1300)  Last BM: 10/21/23  Note:   1. Noted date of last BM   2. Educated about diet, fluid intake, medication and activity to promote bowel function   3. Educated signs and symptoms of constipation and interventions to implement   4. Pharmacologic bowel management per provider order   5. Regular toileting schedule   6. Upright position for toileting   7. High fiber diet   8. Encouraged hydration   9. Collaborate with Clinical Dietician 10. Care and maintenance of ostomy if applicable

## 2023-10-21 NOTE — CARE PLAN
The patient is Stable - Low risk of patient condition declining or worsening    Shift Goals  Clinical Goals: safety, vs and hemodynamic monitoring, I/O, diurese, Echo pending  Patient Goals: get comfortable and sleep  Family Goals: samuel-family not present    Progress made toward(s) clinical / shift goals:       Problem: Knowledge Deficit - Standard  Goal: Patient and family/care givers will demonstrate understanding of plan of care, disease process/condition, diagnostic tests and medications  Outcome: Progressing  Note: Pt updated on POC. Pending echo.     Problem: Fall Risk  Goal: Patient will remain free from falls  10/21/2023 0417 by Rhonda Escamilla [email or web address]  Outcome: Progressing  Note: Fall Prevention Protocol  To be followed on all patients at risk for fall  Patient Name: Daya Woods    Guidelines for patients at risk to fall  1. Environmental precautions in use:       A.  treaded slipper socks are on patient       B.  Bed is locked and in lowest position       C.  Personal belongings, wastebasket, call bell are in easy reach       D.  Transferred patient toward stronger side       E.  Report is given to CNA regarding patient's fall risk    The following are considered:      1.  The side rail closest to bathroom is down      Bed rails:  [facility] Fall program emphasizes bed rail reduction.  Bed rails contribute to patient fall risk by creating barriers to patient transfer in and out of beds.  Use of bed rails must be assessed specifically to individual patient needs.  When possible, use alternative pillows and positioning devices to avail the use of bed rails.  (Remember:  Four (4) side rails are considered a restraint).    Guidelines for high fall risk patients    Fall risk guidelines as outlined above        A.  Fall risk armband on patient      B.  Fall risk sign is placed by the door - High/Moderate Fall Risk      C.  Indwelling catheter in use    Consider the following:        A.  Patient  room is close to the nurse's station      B.  Bed alarm on      C.  Physical Therapy/Occupational Therapy consultation for strengthening and mobility, including assessment of home care needs      D.  Physician and / or pharmacy consultation for discussion of potential medication modification or discontinuation         Problem: Pain - Standard  Goal: Alleviation of pain or a reduction in pain to the patient’s comfort goal  Outcome: Progressing  Note: Patient educated on POC.  Documented pain using the appropriate pain scale (0-10 pain scale and Non-verbal descriptors)  Education provided on non-pharmacologic comfort measures (relaxation, distraction, massage -SCD, heat therapy,  repositioning )  Administered pain management medications as ordered (PRN- Acetaminophen q6h)  Reassessed pain after pain medication administration per policy       Problem: Hemodynamics  Goal: Patient's hemodynamics, fluid balance and neurologic status will be stable or improve  Outcome: Progressing  Note: Patient educated on POC. Pt will remain NAEO.  Monitor vital signs (q4h/as needed)  Pulse oximetry and continuous cardiac monitor are in use. report abnormalities to the provider  Hemodynamic monitoring (CBC, CMP, Prothrombin. UA: E.coli positive)  Monitored intake and output q4h through the shift with 1500 mL fluid restriction.  Daily weights monitored  Peripheral pulses and capillary refill were assessed  Color and body temperature were assessed  Positioned patient for maximum circulation/cardiac output  Signs/symptoms of excessive bleeding were monitored  Assessed mental status, restlessness and changes in level of consciousness  Monitored temperature (report fever or hypothermia to provider immediately).      Problem: Respiratory  Goal: Patient will achieve/maintain optimum respiratory ventilation and gas exchange  Outcome: Progressing  Note: Patient educated on POC.  Assessed and monitored rate, rhythm, depth and effort of  respiration  Breath sounds assessed q shift and/or as needed  Assessed O2 saturation, administer/titrate oxygen as ordered. Saturation was 85-88% ion RA. 2L O2 applied saturation 92%.  Positioned patient for maximum ventilatory efficiency. HOB elevated to >30 degrees.  Turn, cough, and deep breath with splinting to improve effectiveness  Continuous Pulse Oximeter in place for monitoring.      Problem: Bowel Elimination  Goal: Establish and maintain regular bowel function  Outcome: Progressing  Flowsheets (Taken 10/20/2023 2126)  Last BM: 10/16/23  Note: Patient educated on POC.  Last day of BM: 10/16/2023  Education provided about diet, fluid intake, medication and activity to promote bowel function  Pharmacologic bowel management is administered as ordered

## 2023-10-21 NOTE — PROGRESS NOTES
[facility] Internal Medicine Daily Progress Note    Date of Service  10/21/2023    [facility] Team: [facility] IM Blue Team   Attending: Helga Ortiz M.d.  Senior Resident: Dr. Chaney  Intern:  Dr. Miles  Contact Number: [phone]    Chief Complaint  Daya Woods is a 83 y.o. female admitted 10/19/2023 with SOB and LE edema.    Hospital Course  Daya Woods is a 83 y.o. female with past medical history of atrial fibrillation on anticoagulation, hypertension, hyperlipidemia, CHF with ejection fraction of 25%, recent hospital admission for possible stroke versus seizure, which was complicated by GI bleed  who presented 10/19/2023 with shortness of breath and lower extremity edema.     In the ED, labs were significant for leukocytosis of 18,000, BNP greater than 26,000, elevated troponin of 107 and on repeat 102.  Chest x-ray did reveal some small left-sided pleural effusions. She received 80mg IV dose of Lasix during ED workup.     Upon admission 10/20/23, patient's home medications were resumed and echocardiogram was ordered for heart failure exacerbation. Cultures were drawn and IV Ceftriaxone was administered for acute cystitis. Elevated troponins found on admission were suspected secondary to persistent Afib and they were followed until stable. Cardiology was consulted for medical optimization in setting of heart failure, discussed plan for continued diuresis, and fluid status was closely monitored. Echocardiogram pending.    Interval Problem Update  Today, patient describes no new changes in her symptoms. She continues to have some mild wheezing, feels generally weak and has low appetite. She cannot say how her leg swelling compares to her baseline or admission date. She continues to deny chest pain, dysuria, abdominal tenderness.      Oxygen was weaned off 0.5L in room and patient satting above 90% on room air.     I have discussed this patient's plan of care and discharge plan at IDT rounds today with Case  Management, Nursing, Nursing leadership, and other members of the IDT team.    Consultants/Specialty  cardiology    Code Status  DNAR/DNI    Disposition  The patient is not medically cleared for discharge to home or a post-acute facility.      I have placed the appropriate orders for post-discharge needs.    Review of Systems  Review of Systems   Constitutional:  Negative for chills and fever.   HENT:  Negative for ear pain, hearing loss and tinnitus.    Respiratory:  Positive for shortness of breath and wheezing. Negative for cough, hemoptysis and sputum production.    Cardiovascular:  Negative for chest pain, palpitations, orthopnea and leg swelling.   Gastrointestinal:  Negative for abdominal pain, diarrhea, nausea and vomiting.   Genitourinary:  Negative for frequency, hematuria and urgency.   Musculoskeletal:  Negative for back pain and neck pain.   Neurological:  Positive for weakness. Negative for dizziness, tingling and headaches.   Endo/Heme/Allergies:  Negative for environmental allergies. Does not bruise/bleed easily.   Psychiatric/Behavioral:  Negative for depression, substance abuse and suicidal ideas.         Physical Exam  Temp:  [36.1 °C (97 °F)-37.1 °C (98.8 °F)] 36.1 °C (97 °F)  Pulse:  [] 69  Resp:  [15-18] 18  BP: ()/(44-60) 106/51  SpO2:  [92 %-99 %] 92 %    Physical Exam  Vitals (Satting well on room air during visit) reviewed.   Constitutional:       General: She is not in acute distress.     Appearance: Normal appearance. She is normal weight. She is not ill-appearing, toxic-appearing or diaphoretic.   HENT:      Head: Normocephalic and atraumatic.      Nose: Nose normal.      Mouth/Throat:      Mouth: Mucous membranes are moist.   Eyes:      Extraocular Movements: Extraocular movements intact.      Pupils: Pupils are equal, round, and reactive to light.   Cardiovascular:      Rate and Rhythm: Normal rate and regular rhythm.      Pulses: Normal pulses.      Heart sounds: Normal  Controlled with current regime heart sounds. No murmur heard.     No friction rub. No gallop.   Pulmonary:      Effort: Pulmonary effort is normal. No respiratory distress.      Breath sounds: No stridor. Wheezing (mild) and rales (mild) present. No rhonchi.   Chest:      Chest wall: No tenderness.   Abdominal:      General: Abdomen is flat. There is no distension.      Palpations: Abdomen is soft. There is no mass.      Tenderness: There is no abdominal tenderness. There is no right CVA tenderness, left CVA tenderness, guarding or rebound.      Hernia: No hernia is present.   Musculoskeletal:         General: No swelling, tenderness, deformity or signs of injury.      Right lower leg: Edema present.      Left lower leg: Edema present.      Comments:      Skin:     General: Skin is warm and dry.      Capillary Refill: Capillary refill takes less than 2 seconds.      Coloration: Skin is not jaundiced or pale.      Findings: No bruising, erythema, lesion or rash.   Neurological:      General: No focal deficit present.      Mental Status: She is alert and oriented to person, place, and time. Mental status is at baseline.      Cranial Nerves: No cranial nerve deficit.      Sensory: No sensory deficit.      Motor: Weakness present.      Coordination: Coordination normal.   Psychiatric:         Mood and Affect: Mood normal.         Behavior: Behavior normal.         Fluids    Intake/Output Summary (Last 24 hours) at 10/21/2023 1309  Last data filed at 10/21/2023 0528  Gross per 24 hour   Intake 620 ml   Output 620 ml   Net 0 ml       Laboratory  Recent Labs     10/19/23  1628 10/20/23  0039 10/21/23  0157   WBC 18.1* 17.8* 12.2*   RBC 3.24* 3.15* 3.66*   HEMOGLOBIN 8.7* 8.5* 9.6*   HEMATOCRIT 27.7* 26.7* 30.6*   MCV 85.5 84.8 83.6   MCH 26.9* 27.0 26.2*   MCHC 31.4* 31.8* 31.4*   RDW 56.2* 55.2* 54.9*   PLATELETCT 185 175 180   MPV 9.9 9.8 10.0     Recent Labs     10/19/23  1628 10/20/23  0039 10/21/23  0157   SODIUM 124* 127* 124*   POTASSIUM 4.7 4.5  3.9   CHLORIDE 92* 94* 92*   CO2 23 22 23   GLUCOSE 101* 103* 110*   BUN 24* 26* 27*   CREATININE 1.22 1.26 1.25   CALCIUM 7.8* 7.6* 7.8*     Recent Labs     10/19/23  1831 10/21/23  0157   INR 1.20* 1.78*         Recent Labs     10/20/23  0039   TRIGLYCERIDE 69   HDL 19*   LDL 27       Imaging  IR-US GUIDED PIV   Final Result    Ultrasound-guided PERIPHERAL IV INSERTION performed by    qualified nursing staff as above.      DX-CHEST-PORTABLE (1 VIEW)   Final Result         1. Small left pleural effusion.   2. No pulmonary infiltrates or consolidations are noted.      EC-ECHOCARDIOGRAM COMPLETE W/O CONT    (Results Pending)        Assessment/Plan  Problem Representation:    * CHF (congestive heart failure), NYHA class I, acute on chronic, combined (HCC)- (present on admission)  Assessment & Plan  EF 25% (2022)  BNP on admission greater than 25,000  Takes Lasix 20 PO BID at home    - Continue Metoprolol, Spironolactone, Lisinopril  - IV Furosemide dose increased in setting of continued signs of fluid overload  - Cardiology with recommendation for continued medical management  - Monitor weight and I/Os via purewick   - Echocardiogram pending    Acute cystitis without hematuria  Assessment & Plan  10/21: UCx's with preliminary growth of pansensitive E.Coli    - Continue with ceftriaxone IV    Elevated troponin  Assessment & Plan  Patient presented with a troponin of 107, and on repeat 102, now stable  EKG without ST segment elevation  Likely secondary to persistent atrial fibrillation.    Leukocytosis  Assessment & Plan  Secondary to UTI  Continue ceftriaxone    Acute hypoxic respiratory failure (HCC)  Assessment & Plan  Currently requiring 2 L supplemental oxygen.  Not oxygen dependent at home  Likely secondary to pleural effusion secondary to CHF exacerbation    Pleural effusion  Assessment & Plan  CXR: Small left pleural effusion  Continue with IV Lasix    Hyponatremia- (present on admission)  Assessment &  Plan  Likely hyper volemic hyponatremia secondary to volume overload given her CHF.  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  On chart review, GI stated that it was okay for her to resume her anticoagulation yesterday    Chronic atrial fibrillation (HCC)- (present on admission)  Assessment & Plan  PMH Afib and tachycardia on admission    -Resume home dose warfarin  -Resume metoprolol 50 mg daily         VTE prophylaxis: therapeutic anticoagulation with warfarin    I have performed a physical exam and reviewed and updated ROS and Plan today (10/21/2023). In review of yesterday's note (10/20/2023), there are no changes except as documented above.

## 2023-10-21 NOTE — PROGRESS NOTES
Coleman cath discontinued successfully without any complications, Patient education provvided to let Nurse know if she has trouble urinating, Patient verbalized understanding, RN will monitor and bladder scan for post void residual within 6 hrs post cath removal.

## 2023-10-21 NOTE — PROGRESS NOTES
Inpatient Anticoagulation Service Note for 10/21/2023    Reason for Anticoagulation: Atrial Fibrillation   Target INR: 2.0 to 3.0    [id number] VASc Score: 8  HAS-BLED Score: 4    Hemoglobin Value: (!) 9.6  Hematocrit Value: (!) 30.6  Lab Platelet Value: 180    INR from last 7 days       Date/Time INR Value    10/21/23 0157 1.78    10/19/23 1831 1.2          Dose from last 7 days       Date/Time Dose (mg)    10/21/23 1437 5    10/20/23 1540 5     Assessment:  - Chronic warfarin for hx of Afib, chadsvasc 8.  - Noted hx of recent CVA, remote hx of CAD s/p CABG x5 & PCI, PVD, and MitraClip in 2018.  - Patient recently discharged from [facility] on 10/12, where patient was found to have supratherapeutic INR (~8) & bleeding AVM s/p reversal with IV vitamin K and PRBC transfusion. Patient was instructed to resume warfarin in 1 week per GI recs, which was resumed on 10/18 at reported home dosing of 5 mg daily.  - INR remains subtherapeutic @ 1.78, however notable jump in the past 48 hours. Anticipate this uptrend to continue as previous warfarin doses start taking further effect. Aggressive diuresis for aeHFrEF noted, which may clear warfarin more extensively.  - Hgb has remained stable w/o overt bleeding. Home aspirin therapy resumed. Will need to monitor closely in setting of recent warfarin resumption, KESHA, high HASBLED score, & new drug interaction w/ ceftriaxone which may heighten warfarin's effect.  - Remains on cardiac diet.  - No bridge therapy indicated.    Plan:  - Continue warfarin 5 mg daily.  - Repeat INR & CBC tomorrow.    Education Material Provided?: No (chronic warfarin patient)    Pharmacist suggested discharge dosing: Warfarin 5 mg daily may be appropriate, however TBD pending subsequent INR trends given many acute variables. Recommend prompt INR check within 3 days of discharge    Pharmacy will continue following.       Kenrick Wilson, PharmD

## 2023-10-22 PROBLEM — R33.9 URINARY RETENTION: Status: ACTIVE | Noted: 2023-10-22

## 2023-10-22 LAB
ANION GAP SERPL CALC-SCNC: 11 MMOL/L (ref 7–16)
BUN SERPL-MCNC: 24 MG/DL (ref 8–22)
CALCIUM SERPL-MCNC: 7.6 MG/DL (ref 8.5–10.5)
CHLORIDE SERPL-SCNC: 91 MMOL/L (ref 96–112)
CO2 SERPL-SCNC: 23 MMOL/L (ref 20–33)
CREAT SERPL-MCNC: 1.31 MG/DL (ref 0.5–1.4)
ERYTHROCYTE [DISTWIDTH] IN BLOOD BY AUTOMATED COUNT: 54.9 FL (ref 35.9–50)
GFR SERPLBLD CREATININE-BSD FMLA CKD-EPI: 41 ML/MIN/1.73 M 2
GLUCOSE SERPL-MCNC: 97 MG/DL (ref 65–99)
HCT VFR BLD AUTO: 27.5 % (ref 37–47)
HGB BLD-MCNC: 8.9 G/DL (ref 12–16)
INR PPP: 1.8 (ref 0.87–1.13)
MAGNESIUM SERPL-MCNC: 1.9 MG/DL (ref 1.5–2.5)
MCH RBC QN AUTO: 27.1 PG (ref 27–33)
MCHC RBC AUTO-ENTMCNC: 32.4 G/DL (ref 32.2–35.5)
MCV RBC AUTO: 83.6 FL (ref 81.4–97.8)
PLATELET # BLD AUTO: 175 K/UL (ref 164–446)
PMV BLD AUTO: 9.9 FL (ref 9–12.9)
POTASSIUM SERPL-SCNC: 3.4 MMOL/L (ref 3.6–5.5)
PROTHROMBIN TIME: 21.1 SEC (ref 12–14.6)
RBC # BLD AUTO: 3.29 M/UL (ref 4.2–5.4)
SODIUM SERPL-SCNC: 125 MMOL/L (ref 135–145)
WBC # BLD AUTO: 8 K/UL (ref 4.8–10.8)

## 2023-10-22 PROCEDURE — 80048 BASIC METABOLIC PNL TOTAL CA: CPT

## 2023-10-22 PROCEDURE — 85027 COMPLETE CBC AUTOMATED: CPT

## 2023-10-22 PROCEDURE — 97161 PT EVAL LOW COMPLEX 20 MIN: CPT

## 2023-10-22 PROCEDURE — 36415 COLL VENOUS BLD VENIPUNCTURE: CPT

## 2023-10-22 PROCEDURE — 770020 HCHG ROOM/CARE - TELE (206)

## 2023-10-22 PROCEDURE — A9270 NON-COVERED ITEM OR SERVICE: HCPCS | Performed by: STUDENT IN AN ORGANIZED HEALTH CARE EDUCATION/TRAINING PROGRAM

## 2023-10-22 PROCEDURE — A9270 NON-COVERED ITEM OR SERVICE: HCPCS

## 2023-10-22 PROCEDURE — 700102 HCHG RX REV CODE 250 W/ 637 OVERRIDE(OP): Performed by: STUDENT IN AN ORGANIZED HEALTH CARE EDUCATION/TRAINING PROGRAM

## 2023-10-22 PROCEDURE — 700102 HCHG RX REV CODE 250 W/ 637 OVERRIDE(OP)

## 2023-10-22 PROCEDURE — 83735 ASSAY OF MAGNESIUM: CPT

## 2023-10-22 PROCEDURE — 700111 HCHG RX REV CODE 636 W/ 250 OVERRIDE (IP): Mod: JZ | Performed by: INTERNAL MEDICINE

## 2023-10-22 PROCEDURE — 97530 THERAPEUTIC ACTIVITIES: CPT

## 2023-10-22 PROCEDURE — 85610 PROTHROMBIN TIME: CPT

## 2023-10-22 PROCEDURE — 99232 SBSQ HOSP IP/OBS MODERATE 35: CPT | Performed by: INTERNAL MEDICINE

## 2023-10-22 PROCEDURE — 51798 US URINE CAPACITY MEASURE: CPT

## 2023-10-22 PROCEDURE — 700111 HCHG RX REV CODE 636 W/ 250 OVERRIDE (IP): Mod: JZ

## 2023-10-22 PROCEDURE — 700111 HCHG RX REV CODE 636 W/ 250 OVERRIDE (IP): Mod: JZ | Performed by: STUDENT IN AN ORGANIZED HEALTH CARE EDUCATION/TRAINING PROGRAM

## 2023-10-22 PROCEDURE — 700111 HCHG RX REV CODE 636 W/ 250 OVERRIDE (IP): Performed by: STUDENT IN AN ORGANIZED HEALTH CARE EDUCATION/TRAINING PROGRAM

## 2023-10-22 RX ORDER — MAGNESIUM SULFATE HEPTAHYDRATE 40 MG/ML
2 INJECTION, SOLUTION INTRAVENOUS ONCE
Status: COMPLETED | OUTPATIENT
Start: 2023-10-22 | End: 2023-10-22

## 2023-10-22 RX ORDER — FUROSEMIDE 10 MG/ML
60 INJECTION INTRAMUSCULAR; INTRAVENOUS ONCE
Status: COMPLETED | OUTPATIENT
Start: 2023-10-22 | End: 2023-10-22

## 2023-10-22 RX ORDER — POTASSIUM CHLORIDE 20 MEQ/1
40 TABLET, EXTENDED RELEASE ORAL ONCE
Status: COMPLETED | OUTPATIENT
Start: 2023-10-22 | End: 2023-10-22

## 2023-10-22 RX ADMIN — Medication 1 LOZENGE: at 18:19

## 2023-10-22 RX ADMIN — OMEPRAZOLE 20 MG: 20 CAPSULE, DELAYED RELEASE ORAL at 04:55

## 2023-10-22 RX ADMIN — ACETAMINOPHEN 650 MG: 325 TABLET, FILM COATED ORAL at 04:55

## 2023-10-22 RX ADMIN — WARFARIN SODIUM 5 MG: 5 TABLET ORAL at 18:16

## 2023-10-22 RX ADMIN — FUROSEMIDE 60 MG: 10 INJECTION, SOLUTION INTRAVENOUS at 04:55

## 2023-10-22 RX ADMIN — GUAIFENESIN 200 MG: 100 SOLUTION ORAL at 10:59

## 2023-10-22 RX ADMIN — POTASSIUM CHLORIDE 40 MEQ: 1500 TABLET, EXTENDED RELEASE ORAL at 08:27

## 2023-10-22 RX ADMIN — LISINOPRIL 2.5 MG: 5 TABLET ORAL at 04:56

## 2023-10-22 RX ADMIN — ACETAMINOPHEN 650 MG: 325 TABLET, FILM COATED ORAL at 18:40

## 2023-10-22 RX ADMIN — ACETAMINOPHEN 650 MG: 325 TABLET, FILM COATED ORAL at 10:59

## 2023-10-22 RX ADMIN — LEVOTHYROXINE SODIUM 75 MCG: 0.07 TABLET ORAL at 04:54

## 2023-10-22 RX ADMIN — DOCUSATE SODIUM 50 MG AND SENNOSIDES 8.6 MG 2 TABLET: 8.6; 5 TABLET, FILM COATED ORAL at 18:15

## 2023-10-22 RX ADMIN — Medication 1 LOZENGE: at 23:13

## 2023-10-22 RX ADMIN — FUROSEMIDE 60 MG: 10 INJECTION, SOLUTION INTRAVENOUS at 15:41

## 2023-10-22 RX ADMIN — GUAIFENESIN 200 MG: 100 SOLUTION ORAL at 04:55

## 2023-10-22 RX ADMIN — SPIRONOLACTONE 12.5 MG: 25 TABLET ORAL at 04:56

## 2023-10-22 RX ADMIN — OMEPRAZOLE 20 MG: 20 CAPSULE, DELAYED RELEASE ORAL at 18:15

## 2023-10-22 RX ADMIN — CEFTRIAXONE SODIUM 1000 MG: 10 INJECTION, POWDER, FOR SOLUTION INTRAVENOUS at 18:15

## 2023-10-22 RX ADMIN — METOPROLOL SUCCINATE 50 MG: 50 TABLET, EXTENDED RELEASE ORAL at 04:55

## 2023-10-22 RX ADMIN — MAGNESIUM SULFATE HEPTAHYDRATE 2 G: 2 INJECTION, SOLUTION INTRAVENOUS at 12:10

## 2023-10-22 RX ADMIN — ASPIRIN 81 MG: 81 TABLET, COATED ORAL at 04:55

## 2023-10-22 ASSESSMENT — ENCOUNTER SYMPTOMS
PALPITATIONS: 0
ORTHOPNEA: 0
COUGH: 0
DIZZINESS: 0
HEMOPTYSIS: 0
WHEEZING: 1
BACK PAIN: 0
DEPRESSION: 0
SHORTNESS OF BREATH: 1
NECK PAIN: 0
VOMITING: 0
TINGLING: 0
WEAKNESS: 1
NAUSEA: 0
CHILLS: 0
SPUTUM PRODUCTION: 0
FEVER: 0
BRUISES/BLEEDS EASILY: 0
DIARRHEA: 0
HEADACHES: 0
ABDOMINAL PAIN: 0

## 2023-10-22 ASSESSMENT — COGNITIVE AND FUNCTIONAL STATUS - GENERAL
MOVING FROM LYING ON BACK TO SITTING ON SIDE OF FLAT BED: A LITTLE
MOBILITY SCORE: 15
TURNING FROM BACK TO SIDE WHILE IN FLAT BAD: A LITTLE
MOVING TO AND FROM BED TO CHAIR: UNABLE
STANDING UP FROM CHAIR USING ARMS: A LITTLE
CLIMB 3 TO 5 STEPS WITH RAILING: A LOT
SUGGESTED CMS G CODE MODIFIER MOBILITY: CK
WALKING IN HOSPITAL ROOM: A LITTLE

## 2023-10-22 ASSESSMENT — GAIT ASSESSMENTS
GAIT LEVEL OF ASSIST: STANDBY ASSIST
DISTANCE (FEET): 25
DEVIATION: DECREASED HEEL STRIKE;DECREASED TOE OFF
ASSISTIVE DEVICE: FRONT WHEEL WALKER

## 2023-10-22 ASSESSMENT — PAIN DESCRIPTION - PAIN TYPE
TYPE: ACUTE PAIN
TYPE: ACUTE PAIN

## 2023-10-22 ASSESSMENT — PATIENT HEALTH QUESTIONNAIRE - PHQ9
1. LITTLE INTEREST OR PLEASURE IN DOING THINGS: NOT AT ALL
SUM OF ALL RESPONSES TO PHQ9 QUESTIONS 1 AND 2: 0
1. LITTLE INTEREST OR PLEASURE IN DOING THINGS: NOT AT ALL
2. FEELING DOWN, DEPRESSED, IRRITABLE, OR HOPELESS: NOT AT ALL
SUM OF ALL RESPONSES TO PHQ9 QUESTIONS 1 AND 2: 0

## 2023-10-22 ASSESSMENT — LIFESTYLE VARIABLES: SUBSTANCE_ABUSE: 0

## 2023-10-22 NOTE — CARE PLAN
The patient is Stable - Low risk of patient condition declining or worsening    Shift Goals  Clinical Goals: safety, bladder scan protocol, 1500 fluid restrictions, monitor VS and hemodynamic status, and diurese  Patient Goals: urinate without discomfort and burning sensation on her own  Family Goals: get infection under control and feel better      Problem: Fall Risk  Goal: Patient will remain free from falls  Outcome: Progressing  Note: Fall Prevention Protocol  To be followed on all patients at risk for fall  Patient Name: Daya Chuck    Guidelines for patients at risk to fall  Environmental precautions in use:       treaded slipper socks are on patient       Bed is locked and in lowest position       Personal belongings, wastebasket, call bell, urinal are in easy reach       Transferred patient toward stronger side       Report is given to CNA regarding patient's fall risk    The following are considered:     The side rail closest to bathroom is down     Bed rails:  [facility] Fall program emphasizes bed rail reduction.  Bed rails contribute to patient fall risk by creating barriers to patient transfer in and out of beds.  Use of bed rails must be assessed specifically to individual patient needs.  When possible, use alternative pillows and positioning devices to avail the use of bed rails.  (Remember:  Four (4) side rails are considered a restraint).    Guidelines for high fall risk patients    Fall risk guidelines as outlined above        Fall risk armband on patient      Fall risk sign is placed by the door - High/Moderate Fall Risk      Frequent toileting offered    Consider the following:        Patient room is close to the nurse's station      Bed alarm on      Physical Therapy/Occupational Therapy consultation for strengthening and mobility, including assessment of home care needs      Physician and / or pharmacy consultation for discussion of potential medication modification or discontinuation         Problem: Pain - Standard  Goal: Alleviation of pain or a reduction in pain to the patient’s comfort goal  Outcome: Progressing  Note: Pt denies pain after tylenol administration and bladder emptying with straight catheter.       Problem: Hemodynamics  Goal: Patient's hemodynamics, fluid balance and neurologic status will be stable or improve  Outcome: Progressing  Note: Patient educated on POC. Pt will remain NAEO.  Monitor vital signs (q4h/q8h/or as needed)  Pulse oximetry and continuous cardiac monitor are in use. report abnormalities to the provider  Hemodynamic monitoring (hgb goal > 7, Urine + for E. Coli. Cbc, cmp, magnesium, and prothrombin)  Monitored intake and output q4h through the shift  Daily weights monitored  Peripheral pulses and capillary refill were assessed  Color and body temperature were assessed  Positioned patient for maximum circulation/cardiac output  Signs/symptoms of excessive bleeding were monitored  Assessed mental status, restlessness and changes in level of consciousness  Monitored temperature (report fever or hypothermia to provider immediately).        Problem: Urinary Elimination  Goal: Establish and maintain regular urinary output  Outcome: Progressing  Note:   Bladder scan protocol in place per MD.  Assessed signs and symptoms of urinary retention   Assessed post-void residual volumes   Implemented bladder training program   Encouraged scheduled voidings   Assisted patient to sit on bedside commode or toilet for voiding   Educated patient and family/caregiver on use and purpose of urine collection devices (document in Patient Education)       Pt complained of dysuria and burning sensation when trying to pee. GI was tender on Lower quadrant. MD notified. Received order for bladder protocol. Bladder scan: 569 mL. Straight catheter output: 720 mL. Pt expressed relief after draining her bladder.         Problem: Fluid Volume  Goal: Fluid volume balance will be maintained  Outcome:  Progressing  Note:   Intake and output is monitored  Encouraged fluid intake with 1500 mL fluid restriction  Weights per provider order   Assessed for sighns and symptoms of bleeding   Monitored for signs of fluid overload/ inadequate fluid volume         Problem: Skin Integrity  Goal: Skin integrity is maintained or improved  Outcome: Progressing  Note: Patient educated on POC.  Assessed skin integrity, appearance and/or temperature   Assessed risk factors for impaired skin integrity and/or pressures ulcers   Implemented precautions to protect skin integrity  Implemented pressure ulcer prevention protocol   Confirmed/Placed wound care consult order  Patient use of pressure relieving devices (TAPS system, waffle overlay, silicone NC with gray foam, sacral mepilex)

## 2023-10-22 NOTE — ASSESSMENT & PLAN NOTE
Coleman catheter placed earlier this admission  Removal and void trial started 10/24/23    - As of 10/25/23 no significant retention

## 2023-10-22 NOTE — PROGRESS NOTES
Patient has been reported to have urine retention with difficulty voiding this am. Bladder scan completed and patient had 552 ml of urine prior to voiding, sat on the toilet for 20 mins and was unable to pass urine. RN notified Provider and okay to replace galan catheter at this time. Patient is tolerating galan okay at this time with immediate out of 475 ml

## 2023-10-22 NOTE — PROGRESS NOTES
Pt complained of dysuria and burning sensation when trying to pee. GI was tender on Lower quadrant. MD notified. Received order for bladder protocol. Bladder scan: 569 mL. Straight catheter output: 720 mL. Pt expressed relief after draining her bladder.

## 2023-10-22 NOTE — PROGRESS NOTES
Inpatient Anticoagulation Service Note for 10/22/2023    Reason for Anticoagulation: Atrial Fibrillation   Target INR: 2.0 to 3.0    [id number] VASc Score: 8  HAS-BLED Score: 4    Hemoglobin Value: (!) 8.9  Hematocrit Value: (!) 27.5  Lab Platelet Value: 175    INR from last 7 days       Date/Time INR Value    10/22/23 0043 1.8    10/21/23 0157 1.78    10/19/23 1831 1.2          Dose from last 7 days       Date/Time Dose (mg)    10/22/23 0946 5    10/21/23 1437 5    10/20/23 1540 5     Assessment:  - Chronic warfarin for hx of Afib, chadsvasc 8.  - Noted ?recent CVA during recent admission, remote hx of CAD s/p CABG x5 & PCI, PVD, and severe MR s/p MitraClip in 2018.  - Patient recently discharged from [facility] on 10/12, where patient was found to have supratherapeutic INR (~8) & bleeding AVM s/p reversal with IV vitamin K, PRBCs, & endoscopic intervention. Patient was instructed to resume warfarin in 1 week per GI recs, which was resumed on 10/18 at reported home dosing of 5 mg daily.  - Hgb has remained stable w/o overt bleeding despite warfarin resumption & home aspirin therapy for extensive cardiac hx noted above.  - Remains on cardiac diet, unclear intake.  - INR remains subtherapeutic @ 1.8 (goal 2-3). Day #5 of warfarin resumption, unclear if INR is plateuaing or if uprend will continue. Ongoing aggressive IV diuresis for HFrEF may clear warfarin more extensively, however KESHA along w/ acute infx & drug interaction w/ ceftriaxone may increase warfarin concentrations.    Plan:  - Continue warfarin 5 mg daily. Repeat INR tomorrow.  - Will consider slightly increasing warfarin dose tomorrow if minimal INR movement, however with caution in setting of high bleed risk.  - No bridge therapy indicated.  - Repeat CBC tomorrow. Monitor for bleeding closely given recent warfarin resumption s/p GIB, KESHA, HASBLED score of 4, & new drug interactions.  - Pharmacy will continue following.     Pharmacist suggested discharge dosing:  Warfarin 5 mg daily may be appropriate, however TBD pending subsequent INR trends given many acute variables. Recommend prompt INR check within 3 days of discharge.       Kenrick Wilson, PharmD

## 2023-10-22 NOTE — PROGRESS NOTES
[facility] Internal Medicine Daily Progress Note    Date of Service  10/22/2023    [facility] Team: R IM Blue Team   Attending: Helga Ortiz M.d.  Senior Resident: Dr. Chaney  Intern:  Dr. Miles  Contact Number: [phone]    Chief Complaint  Daya Woods is a 83 y.o. female admitted 10/19/2023 with SOB and LE edema.    Hospital Course  Daya Woods is a 83 y.o. female with past medical history of atrial fibrillation on anticoagulation, hypertension, hyperlipidemia, CHF with ejection fraction of 25%, recent hospital admission for possible stroke versus seizure, which was complicated by GI bleed  who presented 10/19/2023 with shortness of breath and lower extremity edema.     In the ED, labs were significant for leukocytosis of 18,000, BNP greater than 26,000, elevated troponin of 107 and on repeat 102.  Chest x-ray did reveal some small left-sided pleural effusions. She received 80mg IV dose of Lasix during ED workup.     Upon admission 10/20/23, patient's home medications were resumed and echocardiogram was ordered for heart failure exacerbation. Cultures were drawn and IV Ceftriaxone was administered for acute cystitis. Elevated troponins found on admission were suspected secondary to persistent Afib and they were followed until stable. Cardiology was consulted for medical optimization in setting of heart failure, discussed plan for continued diuresis, and fluid status was closely monitored. Echocardiogram identifying chronic cardiac issues as well as worsened tricuspid and mitral regurgitation.  Patient's symptoms of shortness of breath abdominal and leg swelling improved with diuresis.    Interval Problem Update  Today, patient patient complains of some vaginal itching and discomfort with Coleman placement, improved with adjustments in minimizing movement.  She says its tolerable for now.  Overall she has continued weakness and some wheezing but she feels that her shortness of breath has improved.  Her  abdominal and leg swelling have significantly improved.  Otherwise she has no acute complaints.    I have discussed this patient's plan of care and discharge plan at IDT rounds today with Case Management, Nursing, Nursing leadership, and other members of the IDT team.    Consultants/Specialty  cardiology    Code Status  DNAR/DNI    Disposition  The patient is not medically cleared for discharge to home or a post-acute facility.      I have placed the appropriate orders for post-discharge needs.    Review of Systems  Review of Systems   Constitutional:  Negative for chills and fever.   HENT:  Negative for ear pain, hearing loss and tinnitus.    Respiratory:  Positive for shortness of breath and wheezing. Negative for cough, hemoptysis and sputum production.    Cardiovascular:  Negative for chest pain, palpitations, orthopnea and leg swelling.   Gastrointestinal:  Negative for abdominal pain, diarrhea, nausea and vomiting.   Genitourinary:  Negative for frequency, hematuria and urgency.   Musculoskeletal:  Negative for back pain and neck pain.   Skin:  Positive for itching.   Neurological:  Positive for weakness. Negative for dizziness, tingling and headaches.   Endo/Heme/Allergies:  Negative for environmental allergies. Does not bruise/bleed easily.   Psychiatric/Behavioral:  Negative for depression, substance abuse and suicidal ideas.         Physical Exam  Temp:  [36.1 °C (96.9 °F)-37 °C (98.6 °F)] 36.8 °C (98.3 °F)  Pulse:  [] 92  Resp:  [15-18] 18  BP: ()/(47-63) 95/58  SpO2:  [92 %-99 %] 97 %    Physical Exam  Vitals (Satting well on room air during visit) reviewed.   Constitutional:       General: She is not in acute distress.     Appearance: Normal appearance. She is normal weight. She is not ill-appearing, toxic-appearing or diaphoretic.   HENT:      Head: Normocephalic and atraumatic.      Nose: Nose normal.      Mouth/Throat:      Mouth: Mucous membranes are moist.   Eyes:      Extraocular  Movements: Extraocular movements intact.      Pupils: Pupils are equal, round, and reactive to light.   Cardiovascular:      Rate and Rhythm: Normal rate and regular rhythm.      Pulses: Normal pulses.      Heart sounds: Normal heart sounds. No murmur heard.     No friction rub. No gallop.   Pulmonary:      Effort: Pulmonary effort is normal. No respiratory distress.      Breath sounds: No stridor. Wheezing (mild) and rales (mild) present. No rhonchi.   Chest:      Chest wall: No tenderness.   Abdominal:      General: Abdomen is flat. There is no distension.      Palpations: Abdomen is soft. There is no mass.      Tenderness: There is no abdominal tenderness. There is no right CVA tenderness, left CVA tenderness, guarding or rebound.      Hernia: No hernia is present.   Genitourinary:     Vagina: No vaginal discharge.      Comments: Coleman in place without erythema or rash in surrounding area  Musculoskeletal:         General: No swelling, tenderness, deformity or signs of injury.      Right lower leg: Edema present.      Left lower leg: Edema present.      Comments:      Skin:     General: Skin is warm and dry.      Capillary Refill: Capillary refill takes less than 2 seconds.      Coloration: Skin is not jaundiced or pale.      Findings: No bruising, erythema, lesion or rash.   Neurological:      General: No focal deficit present.      Mental Status: She is alert and oriented to person, place, and time. Mental status is at baseline.      Cranial Nerves: No cranial nerve deficit.      Sensory: No sensory deficit.      Motor: Weakness present.      Coordination: Coordination normal.   Psychiatric:         Mood and Affect: Mood normal.         Behavior: Behavior normal.         Fluids    Intake/Output Summary (Last 24 hours) at 10/22/2023 1403  Last data filed at 10/22/2023 1232  Gross per 24 hour   Intake 260 ml   Output 1345 ml   Net -1085 ml       Laboratory  Recent Labs     10/20/23  0039 10/21/23  6540  10/22/23  0043   WBC 17.8* 12.2* 8.0   RBC 3.15* 3.66* 3.29*   HEMOGLOBIN 8.5* 9.6* 8.9*   HEMATOCRIT 26.7* 30.6* 27.5*   MCV 84.8 83.6 83.6   MCH 27.0 26.2* 27.1   MCHC 31.8* 31.4* 32.4   RDW 55.2* 54.9* 54.9*   PLATELETCT 175 180 175   MPV 9.8 10.0 9.9     Recent Labs     10/20/23  0039 10/21/23  0157 10/21/23  2056 10/22/23  0043   SODIUM 127* 124* 125* 125*   POTASSIUM 4.5 3.9  --  3.4*   CHLORIDE 94* 92*  --  91*   CO2 22 23  --  23   GLUCOSE 103* 110*  --  97   BUN 26* 27*  --  24*   CREATININE 1.26 1.25  --  1.31   CALCIUM 7.6* 7.8*  --  7.6*     Recent Labs     10/19/23  1831 10/21/23  0157 10/22/23  0043   INR 1.20* 1.78* 1.80*         Recent Labs     10/20/23  0039   TRIGLYCERIDE 69   HDL 19*   LDL 27       Imaging  EC-ECHOCARDIOGRAM COMPLETE W/ CONT   Final Result      IR-US GUIDED PIV   Final Result    Ultrasound-guided PERIPHERAL IV INSERTION performed by    qualified nursing staff as above.      DX-CHEST-PORTABLE (1 VIEW)   Final Result         1. Small left pleural effusion.   2. No pulmonary infiltrates or consolidations are noted.           Assessment/Plan  Problem Representation:    * CHF (congestive heart failure), NYHA class I, acute on chronic, combined (HCC)- (present on admission)  Assessment & Plan  EF 25% (2022)  BNP on admission greater than 25,000  Takes Lasix 20 PO BID at home  10/21: Echo with EF 20%, chronic issues and worsening tricuspid and mitral regurgitation.    - Continue Metoprolol, Spironolactone, Lisinopril  - Currently IV Furosemide with signs of fluid overload  - Reassess IV diuresis based on fluid status on 10/23  - Cardiology with recommendation for continued medical management  - Monitor weight and I/Os via Coleman    Acute cystitis without hematuria  Assessment & Plan  10/21: UCx's with preliminary growth of pansensitive E.Coli    - Continue with ceftriaxone IV      Urinary retention  Assessment & Plan  - Coleman catheter placed    Elevated troponin  Assessment &  Plan  Patient presented with a troponin of 107, and on repeat 102, now stable  EKG without ST segment elevation  Likely secondary to persistent atrial fibrillation.    Leukocytosis  Assessment & Plan  Secondary to UTI  Continue ceftriaxone    Acute hypoxic respiratory failure (HCC)  Assessment & Plan  Currently requiring 2 L supplemental oxygen.  Not oxygen dependent at home  Likely secondary to pleural effusion secondary to CHF exacerbation    Pleural effusion  Assessment & Plan  CXR: Small left pleural effusion  Continue with IV Lasix    Hyponatremia- (present on admission)  Assessment & Plan  Likely hyper volemic hyponatremia secondary to volume overload given her CHF.  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  On chart review, GI stated that it was okay for her to resume her anticoagulation yesterday    Chronic atrial fibrillation (HCC)- (present on admission)  Assessment & Plan  PMH Afib and tachycardia on admission    -Resume home dose warfarin  -Resume metoprolol 50 mg daily         VTE prophylaxis: therapeutic anticoagulation with warfarin    I have performed a physical exam and reviewed and updated ROS and Plan today (10/22/2023). In review of yesterday's note (10/21/2023), there are no changes except as documented above.

## 2023-10-22 NOTE — THERAPY
"Physical Therapy   Initial Evaluation     Patient Name: Daya Woods  Age:  83 y.o., Sex:  female  Medical Record #: [id number]  Today's Date: 10/22/2023     Precautions  Precautions: (P) Fall Risk    Assessment  Patient is 83 y.o. female with CHF exacerbation and UTI. Pt with recent D/C home from admit for stroke vs seizure and GIB. 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 Pt reports she lives with grandson, his wife, and his daughter, and that he assists with excursions from home. Pt reports independence with self care and mobility sans AD at baseline. Today, pt required assistance with bed mobility, and SBA with transfer and gait. She was limited by fatigue. She will benefit from continued PT in the acute setting to address deficits with emphasis on functional mobility, activity tolerance, and stair training. Anticipate pt to be able to D/C home with home health once she has demonstrated safe ability with stairs.    Plan    Physical Therapy Initial Treatment Plan   Treatment Plan : (P) Bed Mobility, Gait Training, Neuro Re-Education / Balance, Stair Training, Therapeutic Activities, Therapeutic Exercise  Treatment Frequency: (P) 4 Times per Week  Duration: (P) Until Therapy Goals Met    DC Equipment Recommendations: (P) Front-Wheel Walker  Discharge Recommendations: (P) Other - (home health once safe with stairs)       Subjective    Pt stating she is feeling weak from being in the hospital.     Objective       10/22/23 1537   Precautions   Precautions Fall Risk   Pain 0 - 10 Group   Location Buttock;Coccyx   Therapist Pain Assessment Post Activity Pain Same as Prior to Activity  (no specific  NPRS value stated; \"pain is way down\")   Prior Living Situation   Prior Services Home-Independent   Housing / Facility [address];Mobile Home   Steps Into Home 5   Steps In Home 0   Rail Both Rail (Steps into Home)   Bathroom Set up Bathtub / Shower " Combination;Grab Bars   Equipment Owned Single Point Cane   Lives with - Patient's Self Care Capacity Other (Comments)  (grandson and his wife and 2 year old baby)   Prior Level of Functional Mobility   Bed Mobility Independent   Transfer Status Independent   Ambulation Independent   Ambulation Distance limited community   Assistive Devices Used None   Stairs Independent   History of Falls   History of Falls No   Cognition    Speech/ Communication Hard of Hearing   Strength Lower Body   Lower Body Strength  X   Gross Strength Generalized Weakness, Equal Bilaterally   Balance Assessment   Sitting Balance (Static) Fair +   Sitting Balance (Dynamic) Fair +   Standing Balance (Static) Fair -   Standing Balance (Dynamic) Fair -   Weight Shift Sitting Fair   Weight Shift Standing Fair   Comments with FWW   Bed Mobility    Supine to Sit Minimal Assist   Sit to Supine Standby Assist   Scooting Standby Assist   Comments with bed rail   Gait Analysis   Gait Level Of Assist Standby Assist   Assistive Device Front Wheel Walker   Distance (Feet) 25   # of Times Distance was Traveled 1   Deviation Decreased Heel Strike;Decreased Toe Off  (slow pace)   # of Stairs Climbed 0   Weight Bearing Status no restriction   Comments no LOB   Functional Mobility   Sit to Stand Standby Assist   Transfer Method Stand Step   Mobility supine->sit EOB->stand->amb->sit EOB->supine   How much difficulty does the patient currently have...   Turning over in bed (including adjusting bedclothes, sheets and blankets)? 3   Sitting down on and standing up from a chair with arms (e.g., wheelchair, bedside commode, etc.) 3   Moving from lying on back to sitting on the side of the bed? 1   How much help from another person does the patient currently need...   Moving to and from a bed to a chair (including a wheelchair)? 3   Need to walk in a hospital room? 3   Climbing 3-5 steps with a railing? 2   6 clicks Mobility Score 15   Activity Tolerance   Sitting in  Chair declined   Sitting Edge of Bed 4 min approx   Standing 4 min approx   Comments limited by fatigue, motivation   Patient / Family Goals    Patient / Family Goal #1 get stronger   Short Term Goals    Short Term Goal # 1 Pt will perform supine to sit with supv within 6 visits in order to return home   Short Term Goal # 2 Pt will tranfser bed<->chair with supv and FWW within 6 visits in order to return home   Short Term Goal # 3 Pt will amb 150' with FWW and supv within 6 visits in order to return home   Short Term Goal # 4 Pt will perform 5 steps with SBA within 6 visits as required to enter/exit home   Education Group   Education Provided Role of Physical Therapist;Gait Training   Role of Physical Therapist Patient Response Patient;Acceptance;Explanation;Verbal Demonstration   Gait Training Patient Response Patient;Acceptance;Explanation;Verbal Demonstration   Use of Assistive Device Patient Response Patient;Acceptance;Explanation;Reinforcement Needed   Physical Therapy Initial Treatment Plan    Treatment Plan  Bed Mobility;Gait Training;Neuro Re-Education / Balance;Stair Training;Therapeutic Activities;Therapeutic Exercise   Treatment Frequency 4 Times per Week   Duration Until Therapy Goals Met   Problem List    Problems Pain;Impaired Bed Mobility;Impaired Ambulation;Impaired Transfers;Functional Strength Deficit;Impaired Balance;Decreased Activity Tolerance   Anticipated Discharge Equipment and Recommendations   DC Equipment Recommendations Front-Wheel Walker   Discharge Recommendations Other -  (home health once safe with stairs)

## 2023-10-22 NOTE — CARE PLAN
The patient is Stable - Low risk of patient condition declining or worsening    Shift Goals  Clinical Goals: Bladder scan protocol, I&O,  Patient Goals: Urinate,less cough and SOB  Family Goals: Get better    Progress made toward(s) clinical / shift goals:    Problem: Skin Integrity  Goal: Skin integrity is maintained or improved  Outcome: Progressing   Patient has an open wound to coccyx , wound consult placed , Patient is Q2 hrs turn and mepilex boarder applied with barrier cream.  Problem: Care Map:  Admission Optimal Outcome for the Heart Failure Patient  Goal: Admission:  Optimal Care of the heart failure patient  Outcome: Progressing   Patient continues on strict I &O related to CHF, RN educated patient on the importance of following her fluid restriction to prevent fluid overload. Coleman cath has been placed and cath care completed this shift. Patient tolerated well. Daughter is at bedside and education has been provided to both for HF at this time.

## 2023-10-23 ENCOUNTER — PATIENT OUTREACH (OUTPATIENT)
Dept: SCHEDULING | Facility: IMAGING CENTER | Age: 83
End: 2023-10-23
Payer: COMMERCIAL

## 2023-10-23 LAB
ALBUMIN SERPL BCP-MCNC: 1.9 G/DL (ref 3.2–4.9)
ANION GAP SERPL CALC-SCNC: 8 MMOL/L (ref 7–16)
BUN SERPL-MCNC: 24 MG/DL (ref 8–22)
CALCIUM ALBUM COR SERPL-MCNC: 9.3 MG/DL (ref 8.5–10.5)
CALCIUM SERPL-MCNC: 7.6 MG/DL (ref 8.5–10.5)
CHLORIDE SERPL-SCNC: 93 MMOL/L (ref 96–112)
CO2 SERPL-SCNC: 24 MMOL/L (ref 20–33)
CREAT SERPL-MCNC: 1.08 MG/DL (ref 0.5–1.4)
ERYTHROCYTE [DISTWIDTH] IN BLOOD BY AUTOMATED COUNT: 56.5 FL (ref 35.9–50)
GFR SERPLBLD CREATININE-BSD FMLA CKD-EPI: 51 ML/MIN/1.73 M 2
GLUCOSE SERPL-MCNC: 110 MG/DL (ref 65–99)
HCT VFR BLD AUTO: 27 % (ref 37–47)
HGB BLD-MCNC: 8.4 G/DL (ref 12–16)
INR PPP: 2.17 (ref 0.87–1.13)
MAGNESIUM SERPL-MCNC: 2 MG/DL (ref 1.5–2.5)
MCH RBC QN AUTO: 26.4 PG (ref 27–33)
MCHC RBC AUTO-ENTMCNC: 31.1 G/DL (ref 32.2–35.5)
MCV RBC AUTO: 84.9 FL (ref 81.4–97.8)
PHOSPHATE SERPL-MCNC: 3.2 MG/DL (ref 2.5–4.5)
PLATELET # BLD AUTO: 182 K/UL (ref 164–446)
PMV BLD AUTO: 9.6 FL (ref 9–12.9)
POTASSIUM SERPL-SCNC: 3.6 MMOL/L (ref 3.6–5.5)
PROTHROMBIN TIME: 24.5 SEC (ref 12–14.6)
RBC # BLD AUTO: 3.18 M/UL (ref 4.2–5.4)
SODIUM SERPL-SCNC: 125 MMOL/L (ref 135–145)
WBC # BLD AUTO: 6.3 K/UL (ref 4.8–10.8)

## 2023-10-23 PROCEDURE — A9270 NON-COVERED ITEM OR SERVICE: HCPCS

## 2023-10-23 PROCEDURE — A9270 NON-COVERED ITEM OR SERVICE: HCPCS | Mod: JZ | Performed by: INTERNAL MEDICINE

## 2023-10-23 PROCEDURE — 85027 COMPLETE CBC AUTOMATED: CPT

## 2023-10-23 PROCEDURE — 83735 ASSAY OF MAGNESIUM: CPT

## 2023-10-23 PROCEDURE — 700101 HCHG RX REV CODE 250: Performed by: STUDENT IN AN ORGANIZED HEALTH CARE EDUCATION/TRAINING PROGRAM

## 2023-10-23 PROCEDURE — 770020 HCHG ROOM/CARE - TELE (206)

## 2023-10-23 PROCEDURE — 700102 HCHG RX REV CODE 250 W/ 637 OVERRIDE(OP): Performed by: STUDENT IN AN ORGANIZED HEALTH CARE EDUCATION/TRAINING PROGRAM

## 2023-10-23 PROCEDURE — 700102 HCHG RX REV CODE 250 W/ 637 OVERRIDE(OP): Mod: JZ | Performed by: INTERNAL MEDICINE

## 2023-10-23 PROCEDURE — 80069 RENAL FUNCTION PANEL: CPT

## 2023-10-23 PROCEDURE — A9270 NON-COVERED ITEM OR SERVICE: HCPCS | Performed by: STUDENT IN AN ORGANIZED HEALTH CARE EDUCATION/TRAINING PROGRAM

## 2023-10-23 PROCEDURE — 700111 HCHG RX REV CODE 636 W/ 250 OVERRIDE (IP): Performed by: STUDENT IN AN ORGANIZED HEALTH CARE EDUCATION/TRAINING PROGRAM

## 2023-10-23 PROCEDURE — 700102 HCHG RX REV CODE 250 W/ 637 OVERRIDE(OP)

## 2023-10-23 PROCEDURE — 36415 COLL VENOUS BLD VENIPUNCTURE: CPT

## 2023-10-23 PROCEDURE — 85610 PROTHROMBIN TIME: CPT

## 2023-10-23 PROCEDURE — 99232 SBSQ HOSP IP/OBS MODERATE 35: CPT | Performed by: INTERNAL MEDICINE

## 2023-10-23 PROCEDURE — 700111 HCHG RX REV CODE 636 W/ 250 OVERRIDE (IP): Mod: JZ | Performed by: INTERNAL MEDICINE

## 2023-10-23 RX ORDER — FUROSEMIDE 10 MG/ML
60 INJECTION INTRAMUSCULAR; INTRAVENOUS ONCE
Status: COMPLETED | OUTPATIENT
Start: 2023-10-23 | End: 2023-10-23

## 2023-10-23 RX ORDER — POTASSIUM CHLORIDE 20 MEQ/1
40 TABLET, EXTENDED RELEASE ORAL ONCE
Status: COMPLETED | OUTPATIENT
Start: 2023-10-23 | End: 2023-10-23

## 2023-10-23 RX ADMIN — LEVOTHYROXINE SODIUM 75 MCG: 0.07 TABLET ORAL at 04:47

## 2023-10-23 RX ADMIN — GUAIFENESIN 200 MG: 100 SOLUTION ORAL at 19:21

## 2023-10-23 RX ADMIN — Medication 1 LOZENGE: at 04:53

## 2023-10-23 RX ADMIN — POTASSIUM CHLORIDE 40 MEQ: 1500 TABLET, EXTENDED RELEASE ORAL at 05:25

## 2023-10-23 RX ADMIN — ACETAMINOPHEN 650 MG: 325 TABLET, FILM COATED ORAL at 10:38

## 2023-10-23 RX ADMIN — WARFARIN SODIUM 5 MG: 5 TABLET ORAL at 17:18

## 2023-10-23 RX ADMIN — FUROSEMIDE 60 MG: 10 INJECTION, SOLUTION INTRAVENOUS at 14:25

## 2023-10-23 RX ADMIN — DOCUSATE SODIUM 50 MG AND SENNOSIDES 8.6 MG 2 TABLET: 8.6; 5 TABLET, FILM COATED ORAL at 04:48

## 2023-10-23 RX ADMIN — METOPROLOL SUCCINATE 50 MG: 50 TABLET, EXTENDED RELEASE ORAL at 04:48

## 2023-10-23 RX ADMIN — Medication 1 LOZENGE: at 19:21

## 2023-10-23 RX ADMIN — GUAIFENESIN 200 MG: 100 SOLUTION ORAL at 05:31

## 2023-10-23 RX ADMIN — Medication 1 LOZENGE: at 23:51

## 2023-10-23 RX ADMIN — OMEPRAZOLE 20 MG: 20 CAPSULE, DELAYED RELEASE ORAL at 17:18

## 2023-10-23 RX ADMIN — SPIRONOLACTONE 12.5 MG: 25 TABLET ORAL at 04:47

## 2023-10-23 RX ADMIN — OMEPRAZOLE 20 MG: 20 CAPSULE, DELAYED RELEASE ORAL at 04:48

## 2023-10-23 RX ADMIN — ASPIRIN 81 MG: 81 TABLET, COATED ORAL at 04:48

## 2023-10-23 RX ADMIN — LISINOPRIL 2.5 MG: 5 TABLET ORAL at 04:47

## 2023-10-23 RX ADMIN — CEFTRIAXONE SODIUM 1000 MG: 10 INJECTION, POWDER, FOR SOLUTION INTRAVENOUS at 08:11

## 2023-10-23 RX ADMIN — LISINOPRIL 2.5 MG: 5 TABLET ORAL at 17:18

## 2023-10-23 ASSESSMENT — ENCOUNTER SYMPTOMS
WEAKNESS: 1
CHILLS: 0
HEMOPTYSIS: 0
VOMITING: 0
TINGLING: 0
DIZZINESS: 0
BRUISES/BLEEDS EASILY: 0
PALPITATIONS: 0
DEPRESSION: 0
DIARRHEA: 0
NECK PAIN: 0
HEADACHES: 0
SPUTUM PRODUCTION: 0
BACK PAIN: 0
ORTHOPNEA: 0
COUGH: 0
WHEEZING: 1
SHORTNESS OF BREATH: 1
FEVER: 0
ABDOMINAL PAIN: 0
NAUSEA: 0

## 2023-10-23 ASSESSMENT — LIFESTYLE VARIABLES: SUBSTANCE_ABUSE: 0

## 2023-10-23 ASSESSMENT — FIBROSIS 4 INDEX: FIB4 SCORE: 3.2

## 2023-10-23 ASSESSMENT — PAIN DESCRIPTION - PAIN TYPE: TYPE: ACUTE PAIN

## 2023-10-23 NOTE — DISCHARGE PLANNING
Case Management Discharge Planning    Admission Date: 10/19/2023  GMLOS: 3.9  ALOS: 4    6-Clicks ADL Score: 23  6-Clicks Mobility Score: 15  PT and/or OT Eval ordered: Yes  Post-acute Referrals Ordered: No  Post-acute Choice Obtained: NA  Has referral(s) been sent to post-acute provider:  JAMES      Anticipated Discharge Dispo: Discharge Disposition: Discharged to home/self care (01)    DME Needed: Yes    DME Ordered: No, Has FWW and wheelchair at home form  who passed away three years ago.     Action(s) Taken: Updated Provider/Nurse on Discharge Plan, Patient discussed during IDT rounds with medical team, no further needs at this time.     Escalations Completed: None    Medically Clear: No    Next Steps: CM will continue to follow for discharge planning needs.     Barriers to Discharge: Medical clearance    Is the patient up for discharge tomorrow: No

## 2023-10-23 NOTE — CARE PLAN
The patient is Stable - Low risk of patient condition declining or worsening    Shift Goals  Clinical Goals: Diuresis, wean off oxygen, mobility  Patient Goals: Rest  Family Goals: get better    Progress made toward(s) clinical / shift goals: Pt on RA now, mobilized to [facility], will attempt to have pt up for PM meal.       Problem: Respiratory  Goal: Patient will achieve/maintain optimum respiratory ventilation and gas exchange  Outcome: Progressing     Problem: Urinary Elimination  Goal: Establish and maintain regular urinary output  Outcome: Progressing     Problem: Fluid Volume  Goal: Fluid volume balance will be maintained  Outcome: Progressing     Problem: Care Map:  Day 3 Optimal Outcome for the Heart Failure Patient  Goal: Day 3:  Optimal Care of the heart failure patient  Outcome: Progressing       Patient is not progressing towards the following goals:

## 2023-10-23 NOTE — DISCHARGE PLANNING
Care Transition Team Assessment        RN GAVINO met with patient at bedside and obtained the information used in this assessment. Patient verified accuracy of facesheet; patient lives in a manufactured home with her grandchildren.  Prior to current hospitalization, patient was independent with ADLS/IADLS. Patient daughter drives and is able to attend necessary MD appointments. Patient's PCP is Sharee Cao and prefers to use [facility]. Patient has no financial concerns. Patient has support from grandchildren and daughter . Denies any hx of substance use and denies any diagnosis of mental illness.         Information Source: Patient  Orientation Level: Oriented X4  Information Given By: Patient  Informant's Name: Daya  Who is responsible for making decisions for patient? : Patient    Readmission Evaluation  Is this a readmission?: Yes - unplanned readmission    Elopement Risk  Legal Hold: No  Ambulatory or Self Mobile in Wheelchair: No-Not an Elopement Risk  Disoriented: No  Psychiatric Symptoms: None  History of Wandering: No  Elopement this Admit: No  Vocalizing Wanting to Leave: No  Displays Behaviors, Body Language Wanting to Leave: No-Not at Risk for Elopement  Elopement Risk: Not at Risk for Elopement    Interdisciplinary Discharge Planning  Primary Care Physician: Sharee Cao  Lives with - Patient's Self Care Capacity: Other (Comments) (grandson and his wife and 2 year old baby)  Patient or legal guardian wants to designate a caregiver: Yes  Caregiver name: Josh zachary dorothy  ([facility]) Authorization for Release of Health Information has been completed: Yes  Support Systems: Family Member(s)  Housing / Facility: [address], Mobile Home  Do You Take your Prescribed Medications Regularly: Yes  Able to Return to Previous ADL's: Yes  Mobility Issues: No  Prior Services: Home-Independent  Assistance Needed: Yes  Durable Medical Equipment: Walker (wheelchair)    Discharge Preparedness  What is  your plan after discharge?: Home with help  Prior Functional Level: Ambulatory  Difficulity with ADLs: None  Difficulity with IADLs: Driving    Functional Assesment  Prior Functional Level: Ambulatory    Finances  Financial Barriers to Discharge: No  Prescription Coverage: Yes    Vision / Hearing Impairment  Vision Impairment : Yes  Right Eye Vision: Wears Glasses  Left Eye Vision: Wears Glasses  Hearing Impairment : Yes  Hearing Impairment: Both Ears, Hearing Device Not Available  Does Pt Need Special Equipment for the Hearing Impaired?: No    Values / Beliefs / Concerns  Values / Beliefs Concerns : No    Advance Directive  Advance Directive?: POLST    Domestic Abuse  Have you ever been the victim of abuse or violence?: No  Physical Abuse or Sexual Abuse: No  Verbal Abuse or Emotional Abuse: No  Possible Abuse/Neglect Reported to:: Not Applicable    Psychological Assessment  History of Substance Abuse: None  History of Psychiatric Problems: No    Discharge Risks or Barriers  Discharge risks or barriers?: No    Anticipated Discharge Information  Discharge Disposition: Discharged to home/self care (01)

## 2023-10-23 NOTE — DIETARY
Nutrition Update:    Day 4 of admit.  Daya Woods is an 83 y.o. female with admitting DX of CHF (congestive heart failure), NYHA class I, acute on chronic, combined.     Patient being followed to optimize nutrition.    Current Diet: Cardia with 1500 mL fluid restriction    Problem: Nutritional:  Goal: Achieve adequate nutritional intake  Description: Patient will consume 50% of meals  Outcome: Met    PO per ADL flow sheet has been 50% or > of meals.    RD will follow per dept guidelines.

## 2023-10-23 NOTE — PROGRESS NOTES
Telemetry Report:        Per Telestrip from Monitor Room     Afib   -/.14/-  RUIZ, joceline, trip, trig

## 2023-10-23 NOTE — PROGRESS NOTES
Inpatient Anticoagulation Service Note for 10/23/2023    Reason for Anticoagulation: Atrial Fibrillation  Target INR: 2.0 to 3.0    [id number] VASc Score: 8  HAS-BLED Score: 4    Hemoglobin Value: (!) 8.4  Hematocrit Value: (!) 27  Lab Platelet Value: 182    INR from last 7 days       Date/Time INR Value    10/23/23 0110 2.17    10/22/23 0043 1.8    10/21/23 0157 1.78    10/19/23 1831 1.2          Dose from last 7 days       Date/Time Dose (mg)    10/23/23 0759 5    10/22/23 0946 5    10/21/23 1437 5    10/20/23 1540 5     Assessment:  - Chronic warfarin for hx of Afib, chadsvasc 8.  - Noted ?recent CVA during recent admission, remote hx of CAD s/p CABG x5 & PCI, PVD, and severe MR s/p MitraClip in 2018.  - Patient recently discharged from [facility] on 10/12, where patient was found to have supratherapeutic INR (~8) & bleeding AVM s/p reversal with IV vitamin K, PRBCs, & endoscopic intervention. Patient was instructed to resume warfarin in 1 week per GI recs, which was resumed on 10/18 at reported home dosing of 5 mg daily.  - Remains on cardiac diet, unclear intake.  - Ongoing drug interaction w/ ceftriaxone may increase warfarin concentrations, established interaction w/ aspirin therapy noted.  - INR now therapeutic @ 2.17 (goal 2-3), Hgb remains stable w/o overt bleeding on day #6 of warfarin resumption.    Plan:  - Continue warfarin 5 mg daily.  - Assess INR's daily until stable.  - Assess CBC's daily given recent GIB w/ HASBLED score of 4, KESHA, & acute infx w/ new drug interaction.  - Pharmacy will continue following.     Pharmacist suggested discharge dosing: Previous home dosing of warfarin 5 mg daily will likely be appropriate. Recommend INR check within 3 days of discharge.         Kenrick Wilson, PharmD

## 2023-10-23 NOTE — PROGRESS NOTES
[facility] Internal Medicine Daily Progress Note    Date of Service  10/23/2023    [facility] Team: [facility] IM Blue Team   Attending: Helga Ortiz M.d.  Senior Resident: Dr. Del Rosario  Intern:  Dr. Mcpherson  Contact Number: [phone]    Chief Complaint  Daya Woods is a 83 y.o. female admitted 10/19/2023 with SOB and LE edema.    Hospital Course  Daya Woods is a 83 y.o. female with past medical history of atrial fibrillation on anticoagulation, hypertension, hyperlipidemia, CHF with ejection fraction of 25%, recent hospital admission for possible stroke versus seizure, which was complicated by GI bleed  who presented 10/19/2023 with shortness of breath and lower extremity edema.     In the ED, labs were significant for leukocytosis of 18,000, BNP greater than 26,000, elevated troponin of 107 and on repeat 102.  Chest x-ray did reveal some small left-sided pleural effusions. She received 80mg IV dose of Lasix during ED workup.     Upon admission 10/20/23, patient's home medications were resumed and echocardiogram was ordered for heart failure exacerbation. Cultures were drawn and IV Ceftriaxone was administered for acute cystitis. Elevated troponins found on admission were suspected secondary to persistent Afib and they were followed until stable. Cardiology was consulted for medical optimization in setting of heart failure, discussed plan for continued diuresis, and fluid status was closely monitored. Echocardiogram identifying chronic cardiac issues as well as worsened tricuspid and mitral regurgitation.  Patient's symptoms of shortness of breath abdominal and leg swelling improved with diuresis.    Interval Problem Update    Today, patient states that she noticed her leg swelling has gone a lot down, and states it is similar to her baseline at home.     However, she feels that her shortness of breath has not continued to improve compared to yesterday, and is overall unchanged. She states she feels her lungs are  congested, although she is not coughing anything out. She can hear her wheezing when she breaths, which is bothering her. Otherwise no new acute complaints.    Today, patient patient complains of some vaginal itching and discomfort with Coleman placement, improved with adjustments in minimizing movement.  She says its tolerable for now.  Overall she has continued weakness and some wheezing but she feels that her shortness of breath has improved.  Her abdominal and leg swelling have significantly improved.  Otherwise she has no acute complaints.    I have discussed this patient's plan of care and discharge plan at IDT rounds today with Case Management, Nursing, Nursing leadership, and other members of the IDT team.    Consultants/Specialty  cardiology    Code Status  DNAR/DNI    Disposition  The patient is not medically cleared for discharge to home or a post-acute facility.      I have placed the appropriate orders for post-discharge needs.    Review of Systems  Review of Systems   Constitutional:  Negative for chills and fever.   HENT:  Negative for ear pain, hearing loss and tinnitus.    Respiratory:  Positive for shortness of breath and wheezing. Negative for cough, hemoptysis and sputum production.    Cardiovascular:  Negative for chest pain, palpitations, orthopnea and leg swelling.   Gastrointestinal:  Negative for abdominal pain, diarrhea, nausea and vomiting.   Genitourinary:  Negative for frequency, hematuria and urgency.   Musculoskeletal:  Negative for back pain and neck pain.   Neurological:  Positive for weakness. Negative for dizziness, tingling and headaches.   Endo/Heme/Allergies:  Negative for environmental allergies. Does not bruise/bleed easily.   Psychiatric/Behavioral:  Negative for depression, substance abuse and suicidal ideas.         Physical Exam  Temp:  [35.9 °C (96.7 °F)-36.7 °C (98 °F)] 36.3 °C (97.3 °F)  Pulse:  [] 96  Resp:  [16-18] 16  BP: ()/(52-68) 109/58  SpO2:  [96 %-100  %] 96 %    Physical Exam  Vitals (Satting well on room air during visit) reviewed.   Constitutional:       General: She is not in acute distress.     Appearance: Normal appearance. She is normal weight. She is not ill-appearing, toxic-appearing or diaphoretic.   HENT:      Head: Normocephalic and atraumatic.      Nose: Nose normal.      Mouth/Throat:      Mouth: Mucous membranes are moist.   Eyes:      Extraocular Movements: Extraocular movements intact.      Pupils: Pupils are equal, round, and reactive to light.   Cardiovascular:      Rate and Rhythm: Normal rate and regular rhythm.      Pulses: Normal pulses.      Heart sounds: Normal heart sounds. No murmur heard.     No friction rub. No gallop.   Pulmonary:      Effort: Pulmonary effort is normal. No respiratory distress.      Breath sounds: No stridor. Wheezing (mild) and rales (mild) present. No rhonchi.   Chest:      Chest wall: No tenderness.   Abdominal:      General: Abdomen is flat. There is no distension.      Palpations: Abdomen is soft. There is no mass.      Tenderness: There is no abdominal tenderness. There is no right CVA tenderness, left CVA tenderness, guarding or rebound.      Hernia: No hernia is present.   Genitourinary:     Comments: Coleman in place without erythema or rash in surrounding area  Musculoskeletal:         General: No swelling, tenderness, deformity or signs of injury.      Right lower leg: Edema (Trace pitting, much improved from yesterday) present.      Left lower leg: Edema (Same as R) present.      Comments:      Skin:     General: Skin is warm and dry.      Capillary Refill: Capillary refill takes less than 2 seconds.      Coloration: Skin is not jaundiced or pale.      Findings: No bruising, erythema, lesion or rash.   Neurological:      General: No focal deficit present.      Mental Status: She is alert and oriented to person, place, and time. Mental status is at baseline.      Cranial Nerves: No cranial nerve deficit.       Sensory: No sensory deficit.      Motor: Weakness present.      Coordination: Coordination normal.   Psychiatric:         Mood and Affect: Mood normal.         Behavior: Behavior normal.         Fluids    Intake/Output Summary (Last 24 hours) at 10/23/2023 1318  Last data filed at 10/23/2023 1004  Gross per 24 hour   Intake 1120 ml   Output 900 ml   Net 220 ml       Laboratory  Recent Labs     10/21/23  0157 10/22/23  0043 10/23/23  0110   WBC 12.2* 8.0 6.3   RBC 3.66* 3.29* 3.18*   HEMOGLOBIN 9.6* 8.9* 8.4*   HEMATOCRIT 30.6* 27.5* 27.0*   MCV 83.6 83.6 84.9   MCH 26.2* 27.1 26.4*   MCHC 31.4* 32.4 31.1*   RDW 54.9* 54.9* 56.5*   PLATELETCT 180 175 182   MPV 10.0 9.9 9.6     Recent Labs     10/21/23  0157 10/21/23  2056 10/22/23  0043 10/23/23  0110   SODIUM 124* 125* 125* 125*   POTASSIUM 3.9  --  3.4* 3.6   CHLORIDE 92*  --  91* 93*   CO2 23  --  23 24   GLUCOSE 110*  --  97 110*   BUN 27*  --  24* 24*   CREATININE 1.25  --  1.31 1.08   CALCIUM 7.8*  --  7.6* 7.6*     Recent Labs     10/21/23  0157 10/22/23  0043 10/23/23  0110   INR 1.78* 1.80* 2.17*                 Imaging  EC-ECHOCARDIOGRAM COMPLETE W/ CONT   Final Result      IR-US GUIDED PIV   Final Result    Ultrasound-guided PERIPHERAL IV INSERTION performed by    qualified nursing staff as above.      DX-CHEST-PORTABLE (1 VIEW)   Final Result         1. Small left pleural effusion.   2. No pulmonary infiltrates or consolidations are noted.           Assessment/Plan  Problem Representation:    * CHF (congestive heart failure), NYHA class I, acute on chronic, combined (HCC)- (present on admission)  Assessment & Plan  EF 25% (2022)  BNP on admission greater than 25,000  Takes Lasix 20 PO BID at home  10/21: Echo with EF 20%, chronic issues and worsening tricuspid and mitral regurgitation.    - Continue Metoprolol, Spironolactone, Lisinopril  - Has been on IV Furosemide due to signs of fluid overload  - 10/23/23 fluid status monitored after IV 60 lasix  prior, improved but will need continued diuresis on same dose  - Cardiology with recommendation for continued medical management  - Monitor weight and I/Os via Coleman    Urinary retention  Assessment & Plan  - Coleman catheter placed    Elevated troponin  Assessment & Plan  Patient presented with a troponin of 107, and on repeat 102, now stable  EKG without ST segment elevation  Likely secondary to persistent atrial fibrillation.    Leukocytosis  Assessment & Plan  Secondary to UTI  Continue ceftriaxone    Acute hypoxic respiratory failure (HCC)  Assessment & Plan  Currently requiring 2 L supplemental oxygen.  Not oxygen dependent at home  Likely secondary to pleural effusion secondary to CHF exacerbation    Pleural effusion  Assessment & Plan  CXR: Small left pleural effusion  Continue with IV Lasix    Acute cystitis without hematuria  Assessment & Plan  10/21: UCx's with preliminary growth of pansensitive E.Coli    - Continue with ceftriaxone IV      Hyponatremia- (present on admission)  Assessment & Plan  Likely hyper volemic hyponatremia secondary to volume overload given her CHF.  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  On chart review, GI stated that it was okay for her to resume her anticoagulation yesterday    Chronic atrial fibrillation (HCC)- (present on admission)  Assessment & Plan  PMH Afib and tachycardia on admission    -Resume home dose warfarin  -Resume metoprolol 50 mg daily         VTE prophylaxis: therapeutic anticoagulation with warfarin    I have performed a physical exam and  reviewed and updated ROS and Plan today (10/23/2023). In review of yesterday's note (10/22/2023), there are no changes except as documented above.

## 2023-10-23 NOTE — CARE PLAN
The patient is Stable - Low risk of patient condition declining or worsening    Shift Goals  Clinical Goals: IV Abx, )2 sat monitoring, diuresis, q2H turns  Patient Goals: comfort, rest  Family Goals: get better    Progress made toward(s) clinical / shift goals:    Problem: Urinary Elimination  Goal: Establish and maintain regular urinary output  Outcome: Not Progressing  Note: Coleman catheter was placed today for urinary retention. Will do trial voiding after 48 hours per protocol. Coleman care in place every shift.     Problem: Knowledge Deficit - Standard  Goal: Patient and family/care givers will demonstrate understanding of plan of care, disease process/condition, diagnostic tests and medications  Outcome: Progressing  Note: Pt educated on POC, medications and disease process.     Problem: Fall Risk  Goal: Patient will remain free from falls  Outcome: Progressing  Note: Pt educated on fall precautions. Pt instructed to use call light if needs help. Hourly rounding in place.     Problem: Pain - Standard  Goal: Alleviation of pain or a reduction in pain to the patient’s comfort goal  Outcome: Progressing  Note: Pt c/o pain that responds in non pharmacological intervention. Pt educated to call RN if pain occurs.     Problem: Respiratory  Goal: Patient will achieve/maintain optimum respiratory ventilation and gas exchange  Outcome: Progressing  Note: Pt on continuous pulse ox, sating more than 90% on 2 LPM O2 NC. Pt educated to call RN if having difficulty breathing.     Problem: Skin Integrity  Goal: Skin integrity is maintained or improved  Outcome: Progressing  Note: Pt on waffle bed and on q2H shift for pressure ulcer prevention and promote adequate circulation.     Problem: Care Map:  Day 2 Optimal Outcome for the Heart Failure Patient  Goal: Day 2:  Optimal Care of the heart failure patient  Outcome: Progressing  Note: Pt on diuretics medication for CHF.       Patient is not progressing towards the following  goals:      Problem: Urinary Elimination  Goal: Establish and maintain regular urinary output  Outcome: Not Progressing  Note: Coleman catheter was placed today for urinary retention. Will do trial voiding after 48 hours per protocol. Coleman care in place every shift.

## 2023-10-24 LAB
ANION GAP SERPL CALC-SCNC: 8 MMOL/L (ref 7–16)
BUN SERPL-MCNC: 18 MG/DL (ref 8–22)
CALCIUM SERPL-MCNC: 7.7 MG/DL (ref 8.5–10.5)
CHLORIDE SERPL-SCNC: 94 MMOL/L (ref 96–112)
CO2 SERPL-SCNC: 27 MMOL/L (ref 20–33)
CREAT SERPL-MCNC: 0.93 MG/DL (ref 0.5–1.4)
ERYTHROCYTE [DISTWIDTH] IN BLOOD BY AUTOMATED COUNT: 56.7 FL (ref 35.9–50)
GFR SERPLBLD CREATININE-BSD FMLA CKD-EPI: 62 ML/MIN/1.73 M 2
GLUCOSE SERPL-MCNC: 119 MG/DL (ref 65–99)
HCT VFR BLD AUTO: 28.5 % (ref 37–47)
HGB BLD-MCNC: 8.9 G/DL (ref 12–16)
INR PPP: 2.46 (ref 0.87–1.13)
MAGNESIUM SERPL-MCNC: 1.8 MG/DL (ref 1.5–2.5)
MCH RBC QN AUTO: 26.6 PG (ref 27–33)
MCHC RBC AUTO-ENTMCNC: 31.2 G/DL (ref 32.2–35.5)
MCV RBC AUTO: 85.1 FL (ref 81.4–97.8)
PLATELET # BLD AUTO: 203 K/UL (ref 164–446)
PMV BLD AUTO: 9.8 FL (ref 9–12.9)
POTASSIUM SERPL-SCNC: 3.9 MMOL/L (ref 3.6–5.5)
PROTHROMBIN TIME: 27 SEC (ref 12–14.6)
RBC # BLD AUTO: 3.35 M/UL (ref 4.2–5.4)
SODIUM SERPL-SCNC: 129 MMOL/L (ref 135–145)
WBC # BLD AUTO: 5.9 K/UL (ref 4.8–10.8)

## 2023-10-24 PROCEDURE — A9270 NON-COVERED ITEM OR SERVICE: HCPCS

## 2023-10-24 PROCEDURE — A9270 NON-COVERED ITEM OR SERVICE: HCPCS | Performed by: STUDENT IN AN ORGANIZED HEALTH CARE EDUCATION/TRAINING PROGRAM

## 2023-10-24 PROCEDURE — 700102 HCHG RX REV CODE 250 W/ 637 OVERRIDE(OP): Performed by: INTERNAL MEDICINE

## 2023-10-24 PROCEDURE — 85610 PROTHROMBIN TIME: CPT

## 2023-10-24 PROCEDURE — 700111 HCHG RX REV CODE 636 W/ 250 OVERRIDE (IP): Mod: JZ | Performed by: INTERNAL MEDICINE

## 2023-10-24 PROCEDURE — 97602 WOUND(S) CARE NON-SELECTIVE: CPT

## 2023-10-24 PROCEDURE — 700102 HCHG RX REV CODE 250 W/ 637 OVERRIDE(OP)

## 2023-10-24 PROCEDURE — 85027 COMPLETE CBC AUTOMATED: CPT

## 2023-10-24 PROCEDURE — 700102 HCHG RX REV CODE 250 W/ 637 OVERRIDE(OP): Performed by: STUDENT IN AN ORGANIZED HEALTH CARE EDUCATION/TRAINING PROGRAM

## 2023-10-24 PROCEDURE — 770020 HCHG ROOM/CARE - TELE (206)

## 2023-10-24 PROCEDURE — 80048 BASIC METABOLIC PNL TOTAL CA: CPT

## 2023-10-24 PROCEDURE — A9270 NON-COVERED ITEM OR SERVICE: HCPCS | Performed by: INTERNAL MEDICINE

## 2023-10-24 PROCEDURE — 51798 US URINE CAPACITY MEASURE: CPT

## 2023-10-24 PROCEDURE — 97166 OT EVAL MOD COMPLEX 45 MIN: CPT

## 2023-10-24 PROCEDURE — 36415 COLL VENOUS BLD VENIPUNCTURE: CPT

## 2023-10-24 PROCEDURE — 99232 SBSQ HOSP IP/OBS MODERATE 35: CPT | Performed by: INTERNAL MEDICINE

## 2023-10-24 PROCEDURE — 83735 ASSAY OF MAGNESIUM: CPT

## 2023-10-24 RX ORDER — GAUZE BANDAGE 2" X 2"
100 BANDAGE TOPICAL DAILY
Status: DISCONTINUED | OUTPATIENT
Start: 2023-10-24 | End: 2023-10-27 | Stop reason: HOSPADM

## 2023-10-24 RX ORDER — WARFARIN SODIUM 3 MG/1
3 TABLET ORAL DAILY
Status: DISCONTINUED | OUTPATIENT
Start: 2023-10-24 | End: 2023-10-25

## 2023-10-24 RX ORDER — ATORVASTATIN CALCIUM 40 MG/1
40 TABLET, FILM COATED ORAL DAILY
Status: DISCONTINUED | OUTPATIENT
Start: 2023-10-24 | End: 2023-10-27 | Stop reason: HOSPADM

## 2023-10-24 RX ORDER — LANOLIN ALCOHOL/MO/W.PET/CERES
400 CREAM (GRAM) TOPICAL DAILY
Status: DISCONTINUED | OUTPATIENT
Start: 2023-10-24 | End: 2023-10-27 | Stop reason: HOSPADM

## 2023-10-24 RX ORDER — FUROSEMIDE 20 MG/1
20 TABLET ORAL 2 TIMES DAILY
Status: DISCONTINUED | OUTPATIENT
Start: 2023-10-24 | End: 2023-10-27 | Stop reason: HOSPADM

## 2023-10-24 RX ORDER — MAGNESIUM SULFATE HEPTAHYDRATE 40 MG/ML
2 INJECTION, SOLUTION INTRAVENOUS ONCE
Status: COMPLETED | OUTPATIENT
Start: 2023-10-24 | End: 2023-10-24

## 2023-10-24 RX ORDER — POTASSIUM CHLORIDE 20 MEQ/1
10 TABLET, EXTENDED RELEASE ORAL DAILY
Status: DISCONTINUED | OUTPATIENT
Start: 2023-10-24 | End: 2023-10-27 | Stop reason: HOSPADM

## 2023-10-24 RX ADMIN — Medication 100 MG: at 15:13

## 2023-10-24 RX ADMIN — OMEPRAZOLE 20 MG: 20 CAPSULE, DELAYED RELEASE ORAL at 17:16

## 2023-10-24 RX ADMIN — MAGNESIUM SULFATE HEPTAHYDRATE 2 G: 2 INJECTION, SOLUTION INTRAVENOUS at 10:04

## 2023-10-24 RX ADMIN — ACETAMINOPHEN 650 MG: 325 TABLET, FILM COATED ORAL at 18:35

## 2023-10-24 RX ADMIN — Medication 400 MG: at 15:13

## 2023-10-24 RX ADMIN — POTASSIUM CHLORIDE 10 MEQ: 1500 TABLET, EXTENDED RELEASE ORAL at 15:13

## 2023-10-24 RX ADMIN — ACETAMINOPHEN 650 MG: 325 TABLET, FILM COATED ORAL at 11:40

## 2023-10-24 RX ADMIN — METOPROLOL SUCCINATE 50 MG: 50 TABLET, EXTENDED RELEASE ORAL at 06:03

## 2023-10-24 RX ADMIN — ASPIRIN 81 MG: 81 TABLET, COATED ORAL at 06:04

## 2023-10-24 RX ADMIN — GUAIFENESIN 200 MG: 100 SOLUTION ORAL at 18:41

## 2023-10-24 RX ADMIN — LISINOPRIL 2.5 MG: 5 TABLET ORAL at 17:16

## 2023-10-24 RX ADMIN — DOCUSATE SODIUM 50 MG AND SENNOSIDES 8.6 MG 2 TABLET: 8.6; 5 TABLET, FILM COATED ORAL at 17:16

## 2023-10-24 RX ADMIN — WARFARIN SODIUM 3 MG: 3 TABLET ORAL at 17:17

## 2023-10-24 RX ADMIN — LEVOTHYROXINE SODIUM 75 MCG: 0.07 TABLET ORAL at 06:04

## 2023-10-24 RX ADMIN — SPIRONOLACTONE 12.5 MG: 25 TABLET ORAL at 06:04

## 2023-10-24 RX ADMIN — DOCUSATE SODIUM 50 MG AND SENNOSIDES 8.6 MG 2 TABLET: 8.6; 5 TABLET, FILM COATED ORAL at 06:03

## 2023-10-24 RX ADMIN — LISINOPRIL 2.5 MG: 5 TABLET ORAL at 06:04

## 2023-10-24 RX ADMIN — OMEPRAZOLE 20 MG: 20 CAPSULE, DELAYED RELEASE ORAL at 06:04

## 2023-10-24 RX ADMIN — ATORVASTATIN CALCIUM 40 MG: 40 TABLET, FILM COATED ORAL at 15:13

## 2023-10-24 RX ADMIN — FUROSEMIDE 20 MG: 20 TABLET ORAL at 17:16

## 2023-10-24 RX ADMIN — GUAIFENESIN 200 MG: 100 SOLUTION ORAL at 06:03

## 2023-10-24 ASSESSMENT — ENCOUNTER SYMPTOMS
SENSORY CHANGE: 0
PALPITATIONS: 0
BLURRED VISION: 0
FEVER: 0
DOUBLE VISION: 0
NAUSEA: 0
CHILLS: 0
MYALGIAS: 1
NERVOUS/ANXIOUS: 0
COUGH: 0
HEARTBURN: 0

## 2023-10-24 ASSESSMENT — COGNITIVE AND FUNCTIONAL STATUS - GENERAL
TOILETING: A LITTLE
DRESSING REGULAR UPPER BODY CLOTHING: A LITTLE
HELP NEEDED FOR BATHING: A LITTLE
DAILY ACTIVITIY SCORE: 20
DRESSING REGULAR LOWER BODY CLOTHING: A LITTLE
SUGGESTED CMS G CODE MODIFIER DAILY ACTIVITY: CJ

## 2023-10-24 ASSESSMENT — PAIN DESCRIPTION - PAIN TYPE
TYPE: ACUTE PAIN
TYPE: ACUTE PAIN

## 2023-10-24 ASSESSMENT — ACTIVITIES OF DAILY LIVING (ADL): TOILETING: INDEPENDENT

## 2023-10-24 ASSESSMENT — FIBROSIS 4 INDEX: FIB4 SCORE: 2.87

## 2023-10-24 NOTE — WOUND TEAM
[facility]  Inpatient Services  Initial Wound and Skin Care Evaluation    Admission Date: 10/19/2023     Last order of IP CONSULT TO WOUND CARE was found on 10/22/2023 from Hospital Encounter on 10/19/2023     HPI, PMH, SH: Reviewed    Past Surgical History:   Procedure Laterality Date    AK UPPER GI ENDOSCOPY,DIAGNOSIS N/A 10/9/2023    Procedure: GASTROSCOPY;  Surgeon: Lizz Holloway M.D.;  Location: SURGERY SAME DAY [facility];  Service: Gastroenterology    AK COLONOSCOPY,DIAGNOSTIC N/A 10/9/2023    Procedure: COLONOSCOPY;  Surgeon: Lizz Holloway M.D.;  Location: SURGERY SAME DAY [facility];  Service: Gastroenterology    AK UPPER GI ENDOSCOPY,CTRL BLEED N/A 10/9/2023    Procedure: EGD, WITH CLIP PLACEMENT;  Surgeon: Lizz Holloway M.D.;  Location: SURGERY SAME DAY [facility];  Service: Gastroenterology    GASTROSCOPY WITH ENDOSTAT N/A 10/9/2023    Procedure: EGD, WITH CAUTERIZATION;  Surgeon: Lizz Holloway M.D.;  Location: SURGERY SAME DAY [facility];  Service: Gastroenterology    COLONOSCOPY WITH ENDOSTAT N/A 10/9/2023    Procedure: COLONOSCOPY, WITH BIPOLAR CAUTERIZATION;  Surgeon: Lizz Holloway M.D.;  Location: SURGERY SAME DAY [facility];  Service: Gastroenterology    TRANSCATHETER MITRAL VALVE REPAIR  10/15/2018    Procedure: TRANSCATHETER MITRAL VALVE REPAIR- WITH POSSIBLE OPEN HEART AND ANY ASSOCIATED PROCEDURES;  Surgeon: Moy Don M.D.;  Location: SURGERY [facility];  Service: Cardiac    DEVON  10/15/2018    Procedure: DEVON;  Surgeon: Moy Don M.D.;  Location: SURGERY [facility];  Service: Cardiac    ZZZ CARDIAC CATH  10/2018    PCI to SVG to OM.     RECOVERY  8/26/2016    Procedure: CATH LAB-DEVON W/LHC W/POSSIBLE-JO-ANN/GRINSELL-LARGE GROUP;  Surgeon: Manuel Surgery;  [place] [facility] [place]  Service:     OTHER Bilateral 08/2014    cataract extraction with iols    MULTIPLE CORONARY ARTERY BYPASS  2002     CABG x 5    APPENDECTOMY       GYN SURGERY      c section     MITRAL VALVE REPLACE      MV clip X1    TONSILLECTOMY AND ADENOIDECTOMY      TUBAL LIGATION       Social History     Tobacco Use    Smoking status: Former     Current packs/day: 0.00     Average packs/day: 0.5 packs/day for 40.0 years (20.0 total pack years)     Types: Cigarettes     Start date: 1962     Quit date: 2002     Years since quittin.6    Smokeless tobacco: Never   Substance Use Topics    Alcohol use: Yes     Comment: occ     Chief Complaint   Patient presents with    Sent by MD     Recent admission here.     Family reports she has declined in the past week with low BPs.  She is fatigued, sleeping constantly, poor PO intake.  Increased leg swelling and cough. Hx of CHF.     Weakness    Leg Swelling    Cough     Diagnosis: CHF (congestive heart failure), NYHA class I, acute on chronic, combined (HCC) [I50.43]    Unit where seen by Wound Team: S1     WOUND CONSULT RELATED TO:  Coccyx      WOUND TEAM PLAN OF CARE - Frequency of Follow-up:   Nursing to follow dressing orders written for wound care. Contact wound team if area fails to progress, deteriorates or with any questions/concerns if something comes up before next scheduled follow up (See below as to whether wound is following and frequency of wound follow up)   Weekly - coccyx    WOUND HISTORY:   Patient's family brought her for concern of increasing fatigued and poor oral intake.  Upon assessment patient reported sacral pain and difficulty relieving pain and pressure.  She denies having a wound on her sacrum when she arrived to the hospital.         WOUND ASSESSMENT/LDA  Wound 10/22/23 Pressure Injury Coccyx Stage 2 (Active)   Date First Assessed/Time First Assessed: 10/22/23 1520   Present on Original Admission: No  Wound Approximate Age at First Assessment (Weeks): (c) 7 weeks  Primary Wound Type: Pressure Injury  Location: Coccyx  Wound Description (Comments): Stage 2      Assessments 10/24/2023  10:21 AM   Wound Image      Site Assessment Pink;Yellow   Periwound Assessment Pink;Red   Margins Defined edges;Unattached edges   Closure Adhesive bandage   Drainage Amount Scant   Drainage Description Serous   Treatments Cleansed;Nonselective debridement;Site care;Offloading   Wound Cleansing Foam Cleanser/Washcloth   Periwound Protectant No-sting Skin Prep   Dressing Status Clean;Dry;Intact   Dressing Changed New   Dressing Cleansing/Solutions Not Applicable   Dressing Options Hydrocolloid Thin;Offloading Dressing - Sacral   Dressing Change/Treatment Frequency Every 72 hrs, and As Needed   NEXT Dressing Change/Treatment Date 10/27/23   NEXT Weekly Photo (Inpatient Only) 10/31/23   Wound Team Following Weekly   Pressure Injury Stage Stage 2   Wound Length (cm) 2 cm   Wound Width (cm) 1.5 cm   Wound Depth (cm) 0.2 cm   Wound Surface Area (cm^2) 3 cm^2   Wound Volume (cm^3) 0.6 cm^3   Shape [place]   Wound Odor None   Pulses N/A   WOUND NURSE ONLY - Time Spent with Patient (mins) 60        Vascular:    YOAN:   No results found.    Lab Values:    Lab Results   Component Value Date/Time    WBC 5.9 10/24/2023 12:55 AM    RBC 3.35 (L) 10/24/2023 12:55 AM    HEMOGLOBIN 8.9 (L) 10/24/2023 12:55 AM    HEMATOCRIT 28.5 (L) 10/24/2023 12:55 AM    CREACTPROT 0.53 10/06/2023 09:44 AM    SEDRATEWES 1 10/06/2023 09:44 AM         Culture Results show:  No results found for this or any previous visit (from the past 720 hour(s)).    Pain Level/Medicated:  None, Tolerated without pain medication       INTERVENTIONS BY WOUND TEAM:  Chart and images reviewed. Discussed with bedside RN. All areas of concern (based on picture review, LDA review and discussion with bedside RN) have been thoroughly assessed. Documentation of areas based on significant findings. This RN in to assess patient. Performed standard wound care which includes appropriate positioning, dressing removal and non-selective debridement. Pictures and measurements obtained  weekly if/when required.    Wound:  coccyx  Cleansed/Non-selectively Debrided with:  Perineal Wipes (Barrier wipes)  Bhargavi wound: Cleansed with Perineal Wipes (Barrier wipes), Prepped with No Sting  Primary Dressing:  hydrocolloid thin  Secondary (Outer) Dressing: offloading adhesive foam    Advanced Wound Care Discharge Planning  Number of Clinicians necessary to complete wound care: 1  Is patient requiring IV pain medications for dressing changes:  No   Length of time for dressing change 25 min. (This does not include chart review, pre-medication time, set up, clean up or time spent charting.)    Interdisciplinary consultation: Patient, Bedside RN , Melisa DUNNE (Wound RN).  Pressure injury and staging reviewed with Melisa DUNNE (Wound RN) and Lexi CASE (Wound RN).    EVALUATION / RATIONALE FOR TREATMENT:     Date:  10/24/23  Wound Status:  Initial evaluation    Patient with Stage 2 pressure injury on coccyx, some yellow tissue with pink reticulum noted, applied hydrocolloid thin to assist with autolytic debridement and wound heal.  Patient educated about offloading and TAPs wedges removed from packaging.           Goals: Steady decrease in wound area and depth weekly.    NURSING PLAN OF CARE ORDERS:  Dressing changes: See Dressing Care orders  Skin care: See Skin Care orders  RN Prevention Protocol    NUTRITION RECOMMENDATIONS   Wound Team Recommendations:  Dietary consult    DIET ORDERS (From admission to next 24h)       Start     Ordered    10/19/23 2259  Diet Order Diet: Cardiac; Fluid modifications: (optional): 1500 ml Fluid Restriction  ALL MEALS        Question Answer Comment   Diet: Cardiac    Fluid modifications: (optional) 1500 ml Fluid Restriction        10/19/23 2259                    PREVENTATIVE INTERVENTIONS:    Q shift Asael - performed per nursing policy  Q shift pressure point assessments - performed per nursing policy    Surface/Positioning  Standard/trauma mattress - Currently in Place  Reposition  q 2 hours - Applied this Visit  TAPs Turning system - Applied this Visit  Waffle overlay  - Currently in Place    Offloading/Redistribution  Sacral offloading dressing (Silicone dressing) - Currently in Place  Heel offloading dressing (Silicone dressing) - Ordered  Float Heels off Bed with Pillows - Applied this Visit           Containment/Moisture Prevention    Coleman Catheter - Currently in Place  Barrier wipes - Currently in Place    Anticipated discharge plans:  Home Health Care        Vac Discharge Needs:  Vac Discharge plan is purely a recommendation from wound team and not a requirement for discharge unless otherwise stated by physician.  Not Applicable Pt not on a wound vac

## 2023-10-24 NOTE — PROGRESS NOTES
Inpatient Anticoagulation Service Note for 10/24/2023      Reason for Anticoagulation: Atrial Fibrillation   [id number] VASc Score: 8  HAS-BLED Score: 4  Target INR: 2.0 to 3.0    Hemoglobin Value: (!) 8.9  Hematocrit Value: (!) 28.5  Lab Platelet Value: 203    INR from last 7 days       Date/Time INR Value    10/24/23 0055 2.46    10/23/23 0110 2.17    10/22/23 0043 1.8    10/21/23 0157 1.78    10/19/23 1831 1.2          Dose from last 7 days       Date/Time Dose (mg)    10/24/23 1614 3    10/23/23 0759 5    10/22/23 0946 5    10/21/23 1437 5    10/20/23 1540 5    10/20/23 0007 5          Average Dose (mg):  (Home Dosin mg daily)  Significant Interactions: Antiplatelet Medications  Bridge Therapy: No   Reversal Agent Administered: Not Applicable    Assessment:  Chronic warfarin patient.  Hx of CAD s/p PCI, CABG x 5, s/p Mitraclip in 2018, CVA.  Pt had recent previous admission where she was found to have an INR of 8.76 which was reversed with Vitamin K. 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 Recommendation from GI was to hold warfarin x 7 days. Pt resumed warfarin on 10/18/23 and has been on 5 mg daily since then. Pt recently finished course of ceftriaxone (10/23/23). Pt dietary intake is minimal. INR value today is 2.46 with stable H/H and no s/s of bleeding. INR therapeutic at 2.46, being on the lower end of the goal range ideal given bleed history. Anticipating continued uptrend in the INR level and so giving reduced dose today to keep INR towards lower end of goal.    Plan:  3 mg today. Continue to assess daily INR and monitor for s/s of bleeding. Will continue to follow.    Education Material Provided?: No (chronic warfarin patient)    Pharmacist suggested discharge dosing: Based on INR on 10/25/23, dosing regimen will be more clear. Follow-up INR should be done 48-72 hrs after discharge.     Mann Ngo, KenaD

## 2023-10-24 NOTE — PROGRESS NOTES
[facility] Internal Medicine Daily Progress Note    Date of Service  10/24/2023    R Team: R IM Blue Team   Attending: Helga Ortiz M.d.  Senior Resident: Dr. Del Rosario  Intern:  Dr. Mcpherson   Contact Number: [phone]    Chief Complaint  Daya Woods is a 83 y.o. female admitted 10/19/2023 with SOB and LE edema     Hospital Course  83F with PMH of HFrEF with EF 20% (echo 10/19), atrial fibrillation on AC, recent hospital admission for CVA versus seizure, recent GIB requiring transfusion 10/6/23 for which DAPT and anticoagulation had been held, admitted 10/19/23 with SOB and LE edema.  She is s/p IV abx for UTI 10/23   S/p IV diuresis for concerns of CHF exacerbation 10/23  Now doing well and transitioned to oral regimen (oral lasix) 10/24   Anticoagulation has also been restarted this admission, no evidence of bleeding with therapeutic INR.    Coleman was also inserted for acute urinary retention. Removed today, undergoing voiding trial 10/24       Interval Problem Update  - Coleman removed today, voiding trial   - Resumed home meds, oral lasix 20 bid, K dur, thiamine 100 qday, atorvastatin 40 Qday, mag ox 400 Qday    I have discussed this patient's plan of care and discharge plan at IDT rounds today with Case Management, Nursing, Nursing leadership, and other members of the IDT team.      Code Status  DNAR/DNI    Disposition  The patient is not medically cleared for discharge to home or a post-acute facility.      I have placed the appropriate orders for post-discharge needs.    Review of Systems  Review of Systems   Constitutional:  Negative for chills and fever.   HENT:  Negative for hearing loss and tinnitus.    Eyes:  Negative for blurred vision and double vision.   Respiratory:  Negative for cough.    Cardiovascular:  Negative for chest pain and palpitations.   Gastrointestinal:  Negative for heartburn and nausea.   Genitourinary:  Negative for dysuria and urgency.   Musculoskeletal:  Positive for myalgias.    Skin:  Negative for itching and rash.   Neurological:  Negative for sensory change.   Psychiatric/Behavioral:  The patient is not nervous/anxious.         Physical Exam  Temp:  [36.1 °C (97 °F)-36.6 °C (97.8 °F)] 36.2 °C (97.2 °F)  Pulse:  [] 100  Resp:  [16-20] 17  BP: ()/(51-66) 111/66  SpO2:  [91 %-99 %] 99 %    Physical Exam  Constitutional:       General: She is not in acute distress.     Appearance: She is not ill-appearing.   HENT:      Head: Normocephalic and atraumatic.      Right Ear: External ear normal.      Left Ear: External ear normal.      Nose: Nose normal.      Mouth/Throat:      Mouth: Mucous membranes are moist.   Eyes:      Extraocular Movements: Extraocular movements intact.      Pupils: Pupils are equal, round, and reactive to light.   Cardiovascular:      Rate and Rhythm: Normal rate and regular rhythm.      Pulses: Normal pulses.   Pulmonary:      Effort: Pulmonary effort is normal.      Breath sounds: Normal breath sounds.   Abdominal:      General: Abdomen is flat. Bowel sounds are normal.      Palpations: Abdomen is soft.   Musculoskeletal:      Cervical back: Normal range of motion.   Skin:     General: Skin is warm.      Capillary Refill: Capillary refill takes less than 2 seconds.   Neurological:      Mental Status: She is alert and oriented to person, place, and time.   Psychiatric:         Mood and Affect: Mood normal.         Fluids    Intake/Output Summary (Last 24 hours) at 10/24/2023 1654  Last data filed at 10/24/2023 1500  Gross per 24 hour   Intake 880 ml   Output 2300 ml   Net -1420 ml       Laboratory  Recent Labs     10/22/23  0043 10/23/23  0110 10/24/23  0055   WBC 8.0 6.3 5.9   RBC 3.29* 3.18* 3.35*   HEMOGLOBIN 8.9* 8.4* 8.9*   HEMATOCRIT 27.5* 27.0* 28.5*   MCV 83.6 84.9 85.1   MCH 27.1 26.4* 26.6*   MCHC 32.4 31.1* 31.2*   RDW 54.9* 56.5* 56.7*   PLATELETCT 175 182 203   MPV 9.9 9.6 9.8     Recent Labs     10/22/23  0043 10/23/23  0110 10/24/23  0055    SODIUM 125* 125* 129*   POTASSIUM 3.4* 3.6 3.9   CHLORIDE 91* 93* 94*   CO2 23 24 27   GLUCOSE 97 110* 119*   BUN 24* 24* 18   CREATININE 1.31 1.08 0.93   CALCIUM 7.6* 7.6* 7.7*     Recent Labs     10/22/23  0043 10/23/23  0110 10/24/23  0055   INR 1.80* 2.17* 2.46*               Imaging  EC-ECHOCARDIOGRAM COMPLETE W/ CONT   Final Result      IR-US GUIDED PIV   Final Result    Ultrasound-guided PERIPHERAL IV INSERTION performed by    qualified nursing staff as above.      DX-CHEST-PORTABLE (1 VIEW)   Final Result         1. Small left pleural effusion.   2. No pulmonary infiltrates or consolidations are noted.           Assessment/Plan  Problem Representation:    * CHF (congestive heart failure), NYHA class I, acute on chronic, combined (HCC)- (present on admission)  Assessment & Plan  EF 25% (2022)  BNP on admission greater than 25,000  Takes Lasix 20 PO BID at home  10/21: Echo with EF 20%, chronic issues and worsening tricuspid and mitral regurgitation.    - Continue Metoprolol, Spironolactone, Lisinopril  - S/p IV diuresis, tranisitioned to oral 10/24   - Monitor I & Os   - Cardiology with recommendation for continued medical management    Urinary retention  Assessment & Plan  - Coleman catheter placed, undergoing voidng trial today     Elevated troponin  Assessment & Plan  Patient presented with a troponin of 107, and on repeat 102  EKG without ST segment elevation  Likely secondary to demand ischemia in the setting of AFIB with CHF exacerbation      Acute hypoxic respiratory failure (HCC)  Assessment & Plan     Improved a lot, down to 0.5 L NC     Pleural effusion  Assessment & Plan  CXR: Small left pleural effusion  S/p IV lasix, transitioned to oral 10/24     Acute cystitis without hematuria  Assessment & Plan  10/21: UCx's with preliminary growth of pansensitive E.Coli  S/p IV ceftriaxone 10/23         Hyponatremia- (present on admission)  Assessment & Plan  Likely hypervolemic hyponatremia secondary to  volume overload given her CHF.  However patient is chronically hyponatremic  Continue with Lasix, improving     Coronary artery disease involving coronary bypass graft of native heart with unstable angina pectoris (HCC)- (present on admission)  Assessment & Plan  Continue with aspirin and statin    Chronic anticoagulation- (present on admission)  Assessment & Plan  Currently on warfarin for atrial fibrillation      Chronic atrial fibrillation (HCC)- (present on admission)  Assessment & Plan  PMH Afib and tachycardia on admission    -Resume home dose warfarin  -Resume metoprolol 50 mg daily         VTE prophylaxis: therapeutic anticoagulation with Warfarin    I have performed a physical exam and reviewed and updated ROS and Plan today (10/24/2023). In review of yesterday's note (10/23/2023), there are no changes except as documented above.

## 2023-10-24 NOTE — CARE PLAN
The patient is Stable - Low risk of patient condition declining or worsening    Shift Goals  Clinical Goals: diuresis, wean off O2, VS and labs monitoring  Patient Goals: rest  Family Goals: get better    Progress made toward(s) clinical / shift goals:    Problem: Urinary Elimination  Goal: Establish and maintain regular urinary output  Outcome: Not Progressing  Note: Coleman catheter was placed on 10 /22/23 for urinary retention. Pt educated on trial voiding on 48 hours after placement.     Problem: Knowledge Deficit - Standard  Goal: Patient and family/care givers will demonstrate understanding of plan of care, disease process/condition, diagnostic tests and medications  Outcome: Progressing  Note: Pt educated on plan of care. Disease process and medications.     Problem: Fall Risk  Goal: Patient will remain free from falls  Outcome: Progressing  Note: Pt educated on fall precautions. Instructed pt to use call light for help. Bed locked and in lowest position, call light and personal belongings within reach. Hourly rounding in place.     Problem: Pain - Standard  Goal: Alleviation of pain or a reduction in pain to the patient’s comfort goal  Outcome: Progressing  Note: Pt c/o pain throughout the shift that responds well with utilizing non pharmacological interventions such turning, deep breath exercising and relaxation technique. Pt educated to call RN if pain occurs.     Problem: Hemodynamics  Goal: Patient's hemodynamics, fluid balance and neurologic status will be stable or improve  Outcome: Progressing  Note: Pt educated on strict I&O's and 1500 FR per the provider's order. Pt's vital signs since the beginning of the shift is WNL. Daily weight in place, pt's cognition is stable AOX4.     Problem: Respiratory  Goal: Patient will achieve/maintain optimum respiratory ventilation and gas exchange  Outcome: Progressing  Note: Pt educated on weaning of oxygen, currently in 1 LPM oxygen via NC with O2 saturation of  more than 90%. Pt educated to call RN if having shortness of breath or difficulty breathing.     Problem: Skin Integrity  Goal: Skin integrity is maintained or improved  Outcome: Progressing  Note: No new skin issue noted throughout the shift. Pt on waffle bed, q2H turn in placed.       Patient is not progressing towards the following goals:      Problem: Urinary Elimination  Goal: Establish and maintain regular urinary output  Outcome: Not Progressing  Note: Coleman catheter was placed on 10 /22/23 for urinary retention. Pt educated on trial voiding on 48 hours after placement.

## 2023-10-24 NOTE — PROGRESS NOTES
Telemetry Report:    Rhythm: Afib  Heart Rate: 60 to 105  Ectopy:  Events: 5 beats of Vtach- Dr. Canela notified    [place]:  QRS: 0.10  QT:          Per telemetry room monitor

## 2023-10-24 NOTE — CARE PLAN
The patient is Watcher - Medium risk of patient condition declining or worsening    Shift Goals  Clinical Goals: monitor electrolytes, wean off O2, voiding trials, Q2 turns  Patient Goals: rest, pain management  Family Goals: samuel    Progress made toward(s) clinical / shift goals:  galan removed, voiding trials, meds changed over to PO.     Patient is not progressing towards the following goals: no UO yet.   Problem: Knowledge Deficit - Standard  Goal: Patient and family/care givers will demonstrate understanding of plan of care, disease process/condition, diagnostic tests and medications  Outcome: Progressing  Note: Pt and daughter educated on current POC, expected outcomes and goals, and new medications ordered. All questions and concerns have been answered at this time.       Problem: Skin Integrity  Goal: Skin integrity is maintained or improved  Outcome: Progressing  Note: Pt's skin assessed, waffle cushion, mepilex, pillows for support of turning and Offloading of heels, Q2 hour turns in place and discussed with interdisciplinary team. Pt cooperative with Q2 turns. Wound care done.

## 2023-10-24 NOTE — THERAPY
Occupational Therapy   Initial Evaluation     Patient Name: Daya Woods  Age:  83 y.o., Sex:  female  Medical Record #: [id number]  Today's Date: 10/24/2023     Precautions  Precautions: (P) Fall Risk    Assessment    Patient is 83 y.o. female admitted for SOB, BLE edema, recent history of afib, CVA vs seizure, GI bleed. Pt normally independent with functional mobility and ADLs living in a SLH with grandson and SO. Pt required CGA for functional mobility and ADLs, limited by generalized weakness and decreased activity tolerance. Will continue to see for skilled therapy while admitted as well as recommend home health therapy as patient has multiple family members to assist.    Plan    Occupational Therapy Initial Treatment Plan   Treatment Interventions: (P) Self Care / Activities of Daily Living, Adaptive Equipment, Neuro Re-Education / Balance, Therapeutic Exercises, Therapeutic Activity  Treatment Frequency: (P) 3 Times per Week  Duration: (P) Until Therapy Goals Met    DC Equipment Recommendations: (P) Unable to determine at this time  Discharge Recommendations: (P) Recommend home health for continued occupational therapy services     Objective       10/24/23 1510   Prior Living Situation   Prior Services Home-Independent   Housing / Facility 1 Story House;Mobile Home   Steps Into Home 5   Steps In Home 0   Rail Both Rail (Steps into Home)   Bathroom Set up Bathtub / Shower Combination;Grab Bars   Equipment Owned Single Point Cane;Front-Wheel Walker   Lives with - Patient's Self Care Capacity Other (Comments)  (multiple family members)   Prior Level of ADL Function   Self Feeding Independent   Grooming / Hygiene Independent   Bathing Independent   Dressing Independent   Toileting Independent   Prior Level of IADL Function   Prior Level Of Mobility Independent With Device in Home   Driving / Transportation Relatives / Others Provide Transportation   Occupation (Pre-Hospital Vocational) Retired Due To Age    History of Falls   History of Falls No   Precautions   Precautions Fall Risk   Cognition    Cognition / Consciousness X   Speech/ Communication Hard of Hearing   Level of Consciousness Alert   Safety Awareness Impaired   Initiation Impaired   Comments Repetitive, pleasant, Eek   Active ROM Upper Body   Active ROM Upper Body  WDL   Strength Upper Body   Upper Body Strength  WDL   Coordination Upper Body   Coordination WDL   Balance Assessment   Sitting Balance (Static) Fair +   Sitting Balance (Dynamic) Fair +   Standing Balance (Static) Fair -   Standing Balance (Dynamic) Fair -   Weight Shift Sitting Fair   Weight Shift Standing Fair   Comments w/ FWW   Bed Mobility    Sit to Supine Standby Assist   Scooting Standby Assist   Comments sitting at EOB with RN    ADL Assessment   Grooming Supervision;Standing   Upper Body Dressing Supervision   Lower Body Dressing Minimal Assist   Toileting Contact Guard Assist   How much help from another person does the patient currently need...   Putting on and taking off regular lower body clothing? 3   Bathing (including washing, rinsing, and drying)? 3   Toileting, which includes using a toilet, bedpan, or urinal? 3   Putting on and taking off regular upper body clothing? 3   Taking care of personal grooming such as brushing teeth? 4   Eating meals? 4   6 Clicks Daily Activity Score 20   Functional Mobility   Sit to Stand Contact Guard Assist   Bed, Chair, Wheelchair Transfer Contact Guard Assist   Toilet Transfers Contact Guard Assist   Transfer Method Stand Step   Mobility STS from bed, in room mobility, up to BSC, returned to supine   Comments w/ FWW   Activity Tolerance   Sitting Edge of Bed found seated at EOB   Standing 10 min   Patient / Family Goals   Patient / Family Goal #1 To go home   Short Term Goals   Short Term Goal # 1 Pt will complete ADL transfers with supervision   Short Term Goal # 2 Pt will complete LB dressing with supervision   Education Group    Education Provided Role of Occupational Therapist   Role of Occupational Therapist Patient Response Patient;Acceptance;Explanation;Demonstration;Verbal Demonstration;Action Demonstration   Occupational Therapy Initial Treatment Plan    Treatment Interventions Self Care / Activities of Daily Living;Adaptive Equipment;Neuro Re-Education / Balance;Therapeutic Exercises;Therapeutic Activity   Treatment Frequency 3 Times per Week   Duration Until Therapy Goals Met   Problem List   Problem List Decreased Active Daily Living Skills;Decreased Homemaking Skills;Decreased Functional Mobility;Decreased Activity Tolerance;Impaired Postural Control / Balance;Safety Awareness Deficits / Cognition   Anticipated Discharge Equipment and Recommendations   DC Equipment Recommendations Unable to determine at this time   Discharge Recommendations Recommend home health for continued occupational therapy services   Interdisciplinary Plan of Care Collaboration   IDT Collaboration with  Nursing   Patient Position at End of Therapy In Bed;Bed Alarm On;Call Light within Reach;Tray Table within Reach;Phone within Reach   Collaboration Comments RN updated

## 2023-10-24 NOTE — DISCHARGE PLANNING
Case Management Discharge Planning    Admission Date: 10/19/2023  GMLOS: 3.9  ALOS: 5    6-Clicks ADL Score: 23  6-Clicks Mobility Score: 15  PT and/or OT Eval ordered: Yes  Post-acute Referrals Ordered: No  Post-acute Choice Obtained: NA  Has referral(s) been sent to post-acute provider:  JAMES      Anticipated Discharge Dispo: Discharge Disposition: Discharged to home/self care (01)    DME Needed: Yes    DME Ordered: No, States she has a FWW at home. Refusing [facility] due to poor service with her . States her daughter and stepson live with her and are very helpful with her needs. Patient discussed during IDT rounds with medical team, no further needs at this time.     Action(s) Taken: Updated Provider/Nurse on Discharge Plan    Escalations Completed: None    Medically Clear: No    Next Steps: CM will continue to follow for discharge planning needs.     Barriers to Discharge: Medical clearance    Is the patient up for discharge tomorrow: No

## 2023-10-25 PROBLEM — L89.152 PRESSURE ULCER OF COCCYGEAL REGION, STAGE II (HCC): Status: ACTIVE | Noted: 2023-10-25

## 2023-10-25 LAB
ANION GAP SERPL CALC-SCNC: 9 MMOL/L (ref 7–16)
BUN SERPL-MCNC: 14 MG/DL (ref 8–22)
CALCIUM SERPL-MCNC: 7.8 MG/DL (ref 8.5–10.5)
CHLORIDE SERPL-SCNC: 93 MMOL/L (ref 96–112)
CO2 SERPL-SCNC: 23 MMOL/L (ref 20–33)
CREAT SERPL-MCNC: 0.84 MG/DL (ref 0.5–1.4)
GFR SERPLBLD CREATININE-BSD FMLA CKD-EPI: 70 ML/MIN/1.73 M 2
GLUCOSE SERPL-MCNC: 122 MG/DL (ref 65–99)
INR PPP: 2.42 (ref 0.87–1.13)
POTASSIUM SERPL-SCNC: 4.5 MMOL/L (ref 3.6–5.5)
PROTHROMBIN TIME: 26.7 SEC (ref 12–14.6)
SODIUM SERPL-SCNC: 125 MMOL/L (ref 135–145)
SODIUM SERPL-SCNC: 128 MMOL/L (ref 135–145)

## 2023-10-25 PROCEDURE — A9270 NON-COVERED ITEM OR SERVICE: HCPCS | Performed by: STUDENT IN AN ORGANIZED HEALTH CARE EDUCATION/TRAINING PROGRAM

## 2023-10-25 PROCEDURE — 51798 US URINE CAPACITY MEASURE: CPT

## 2023-10-25 PROCEDURE — 85027 COMPLETE CBC AUTOMATED: CPT

## 2023-10-25 PROCEDURE — A9270 NON-COVERED ITEM OR SERVICE: HCPCS

## 2023-10-25 PROCEDURE — 700102 HCHG RX REV CODE 250 W/ 637 OVERRIDE(OP): Performed by: STUDENT IN AN ORGANIZED HEALTH CARE EDUCATION/TRAINING PROGRAM

## 2023-10-25 PROCEDURE — 85610 PROTHROMBIN TIME: CPT

## 2023-10-25 PROCEDURE — 700102 HCHG RX REV CODE 250 W/ 637 OVERRIDE(OP): Mod: JZ | Performed by: INTERNAL MEDICINE

## 2023-10-25 PROCEDURE — 99232 SBSQ HOSP IP/OBS MODERATE 35: CPT | Performed by: INTERNAL MEDICINE

## 2023-10-25 PROCEDURE — 770020 HCHG ROOM/CARE - TELE (206)

## 2023-10-25 PROCEDURE — 80048 BASIC METABOLIC PNL TOTAL CA: CPT

## 2023-10-25 PROCEDURE — A9270 NON-COVERED ITEM OR SERVICE: HCPCS | Performed by: INTERNAL MEDICINE

## 2023-10-25 PROCEDURE — 700111 HCHG RX REV CODE 636 W/ 250 OVERRIDE (IP): Mod: JZ | Performed by: INTERNAL MEDICINE

## 2023-10-25 PROCEDURE — 700102 HCHG RX REV CODE 250 W/ 637 OVERRIDE(OP)

## 2023-10-25 PROCEDURE — 36415 COLL VENOUS BLD VENIPUNCTURE: CPT

## 2023-10-25 PROCEDURE — 84295 ASSAY OF SERUM SODIUM: CPT

## 2023-10-25 PROCEDURE — 97530 THERAPEUTIC ACTIVITIES: CPT

## 2023-10-25 RX ORDER — WARFARIN SODIUM 5 MG/1
5 TABLET ORAL DAILY
Status: DISCONTINUED | OUTPATIENT
Start: 2023-10-25 | End: 2023-10-26

## 2023-10-25 RX ORDER — FUROSEMIDE 10 MG/ML
20 INJECTION INTRAMUSCULAR; INTRAVENOUS ONCE
Status: COMPLETED | OUTPATIENT
Start: 2023-10-25 | End: 2023-10-25

## 2023-10-25 RX ORDER — GUAIFENESIN/DEXTROMETHORPHAN 100-10MG/5
5 SYRUP ORAL EVERY 6 HOURS
Status: DISCONTINUED | OUTPATIENT
Start: 2023-10-25 | End: 2023-10-27 | Stop reason: HOSPADM

## 2023-10-25 RX ADMIN — POTASSIUM CHLORIDE 10 MEQ: 1500 TABLET, EXTENDED RELEASE ORAL at 06:08

## 2023-10-25 RX ADMIN — Medication 100 MG: at 06:09

## 2023-10-25 RX ADMIN — GUAIFENESIN SYRUP AND DEXTROMETHORPHAN 5 ML: 100; 10 SYRUP ORAL at 20:13

## 2023-10-25 RX ADMIN — GUAIFENESIN 200 MG: 100 SOLUTION ORAL at 14:58

## 2023-10-25 RX ADMIN — FUROSEMIDE 20 MG: 10 INJECTION, SOLUTION INTRAVENOUS at 14:58

## 2023-10-25 RX ADMIN — LISINOPRIL 2.5 MG: 5 TABLET ORAL at 17:31

## 2023-10-25 RX ADMIN — OMEPRAZOLE 20 MG: 20 CAPSULE, DELAYED RELEASE ORAL at 06:09

## 2023-10-25 RX ADMIN — OMEPRAZOLE 20 MG: 20 CAPSULE, DELAYED RELEASE ORAL at 17:31

## 2023-10-25 RX ADMIN — SPIRONOLACTONE 12.5 MG: 25 TABLET ORAL at 06:08

## 2023-10-25 RX ADMIN — ASPIRIN 81 MG: 81 TABLET, COATED ORAL at 06:08

## 2023-10-25 RX ADMIN — WARFARIN SODIUM 5 MG: 5 TABLET ORAL at 17:32

## 2023-10-25 RX ADMIN — LEVOTHYROXINE SODIUM 75 MCG: 0.07 TABLET ORAL at 06:08

## 2023-10-25 RX ADMIN — ATORVASTATIN CALCIUM 40 MG: 40 TABLET, FILM COATED ORAL at 06:11

## 2023-10-25 RX ADMIN — ACETAMINOPHEN 650 MG: 325 TABLET, FILM COATED ORAL at 20:14

## 2023-10-25 RX ADMIN — LISINOPRIL 2.5 MG: 5 TABLET ORAL at 06:07

## 2023-10-25 RX ADMIN — METOPROLOL SUCCINATE 50 MG: 50 TABLET, EXTENDED RELEASE ORAL at 06:08

## 2023-10-25 RX ADMIN — FUROSEMIDE 20 MG: 20 TABLET ORAL at 06:08

## 2023-10-25 RX ADMIN — Medication 400 MG: at 06:09

## 2023-10-25 ASSESSMENT — COGNITIVE AND FUNCTIONAL STATUS - GENERAL
MOVING TO AND FROM BED TO CHAIR: A LITTLE
CLIMB 3 TO 5 STEPS WITH RAILING: A LITTLE
MOBILITY SCORE: 18
STANDING UP FROM CHAIR USING ARMS: A LITTLE
WALKING IN HOSPITAL ROOM: A LITTLE
MOVING FROM LYING ON BACK TO SITTING ON SIDE OF FLAT BED: A LITTLE
TURNING FROM BACK TO SIDE WHILE IN FLAT BAD: A LITTLE
SUGGESTED CMS G CODE MODIFIER MOBILITY: CK

## 2023-10-25 ASSESSMENT — ENCOUNTER SYMPTOMS
NAUSEA: 0
VOMITING: 0
HEADACHES: 0
BLURRED VISION: 0
SPUTUM PRODUCTION: 0
FEVER: 0
PALPITATIONS: 0
DIARRHEA: 0
DOUBLE VISION: 0
CHILLS: 0
SHORTNESS OF BREATH: 0
COUGH: 1
ABDOMINAL PAIN: 0
SORE THROAT: 0
MYALGIAS: 0
HEMOPTYSIS: 0
DIZZINESS: 0
LOSS OF CONSCIOUSNESS: 0
WHEEZING: 0
CONSTIPATION: 0

## 2023-10-25 ASSESSMENT — FIBROSIS 4 INDEX: FIB4 SCORE: 2.87

## 2023-10-25 ASSESSMENT — PAIN DESCRIPTION - PAIN TYPE
TYPE: ACUTE PAIN

## 2023-10-25 ASSESSMENT — GAIT ASSESSMENTS
ASSISTIVE DEVICE: FRONT WHEEL WALKER
DEVIATION: BRADYKINETIC;OTHER (COMMENT)
DISTANCE (FEET): 40
GAIT LEVEL OF ASSIST: STANDBY ASSIST

## 2023-10-25 NOTE — THERAPY
Physical Therapy   Daily Treatment     Patient Name: Daya Woods  Age:  83 y.o., Sex:  female  Medical Record #: [id number]  Today's Date: 10/25/2023     Precautions  Precautions: Fall Risk    Assessment    Patient seen for PT treatment session. Patient in bed, agreeable to participate. Patient ambulated in the room with FWW, deferred ambulation in the hallway & stairs at this time. Will continue to follow up to address functional mobility. Recommend HH PT upon DC.     Plan    Treatment Plan Status: Continue Current Treatment Plan  Type of Treatment: Bed Mobility, Gait Training, Neuro Re-Education / Balance, Stair Training, Therapeutic Activities, Therapeutic Exercise  Treatment Frequency: 4 Times per Week  Treatment Duration: Until Therapy Goals Met    DC Equipment Recommendations: Front-Wheel Walker  Discharge Recommendations: (P) Recommend home health for continued physical therapy services    Objective     10/25/23 1240   Charge Group   Charges  Yes   PT Therapeutic Activities (Units) 1   Total Time Spent   PT Total Time Yes   PT Therapeutic Activities Time Spent (Mins) 18   PT Total Time Spent (Calculated) 18   Precautions   Precautions Fall Risk   Vitals   Pulse 99   Patient BP Position Sitting  (In the chair, post ambulation)   Pulse Oximetry 98 %  (With 1L O2)   O2 Delivery Device None - Room Air   Vitals Comments Patient ambulated on 1L O2 since her HR was elevated. Weaned to RA post ambulation. Prior to ambulation, SpO2 92,  in room air.   Pain   Pain Scales 0 to 10 Scale    Intervention Declines   Cognition    Level of Consciousness Alert   Other Treatments   Other Treatments Provided Patient was assisted to the chair for lunch, assisted with set up of lunch tray & appropriate positioning in the chair.   Balance   Sitting Balance (Static) Fair +   Sitting Balance (Dynamic) Fair +   Standing Balance (Static) Fair   Standing Balance (Dynamic) Fair -   Skilled Intervention Verbal Cuing;Postural  Facilitation   Comments W FWW in standing   Bed Mobility    Supine to Sit Standby Assist   Scooting Standby Assist   Skilled Intervention Verbal Cuing;Sequencing   Comments HOB flat   Gait Analysis   Gait Level Of Assist Standby Assist   Assistive Device Front Wheel Walker   Distance (Feet) 40   Deviation Bradykinetic;Other (Comment)  (Asymmetrical step & stride length)   Skilled Intervention Verbal Cuing;Sequencing;Postural Facilitation   Comments Patient ambulated in the room with steady gait, no loss of balance. Denied any SOB. Deferred ambulation in the hallway this visit.   Functional Mobility   Sit to Stand Standby Assist   Bed, Chair, Wheelchair Transfer Standby Assist   Transfer Method Stand Step   Mobility Bed-chair, W FWW   Skilled Intervention Verbal Cuing;Sequencing   Comments Cues for hand placement, LE placement   How much difficulty does the patient currently have...   Turning over in bed (including adjusting bedclothes, sheets and blankets)? 3   Sitting down on and standing up from a chair with arms (e.g., wheelchair, bedside commode, etc.) 3   Moving from lying on back to sitting on the side of the bed? 3   How much help from another person does the patient currently need...   Moving to and from a bed to a chair (including a wheelchair)? 3   Need to walk in a hospital room? 3   Climbing 3-5 steps with a railing? 3   6 clicks Mobility Score 17   Activity Tolerance   Sitting in Chair Post session   Patient / Family Goals    Patient / Family Goal #1 To return home   Short Term Goals    Short Term Goal # 1 Pt will perform supine to sit with supv within 6 visits in order to return home   Goal Outcome # 1 Progressing as expected   Short Term Goal # 2 Pt will tranfser bed<->chair with supv and FWW within 6 visits in order to return home   Goal Outcome # 2 Progressing as expected   Short Term Goal # 3 Pt will amb 150' with FWW and supv within 6 visits in order to return home   Goal Outcome # 3 Progressing  as expected   Short Term Goal # 4 Pt will perform 5 steps with SBA within 6 visits as required to enter/exit home   Goal Outcome # 4 Progressing as expected   Physical Therapy Treatment Plan   Physical Therapy Treatment Plan Continue Current Treatment Plan   Treatment Plan  Bed Mobility;Gait Training;Neuro Re-Education / Balance;Stair Training;Therapeutic Activities;Therapeutic Exercise   Treatment Frequency 4 Times per Week   Duration Until Therapy Goals Met   Anticipated Discharge Equipment and Recommendations   DC Equipment Recommendations Front-Wheel Walker   Discharge Recommendations Recommend home health for continued physical therapy services   Interdisciplinary Plan of Care Collaboration   IDT Collaboration with  Nursing   Patient Position at End of Therapy Seated;Chair Alarm On;Call Light within Reach;Tray Table within Reach;Phone within Reach   Session Information   Date / Session Number  10/25-2(2/4, 10/28)

## 2023-10-25 NOTE — CARE PLAN
The patient is Stable - Low risk of patient condition declining or worsening    Shift Goals  Clinical Goals: Monitor output, wean o2  Patient Goals: Rest  Family Goals: JAYA    Progress made toward(s) clinical / shift goals:    Problem: Fall Risk  Goal: Patient will remain free from falls  Outcome: Progressing  Note: Patient is a high fall risk, patient was educated on fall safety and encouraged to use call light as needs arise.      Problem: Respiratory  Goal: Patient will achieve/maintain optimum respiratory ventilation and gas exchange  Outcome: Progressing  Flowsheets  Taken 10/25/2023 1604 by Noel Yo RMEE  Deep Breathe and Cough: Performs Correctly  Taken 10/25/2023 1525 by Lianet Campo, C.N.A.  O2 Delivery Device: Nasal Cannula  Note: Patient satting well in the 90s while at rest but desats with ambulation and pivot to commode. Administered lasix to move fluid from lungs. Patient monitored for side effects.     Problem: Urinary Elimination  Goal: Establish and maintain regular urinary output  Outcome: Progressing  Note: Patient bladder scanned and successfully passed void trial.      Problem: Care Map:  Day Before Discharge Optimal Outcome for the Heart Failure Patient  Goal: Day Before Discharge:  Optimal Care of the heart failure patient  Outcome: Progressing  Note: Patient educated on s/s of decompensating HF. Educated on diet and daily weights.

## 2023-10-25 NOTE — PROGRESS NOTES
[facility] Internal Medicine Daily Progress Note    Date of Service  10/25/2023    [facility] Team: [facility] IM Blue Team   Attending: Helga Ortiz M.d.  Senior Resident: Dr. Del Rosario  Intern:  Dr. Mcpherson  Contact Number: [phone]    Chief Complaint  Daya Woods is a 83 y.o. female admitted 10/19/2023 with SOB and LE edema.    Hospital Course  Daya Woods is a 83 y.o. female with past medical history of atrial fibrillation on anticoagulation, hypertension, hyperlipidemia, CHF with ejection fraction of 25%, recent hospital admission for possible stroke versus seizure, which was complicated by GI bleed  who presented 10/19/2023 with shortness of breath and lower extremity edema.     In the ED, labs were significant for leukocytosis of 18,000, BNP greater than 26,000, elevated troponin of 107 and on repeat 102.  Chest x-ray did reveal some small left-sided pleural effusions. She received 80mg IV dose of Lasix during ED workup.     Upon admission 10/20/23, patient's home medications were resumed and echocardiogram was ordered for heart failure exacerbation. Cultures were drawn and IV Ceftriaxone was administered for acute cystitis. Elevated troponins found on admission were suspected secondary to persistent Afib and they were followed until stable. Cardiology was consulted for medical optimization in setting of heart failure, discussed plan for continued diuresis, and fluid status was closely monitored. Echocardiogram identifying chronic cardiac issues as well as worsened tricuspid and mitral regurgitation.  Patient's symptoms of shortness of breath abdominal and leg swelling improved with diuresis.    Interval Problem Update  Had a discussion with the patient's family today - together decided that it would be best for the patient if she were to be out of bed more often, using the commode for urination and bowel movements, instead of the bedpan. This is especially because the patient has a pressure ulcer on the  coccyx.    With galan removed yesterday, subsequent bladder scans did not show significant retention, did not require galan placed again.    This morning, patient reports that she is feeling a lot better overall. Only thing that is bothering her is the cough, which is still better than before. She states she thinks it might be due to allergies. In terms of breathing, she reports no dyspnea at rest, but does states she will get short of breath if she actively walks around. Seen by PT today, and they recommended HH PT upon DC.     While patient has improved overall, she still has some crackles on pulmonary exam, so the plan is to diuresis slightly with 20 IV Lasix today, and re-assess tomorrow.    I have discussed this patient's plan of care and discharge plan at IDT rounds today with Case Management, Nursing, Nursing leadership, and other members of the IDT team.    Consultants/Specialty  cardiology    Code Status  DNAR/DNI    Disposition  The patient is not medically cleared for discharge to home or a post-acute facility.      I have placed the appropriate orders for post-discharge needs.    Review of Systems  Review of Systems   Constitutional:  Negative for chills and fever.   HENT:  Positive for congestion. Negative for hearing loss and sore throat.    Eyes:  Negative for blurred vision and double vision.   Respiratory:  Positive for cough. Negative for hemoptysis, sputum production, shortness of breath and wheezing.    Cardiovascular:  Negative for chest pain, palpitations and leg swelling.   Gastrointestinal:  Negative for abdominal pain, constipation, diarrhea, nausea and vomiting.   Genitourinary:  Negative for dysuria.   Musculoskeletal:  Negative for joint pain and myalgias.   Neurological:  Negative for dizziness, loss of consciousness and headaches.        Physical Exam  Temp:  [36.2 °C (97.2 °F)-36.8 °C (98.3 °F)] 36.4 °C (97.6 °F)  Pulse:  [] 99  Resp:  [17-18] 17  BP: ()/(52-71)  111/59  SpO2:  [90 %-99 %] 98 %    Physical Exam  Constitutional:       General: She is not in acute distress.     Appearance: Normal appearance. She is not ill-appearing, toxic-appearing or diaphoretic.   HENT:      Head: Normocephalic and atraumatic.      Mouth/Throat:      Mouth: Mucous membranes are moist.   Eyes:      Extraocular Movements: Extraocular movements intact.      Pupils: Pupils are equal, round, and reactive to light.   Cardiovascular:      Rate and Rhythm: Normal rate and regular rhythm.      Heart sounds: No murmur heard.     No gallop.   Pulmonary:      Effort: No respiratory distress.      Breath sounds: Rales present. No wheezing.      Comments: Very slight crackles, improved  Abdominal:      General: There is no distension.      Tenderness: There is no abdominal tenderness. There is no guarding or rebound.   Musculoskeletal:      Right lower leg: No edema.      Left lower leg: No edema.   Skin:     Coloration: Skin is not jaundiced or pale.   Neurological:      Mental Status: She is alert. Mental status is at baseline.   Psychiatric:         Mood and Affect: Mood normal.         Behavior: Behavior normal.         Fluids    Intake/Output Summary (Last 24 hours) at 10/25/2023 1459  Last data filed at 10/25/2023 1204  Gross per 24 hour   Intake 1480 ml   Output 301 ml   Net 1179 ml       Laboratory  Recent Labs     10/23/23  0110 10/24/23  0055   WBC 6.3 5.9   RBC 3.18* 3.35*   HEMOGLOBIN 8.4* 8.9*   HEMATOCRIT 27.0* 28.5*   MCV 84.9 85.1   MCH 26.4* 26.6*   MCHC 31.1* 31.2*   RDW 56.5* 56.7*   PLATELETCT 182 203   MPV 9.6 9.8     Recent Labs     10/23/23  0110 10/24/23  0055 10/25/23  0107 10/25/23  0649   SODIUM 125* 129* 125* 128*   POTASSIUM 3.6 3.9 4.5  --    CHLORIDE 93* 94* 93*  --    CO2 24 27 23  --    GLUCOSE 110* 119* 122*  --    BUN 24* 18 14  --    CREATININE 1.08 0.93 0.84  --    CALCIUM 7.6* 7.7* 7.8*  --      Recent Labs     10/23/23  0110 10/24/23  0055 10/25/23  0107   INR 2.17*  2.46* 2.42*               Imaging  EC-ECHOCARDIOGRAM COMPLETE W/ CONT   Final Result      IR-US GUIDED PIV   Final Result    Ultrasound-guided PERIPHERAL IV INSERTION performed by    qualified nursing staff as above.      DX-CHEST-PORTABLE (1 VIEW)   Final Result         1. Small left pleural effusion.   2. No pulmonary infiltrates or consolidations are noted.           Assessment/Plan  Problem Representation:    * CHF (congestive heart failure), NYHA class I, acute on chronic, combined (HCC)- (present on admission)  Assessment & Plan  EF 25% (2022)  BNP on admission greater than 25,000  Takes Lasix 20 PO BID at home  10/21: Echo with EF 20%, chronic issues and worsening tricuspid and mitral regurgitation.    - Continue Metoprolol, Spironolactone, Lisinopril  - Has been on IV Furosemide due to signs of fluid overload, starting with 60. Now down to 20 as of 10/25/23  - Cardiology with recommendation for continued medical management  - Monitor weight and I/Os    Urinary retention  Assessment & Plan  Coleman catheter placed earlier this admission  Removal and void trial started 10/24/23    - As of 10/25/23 no significant retention    Elevated troponin  Assessment & Plan  Patient presented with a troponin of 107, and on repeat 102, now stable  EKG without ST segment elevation  Likely secondary to persistent atrial fibrillation.    Leukocytosis  Assessment & Plan  Resolved    Secondary to UTI, resolved after ceftriaxone treatment    Acute hypoxic respiratory failure (HCC)  Assessment & Plan  Initially requiring 2 L supplemental oxygen, now down to 0.5L as of 10/25/23  Not oxygen dependent at home  Likely secondary to pleural effusion secondary to CHF exacerbation    Pleural effusion  Assessment & Plan  CXR: Small left pleural effusion  Continue with IV Lasix    Acute cystitis without hematuria  Assessment & Plan  Resolved    10/21: UCx's with preliminary growth of pansensitive E.Coli  Course of Ceftriaxone completed this  admission      Hyponatremia- (present on admission)  Assessment & Plan  Likely hyper volemic hyponatremia secondary to volume overload given her CHF.  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  On chart review, GI stated that it was okay for her to resume her anticoagulation currently.    Chronic atrial fibrillation (HCC)- (present on admission)  Assessment & Plan  PMH Afib and tachycardia on admission    -Resume home dose warfarin  -Resume metoprolol 50 mg daily         VTE prophylaxis: therapeutic anticoagulation with warfarin    I have performed a physical exam and reviewed and updated ROS and Plan today (10/25/2023). In review of yesterday's note (10/24/2023), there are no changes except as documented above.

## 2023-10-25 NOTE — PROGRESS NOTES
Telemetry Report:    Rhythm: Afib  Heart Rate: 60's to 110's  Ectopy: R) PVC,O) PVC, Couplet    CO:  QRS: 0.10  QT:            Per telemetry room monitor

## 2023-10-25 NOTE — DOCUMENTATION QUERY
[facility]                                                                       Query Response Note      PATIENT:               CARROLL JAMES  ACCT #:                  [id number]  MRN:                     [id number]  :                      1940  ADMIT DATE:       10/19/2023 5:30 PM  DISCH DATE:          RESPONDING  PROVIDER #:        [id number]           QUERY TEXT:    Documentation in the medical record indicates that this patient is being treated for ulcer of the following site:  Stage 2 pressure injury to coccyx, not POA     Can the diagnosis of ulcer be further clarified as to type/etiology of the wound, location, and if present on admission (POA) based on the clinical indicators and treatment?      The patient's Clinical Indicators include:  82yo F presented for increasing fatigue and poor oral intake; admitted for acute exacerbation of HFrEF, acute hypoxic respiratory failure and acute cystitis.     10/24 WOCN note:  -- Stage 2 pressure injury to coccyx (not present on admission)     Risk Factors: Acute medical state, generalized weakness, decreased activity tolerance, advanced age 82yo  Treatment: WOCN eval, local wound care (hydrocolloid dressing), skin integrity impairment prevention strategies (q2h repositioning, TAPs turning system, waffle overlay, offloading), patient education     Please contact me for any questions.    Thank you for time and attention,  TAMMY Gonzales RN  [email or web address]  Contact via Vo[employer] Messenger  Options provided:   -- Stage 2 pressure injury to coccyx, not present on admission   -- Wound is not a Pressure Ulcer/DTI, (please specify type of wound)   -- Other explanation, (please specify other explanation)      Query created by: Loli Hannon on 10/25/2023 11:26 AM    RESPONSE TEXT:    Other explanation - Stage 2 pressure injury to coccyx, present on admission          Electronically signed by:  RAIZA DALY  ADELINA MCLAUGHLIN 10/25/2023 1:58 PM

## 2023-10-25 NOTE — CARE PLAN
The patient is Stable - Low risk of patient condition declining or worsening    Shift Goals  Clinical Goals: VS monitoring, wean O2, monitor urine output, bladder scan q6H  Patient Goals: rest  Family Goals: not present    Progress made toward(s) clinical / shift goals:    Problem: Knowledge Deficit - Standard  Goal: Patient and family/care givers will demonstrate understanding of plan of care, disease process/condition, diagnostic tests and medications  Outcome: Progressing  Note: Pt educated on plan of care. Disease process, medications ordered, expected outcomes and goals.     Problem: Fall Risk  Goal: Patient will remain free from falls  Outcome: Progressing  Note: Pt educated on fall precautions. Pt instructed to use of call light for help. Pt calls appropriately. Hourly rounding in place.     Problem: Pain - Standard  Goal: Alleviation of pain or a reduction in pain to the patient’s comfort goal  Outcome: Progressing  Note: Pt';s pain responds well with non pharmacological intervention. Pt educated to call RN if pain occurs.     Problem: Respiratory  Goal: Patient will achieve/maintain optimum respiratory ventilation and gas exchange  Outcome: Progressing  Note: Pt is tolerating weaning oxygen on RA to 0.5 LPM oxygen via NC. Pt educated to call RN if experiencinng SOB or .     Problem: Urinary Elimination  Goal: Establish and maintain regular urinary output  Outcome: Progressing  Note: Pt voided after galan catheter removal with PVR of 0 mL.     Problem: Skin Integrity  Goal: Skin integrity is maintained or improved  Outcome: Progressing  Note: Pt's has stage 2 pressure ulcer followed by wound care team. Wound care ordere carried out. Pt on q2H turns and on waffle bed. Pt stands up to the chair and commode to promote adequate blood circulation.       Patient is not progressing towards the following goals:

## 2023-10-25 NOTE — PROGRESS NOTES
Inpatient Anticoagulation Service Note for 10/25/2023      Reason for Anticoagulation: Atrial Fibrillation   [id number] VASc Score: 8  HAS-BLED Score: 4  Target INR: 2.0 to 3.0    Hemoglobin Value: (!) 8.9  Hematocrit Value: (!) 28.5  Lab Platelet Value: 203      INR from last 7 days       Date/Time INR Value    10/25/23 0107 2.42    10/24/23 0055 2.46    10/23/23 0110 2.17    10/22/23 0043 1.8    10/21/23 0157 1.78    10/19/23 1831 1.2          Dose from last 7 days       Date/Time Dose (mg)    10/25/23 0956 5    10/24/23 1614 3    10/23/23 0759 5    10/22/23 0946 5    10/21/23 1437 5    10/20/23 1540 5               Average Dose (mg):  (Home Dosin mg daily)  Significant Interactions: Antiplatelet Medications  Bridge Therapy: No   Reversal Agent Administered: Not Applicable    Assessment:  Chronic warfarin patient.  Hx of CAD s/p PCI, CABG x 5, s/p Mitraclip in 2018, CVA.  Pt had recent previous admission where she was found to have an INR of 8.76 which was reversed with Vitamin K. 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 Recommendation from GI was to hold warfarin x 7 days. Pt resumed warfarin on 10/18/23 and has been on 5 mg daily since then. Pt recently finished course of ceftriaxone (10/23/23). Pt dietary intake is minimal, eating less than 50% of meals. INR value today is 2.42 with stable H/H and no s/s of bleeding. INR therapeutic at 2.42, and this is a likely plateau. INR level is likely reflective of previous 5 mg doses supporting her home regimen.    Plan:  5 mg daily. Continue to assess INR and monitor for s/s of bleeding. Will continue to follow.    Education Material Provided?: No (chronic warfarin patient)    Pharmacist suggested discharge dosin mg daily with a follow-up INR within 48-72 hrs after discharge.     Mann Ngo, PharmD

## 2023-10-26 LAB
ANION GAP SERPL CALC-SCNC: 8 MMOL/L (ref 7–16)
BUN SERPL-MCNC: 14 MG/DL (ref 8–22)
CALCIUM SERPL-MCNC: 7.5 MG/DL (ref 8.5–10.5)
CHLORIDE SERPL-SCNC: 95 MMOL/L (ref 96–112)
CO2 SERPL-SCNC: 25 MMOL/L (ref 20–33)
CREAT SERPL-MCNC: 0.83 MG/DL (ref 0.5–1.4)
ERYTHROCYTE [DISTWIDTH] IN BLOOD BY AUTOMATED COUNT: 55.6 FL (ref 35.9–50)
GFR SERPLBLD CREATININE-BSD FMLA CKD-EPI: 71 ML/MIN/1.73 M 2
GLUCOSE SERPL-MCNC: 91 MG/DL (ref 65–99)
HCT VFR BLD AUTO: 26.8 % (ref 37–47)
HGB BLD-MCNC: 8.4 G/DL (ref 12–16)
INR PPP: 2.59 (ref 0.87–1.13)
MCH RBC QN AUTO: 26.3 PG (ref 27–33)
MCHC RBC AUTO-ENTMCNC: 31.3 G/DL (ref 32.2–35.5)
MCV RBC AUTO: 83.8 FL (ref 81.4–97.8)
PLATELET # BLD AUTO: 206 K/UL (ref 164–446)
PMV BLD AUTO: 9.1 FL (ref 9–12.9)
POTASSIUM SERPL-SCNC: 4.5 MMOL/L (ref 3.6–5.5)
PROTHROMBIN TIME: 28.1 SEC (ref 12–14.6)
RBC # BLD AUTO: 3.2 M/UL (ref 4.2–5.4)
SODIUM SERPL-SCNC: 128 MMOL/L (ref 135–145)
WBC # BLD AUTO: 3.5 K/UL (ref 4.8–10.8)

## 2023-10-26 PROCEDURE — 85027 COMPLETE CBC AUTOMATED: CPT

## 2023-10-26 PROCEDURE — 97116 GAIT TRAINING THERAPY: CPT

## 2023-10-26 PROCEDURE — 700102 HCHG RX REV CODE 250 W/ 637 OVERRIDE(OP)

## 2023-10-26 PROCEDURE — A9270 NON-COVERED ITEM OR SERVICE: HCPCS | Performed by: STUDENT IN AN ORGANIZED HEALTH CARE EDUCATION/TRAINING PROGRAM

## 2023-10-26 PROCEDURE — 85610 PROTHROMBIN TIME: CPT

## 2023-10-26 PROCEDURE — 700102 HCHG RX REV CODE 250 W/ 637 OVERRIDE(OP): Performed by: INTERNAL MEDICINE

## 2023-10-26 PROCEDURE — 770020 HCHG ROOM/CARE - TELE (206)

## 2023-10-26 PROCEDURE — 97530 THERAPEUTIC ACTIVITIES: CPT

## 2023-10-26 PROCEDURE — A9270 NON-COVERED ITEM OR SERVICE: HCPCS

## 2023-10-26 PROCEDURE — 99232 SBSQ HOSP IP/OBS MODERATE 35: CPT | Performed by: INTERNAL MEDICINE

## 2023-10-26 PROCEDURE — 700102 HCHG RX REV CODE 250 W/ 637 OVERRIDE(OP): Performed by: STUDENT IN AN ORGANIZED HEALTH CARE EDUCATION/TRAINING PROGRAM

## 2023-10-26 PROCEDURE — 80048 BASIC METABOLIC PNL TOTAL CA: CPT

## 2023-10-26 PROCEDURE — A9270 NON-COVERED ITEM OR SERVICE: HCPCS | Performed by: INTERNAL MEDICINE

## 2023-10-26 PROCEDURE — 36415 COLL VENOUS BLD VENIPUNCTURE: CPT

## 2023-10-26 RX ORDER — WARFARIN SODIUM 4 MG/1
4 TABLET ORAL DAILY
Status: DISCONTINUED | OUTPATIENT
Start: 2023-10-26 | End: 2023-10-27 | Stop reason: HOSPADM

## 2023-10-26 RX ADMIN — Medication 100 MG: at 06:19

## 2023-10-26 RX ADMIN — Medication 400 MG: at 06:20

## 2023-10-26 RX ADMIN — GUAIFENESIN SYRUP AND DEXTROMETHORPHAN 5 ML: 100; 10 SYRUP ORAL at 13:16

## 2023-10-26 RX ADMIN — ATORVASTATIN CALCIUM 40 MG: 40 TABLET, FILM COATED ORAL at 06:20

## 2023-10-26 RX ADMIN — WARFARIN SODIUM 4 MG: 4 TABLET ORAL at 18:45

## 2023-10-26 RX ADMIN — ACETAMINOPHEN 650 MG: 325 TABLET, FILM COATED ORAL at 02:05

## 2023-10-26 RX ADMIN — ACETAMINOPHEN 650 MG: 325 TABLET, FILM COATED ORAL at 13:16

## 2023-10-26 RX ADMIN — GUAIFENESIN SYRUP AND DEXTROMETHORPHAN 5 ML: 100; 10 SYRUP ORAL at 06:20

## 2023-10-26 RX ADMIN — ACETAMINOPHEN 650 MG: 325 TABLET, FILM COATED ORAL at 21:06

## 2023-10-26 RX ADMIN — GUAIFENESIN SYRUP AND DEXTROMETHORPHAN 5 ML: 100; 10 SYRUP ORAL at 23:48

## 2023-10-26 RX ADMIN — POTASSIUM CHLORIDE 10 MEQ: 1500 TABLET, EXTENDED RELEASE ORAL at 06:19

## 2023-10-26 RX ADMIN — ASPIRIN 81 MG: 81 TABLET, COATED ORAL at 06:19

## 2023-10-26 RX ADMIN — DOCUSATE SODIUM 50 MG AND SENNOSIDES 8.6 MG 2 TABLET: 8.6; 5 TABLET, FILM COATED ORAL at 18:45

## 2023-10-26 RX ADMIN — GUAIFENESIN 200 MG: 100 SOLUTION ORAL at 02:53

## 2023-10-26 RX ADMIN — LISINOPRIL 2.5 MG: 5 TABLET ORAL at 18:45

## 2023-10-26 RX ADMIN — METOPROLOL SUCCINATE 50 MG: 50 TABLET, EXTENDED RELEASE ORAL at 06:19

## 2023-10-26 RX ADMIN — SPIRONOLACTONE 12.5 MG: 25 TABLET ORAL at 06:19

## 2023-10-26 RX ADMIN — OMEPRAZOLE 20 MG: 20 CAPSULE, DELAYED RELEASE ORAL at 06:19

## 2023-10-26 RX ADMIN — OMEPRAZOLE 20 MG: 20 CAPSULE, DELAYED RELEASE ORAL at 18:45

## 2023-10-26 RX ADMIN — LISINOPRIL 2.5 MG: 5 TABLET ORAL at 06:19

## 2023-10-26 RX ADMIN — FUROSEMIDE 20 MG: 20 TABLET ORAL at 06:20

## 2023-10-26 RX ADMIN — LEVOTHYROXINE SODIUM 75 MCG: 0.07 TABLET ORAL at 06:19

## 2023-10-26 RX ADMIN — FUROSEMIDE 20 MG: 20 TABLET ORAL at 15:39

## 2023-10-26 RX ADMIN — GUAIFENESIN SYRUP AND DEXTROMETHORPHAN 5 ML: 100; 10 SYRUP ORAL at 18:46

## 2023-10-26 ASSESSMENT — COGNITIVE AND FUNCTIONAL STATUS - GENERAL
MOVING FROM LYING ON BACK TO SITTING ON SIDE OF FLAT BED: A LITTLE
CLIMB 3 TO 5 STEPS WITH RAILING: A LITTLE
SUGGESTED CMS G CODE MODIFIER MOBILITY: CK
TURNING FROM BACK TO SIDE WHILE IN FLAT BAD: A LITTLE
STANDING UP FROM CHAIR USING ARMS: A LITTLE
WALKING IN HOSPITAL ROOM: A LITTLE
MOVING TO AND FROM BED TO CHAIR: A LITTLE
MOBILITY SCORE: 18

## 2023-10-26 ASSESSMENT — GAIT ASSESSMENTS
ASSISTIVE DEVICE: FRONT WHEEL WALKER
DISTANCE (FEET): 45
DEVIATION: BRADYKINETIC;OTHER (COMMENT)
GAIT LEVEL OF ASSIST: STANDBY ASSIST

## 2023-10-26 ASSESSMENT — ENCOUNTER SYMPTOMS
SHORTNESS OF BREATH: 0
CONSTIPATION: 0
LOSS OF CONSCIOUSNESS: 0
ABDOMINAL PAIN: 0
DOUBLE VISION: 0
WHEEZING: 0
HEMOPTYSIS: 0
FEVER: 0
CHILLS: 0
VOMITING: 0
SPUTUM PRODUCTION: 0
PALPITATIONS: 0
MYALGIAS: 0
DIARRHEA: 0
HEADACHES: 0
SORE THROAT: 0
COUGH: 1
NAUSEA: 0
DIZZINESS: 0
BLURRED VISION: 0

## 2023-10-26 ASSESSMENT — PAIN DESCRIPTION - PAIN TYPE
TYPE: ACUTE PAIN
TYPE: ACUTE PAIN

## 2023-10-26 ASSESSMENT — FIBROSIS 4 INDEX: FIB4 SCORE: 2.83

## 2023-10-26 NOTE — CARE PLAN
The patient is Watcher - Medium risk of patient condition declining or worsening    Shift Goals  Clinical Goals: monitor I&O, wean oxygen  Patient Goals: rest  Family Goals: JAYA    Progress made toward(s) clinical / shift goals:      Problem: Knowledge Deficit - Standard  Goal: Patient and family/care givers will demonstrate understanding of plan of care, disease process/condition, diagnostic tests and medications  Outcome: Progressing     Problem: Pain - Standard  Goal: Alleviation of pain or a reduction in pain to the patient’s comfort goal  Outcome: Progressing   Pt reported headache and leg cramps. Tylenol and heat packs given. Pt reports improvement in pain.    Patient is not progressing towards the following goals:

## 2023-10-26 NOTE — PROGRESS NOTES
Inpatient Anticoagulation Service Note for 10/26/2023      Reason for Anticoagulation: Atrial Fibrillation   [id number] VASc Score: 8  HAS-BLED Score: 4  Target INR: 2.0 to 3.0    Hemoglobin Value: (!) 8.4  Hematocrit Value: (!) 26.8  Lab Platelet Value: 206    INR from last 7 days       Date/Time INR Value    10/26/23 0129 2.59    10/25/23 0107 2.42    10/24/23 0055 2.46    10/23/23 0110 2.17    10/22/23 0043 1.8    10/21/23 0157 1.78    10/19/23 1831 1.2          Dose from last 7 days       Date/Time Dose (mg)    10/26/23 0958 4    10/25/23 0956 5    10/24/23 1614 3    10/23/23 0759 5    10/22/23 0946 5    10/21/23 1437 5    10/20/23 1540 5               Average Dose (mg):  (Home Dosin mg daily)  Significant Interactions: Antiplatelet Medications  Bridge Therapy: No  Reversal Agent Administered: Not Applicable    Assessment:  Chronic warfarin patient.  Hx of CAD s/p PCI, CABG x 5, s/p Mitraclip in 2018, CVA.  Pt had recent previous admission where she was found to have an INR of 8.76 which was reversed with Vitamin K. 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 Recommendation from GI was to hold warfarin x 7 days. Pt resumed warfarin on 10/18/23 and has been on 5 mg daily since then. Pt recently finished course of ceftriaxone (10/23/23). Pt dietary intake is minimal, eating less than 50% of meals. INR is therapeutic at 2.59 which is up from 2.42. Reduce dose given goal to be on the lower end of the goal range due to history of GIB.    Plan:  4 mg daily. INR increased; prefer lower end of goal range. Continue to assess INR and monitor for s/s of bleeding.  Education Material Provided?: No (Chronic warfarin)    Pharmacist suggested discharge dosin mg daily with a follow-up INR within 48-72 hrs after discharge.     Mann Ngo, KenaD

## 2023-10-26 NOTE — DISCHARGE PLANNING
Case Management Discharge Planning    Admission Date: 10/19/2023  GMLOS: 3.9  ALOS: 7    6-Clicks ADL Score: 20  6-Clicks Mobility Score: 18      Anticipated Discharge Dispo: Discharge Disposition: Discharged to home/self care (01)    DME Needed: No    Action(s) Taken: Updated Provider/Nurse on Discharge Plan, Patient discussed during IDT rounds with medical team. Found a clinic,  [place] [phone] that can draw her INR labs, notified medical team.    Escalations Completed: None    Medically Clear: No    Next Steps: CM will continue to follow for discharge planning needs.     Barriers to Discharge: Medical clearance    Is the patient up for discharge tomorrow: No

## 2023-10-26 NOTE — THERAPY
Physical Therapy   Daily Treatment     Patient Name: Daya Woods  Age:  83 y.o., Sex:  female  Medical Record #: [id number]  Today's Date: 10/26/2023     Precautions  Precautions: Fall Risk    Assessment    Patient seen for PT treatment session. Patient was able to demonstrate functional mobility tasks as detailed below. Will continue to benefit from PT services while in-house to address mobility and recommend HH PT and 1 person supervision on stairs.     Plan    Treatment Plan Status: Continue Current Treatment Plan  Type of Treatment: Gait Training, Neuro Re-Education / Balance, Stair Training, Therapeutic Activities, Therapeutic Exercise  Treatment Frequency: 4 Times per Week  Treatment Duration: Until Therapy Goals Met    DC Equipment Recommendations: Front-Wheel Walker  Discharge Recommendations: Recommend home health for continued physical therapy services    Objective       10/26/23 1514   Charge Group   Charges  Yes   PT Gait Training (Units) 1   PT Therapeutic Activities (Units) 1   Total Time Spent   PT Gait Training Time Spent (Mins) 15   PT Therapeutic Activities Time Spent (Mins) 16   PT Total Time Spent (Calculated) 31   Precautions   Precautions Fall Risk   Vitals   Pulse (!) 108   Patient BP Position Reyes's Position  (Post activity)   Pulse Oximetry 96 %   O2 Delivery Device None - Room Air   Vitals Comments During ambulation, SpO2 97.   Pain   Pain Scales 0 to 10 Scale    Intervention Declines   Cognition    Level of Consciousness Alert   Other Treatments   Other Treatments Provided Educated about daily mobility with nursing, OOB to chair for meals. Discussed DC recommendations with patient, patient refusing placement and HH services at this time. Discussed patient's progress with ambulation & stairs with daughter Lindy and grand son Josh via phone, per patient's request. Reinforced 1 person supervision on the stairs for safety concerns, activity pacing, seated rest breaks.   Balance    Sitting Balance (Static) Fair +   Sitting Balance (Dynamic) Fair   Standing Balance (Static) Fair   Standing Balance (Dynamic) Fair   Skilled Intervention Verbal Cuing;Postural Facilitation   Comments Standing with FWW   Bed Mobility    Supine to Sit Supervised   Sit to Supine Supervised   Scooting Supervised   Skilled Intervention Verbal Cuing;Sequencing   Comments HOB raised   Gait Analysis   Gait Level Of Assist Standby Assist   Assistive Device Front Wheel Walker   Distance (Feet) 45   # of Times Distance was Traveled 2   Deviation Bradykinetic;Other (Comment)  (Reduced step & stride length)   # of Stairs Climbed 5  (up/down)   Level of Assist with Stairs Contact Guard Assist   Skilled Intervention Sequencing;Verbal Cuing   Comments Patient ambulated in the room, into the hallway, with steady gait & no loss of balance, cues for directions and appropriate spacing b/w self and FWW; Negotiated steps with B/L rails, seated rest breaks before & after stairs; steady pace, no loss of balance   Functional Mobility   Sit to Stand Standby Assist   Bed, Chair, Wheelchair Transfer Standby Assist   Transfer Method Stand Step   Mobility Bed-BSC, into & out of transport chair x 2 times; W FWW   Skilled Intervention Verbal Cuing;Sequencing;Postural Facilitation   Comments Cues for hand placement, LE placement, sequencing   How much difficulty does the patient currently have...   Turning over in bed (including adjusting bedclothes, sheets and blankets)? 3   Sitting down on and standing up from a chair with arms (e.g., wheelchair, bedside commode, etc.) 3   Moving from lying on back to sitting on the side of the bed? 3   How much help from another person does the patient currently need...   Moving to and from a bed to a chair (including a wheelchair)? 3   Need to walk in a hospital room? 3   Climbing 3-5 steps with a railing? 3   6 clicks Mobility Score 18   Patient / Family Goals    Patient / Family Goal #1 To return home    Short Term Goals    Short Term Goal # 1 Pt will perform supine to sit with supv within 6 visits in order to return home   Goal Outcome # 1 Goal met   Short Term Goal # 2 Pt will tranfser bed<->chair with supv and FWW within 6 visits in order to return home   Goal Outcome # 2 Progressing as expected   Short Term Goal # 3 Pt will amb 150' with FWW and supv within 6 visits in order to return home   Goal Outcome # 3 Progressing as expected   Short Term Goal # 4 Pt will perform 5 steps with SBA within 6 visits as required to enter/exit home   Goal Outcome # 4 Progressing as expected   Physical Therapy Treatment Plan   Physical Therapy Treatment Plan Continue Current Treatment Plan   Treatment Plan  Gait Training;Neuro Re-Education / Balance;Stair Training;Therapeutic Activities;Therapeutic Exercise   Treatment Frequency 4 Times per Week   Duration Until Therapy Goals Met   Anticipated Discharge Equipment and Recommendations   DC Equipment Recommendations Front-Wheel Walker   Discharge Recommendations Recommend home health for continued physical therapy services   Interdisciplinary Plan of Care Collaboration   IDT Collaboration with  Nursing;Family / Caregiver;Physician   Patient Position at End of Therapy In Bed;Bed Alarm On;Call Light within Reach;Tray Table within Reach;Phone within Reach   Session Information   Date / Session Number  10/26-3(3/4, 10/28)

## 2023-10-27 VITALS
BODY MASS INDEX: 18.48 KG/M2 | DIASTOLIC BLOOD PRESSURE: 70 MMHG | WEIGHT: 108.25 LBS | HEIGHT: 64 IN | RESPIRATION RATE: 18 BRPM | SYSTOLIC BLOOD PRESSURE: 119 MMHG | OXYGEN SATURATION: 93 % | HEART RATE: 84 BPM | TEMPERATURE: 97.2 F

## 2023-10-27 LAB
ANION GAP SERPL CALC-SCNC: 7 MMOL/L (ref 7–16)
BUN SERPL-MCNC: 12 MG/DL (ref 8–22)
CALCIUM SERPL-MCNC: 7.8 MG/DL (ref 8.5–10.5)
CHLORIDE SERPL-SCNC: 94 MMOL/L (ref 96–112)
CO2 SERPL-SCNC: 26 MMOL/L (ref 20–33)
CREAT SERPL-MCNC: 0.8 MG/DL (ref 0.5–1.4)
ERYTHROCYTE [DISTWIDTH] IN BLOOD BY AUTOMATED COUNT: 55.1 FL (ref 35.9–50)
GFR SERPLBLD CREATININE-BSD FMLA CKD-EPI: 74 ML/MIN/1.73 M 2
GLUCOSE SERPL-MCNC: 91 MG/DL (ref 65–99)
HCT VFR BLD AUTO: 28.6 % (ref 37–47)
HGB BLD-MCNC: 9 G/DL (ref 12–16)
INR PPP: 2.48 (ref 0.87–1.13)
MCH RBC QN AUTO: 26.3 PG (ref 27–33)
MCHC RBC AUTO-ENTMCNC: 31.5 G/DL (ref 32.2–35.5)
MCV RBC AUTO: 83.6 FL (ref 81.4–97.8)
PLATELET # BLD AUTO: 228 K/UL (ref 164–446)
PMV BLD AUTO: 9.5 FL (ref 9–12.9)
POTASSIUM SERPL-SCNC: 4.5 MMOL/L (ref 3.6–5.5)
PROTHROMBIN TIME: 27.2 SEC (ref 12–14.6)
RBC # BLD AUTO: 3.42 M/UL (ref 4.2–5.4)
SODIUM SERPL-SCNC: 127 MMOL/L (ref 135–145)
WBC # BLD AUTO: 3.8 K/UL (ref 4.8–10.8)

## 2023-10-27 PROCEDURE — 80048 BASIC METABOLIC PNL TOTAL CA: CPT

## 2023-10-27 PROCEDURE — 36415 COLL VENOUS BLD VENIPUNCTURE: CPT

## 2023-10-27 PROCEDURE — 97535 SELF CARE MNGMENT TRAINING: CPT | Mod: CO

## 2023-10-27 PROCEDURE — 85027 COMPLETE CBC AUTOMATED: CPT

## 2023-10-27 PROCEDURE — 99239 HOSP IP/OBS DSCHRG MGMT >30: CPT | Mod: GC | Performed by: HOSPITALIST

## 2023-10-27 PROCEDURE — 700102 HCHG RX REV CODE 250 W/ 637 OVERRIDE(OP): Performed by: STUDENT IN AN ORGANIZED HEALTH CARE EDUCATION/TRAINING PROGRAM

## 2023-10-27 PROCEDURE — A9270 NON-COVERED ITEM OR SERVICE: HCPCS | Performed by: INTERNAL MEDICINE

## 2023-10-27 PROCEDURE — 85610 PROTHROMBIN TIME: CPT

## 2023-10-27 PROCEDURE — A9270 NON-COVERED ITEM OR SERVICE: HCPCS | Performed by: STUDENT IN AN ORGANIZED HEALTH CARE EDUCATION/TRAINING PROGRAM

## 2023-10-27 PROCEDURE — 700102 HCHG RX REV CODE 250 W/ 637 OVERRIDE(OP): Performed by: INTERNAL MEDICINE

## 2023-10-27 RX ORDER — WARFARIN SODIUM 4 MG/1
4 TABLET ORAL DAILY
Qty: 30 TABLET | Refills: 0 | Status: ACTIVE | OUTPATIENT
Start: 2023-10-27 | End: 2023-11-28 | Stop reason: SDUPTHER

## 2023-10-27 RX ADMIN — OMEPRAZOLE 20 MG: 20 CAPSULE, DELAYED RELEASE ORAL at 05:23

## 2023-10-27 RX ADMIN — Medication 100 MG: at 05:23

## 2023-10-27 RX ADMIN — LEVOTHYROXINE SODIUM 75 MCG: 0.07 TABLET ORAL at 05:23

## 2023-10-27 RX ADMIN — GUAIFENESIN SYRUP AND DEXTROMETHORPHAN 5 ML: 100; 10 SYRUP ORAL at 08:00

## 2023-10-27 RX ADMIN — SPIRONOLACTONE 12.5 MG: 25 TABLET ORAL at 05:22

## 2023-10-27 RX ADMIN — Medication 400 MG: at 05:22

## 2023-10-27 RX ADMIN — LISINOPRIL 2.5 MG: 5 TABLET ORAL at 05:23

## 2023-10-27 RX ADMIN — ASPIRIN 81 MG: 81 TABLET, COATED ORAL at 05:22

## 2023-10-27 RX ADMIN — POTASSIUM CHLORIDE 10 MEQ: 1500 TABLET, EXTENDED RELEASE ORAL at 05:23

## 2023-10-27 RX ADMIN — FUROSEMIDE 20 MG: 20 TABLET ORAL at 05:24

## 2023-10-27 RX ADMIN — METOPROLOL SUCCINATE 50 MG: 50 TABLET, EXTENDED RELEASE ORAL at 05:23

## 2023-10-27 RX ADMIN — ATORVASTATIN CALCIUM 40 MG: 40 TABLET, FILM COATED ORAL at 05:22

## 2023-10-27 ASSESSMENT — COGNITIVE AND FUNCTIONAL STATUS - GENERAL
DRESSING REGULAR UPPER BODY CLOTHING: A LITTLE
HELP NEEDED FOR BATHING: A LITTLE
DAILY ACTIVITIY SCORE: 19
PERSONAL GROOMING: A LITTLE
SUGGESTED CMS G CODE MODIFIER DAILY ACTIVITY: CK
DRESSING REGULAR LOWER BODY CLOTHING: A LITTLE
TOILETING: A LITTLE

## 2023-10-27 ASSESSMENT — GAIT ASSESSMENTS: DISTANCE (FEET): 8

## 2023-10-27 ASSESSMENT — FIBROSIS 4 INDEX: FIB4 SCORE: 2.56

## 2023-10-27 NOTE — DISCHARGE SUMMARY
[facility] Discharge Summary    Attending: ALEKS Lopez M.d.  Senior Resident: Dr. Del Rosario   Intern:  DrEdward Mcpherson   Contact Number: [phone]    CHIEF COMPLAINT ON ADMISSION  Chief Complaint   Patient presents with    Sent by MD     Recent admission here.     Family reports she has declined in the past week with low BPs.  She is fatigued, sleeping constantly, poor PO intake.  Increased leg swelling and cough. Hx of CHF.     Weakness    Leg Swelling    Cough       Reason for Admission  sent by MD     Admission Date  10/19/2023    CODE STATUS  DNAR/DNI    HPI & HOSPITAL COURSE  This is a very pleasant 82 yo lady with PMHx of HFrEF 20 % (ECHO 10/23), CAD s/p multi vessel CABG and PCI in the past, permanent atrial fibrillation on anticoagulation with warfarin, severe mitral regurgitation s/p mitraclip 2018, HTN, recent hospital admission for CVA versus seizure, recent GI bleed in the setting of supratherapeutic INR, 8.76 at last admission 10/6/2023 requiring transfusion for which DAPT and anticoagulation had been held, admitted 10/19/2023 for shortness of breath and lower extremity edema found to have acute hypoxic respiratory failure secondary to acute exacerbation of her congestive heart failure and UTI    CHF exacerbation was likely precipitated by her recent acute illness and the need for transfusions as well as the UTI on presentation. Cardiology was consulted and she underwent aggressive IV diuresis with improvement of clinical status. She is currently back to her baseline, with no supplemental oxygen requirements, lung exam clear with no crackles.  She also had hypervolemic hyponatremia that improved with diuresis, sodium is back at baseline today.    She had a UTI on presentation and she completed IV antibiotic therapy with ceftriaxone this admission.  This was complicated by acute urinary retention and a Coleman was placed, which was successfully removed on 10/24 and since then she has been able to  void spontaneously.    Therapy saw the patient when she was admitted and recommended home health for continued therapy needs, family describes that the home health will be provided by themselves.  She is also being discharged with outpatient referrals to physical and Occupational Therapy for continued therapy needs as well as a paper script for HH.   Labs have been ordered and they have been advised to get them done in about 3 days for continued monitoring of her electrolytes and INR for anticoagulation. Dose of warfarin decreased to 4 mg on discharge per recs from [facility] pharmacy.   Outpatient appointment has been set up at her PCP's office to review the labs and for continuity of care in 1 week, on  11/02/2023.     Therefore, she is discharged in good and stable condition to home with close outpatient follow-up.    The patient recovered much more quickly than anticipated on admission.    Discharge Date  10/58958    Physical Exam on Day of Discharge  Physical Exam  Constitutional:       General: She is not in acute distress.     Appearance: Normal appearance. She is not ill-appearing.   HENT:      Head: Normocephalic and atraumatic.      Right Ear: External ear normal.      Left Ear: External ear normal.      Nose: Nose normal. No congestion.      Mouth/Throat:      Mouth: Mucous membranes are moist.      Pharynx: No oropharyngeal exudate.   Eyes:      Extraocular Movements: Extraocular movements intact.      Pupils: Pupils are equal, round, and reactive to light.   Cardiovascular:      Rate and Rhythm: Normal rate.      Pulses: Normal pulses.   Pulmonary:      Effort: Pulmonary effort is normal. No respiratory distress.      Breath sounds: Normal breath sounds. No wheezing or rales.   Abdominal:      General: Abdomen is flat. Bowel sounds are normal. There is no distension.      Palpations: Abdomen is soft.      Tenderness: There is no abdominal tenderness.   Musculoskeletal:         General: Normal range of  motion.      Right lower leg: No edema.      Left lower leg: No edema.   Skin:     General: Skin is warm.      Capillary Refill: Capillary refill takes less than 2 seconds.      Coloration: Skin is not jaundiced.   Neurological:      Mental Status: She is alert and oriented to person, place, and time.   Psychiatric:         Mood and Affect: Mood normal.         FOLLOW UP ITEMS POST DISCHARGE  - Follow up with     DISCHARGE DIAGNOSES  Principal Problem:    CHF (congestive heart failure), NYHA class I, acute on chronic, combined (HCC) (POA: Yes)  Active Problems:    Chronic atrial fibrillation (HCC) (POA: Yes)      Overview: Managed with rate control and warfarin for anticoagulation    Chronic anticoagulation (POA: Yes)    Coronary artery disease involving coronary bypass graft of native heart with unstable angina pectoris (HCC) (POA: Yes)      Overview: Patient with history of prior MI and 5 vessel CABG in 2002, now with 2 out       of 5 grafts open by cardiac catheterization on August 2016 status post PCI       October 2018, and severe disease of her native LAD status post PCI with       drug-eluting prior stents and occluded native right coronary artery       filling collaterals.  Patient presented to clinic today to see her       cardiologist.  She was complaining of several day history of unstable       angina.  She was having pain at rest and with exertion.  It was       recommended the patient present to the emergency department for repeat       left heart cath to assess her coronary artery anatomy.            As per cardiology recommendations.  Patient says metoprolol will be       increased to 50 mg p.o. daily along with Ranexa 50 mg p.o. twice daily.        Patient should continue aspirin.  Hold Coumadin for now given her elevated       INR and plan for left heart cath tomorrow morning.            Case was further discussed with Dr. Don.  He recommends repeating her       INR at 6 AM tomorrow in the  setting that the patient will need FFP prior       to procedure.                Hyponatremia (POA: Yes)    Acute cystitis without hematuria (POA: Unknown)    Pleural effusion (POA: Unknown)    Acute hypoxic respiratory failure (HCC) (POA: Unknown)    Leukocytosis (POA: Unknown)    Elevated troponin (POA: Unknown)    Urinary retention (POA: Unknown)    Pressure ulcer of coccygeal region, stage II (HCC) (POA: Unknown)      Overview: - Seen by wound care 10/24/23, nursing orders in place      - Discussed with family on 10/25/23 about having patient out of bed more       often, such as using commode instead of bedpan  Resolved Problems:    * No resolved hospital problems. *      FOLLOW UP  Future Appointments   Date Time Provider Department Center   11/28/2023  2:30 PM LEATHA Palmer None     Sourav Swift M.D.  [address] [place] [address]# [phone]  Go on 10/26/2023  Please go to your follow up appointment on Thursday October 26, 2023 at 2:30pm.    FOLLOW UP APPOINTMENT FOR INR LAB RESULTS     November 2nd at 1:30 PM     Dr Lilly (covering for Sharee Pagan)  [address] [address]  [phone]    MEDICATIONS ON DISCHARGE     Medication List        CHANGE how you take these medications        Instructions   warfarin 4 MG Tabs  What changed:   medication strength  how much to take  Commonly known as: Coumadin   Take 1 Tablet by mouth every day.  Dose: 4 mg            CONTINUE taking these medications        Instructions   acetaminophen 500 MG Tabs  Commonly known as: Tylenol   Take 500 mg by mouth 1 time a day as needed.  Dose: 500 mg     aspirin EC 81 MG Tbec  Commonly known as: Ecotrin   Take 81 mg by mouth every day.  Dose: 81 mg     atorvastatin 40 MG Tabs  Commonly known as: Lipitor   Take 1 Tablet by mouth every day.  Dose: 40 mg     cyanocobalamin 1000 MCG Tabs  Commonly known as: Vitamin B12   Take 1 Tablet by mouth every day for 30 days.  Dose: 1,000 mcg    "  furosemide 20 MG Tabs  Commonly known as: Lasix   Take 20 mg by mouth 2 times a day.  Dose: 20 mg     levothyroxine 75 MCG Tabs  Commonly known as: Synthroid   take 1 tablet by mouth every morning ON AN EMPTY STOMACH  Dose: 75 mcg     lisinopril 2.5 MG Tabs  Commonly known as: Prinivil   Take 2.5 mg by mouth 2 times a day.  Dose: 2.5 mg     magnesium oxide 400 MG Tabs tablet  Commonly known as: Mag-Ox   Take 1 Tablet by mouth every day.  Dose: 400 mg     metoprolol SR 50 MG Tb24  Commonly known as: Toprol XL   Take 1 Tablet by mouth every day for 30 days.  Dose: 50 mg     multivitamin Tabs   Take 1 Tablet by mouth every day for 30 days.  Dose: 1 Tablet     omeprazole 20 MG delayed-release capsule  Commonly known as: PriLOSEC   Take 1 Capsule by mouth 2 times a day for 30 days.  Dose: 20 mg     potassium chloride SA 10 MEQ Tbcr  Commonly known as: K-Dur   Take 5 mEq by mouth 2 times a day. 5 mEq = 1/2 tablet  Dose: 5 mEq     spironolactone 25 MG Tabs  Commonly known as: Aldactone   take 1/2 tablet by mouth once daily     thiamine 100 MG tablet  Commonly known as: Thiamine   Take 1 Tablet by mouth every day for 30 days.  Dose: 100 mg              Allergies  Allergies   Allergen Reactions    Codeine Unspecified     \"I just don't like it. It does weird things to me.\"    Isosorbide Mononitrate Rash     Rash    Keflex Rash     Rash    Sulfa Drugs Rash     rash    Tape Unspecified     Tears skin off    Xarelto [Rivaroxaban] Rash     rash    Eggs Or Egg-Derived Products [Chicken-Derived Products] Diarrhea     Pt's daughter reports diarrhea    Hydrochlorothiazide Unspecified     Unsure of reaction    Lactose      Lactose intolerant       DIET  Orders Placed This Encounter   Procedures    Diet Order Diet: Cardiac; Fluid modifications: (optional): 1500 ml Fluid Restriction     Standing Status:   Standing     Number of Occurrences:   1     Order Specific Question:   Diet:     Answer:   Cardiac [6]     Order Specific Question: "   Fluid modifications: (optional)     Answer:   1500 ml Fluid Restriction [9]       ACTIVITY  As tolerated.  Weight bearing as tolerated    CONSULTATIONS  - Cardiology     PROCEDURES  - N/A     LABORATORY  Lab Results   Component Value Date    SODIUM 127 (L) 10/27/2023    POTASSIUM 4.5 10/27/2023    CHLORIDE 94 (L) 10/27/2023    CO2 26 10/27/2023    GLUCOSE 91 10/27/2023    BUN 12 10/27/2023    CREATININE 0.80 10/27/2023        Lab Results   Component Value Date    WBC 3.8 (L) 10/27/2023    HEMOGLOBIN 9.0 (L) 10/27/2023    HEMATOCRIT 28.6 (L) 10/27/2023    PLATELETCT 228 10/27/2023        Total time of the discharge process exceeds 43 minutes.

## 2023-10-27 NOTE — CARE PLAN
The patient is Stable - Low risk of patient condition declining or worsening    Shift Goals  Clinical Goals: up to chair for all meals; promote comfort; maintain safety;  Patient Goals: rest  Family Goals: JAYA    Progress made toward(s) clinical / shift goals:      Problem: Fall Risk  Goal: Patient will remain free from falls  Outcome: Progressing  Pt has been free from falls/injuries this shift, she utilizes her call light appropriately and does not attempt to exit bed/chair without calling for assistance.     Problem: Pain - Standard  Goal: Alleviation of pain or a reduction in pain to the patient’s comfort goal  Outcome: Progressing  Pt c/o pain to her legs after sitting up in the chair for a little while.  PRN tylenol was given as ordered, in which she reported helped.  She remains free from non-verbal s/sx of discomfort at this time.      Problem: Knowledge Deficit - Standard  Goal: Patient and family/care givers will demonstrate understanding of plan of care, disease process/condition, diagnostic tests and medications  Outcome: Progressing  Pt and family verbalize understanding of POC, the medication that is being given and her lab results.  Pt was able to ambulate with PT from her bed, into the hallway, to the stairs, up 5 steps, back down them, and back to her room with utilization of walker.  Pt had a small rest period before walking up the stairs and after coming back down them, where she sat in her w/c. O2 sat remained in the 95-98% range on room air.    Patient is not progressing towards the following goals:

## 2023-10-27 NOTE — PROGRESS NOTES
[facility] Internal Medicine Daily Progress Note    Date of Service  10/26/2023    [facility] Team: [facility] IM Blue Team   Attending: Helga Ortiz M.d.  Senior Resident: Dr. Del Rosario  Intern:  Dr. Mcpherson  Contact Number: [phone]    Chief Complaint  Daya Woods is a 83 y.o. female admitted 10/19/2023 with SOB and LE edema.    Hospital Course  Daya Woods is a 83 y.o. female with past medical history of atrial fibrillation on anticoagulation, hypertension, hyperlipidemia, CHF with ejection fraction of 25%, recent hospital admission for possible stroke versus seizure, which was complicated by GI bleed  who presented 10/19/2023 with shortness of breath and lower extremity edema.     In the ED, labs were significant for leukocytosis of 18,000, BNP greater than 26,000, elevated troponin of 107 and on repeat 102.  Chest x-ray did reveal some small left-sided pleural effusions. She received 80mg IV dose of Lasix during ED workup.     Upon admission 10/20/23, patient's home medications were resumed and echocardiogram was ordered for heart failure exacerbation. Cultures were drawn and IV Ceftriaxone was administered for acute cystitis. Elevated troponins found on admission were suspected secondary to persistent Afib and they were followed until stable. Cardiology was consulted for medical optimization in setting of heart failure, discussed plan for continued diuresis, and fluid status was closely monitored. Echocardiogram identifying chronic cardiac issues as well as worsened tricuspid and mitral regurgitation.  Patient's symptoms of shortness of breath abdominal and leg swelling improved with diuresis.    Interval Problem Update  No significant overnight events. This morning patient continuing to fell well overall. Has some nasal congestion and some residual cough, but no other severe respiratory problems.     Seen by PT today, and reported that patient did very well compared to prior. Considering this, will considering  discharging patient home soon with home health PT/OT.    Plan is to continue have the patient with her home meds, while getting OOB to commode for urination/BM. Dicussed with patient and family at bedside this evening, and they are also okay with the plan for possible discharge tomorrow.     I have discussed this patient's plan of care and discharge plan at IDT rounds today with Case Management, Nursing, Nursing leadership, and other members of the IDT team.    Consultants/Specialty  cardiology    Code Status  DNAR/DNI    Disposition  The patient is not medically cleared for discharge to home or a post-acute facility.  Anticipate discharge to: home with organized home healthcare and close outpatient follow-up    I have placed the appropriate orders for post-discharge needs.    Review of Systems  Review of Systems   Constitutional:  Negative for chills and fever.   HENT:  Positive for congestion. Negative for hearing loss and sore throat.    Eyes:  Negative for blurred vision and double vision.   Respiratory:  Positive for cough. Negative for hemoptysis, sputum production, shortness of breath and wheezing.    Cardiovascular:  Negative for chest pain, palpitations and leg swelling.   Gastrointestinal:  Negative for abdominal pain, constipation, diarrhea, nausea and vomiting.   Genitourinary:  Negative for dysuria.   Musculoskeletal:  Negative for joint pain and myalgias.   Neurological:  Negative for dizziness, loss of consciousness and headaches.        Physical Exam  Temp:  [36.3 °C (97.3 °F)-36.7 °C (98.1 °F)] 36.7 °C (98.1 °F)  Pulse:  [] 81  Resp:  [16-18] 16  BP: ()/(48-70) 108/67  SpO2:  [93 %-96 %] 94 %    Physical Exam  Constitutional:       General: She is not in acute distress.     Appearance: Normal appearance. She is not ill-appearing, toxic-appearing or diaphoretic.   HENT:      Head: Normocephalic and atraumatic.      Mouth/Throat:      Mouth: Mucous membranes are moist.   Eyes:       Extraocular Movements: Extraocular movements intact.      Pupils: Pupils are equal, round, and reactive to light.   Cardiovascular:      Rate and Rhythm: Normal rate and regular rhythm.      Heart sounds: No murmur heard.     No gallop.   Pulmonary:      Effort: No respiratory distress.      Breath sounds: No wheezing or rales.   Abdominal:      General: There is no distension.      Tenderness: There is no abdominal tenderness. There is no guarding or rebound.   Musculoskeletal:      Right lower leg: No edema.      Left lower leg: No edema.   Skin:     Coloration: Skin is not jaundiced or pale.   Neurological:      Mental Status: She is alert. Mental status is at baseline.   Psychiatric:         Mood and Affect: Mood normal.         Behavior: Behavior normal.         Fluids    Intake/Output Summary (Last 24 hours) at 10/26/2023 1747  Last data filed at 10/26/2023 1600  Gross per 24 hour   Intake 730 ml   Output 600 ml   Net 130 ml       Laboratory  Recent Labs     10/24/23  0055 10/26/23  0129   WBC 5.9 3.5*   RBC 3.35* 3.20*   HEMOGLOBIN 8.9* 8.4*   HEMATOCRIT 28.5* 26.8*   MCV 85.1 83.8   MCH 26.6* 26.3*   MCHC 31.2* 31.3*   RDW 56.7* 55.6*   PLATELETCT 203 206   MPV 9.8 9.1     Recent Labs     10/24/23  0055 10/25/23  0107 10/25/23  0649 10/26/23  0129   SODIUM 129* 125* 128* 128*   POTASSIUM 3.9 4.5  --  4.5   CHLORIDE 94* 93*  --  95*   CO2 27 23  --  25   GLUCOSE 119* 122*  --  91   BUN 18 14  --  14   CREATININE 0.93 0.84  --  0.83   CALCIUM 7.7* 7.8*  --  7.5*     Recent Labs     10/24/23  0055 10/25/23  0107 10/26/23  0129   INR 2.46* 2.42* 2.59*               Imaging  EC-ECHOCARDIOGRAM COMPLETE W/ CONT   Final Result      IR-US GUIDED PIV   Final Result    Ultrasound-guided PERIPHERAL IV INSERTION performed by    qualified nursing staff as above.      DX-CHEST-PORTABLE (1 VIEW)   Final Result         1. Small left pleural effusion.   2. No pulmonary infiltrates or consolidations are noted.            Assessment/Plan  Problem Representation:    * CHF (congestive heart failure), NYHA class I, acute on chronic, combined (HCC)- (present on admission)  Assessment & Plan  EF 25% (2022)  BNP on admission greater than 25,000  Takes Lasix 20 PO BID at home, but was on 60 IV earlier this admission, gradually titrated down.  10/21: Echo with EF 20%, chronic issues and worsening tricuspid and mitral regurgitation.    - Continue Metoprolol, Spironolactone, Lisinopril  - Back on home PO Lasix 20  - Cardiology with recommendation for continued medical management  - Monitor weight and I/Os    Urinary retention  Assessment & Plan  Coleman catheter placed earlier this admission  Removal and void trial started 10/24/23    - As of 10/25/23 no significant retention    Elevated troponin  Assessment & Plan  Patient presented with a troponin of 107, and on repeat 102, now stable  EKG without ST segment elevation  Likely secondary to persistent atrial fibrillation.    Leukocytosis  Assessment & Plan  Resolved    Secondary to UTI, resolved after ceftriaxone treatment    Acute hypoxic respiratory failure (HCC)  Assessment & Plan  Initially requiring 2 L supplemental oxygen, now down to 0.5L as of 10/25/23  Not oxygen dependent at home  Likely secondary to pleural effusion secondary to CHF exacerbation    Pleural effusion  Assessment & Plan  CXR: Small left pleural effusion  Continue with IV Lasix    Acute cystitis without hematuria  Assessment & Plan  Resolved    10/21: UCx's with preliminary growth of pansensitive E.Coli  Course of Ceftriaxone completed this admission      Hyponatremia- (present on admission)  Assessment & Plan  Likely hyper volemic hyponatremia secondary to volume overload given her CHF.  However patient is chronically hyponatremic  Continue with Lasix    Coronary artery disease involving coronary bypass graft of native heart with unstable angina pectoris (HCC)- (present on admission)  Assessment & Plan  Continue with  aspirin and statin    Chronic anticoagulation- (present on admission)  Assessment & Plan  Currently on warfarin for atrial fibrillation  Her warfarin dose has been held for the last week given her GI bleed on previous admission.  On chart review, GI stated that it was okay for her to resume her anticoagulation currently.    Chronic atrial fibrillation (HCC)- (present on admission)  Assessment & Plan  PMH Afib and tachycardia on admission    -Resume home dose warfarin  -Resume metoprolol 50 mg daily         VTE prophylaxis: therapeutic anticoagulation with warfarin    I have performed a physical exam and reviewed and updated ROS and Plan today (10/26/2023). In review of yesterday's note (10/25/2023), there are no changes except as documented above.

## 2023-10-27 NOTE — DISCHARGE INSTRUCTIONS
HF Patient Discharge Instructions  Monitor your weight daily, and maintain a weight chart, to track your weight changes.   Activity as tolerated, unless your Doctor has ordered otherwise. Other activity order:   Follow a low fat, low cholesterol, low salt diet unless instructed otherwise by your Doctor. Read the labels on the back of food products and track your intake of fat, cholesterol and salt.   Fluid Restriction Yes. If a Fluid Restriction has been ordered by your Doctor, measure fluids with a measuring cup to ensure that you are not exceeding the restriction.   No smoking.  Oxygen Yes. If your Doctor has ordered that you wear Oxygen at home, it is important to wear it as ordered.  Did you receive an explanation from staff on the importance of taking each of your medications and why it is necessary to keep taking them unless your doctor says to stop? Yes  Were all of your questions answered about how to manage your heart failure and what to do if you have increased signs and symptoms after you go home? Yes  Do you feel like your heart failure care team involved you in the care treatment plan and allowed you to make decisions regarding your care while in the hospital and addressed any discharge needs you might have? Yes    See the educational handout provided at discharge for more information on monitoring your daily weight, activity and diet. This also explains more about Heart Failure, symptoms of a flare-up and some of the tests that you have undergone.     Warning Signs of a Flare-Up include:  Swelling in the ankles or lower legs.  Shortness of breath, while at rest, or while doing normal activities.   Shortness of breath at night when in bed, or coughing in bed.   Requiring more pillows to sleep at night, or needing to sit up at night to sleep.  Feeling weak, dizzy or fatigued.     When to call your Doctor:  Call your Primary Care Physician or Cardiologist if:   You experience any pain radiating to your  jaw or neck.  You have any difficulty breathing.  You experience weight gain of 3 lbs in a day or 5 lbs in a week.   You feel any palpitations or irregular heartbeats.  You become dizzy or lose consciousness.   If you have had an angiogram or had a pacemaker or AICD placed, and experience:  Bleeding, drainage or swelling at the surgical / puncture site.  Fever greater than 100.0 F  Shock from internal defibrillator.  Cool and / or numb extremities.     Please access the [facility] My HF Guide/Heart Failure Interactive Workbook:   [email or web address]

## 2023-10-27 NOTE — DISCHARGE INSTR - CASE MGT
FOLLOW UP APPOINTMENT FOR INR LAB RESULTS    November 2nd at 1:30 PM    Dr Lilly (covering for Sharee Pagan)  [address] [facility] [address] [phone]

## 2023-10-27 NOTE — THERAPY
"Occupational Therapy  Daily Treatment     Patient Name: Daya Woods  Age:  83 y.o., Sex:  female  Medical Record #: [id number]  Today's Date: 10/27/2023     Precautions  Precautions: Fall Risk    Assessment    Pt was seen for OT treatment. Pt needed encouragement to participate. Pt agreed and gave good effort. Pt continues to require CGA/SBA with FWW  for transfers and ambulating ADL's. Pt required SBA for for all ADL transfers and for standing at sink for H/G tasks. Pt demo Min A for LB dressing using tailor tech seated base and CGA for clothing management up over hips. Pt needed cues for hand placement and cues for safety with BSC transfers attempting to sit before being fully in front of BSC . Pt is a fall risk Pt able to do frontal kriss care seated post urination. No need for BM at this time so full toilet hygiene not yet assessed. Pt was encouraged to get up in chair and OOB and pt agreed. RN was updated on OT treatment findings and recommendations. Will continue to follow. Family not present this OT session.      Plan    Treatment Plan Status: (P) Continue Current Treatment Plan  Type of Treatment: Self Care / Activities of Daily Living, Adaptive Equipment, Neuro Re-Education / Balance, Therapeutic Exercises, Therapeutic Activity  Treatment Frequency: 3 Times per Week  Treatment Duration: Until Therapy Goals Met    DC Equipment Recommendations: (P) Unable to determine at this time  Discharge Recommendations: (P) Recommend home health for continued occupational therapy services    Subjective    \"I will try to do what you ask but I'm sure I can\".      Objective       10/27/23 0902   Cognition    Cognition / Consciousness X   Speech/ Communication Hard of Hearing   Level of Consciousness Alert   Safety Awareness Impaired   Initiation Impaired   Comments Inupiat , pleasant and at time repetitive, cooperative.   Passive ROM Upper Body   Passive ROM Upper Body WDL   Comments WFL   Active ROM Upper Body   Active ROM " Upper Body  WDL   Dominant Hand Right   Comments WFL for simple self care tasks   Strength Upper Body   Upper Body Strength  WDL   Comments WFL for simple self care tasks.   Other Treatments   Other Treatments Provided Psychosocial intervention addressed. Educated in the importance of being OOB and attempt simple self care tasks to increase activity tolerance and strength.   Balance   Sitting Balance (Static) Fair +   Sitting Balance (Dynamic) Fair   Standing Balance (Static) Fair   Standing Balance (Dynamic) Fair   Weight Shift Sitting Fair   Weight Shift Standing Fair   Comments CGA with  FWW   Bed Mobility    Supine to Sit Supervised   Sit to Supine Supervised   Scooting Supervised   Comments HOB raised   Activities of Daily Living   Grooming Supervision;Standing   Bathing   (declined)   Upper Body Dressing Supervision   Lower Body Dressing Minimal Assist  (using tailor tech seated EOB)   Toileting Contact Guard Assist  (with standing for pericare)   Skilled Intervention Verbal Cuing;Sequencing;Compensatory Strategies   Comments Pt will need assist at home for I ADL's and transfers.   Functional Mobility   Sit to Stand Standby Assist   Bed, Chair, Wheelchair Transfer Standby Assist   Toilet Transfers Contact Guard Assist   Transfer Method Stand Step   Skilled Intervention Verbal Cuing;Sequencing;Postural Facilitation   Comments cues for hand placement and safety attempting to sit before completely infront of [facility]   Activity Tolerance   Comments limited by fatigue   Patient / Family Goals   Patient / Family Goal #1 To go home   Goal #1 Outcome Progressing as expected   Short Term Goals   Short Term Goal # 1 Pt will complete ADL transfers with supervision   Goal Outcome # 1 Progressing as expected   Short Term Goal # 2 Pt will complete LB dressing with supervision   Goal Outcome # 2 Progressing as expected   Occupational Therapy Treatment Plan    O.T. Treatment Plan Continue Current Treatment Plan   Anticipated  Discharge Equipment and Recommendations   DC Equipment Recommendations Unable to determine at this time   Discharge Recommendations Recommend home health for continued occupational therapy services   Interdisciplinary Plan of Care Collaboration   IDT Collaboration with  Nursing   Collaboration Comments RN updated

## 2023-10-27 NOTE — CARE PLAN
The patient is Watcher - Medium risk of patient condition declining or worsening    Shift Goals  Clinical Goals: pain free  Patient Goals: rest  Family Goals: Randy    Progress made toward(s) clinical / shift goals:      Problem: Pain - Standard  Goal: Alleviation of pain or a reduction in pain to the patient’s comfort goal  Outcome: Progressing  Note: Turn patient two hourly,assess for pain,offer pain medication as ordered,redirect the patient.     Problem: Fall Risk  Goal: Patient will remain free from falls  Outcome: Progressing  Note: Put the bed in low position and locked,call light and personal belongings within reach,bed alarm on,hourly rounding on patient,offer toileting needs.

## 2023-10-27 NOTE — CARE PLAN
The patient is Stable - Low risk of patient condition declining or worsening    Shift Goals  Clinical Goals: maintain safety; promote comfort; monitor telemetry  Patient Goals: rest  Family Goals: JAYA    Pt has rested in her room this shift, she has denied c/o pain when asked and she remains free from non-verbal s/sx of discomfort at this time.  Pt has transferred to the chair and back to bed with staff.  She is able to make her wants/needs known.  Pt is being discharged this afternoon to home with family.  Safety measures in place, call light within reach.  Will continue with current POC.  Progress made toward(s) clinical / shift goals:      Problem: Fall Risk  Goal: Patient will remain free from falls  Outcome: Met   Pt remains free from falls/injuries this shift, she has transferred from the bed to the chair and  back with nurse offering stand-by assist, pt tolerated well.    Problem: Pain - Standard  Goal: Alleviation of pain or a reduction in pain to the patient’s comfort goal  Outcome: Met  Pt has denied c/o pain this shift, and she remains free from non-verbal s/sx of discomfort at this time.       Problem: Skin Integrity  Goal: Skin integrity is maintained or improved  Outcome: Met  Pt has no new skin issues this shift, area to coccyx healing.  Dressing clean, dry and intact.       Patient is not progressing towards the following goals:

## 2023-10-27 NOTE — PROGRESS NOTES
Pt leaving facility per w/c accompanied by daughter and staff in stable condition to private vehicle home.  Discharge instructions given to pt and daughter with both verbalizing understanding and denying any questions.  Orders for CBC, BMP and PT/INR as well as PT and OT orders given to daughter, along with all other discharge instructions in brown envelope.

## 2023-10-27 NOTE — PROGRESS NOTES
Inpatient Anticoagulation Service Note for 10/27/2023    Reason for Anticoagulation: Atrial Fibrillation   [id number] VASc Score: 8  HAS-BLED Score: 4    Hemoglobin Value: (!) 9  Hematocrit Value: (!) 28.6  Lab Platelet Value: 228  Target INR: 2.0 to 3.0    INR from last 7 days       Date/Time INR Value    10/27/23 0057 2.48    10/26/23 0129 2.59    10/25/23 0107 2.42    10/24/23 0055 2.46    10/23/23 0110 2.17    10/22/23 0043 1.8    10/21/23 0157 1.78          Dose from last 7 days       Date/Time Dose (mg)    10/27/23 0838 4    10/26/23 0958 4    10/25/23 0956 5    10/24/23 1614 3    10/23/23 0759 5    10/22/23 0946 5    10/21/23 1437 5    10/20/23 1540 5          Average Dose (mg):  (Home Dosin mg daily)    Significant Interactions: Antiplatelet Medications    Bridge Therapy: No    Reversal Agent Administered: Not Applicable    Assessment:   Chronic warfarin patient who previously was on 5mg daily at home (managed by outside provider) for atrial fibrillation. Pt had recent previous admission where she was found to have an INR of 8.76 which was reversed with Vitamin K. 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 Recommendation from GI was to hold warfarin x 7 days. Pt resumed warfarin on 10/18/23 and had been on 5 mg daily since then. Pt recently finished course of ceftriaxone (10/23/23). Patient on a cardiac diet.    Plan: INR today within range at 2.48 (goal 2-3). Will continue with 4mg dose tonight     Education Material Provided?: No (Chronic warfarin patient)    Pharmacist suggested discharge dosing: Anticipate that patient can be discharged on 4mg daily. Recommend INR 48-72 hours within discharge.       Liz Barone, KenaD

## 2023-11-02 ENCOUNTER — TELEPHONE (OUTPATIENT)
Dept: CARDIOLOGY | Facility: MEDICAL CENTER | Age: 83
End: 2023-11-02
Payer: COMMERCIAL

## 2023-11-02 NOTE — TELEPHONE ENCOUNTER
Called and spoke with patients grandson  Patient has chronic wet cough, dependant edema in the lower extremity.  Edema dissipates when patient elevates her lower extremities    Patient does not follow a low sodium diet due to low sodium levels.    Patient is currently taking lasix 20 mg twice a day, lisinopril BID, spironolactone in the AM   BP today 98/57, pulse 89.    Patient's has upcoming F/U.  JM- please advise if you'd like to make any medication changes before patients upcoming appointment.

## 2023-11-02 NOTE — TELEPHONE ENCOUNTER
SERAFIN        Caller: Genesis(pt's daughter)    Topic/issue: Patient's daughter was calling because her mother has CHF and and she was asking if the furosemide (LASIX) 20 MG Tab medication could be increased to a higher dosage. She stated that when her mother was admitted to the hospital on 10/19 she was given a higher dosage and it helped and she was asking for a call back    Callback Number: [phone]      Thank you    -Gm GIBBS

## 2023-11-02 NOTE — TELEPHONE ENCOUNTER
SERAFIN        Caller: Josh(pt's grandson)    Topic/issue: Patient's grandson was returning our call and was asking for a call back    Callback Number: [phone]      Thank you    -Gm GIBBS

## 2023-11-03 DIAGNOSIS — I50.23 ACUTE ON CHRONIC SYSTOLIC CHF (CONGESTIVE HEART FAILURE) (HCC): ICD-10-CM

## 2023-11-03 RX ORDER — FUROSEMIDE 20 MG/1
20 TABLET ORAL 2 TIMES DAILY
Qty: 270 TABLET | Refills: 0 | Status: SHIPPED | OUTPATIENT
Start: 2023-11-03 | End: 2023-11-28

## 2023-11-03 NOTE — TELEPHONE ENCOUNTER
Called and spoke with patients jose d, discussed JM recommendations.   Updated patients medication list and sent in prescription to patients pharmacy.    Grandson states patient already completed follow up labs at [facility]

## 2023-11-03 NOTE — TELEPHONE ENCOUNTER
Yes she can take an extra dose of lasix (so 40mg in the morning and 20mg in the afternoon) if her weight is up more than 2 pounds over the weight she was discharged from the hospital at, or if she is having worsening shortness of breath or leg swelling.     Let's make sure she gets the follow-up labs ordered within 1-2 weeks of her discharge date.     I see she has had multiple cancelled appointments, can we get her into heart failure clinic for a HF new appointment asap please? Thank you!

## 2023-11-07 NOTE — TELEPHONE ENCOUNTER
Phone Number Called: [phone]    Call outcome: Spoke with daughter Genesis    Message: Called to clarify orders. Dtr advised that patient will complete JM labs prior to [facility]. Pt had already completed labs from inpatient stay. Dtr advised to follow up as advised with provider in [facility]. Dtr will make sure current provider aware of PT/INR results & is planning to follow pt for Coumadin monitoring. Daughter appreciative of phone call

## 2023-11-13 ENCOUNTER — TELEPHONE (OUTPATIENT)
Dept: CARDIOLOGY | Facility: MEDICAL CENTER | Age: 83
End: 2023-11-13
Payer: COMMERCIAL

## 2023-11-13 NOTE — TELEPHONE ENCOUNTER
SERAIFN    Caller: Marely - Daughter    Topic/issue: Daughter requesting that we fax lab order placed by Dr. Jean be faxed to [facility] in [facility] so that they can complete the labs there. Daughter requesting return call.    Callback Number: [phone] (cell)     Thank you,  Helga DELAROSA

## 2023-11-28 ENCOUNTER — OFFICE VISIT (OUTPATIENT)
Dept: CARDIOLOGY | Facility: MEDICAL CENTER | Age: 83
End: 2023-11-28
Attending: PHYSICIAN ASSISTANT
Payer: MEDICARE

## 2023-11-28 VITALS
BODY MASS INDEX: 19.12 KG/M2 | HEART RATE: 98 BPM | HEIGHT: 64 IN | WEIGHT: 112 LBS | RESPIRATION RATE: 18 BRPM | SYSTOLIC BLOOD PRESSURE: 92 MMHG | DIASTOLIC BLOOD PRESSURE: 48 MMHG

## 2023-11-28 DIAGNOSIS — I48.20 CHRONIC ATRIAL FIBRILLATION (HCC): ICD-10-CM

## 2023-11-28 DIAGNOSIS — I50.41 ACUTE HEART FAILURE WITH REDUCED EJECTION FRACTION AND DIASTOLIC DYSFUNCTION (HCC): ICD-10-CM

## 2023-11-28 DIAGNOSIS — Z51.5 END OF LIFE CARE: ICD-10-CM

## 2023-11-28 DIAGNOSIS — I50.23 ACUTE ON CHRONIC SYSTOLIC CHF (CONGESTIVE HEART FAILURE) (HCC): ICD-10-CM

## 2023-11-28 DIAGNOSIS — R54 AGE-RELATED PHYSICAL DEBILITY: ICD-10-CM

## 2023-11-28 DIAGNOSIS — I24.0 ISCHEMIC HEART DISEASE DUE TO CORONARY ARTERY OBSTRUCTION (HCC): ICD-10-CM

## 2023-11-28 DIAGNOSIS — Z95.1 STATUS POST CORONARY ARTERY BYPASS GRAFTS X 5: ICD-10-CM

## 2023-11-28 DIAGNOSIS — Z66 DO NOT RESUSCITATE STATUS: ICD-10-CM

## 2023-11-28 DIAGNOSIS — D68.69 SECONDARY HYPERCOAGULABLE STATE (HCC): ICD-10-CM

## 2023-11-28 DIAGNOSIS — I25.9 ISCHEMIC HEART DISEASE DUE TO CORONARY ARTERY OBSTRUCTION (HCC): ICD-10-CM

## 2023-11-28 DIAGNOSIS — Z98.890 S/P MITRAL VALVE REPAIR: ICD-10-CM

## 2023-11-28 PROCEDURE — 3074F SYST BP LT 130 MM HG: CPT | Performed by: PHYSICIAN ASSISTANT

## 2023-11-28 PROCEDURE — 3078F DIAST BP <80 MM HG: CPT | Performed by: PHYSICIAN ASSISTANT

## 2023-11-28 PROCEDURE — 99215 OFFICE O/P EST HI 40 MIN: CPT | Performed by: PHYSICIAN ASSISTANT

## 2023-11-28 PROCEDURE — 99213 OFFICE O/P EST LOW 20 MIN: CPT | Performed by: PHYSICIAN ASSISTANT

## 2023-11-28 RX ORDER — BUMETANIDE 2 MG/1
2 TABLET ORAL 2 TIMES DAILY
Qty: 60 TABLET | Refills: 3 | Status: SHIPPED | OUTPATIENT
Start: 2023-11-28 | End: 2023-12-21

## 2023-11-28 RX ORDER — LANOLIN ALCOHOL/MO/W.PET/CERES
325 CREAM (GRAM) TOPICAL DAILY
COMMUNITY
Start: 2023-11-16

## 2023-11-28 RX ORDER — METOPROLOL SUCCINATE 25 MG/1
25 TABLET, EXTENDED RELEASE ORAL EVERY EVENING
COMMUNITY

## 2023-11-28 RX ORDER — WARFARIN SODIUM 4 MG/1
4 TABLET ORAL DAILY
Qty: 30 TABLET | Refills: 0 | Status: SHIPPED | OUTPATIENT
Start: 2023-11-28 | End: 2023-12-08

## 2023-11-28 RX ORDER — LANOLIN ALCOHOL/MO/W.PET/CERES
1000 CREAM (GRAM) TOPICAL DAILY
COMMUNITY
Start: 2023-11-13

## 2023-11-28 ASSESSMENT — ENCOUNTER SYMPTOMS
ORTHOPNEA: 1
SHORTNESS OF BREATH: 1
PALPITATIONS: 0
PND: 1
DIZZINESS: 0
COUGH: 1

## 2023-11-28 ASSESSMENT — FIBROSIS 4 INDEX: FIB4 SCORE: 2.56

## 2023-11-28 NOTE — PROGRESS NOTES
Chief Complaint   Patient presents with    Hypertension     Follow up          Subjective:   Daya Woods is a 83 y.o. female who presents today for follow-up with her daughter Lavinia and grandson Josh.      Patient of Dr. Lilly.Current medical problems include CAD s/p 5-vessel CABG in 2002 now with now 2/5 grafts open by cardiac catheterization 8/26/16 (LIMA-diag patent, SVG-OM status post PCI 10/9/2018, all others occluded), ischemic CMO, HFrEF, chronic atrial fibrillation, hypertension, dyslipidemia, functional mitral regurgitation status post Mitraclip procedure on 10/15/2018.      She saw Dr. Lilly in Dec 2022 and reported severe chest pressure. She was referred to the emergency department her HS troponin was elevated at 308, and ultimately for a coronary angiogram which was unable to be completed due to severe peripheral arterial disease ( preventing access to coronary vascular system). She was discharged on medical therapy.   After discharged she was not tolerating ranolazine and this was discontinued. Her beta blocker was increased.    Since her last office visit in May she has been to the hospital 2 times.  In October she was hospitalized for strokelike symptoms complicated by anemia and supratherapeutic INR.  She underwent endoscopy showing 2 bleeding ulcers in the stomach and bleeding AVM s/p hemoclip.  She was discharged on aspirin and instructed to restart anticoagulation 1 week.  She was then admitted to the hospital several weeks later with acute decompensated heart failure.  She was diuresed.  Her statin was held for malnutrition.    At the time of her discharge her weight was 103 pounds.  Since her discharge she slowly been gaining weight, she is up to 112 pounds today.  Her legs are more swollen than when she left the hospital.  She has a productive cough at night that keeps her awake.  She is propping herself up with 3 pillows right now.  At 1 point she was told to reduce the furosemide  to 20 mg twice a day but this worsened her symptoms so her family increase the furosemide to 40 mg in the morning and 20 mg at night.  She does not have any urinary response to the diuretic dose.    She denies any blood in her stool.  She does say her stools are dark color.  She is taking iron supplements.    She is back on her warfarin however she does not have anyone consistently following her INR.  Together we looked at her INR test with her family from an outside hospital, on 11/13 her INR was 2.3.  Past Medical History:   Diagnosis Date    Anesthesia     ponv    Anticoagulation monitoring, special range     CAD (coronary artery disease) 10/09/2018    PCI/ANA (Synergy 3.0 x 28mm) to the OM.    Cataract     Bilateral IOL    Chronic atrial fibrillation (HCC)     Managed with rate control and warfarin for anticoagulation     Congestive heart failure (HCC)     Coronary atherosclerosis of native coronary artery 12/2002    CABG x 5 (LIMA-D3, SVG-OM, SVG-RCA, SVG-distal LAD, SVG-D1). August 2016: LIMA-D3 and SVG-OM patent (interval occlusion of the SVG-RCA); chronic occluded SVG-LAD, SVG-D1.    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     MI, old 2002    Mitral regurgitation 10/2018    Status post MV repair with MitraClip.    Osteoarthritis     Pain     right knee, back    Raynaud's disease     Spinal stenosis 08/20/2015    Status post coronary artery bypass grafts x 5 08/2002    LIMA-D3, SVG-LAD, SVG-OM, SVG-RCA, SVG-distal LAD.          Family History   Problem Relation Age of Onset    Heart Attack Brother 55        heart attack         Social History     Tobacco Use    Smoking status: Former     Current packs/day: 0.00     Average packs/day: 0.5 packs/day for 40.0 years (20.0 ttl pk-yrs)  "    Types: Cigarettes     Start date: 1962     Quit date: 2002     Years since quittin.7    Smokeless tobacco: Never   Vaping Use    Vaping Use: Never used   Substance Use Topics    Alcohol use: Not Currently     Comment: occ    Drug use: No         Allergies   Allergen Reactions    Codeine Unspecified     \"I just don't like it. It does weird things to me.\"    Isosorbide Mononitrate Rash     Rash    Keflex Rash     Rash    Sulfa Drugs Rash     rash    Tape Unspecified     Tears skin off    Xarelto [Rivaroxaban] Rash     rash    Eggs Or Egg-Derived Products [Chicken-Derived Products] Diarrhea     Pt's daughter reports diarrhea    Hydrochlorothiazide Unspecified     Unsure of reaction    Lactose      Lactose intolerant         Current Outpatient Medications   Medication Sig    Cyanocobalamin (VITAMIN B-12) 1000 MCG Tab Take 1,000 mcg by mouth every day.    ferrous sulfate 325 (65 Fe) MG EC tablet Take 325 mg by mouth every day.    warfarin (COUMADIN) 4 MG Tab Take 1 Tablet by mouth every day.    bumetanide (BUMEX) 2 MG tablet Take 1 Tablet by mouth 2 times a day.    metoprolol SR (TOPROL XL) 25 MG TABLET SR 24 HR Take 25 mg by mouth every evening.    lisinopril (PRINIVIL) 2.5 MG Tab Take 2.5 mg by mouth 2 times a day.    potassium chloride SA (K-DUR) 10 MEQ Tab CR Take 5 mEq by mouth 2 times a day. 5 mEq = 1/2 tablet    acetaminophen (TYLENOL) 500 MG Tab Take 500 mg by mouth 1 time a day as needed.    spironolactone (ALDACTONE) 25 MG Tab take 1/2 tablet by mouth once daily (Patient taking differently: Take 12.5 mg by mouth every day.)    levothyroxine (SYNTHROID) 75 MCG Tab take 1 tablet by mouth every morning ON AN EMPTY STOMACH    atorvastatin (LIPITOR) 40 MG Tab Take 1 Tablet by mouth every day.    magnesium oxide (MAG-OX) 400 MG Tab tablet Take 1 Tablet by mouth every day.         Review of Systems   Constitutional:  Positive for malaise/fatigue.        + Weight gain   Respiratory:  Positive for " "cough and shortness of breath.    Cardiovascular:  Positive for orthopnea, leg swelling and PND. Negative for chest pain and palpitations.   Neurological:  Negative for dizziness.          Objective:   BP 92/48 (BP Location: Left arm, Patient Position: Sitting)   Pulse 98   Resp 18   Ht 1.626 m (5' 4\")   Wt 50.8 kg (112 lb)  Body mass index is 19.22 kg/m².         Physical Exam  Vitals reviewed.   Constitutional:       General: She is not in acute distress.     Appearance: She is ill-appearing.      Comments: Frail-appearing   Neck:      Comments: Distended neck veins  Cardiovascular:      Rate and Rhythm: Normal rate. Rhythm irregular.      Heart sounds: Murmur heard.   Pulmonary:      Effort: Pulmonary effort is normal.      Comments: Decreased breath sounds at bases  Abdominal:      General: There is no distension.      Palpations: Abdomen is soft.   Musculoskeletal:      Right lower leg: Edema present.      Left lower leg: Edema present.   Skin:     General: Skin is warm.   Neurological:      Mental Status: She is alert.   Psychiatric:         Judgment: Judgment normal.          Anigiogram 12/15/22  Indication/Preoperative diagnosis:  Hypertensive emergency  Advanced CAD  Advanced PAD  MR s/p MitraClip  Atrial fibrillation  Frailty     Postoperative diagnosis:  Severe peripheral arterial disease preventing access to the coronary vascular system  Severe distal aortic and left iliac stenosis, inability to pass equipment besides a wire        Recommendations:  Guideline directed medical therapy   Cardiovascular Risk factor modification  Pulm sheath in 1 hour, defer restart of heparin to primary team.  6-hour bedrest after sheath pull.     Consider a more rigorous evaluation of her goals of care given her advanced age frailty and multiple medical comorbidities.  Should coronary angiography still be sought, she would require peripheral arterial intervention first to facilitate this.        Procedures " performed:  Supervision moderate sedation  Abdominal aortogram with iliofemoral runoff        FINDINGS:  I.  Abdominal aorta:  Severe diffuse aortic tortuosity and calcification, including the iliac bifurcation with subtotal occlusion of greater than 98%.  Heavy bilateral iliofemoral calcification and sequential stenoses.  Assessment:     1. Acute heart failure with reduced ejection fraction and diastolic dysfunction (HCC)  bumetanide (BUMEX) 2 MG tablet    Basic Metabolic Panel      2. Chronic atrial fibrillation (HCC)  Referral to Anticoagulation Monitoring    warfarin (COUMADIN) 4 MG Tab      3. Status post coronary artery bypass grafts x 5        4. Ischemic heart disease due to coronary artery obstruction (HCC)        5. S/P mitral valve repair        6. DNR (do not resuscitate)        7. Age-related physical debility        8. Secondary hypercoagulable state (HCC)             Medical Decision Making:  Today's Assessment / Plan:   Acute on chronic HFrEF Stage D NYHA IV  Ischemic cardiomyopathy  - she is volume overloaded and not responding to her oral lasix at 40mg dose. Was unable to arrange for subq lasix today in our clinic. We'll try with oral bumex 2mg bid. If she is not urinating within 60min of dose then increase to 4mg per dose. BMP next week. If responding to this we'll keep on this regimen. If no urinary response with increasing oral diuretics then she needs to return to the ER, Daya and her family are aware of these instructions.     Ultimately she has non- revascularizable disease. Hospice is a reasonable option for Daya       CAD with stable angina  PAD with claudication   - She is not interested in re-trying intervention. Her symptoms are manageable and she prefers less medication   - she has signed a DNR/DNI/POLST on file  - statin discontinued for malnutrition and likely no long term benefit in this stage D patient  - intolerant to isosorbide mononitrate and ranolazine     Anemia secondary to  GIB  - H/H stable (8.8) compared to last labs from 10/27. Stop aspirin  Remains on warfarin though we discussed if this is causing more harm than good. She would prefer to stay on anticoagulant for now as no signs of bleeding since her INR was supra therapeutic. Referral to AC clinic.        S/P mitral valve clip  -  now with rising gradients though will not increase beta blocker during acute HF exacerbation       Chronic atrial fibrillation   Tricuspid valve insufficiency    Hyponatremia, mild   - Na 132 on outside labs 11/27    End of life care  - Both Daya and her family are aware that she has a terminal condition. Daya' wish is to die at home. Her grandson Josh supports this decision and has a friend who works for a local hospice organization in [place] and feels he can get in touch with them for resources. He does not request a referral at this time. If she elects to de-escalate care than stopping the warfarin may allow her H/H to recover and help her feel a bit better. Would continue diuretics in hospice care. Also discussed with Dr. Lilly      Future Appointments   Date Time Provider Department Center   12/8/2023 10:30 AM [facility] EXAM 4 [facility] None   12/8/2023  1:15 PM Eliot Durbin M.D. [facility] None         I personally spent a total of 60 minutes which includes 45 minutes of face-to-face time and additional non-face-to-face time spent on preparing to see the patient, reviewing hospital notes and tests, obtaining history from the patient, performing a medically appropriate exam, counseling and educating the patient and her family on her diagnosis, coordinating care  and documenting information in the electronic medical record.

## 2023-11-28 NOTE — PATIENT INSTRUCTIONS
Stop furosemide    Start Bumex 2mg twice a day. If not urinating within 60-90min of dose then take 2 pills at next dose (4mg).   Continue potassium 10meq daily with potassium drink.   Blood draw next week (Monday)

## 2023-11-29 ENCOUNTER — TELEPHONE (OUTPATIENT)
Dept: VASCULAR LAB | Facility: MEDICAL CENTER | Age: 83
End: 2023-11-29
Payer: COMMERCIAL

## 2023-11-29 NOTE — TELEPHONE ENCOUNTER
[facility]     Received anticoagulation referral from Talya Anderson PA-C on 11/28.     S/w pt's daughter. Her son Josh manages patient's medications and appointments.    S/w pt's son. Scheduled NP 12/8. Pt is not new to warfarin and is transferring care to our clinic. We discussed pts are required to be seen face to face once annually.    1st call     Insurance: [employer] Medicare Advantage - Employer Sponsored  PCP: External  Locations to be seen: [facility]    If no response by 12/28 OR 2 no shows/cancellations, will remove from referral list     [facility] at [phone], fax [phone]    Elsy Byrne, KenaD

## 2023-12-07 NOTE — PROGRESS NOTES
Cardiology Follow-up Consultation Note    Date of note:    12/8/2023    Primary Care Provider: PHILLIP Pires    Patient Name: Daya Woods   YOB: 1940  MRN:              [id number]    Chief Complaint: Follow-up HFrEF and CAD    History of Present Illness: Ms. Daya Woods is a 83 y.o. female whose current medical problems include CAD s/p 5-vessel CABG in 2002 now with now 2/5 grafts open by cardiac catheterization 8/26/16 (LIMA-diag patent, SVG-OM status post PCI 10/9/2018, all others occluded), ischemic CMO, HFrEF, chronic atrial fibrillation, hypertension, dyslipidemia, functional mitral regurgitation status post Mitraclip procedure on 10/15/2018  who is here for follow-up HFrEF and CAD.    The patient was previously a patient of Dr. Lilly, last seen by Talya Anderson PA-C, on 11/20/2023. She saw Dr. Lilly in Dec 2022 and reported severe chest pressure. She was referred to the emergency department her HS troponin was elevated at 308, and ultimately for a coronary angiogram which was unable to be completed due to severe peripheral arterial disease ( preventing access to coronary vascular system). She was discharged on medical therapy.  After discharged she was not tolerating ranolazine and this was discontinued. Her beta blocker was increased.  During that visit, the patient was volume overloaded, and she was initiated on Bumex.    The patient presents today for follow-up. The patient presents with her daughter and grandson. The patient reports feeling much better today.  She is no longer having swelling, and breathing has much improved.  She is able to walk around more without any problems besides hip pain.  She denies any orthopnea or PND.  No palpitations.  No syncope or presyncopal episodes.      Cardiovascular Risk Factors:  1. Smoking status: Former smoker  2. Type II Diabetes Mellitus: None/not recently checked   3. Hypertension: On medication  4. Dyslipidemia: On  statin   Cholesterol,Tot   Date Value Ref Range Status   10/20/2023 60 (L) 100 - 199 mg/dL Final     LDL   Date Value Ref Range Status   10/20/2023 27 <100 mg/dL Final     HDL   Date Value Ref Range Status   10/20/2023 19 (A) >=40 mg/dL Final     Triglycerides   Date Value Ref Range Status   10/20/2023 69 0 - 149 mg/dL Final     5. Family history of early Coronary Artery Disease in a first degree relative (Male less than 55 years of age; Female less than 65 years of age): Brother with MI  6.  Obesity and/or Metabolic Syndrome: Body mass index is 18.4 kg/m².  7. Sedentary lifestyle: Sedentary    Review of Systems   Constitutional: Negative for fever, malaise/fatigue and night sweats.   Cardiovascular:  Negative for chest pain, dyspnea on exertion, irregular heartbeat, leg swelling, near-syncope, orthopnea, palpitations, paroxysmal nocturnal dyspnea and syncope.   Respiratory:  Negative for cough, shortness of breath and wheezing.    Gastrointestinal:  Negative for abdominal pain, diarrhea, nausea and vomiting.   Neurological:  Negative for dizziness, focal weakness and weakness.         All other systems reviewed and are negative.         Current Outpatient Medications   Medication Sig Dispense Refill    Cyanocobalamin (VITAMIN B-12) 1000 MCG Tab Take 1,000 mcg by mouth every day.      bumetanide (BUMEX) 2 MG tablet Take 1 Tablet by mouth 2 times a day. 60 Tablet 3    metoprolol SR (TOPROL XL) 25 MG TABLET SR 24 HR Take 25 mg by mouth every evening.      lisinopril (PRINIVIL) 2.5 MG Tab Take 2.5 mg by mouth 2 times a day.      potassium chloride SA (K-DUR) 10 MEQ Tab CR Take 5 mEq by mouth 2 times a day. 5 mEq = 1/2 tablet      acetaminophen (TYLENOL) 500 MG Tab Take 500 mg by mouth 1 time a day as needed.      spironolactone (ALDACTONE) 25 MG Tab take 1/2 tablet by mouth once daily (Patient taking differently: Take 12.5 mg by mouth every day.) 45 Tablet 3    levothyroxine (SYNTHROID) 75 MCG Tab take 1 tablet by  "mouth every morning ON AN EMPTY STOMACH 90 Tablet 3    atorvastatin (LIPITOR) 40 MG Tab Take 1 Tablet by mouth every day. 100 Tablet 3    magnesium oxide (MAG-OX) 400 MG Tab tablet Take 1 Tablet by mouth every day. 100 Tablet 3    dabigatran (PRADAXA) 150 MG Cap capsule Take 1 capsule by mouth 2 times a day. 180 Capsule 1    ferrous sulfate 325 (65 Fe) MG EC tablet Take 325 mg by mouth every day. (Patient not taking: Reported on 12/8/2023)       No current facility-administered medications for this visit.         Allergies   Allergen Reactions    Codeine Unspecified     \"I just don't like it. It does weird things to me.\"    Isosorbide Mononitrate Rash     Rash    Keflex Rash     Rash    Sulfa Drugs Rash     rash    Tape Unspecified     Tears skin off    Xarelto [Rivaroxaban] Rash     rash    Eggs Or Egg-Derived Products [Chicken-Derived Products] Diarrhea     Pt's daughter reports diarrhea    Hydrochlorothiazide Unspecified     Unsure of reaction    Lactose      Lactose intolerant         Physical Exam:  Ambulatory Vitals  BP (!) 80/40 (BP Location: Left arm, Patient Position: Sitting, BP Cuff Size: Adult)   Pulse 78   Resp 12   Ht 1.626 m (5' 4\")   Wt 48.6 kg (107 lb 3.2 oz)   SpO2 94%    Oxygen Therapy:  Pulse Oximetry: 94 %  BP Readings from Last 4 Encounters:   12/08/23 (!) 80/40   12/08/23 96/66   11/28/23 92/48   10/27/23 119/70       Weight/BMI: Body mass index is 18.4 kg/m².  Wt Readings from Last 4 Encounters:   12/08/23 48.6 kg (107 lb 3.2 oz)   11/28/23 50.8 kg (112 lb)   10/27/23 49.1 kg (108 lb 3.9 oz)   10/11/23 51.6 kg (113 lb 12.1 oz)         General: Frail, no acute distress  Eyes: nl conjunctiva, no icteric sclera  ENT: normal external appearance of ears, nose, and throat  Neck: no visible JVP,  no carotid bruits  Lungs: normal respiratory effort, CTAB  Heart: RRR, no murmurs, no rubs or gallops,  no edema bilateral lower extremities. No LV/RV heave on cardiac palpatation. + bilateral radial " "pulses.  + bilateral dp pulses.   Abdomen: soft, non tender, non distended, no masses, normal bowel sounds.  No HSM.  Extremities/MSK: no clubbing, no cyanosis  Neurological: No focal sensory deficits  Psychiatric: Appropriate affect, A/O x 3, intact judgement and insight  Skin: Warm extremities      Lab Data Review:  Lab Results   Component Value Date/Time    CHOLSTRLTOT 60 (L) 10/20/2023 12:39 AM    LDL 27 10/20/2023 12:39 AM    HDL 19 (A) 10/20/2023 12:39 AM    TRIGLYCERIDE 69 10/20/2023 12:39 AM       Lab Results   Component Value Date/Time    SODIUM 127 (L) 10/27/2023 12:57 AM    POTASSIUM 4.5 10/27/2023 12:57 AM    CHLORIDE 94 (L) 10/27/2023 12:57 AM    CO2 26 10/27/2023 12:57 AM    GLUCOSE 91 10/27/2023 12:57 AM    BUN 12 10/27/2023 12:57 AM    CREATININE 0.80 10/27/2023 12:57 AM     Lab Results   Component Value Date/Time    ALKPHOSPHAT 97 10/21/2023 01:57 AM    ASTSGOT 33 10/21/2023 01:57 AM    ALTSGPT 21 10/21/2023 01:57 AM    TBILIRUBIN 0.3 10/21/2023 01:57 AM      Lab Results   Component Value Date/Time    WBC 3.8 (L) 10/27/2023 12:57 AM    HEMOGLOBIN 9.0 (L) 10/27/2023 12:57 AM     No results found for: \"HBA1C\"      Cardiac Imaging and Procedures Review:    EKG dated 10/19/2023: My personal interpretation is atrial fibrillation, nonspecific intraventricular conduction delay, old anterior infarct.     Echo dated 10/21/2023:   Severely reduced left ventricular systolic function.  The left ventricular ejection fraction is visually estimated to be 20%.  Global hypokinesis with apical aneurysm.  There is evidence of elevated left ventricular filling pressures in the   setting of dysrhythmia.  Normal right ventricular size.  Severely reduced right ventricular systolic function.  Enlarged right atrium.  Severely dilated left atrium.  Known mitral valve repair (mitraclip) with elevated gradient of 7mmHg.  Moderate mitral regurgitation.  Moderate tricuspid regurgitation.  Right atrial pressure is estimated to be " 8 mmHg.  Estimated right ventricular systolic pressure is 35 mmHg.     Compared to the Prior echocardiogram performed on 12/14/2022: The   gradient across the mitral valve appears to have increased. there has   been an increase in the tricuspid regurgitation. The estimated right   atrial pressure has increased from 3 to 8mmHg.    Coronary Angiogram 12/15/22  Indication/Preoperative diagnosis:  Hypertensive emergency  Advanced CAD  Advanced PAD  MR s/p MitraClip  Atrial fibrillation  Frailty     Postoperative diagnosis:  Severe peripheral arterial disease preventing access to the coronary vascular system  Severe distal aortic and left iliac stenosis, inability to pass equipment besides a wire        Recommendations:  Guideline directed medical therapy   Cardiovascular Risk factor modification  Pulm sheath in 1 hour, defer restart of heparin to primary team.  6-hour bedrest after sheath pull.     Consider a more rigorous evaluation of her goals of care given her advanced age frailty and multiple medical comorbidities.  Should coronary angiography still be sought, she would require peripheral arterial intervention first to facilitate this.        Procedures performed:  Supervision moderate sedation  Abdominal aortogram with iliofemoral runoff    Assessment & Plan     1. HFrEF (heart failure with reduced ejection fraction) ([facility])        2. CHF (NYHA class IV, ACC/AHA stage D) ([facility])        3. Ischemic cardiomyopathy        4. Coronary artery disease of native artery of native heart with stable angina pectoris ([facility])        5. PAD (peripheral artery disease) ([facility])        6. Chronic a-fib ([facility])        7. End of life care        8. S/P mitral valve repair              Shared Medical Decision Making:    HFrEF  NYHA IV stage D heart failure  Ischemic cardiomyopathy  S/P mitral valve clip  Euvolemic on exam.  Patient with nonrevascularizable disease.  Previously discussed with prior provider regarding hospice.  -Continue Bumex 2  mg twice daily.  BMP pending.  -Continue Aldactone 12.5 mg daily  -Continue lisinopril 2.5 mg twice daily  -Continue metoprolol 25 mg daily  -BP low for uptitration of GDMT this visit  -Previously discussed extensively regarding ICD, which the patient declined.  Reasonable to no longer discuss given frailty and her goals of care.  -Follow-up in 4 weeks to reassess volume status    CAD with stable angina  PAD with claudication   -Continue with conservative management  -Continue statin and metoprolol  -Continue Pradaxa as below.  Previously unable to tolerate statin due to GIB  -Previously intolerant to isosorbide mononitrate and ranolazine      Chronic atrial fibrillation  -Continue Pradaxa     End of life care  -Per prior provider, both the patient and her family are aware that she has a terminal condition and she wishes to be home and likely consider hospice in the future.    All of the patient's excellent questions were answered to the best of my knowledge and to her satisfaction.  It was a pleasure seeing Ms. Daya Woods in my clinic today. Return in about 4 weeks (around 1/5/2024). Patient is aware to call the cardiology clinic with any questions or concerns.      Eliot Durbin MD  [facility]  [place], [address]  [address] [address]  Phone: [phone]  Fax: [phone]

## 2023-12-08 ENCOUNTER — PHARMACY VISIT (OUTPATIENT)
Dept: PHARMACY | Facility: MEDICAL CENTER | Age: 83
End: 2023-12-08
Payer: COMMERCIAL

## 2023-12-08 ENCOUNTER — DOCUMENTATION (OUTPATIENT)
Dept: VASCULAR LAB | Facility: MEDICAL CENTER | Age: 83
End: 2023-12-08

## 2023-12-08 ENCOUNTER — OFFICE VISIT (OUTPATIENT)
Dept: CARDIOLOGY | Facility: MEDICAL CENTER | Age: 83
End: 2023-12-08
Attending: STUDENT IN AN ORGANIZED HEALTH CARE EDUCATION/TRAINING PROGRAM
Payer: MEDICARE

## 2023-12-08 ENCOUNTER — ANTICOAGULATION VISIT (OUTPATIENT)
Dept: VASCULAR LAB | Facility: MEDICAL CENTER | Age: 83
End: 2023-12-08
Attending: INTERNAL MEDICINE
Payer: MEDICARE

## 2023-12-08 VITALS
DIASTOLIC BLOOD PRESSURE: 40 MMHG | HEART RATE: 78 BPM | SYSTOLIC BLOOD PRESSURE: 80 MMHG | OXYGEN SATURATION: 94 % | RESPIRATION RATE: 12 BRPM | BODY MASS INDEX: 18.3 KG/M2 | HEIGHT: 64 IN | WEIGHT: 107.2 LBS

## 2023-12-08 VITALS — DIASTOLIC BLOOD PRESSURE: 66 MMHG | HEART RATE: 100 BPM | SYSTOLIC BLOOD PRESSURE: 96 MMHG

## 2023-12-08 DIAGNOSIS — I25.118 CORONARY ARTERY DISEASE OF NATIVE ARTERY OF NATIVE HEART WITH STABLE ANGINA PECTORIS (HCC): ICD-10-CM

## 2023-12-08 DIAGNOSIS — I25.5 ISCHEMIC CARDIOMYOPATHY: ICD-10-CM

## 2023-12-08 DIAGNOSIS — I50.20 HFREF (HEART FAILURE WITH REDUCED EJECTION FRACTION) (HCC): ICD-10-CM

## 2023-12-08 DIAGNOSIS — I48.20 CHRONIC A-FIB (HCC): ICD-10-CM

## 2023-12-08 DIAGNOSIS — I50.84 CHF (NYHA CLASS IV, ACC/AHA STAGE D) (HCC): ICD-10-CM

## 2023-12-08 DIAGNOSIS — Z51.5 END OF LIFE CARE: ICD-10-CM

## 2023-12-08 DIAGNOSIS — I73.9 PAD (PERIPHERAL ARTERY DISEASE) (HCC): ICD-10-CM

## 2023-12-08 DIAGNOSIS — I48.20 CHRONIC ATRIAL FIBRILLATION (HCC): ICD-10-CM

## 2023-12-08 DIAGNOSIS — Z98.890 S/P MITRAL VALVE REPAIR: ICD-10-CM

## 2023-12-08 LAB — INR PPP: 2 (ref 2–3.5)

## 2023-12-08 PROCEDURE — 99214 OFFICE O/P EST MOD 30 MIN: CPT | Performed by: STUDENT IN AN ORGANIZED HEALTH CARE EDUCATION/TRAINING PROGRAM

## 2023-12-08 PROCEDURE — 3078F DIAST BP <80 MM HG: CPT | Performed by: STUDENT IN AN ORGANIZED HEALTH CARE EDUCATION/TRAINING PROGRAM

## 2023-12-08 PROCEDURE — 99213 OFFICE O/P EST LOW 20 MIN: CPT

## 2023-12-08 PROCEDURE — 85610 PROTHROMBIN TIME: CPT

## 2023-12-08 PROCEDURE — 3074F SYST BP LT 130 MM HG: CPT | Performed by: STUDENT IN AN ORGANIZED HEALTH CARE EDUCATION/TRAINING PROGRAM

## 2023-12-08 PROCEDURE — RXMED WILLOW AMBULATORY MEDICATION CHARGE

## 2023-12-08 RX ORDER — DABIGATRAN ETEXILATE 150 MG/1
150 CAPSULE ORAL 2 TIMES DAILY
Qty: 180 CAPSULE | Refills: 1 | Status: SHIPPED | OUTPATIENT
Start: 2023-12-08

## 2023-12-08 ASSESSMENT — MINNESOTA LIVING WITH HEART FAILURE QUESTIONNAIRE (MLHF)
MAKING YOU WORRY: 0
DIFFICULTY WITH SEXUAL ACTIVITIES: 0
DIFFICULTY SLEEPING WELL AT NIGHT: 4
DIFFICULTY WORKING TO EARN A LIVING: 0
TIRED, FATIGUED OR LOW ON ENERGY: 4
TOTAL_SCORE: 47
DIFFICULTY GOING AWAY FROM HOME: 4
SWELLING IN ANKLES OR LEGS: 3
MAKING YOU FEEL DEPRESSED: 2
HAVING TO SIT OR LIE DOWN DURING THE DAY: 3
DIFFICULTY SOCIALIZING WITH FAMILY OR FRIENDS: 1
COSTING YOU MONEY FOR MEDICAL CARE: 3
FEELING LIKE A BURDEN TO FAMILY AND FRIENDS: 0
MAKING YOU SHORT OF BREATH: 3
MAKING YOU STAY IN A HOSPITAL: 3
DIFFICULTY TO CONCENTRATE OR REMEMBERING THINGS: 2
WALKING ABOUT OR CLIMBING STAIRS DIFFICULT: 2
EATING LESS FOODS YOU LIKE: 2
DIFFICULTY WITH RECREATIONAL PASTIMES, SPORTS, HOBBIES: 4
GIVING YOU SIDE EFFECTS FROM TREATMENTS: 0
WORKING AROUND THE HOUSE OR YARD DIFFICULT: 4
LOSS OF SELF CONTROL IN YOUR LIFE: 3

## 2023-12-08 ASSESSMENT — ENCOUNTER SYMPTOMS
DIARRHEA: 0
SYNCOPE: 0
COUGH: 0
DYSPNEA ON EXERTION: 0
FEVER: 0
FOCAL WEAKNESS: 0
NIGHT SWEATS: 0
VOMITING: 0
DIZZINESS: 0
IRREGULAR HEARTBEAT: 0
NEAR-SYNCOPE: 0
WHEEZING: 0
PALPITATIONS: 0
WEAKNESS: 0
SHORTNESS OF BREATH: 0
PND: 0
ORTHOPNEA: 0
NAUSEA: 0
ABDOMINAL PAIN: 0

## 2023-12-08 ASSESSMENT — CHA2DS2 SCORE
PRIOR STROKE OR TIA OR THROMBOEMBOLISM: NO
HYPERTENSION: YES
AGE 65 TO 74: NO
DIABETES: NO
CHA2DS2 VASC SCORE: 6
AGE 75 OR GREATER: YES
VASCULAR DISEASE: YES
SEX: FEMALE
CHF OR LEFT VENTRICULAR DYSFUNCTION: YES

## 2023-12-08 ASSESSMENT — FIBROSIS 4 INDEX: FIB4 SCORE: 2.56

## 2023-12-08 NOTE — PROGRESS NOTES
NEW DOAC   .  Anticoagulation Summary  As of 2023      INR goal:  2.0-3.0   TTR:  --   INR used for dosin.00 (2023)   Warfarin maintenance plan:  No maintenance plan   Next INR check:  3/29/2024   Target end date:  Indefinite    Indications    Chronic atrial fibrillation (HCC) [I48.20]                 Anticoagulation Episode Summary       INR check location:      Preferred lab:      Send INR reminders to:      Comments:            Anticoagulation Care Providers       Provider Role Specialty Phone number    Talya Anderson P.A.-C. Referring Physician Assistant [phone]          Anticoagulation Patient Findings      PCP: PHILLIP Pires  Cardiologist: Talya Anderson  Dx: atrial fibrillation  CHADSVASC = at least 6  HAS-BLED = at least 2  Target End Date = Indefinite    Pt Hx: Pt referred to our clinic by cardiology as the pt lost the provider who was managing warfarin.  Pt reports someone in [place] use to take care of their INR monitoring but they seem to lose their providers frequently.      Last cardiology note stated current status very well:  - H/H stable (8.8) compared to last labs from 10/27. Stop aspirin  Remains on warfarin though we discussed if this is causing more harm than good. She would prefer to stay on anticoagulant for now as no signs of bleeding since her INR was supra therapeutic. Referral to AC clinic.     Pt and family report she does not have any GIB.  Additionally there is no further thought about investigating GIB      Labs:  Lab Results   Component Value Date/Time    WBC 3.8 (L) 10/27/2023 12:57 AM    RBC 3.42 (L) 10/27/2023 12:57 AM    HEMOGLOBIN 9.0 (L) 10/27/2023 12:57 AM    HEMATOCRIT 28.6 (L) 10/27/2023 12:57 AM    MCV 83.6 10/27/2023 12:57 AM    MCH 26.3 (L) 10/27/2023 12:57 AM    MCHC 31.5 (L) 10/27/2023 12:57 AM    MPV 9.5 10/27/2023 12:57 AM    NEUTSPOLYS 93.50 (H) 10/19/2023 04:28 PM    LYMPHOCYTES 2.00 (L) 10/19/2023 04:28 PM    MONOCYTES 4.00  "10/19/2023 04:28 PM    EOSINOPHILS 0.00 10/19/2023 04:28 PM    BASOPHILS 0.10 10/19/2023 04:28 PM    HYPOCHROMIA 1+ 10/06/2023 09:44 AM    ANISOCYTOSIS 2+ (A) 10/06/2023 09:44 AM      Lab Results   Component Value Date/Time    SODIUM 127 (L) 10/27/2023 12:57 AM    POTASSIUM 4.5 10/27/2023 12:57 AM    CHLORIDE 94 (L) 10/27/2023 12:57 AM    CO2 26 10/27/2023 12:57 AM    GLUCOSE 91 10/27/2023 12:57 AM    BUN 12 10/27/2023 12:57 AM    CREATININE 0.80 10/27/2023 12:57 AM        Pt referred to our clinic for warfarin management.    Pt presents for warfarin management.  Based on H/H discussed if OAC is even the best option.  Pt and family want to move forward with OAC.    Discussed warfarin vs Pradaxa.  We discussed benefits vs risks including GI bleed with Pradaxa.    Pt and family feel she is living on \"borrowed time\" and therefore the lifestyle benefits of Pradaxa are preferred over having to test INR frequently and monitoring her diet closely.      Pt will hold warfarin today, begin Pradaxa tomorrow.     CrCl~36 mL/min but needs to be watched closely.     Discussed:   Indication for DOAC therapy.  Importance of monitoring and compliance.   Monitoring parameters, signs and symptoms of bleeding or clotting.  DOAC therapy, side effects, potential DDIs, OTC medications  Hormonal therapy: No  Pregnancy: No  DDI: No  Pt is not on antiplatelet/NSAID therapy:   Lifestyle safety, ie smoking, ETOH, hobby safety, fall safety/prevention  Procedures for missed doses or suspected missed doses, surgeries/procedures, travel, dental work, any medication changes    Take Pradaxa 150 mg twice daily    DOAC affordable: yes; no    Samples provided: no;     Labs to be completed prior to next f/u - CBC, CMP    F/U: 3 weeks by phone (for repeat labs) but pt will return to clinic in 3 months.  Jeramie Smith, PharmD    CC  Jocelyn Anderson APN Michael Bloch MD    [facility] Pharmacotherapy Program Consent                                      "        Name    Daya Woods    MRN number: [id number]    the following are guidelines for participation in the [facility] Pharmacotherapy [facility].     I, ____Daya Woods_____, understand and voluntarily agree to participate in the [facility] Pharmacotherapy Program and to have services provided to me by pharmacists working in collaboration with my provider.    I understand the pharmacist within the [facility] Pharmacotherapy Program may initiate, modify or discontinue my medications, order appropriate testing and appointments, perform exams, monitor treatment, and make clinical evaluations and decisions pursuant to a collaborative practice agreement with my provider.  I understand the pharmacist within the [facility] Pharmacotherapy Program is not a physician, osteopathic physician, advanced practice registered nurse or physician assistant and may not diagnose.  I will take all my medications as instructed and not change the way I take it without first talking to my provider or a pharmacist within the [facility] Pharmac[facility] Program.  I understand that if I am late to my appointment I may not be able to be seen by a pharmacist at that time and will have to reschedule my appointment.  During appointment with pharmacist I understand that pharmacist has the right not to answer questions or perform services outside the pharmacist’s scope of practice.  By signing below, I provide informed consent for the pharmacist to provide these services and for my participation in the [facility] Pharmacotherapy Program.      Daya Woods           [id number]          12/08/23  Patient Name                   MRN number  Date     ____Obtained verbal consent from Daya Woods

## 2023-12-09 NOTE — PROGRESS NOTES
Initial anticoag note and most recent cards note reviewed.  Patient with afib and chads vasc = approx 6    Pending further recommendations, we will continue with indefinite anticoagulation with dabigitran as directed by cards    Will defer all management of rhythm, rate, and other cv issues, aside from anticoagulation, to cards Michael Bloch, MD  Anticoagulation Clinic    Cc: RAHUL Cao

## 2023-12-13 DIAGNOSIS — I48.20 CHRONIC ATRIAL FIBRILLATION (HCC): ICD-10-CM

## 2023-12-15 ENCOUNTER — HOSPITAL ENCOUNTER (EMERGENCY)
Facility: MEDICAL CENTER | Age: 83
End: 2023-12-15
Attending: EMERGENCY MEDICINE
Payer: MEDICARE

## 2023-12-15 ENCOUNTER — APPOINTMENT (OUTPATIENT)
Dept: RADIOLOGY | Facility: MEDICAL CENTER | Age: 83
End: 2023-12-15
Attending: EMERGENCY MEDICINE
Payer: MEDICARE

## 2023-12-15 VITALS
SYSTOLIC BLOOD PRESSURE: 101 MMHG | TEMPERATURE: 97.8 F | DIASTOLIC BLOOD PRESSURE: 57 MMHG | HEART RATE: 91 BPM | RESPIRATION RATE: 20 BRPM | OXYGEN SATURATION: 95 % | BODY MASS INDEX: 18.52 KG/M2 | WEIGHT: 108.47 LBS | HEIGHT: 64 IN

## 2023-12-15 DIAGNOSIS — K62.5 RECTAL BLEEDING: ICD-10-CM

## 2023-12-15 DIAGNOSIS — K64.9 HEMORRHOIDS, UNSPECIFIED HEMORRHOID TYPE: ICD-10-CM

## 2023-12-15 LAB
ABO GROUP BLD: NORMAL
ALBUMIN SERPL BCP-MCNC: 2.8 G/DL (ref 3.2–4.9)
ALBUMIN/GLOB SERPL: 0.8 G/DL
ALP SERPL-CCNC: 68 U/L (ref 30–99)
ALT SERPL-CCNC: 11 U/L (ref 2–50)
ANION GAP SERPL CALC-SCNC: 13 MMOL/L (ref 7–16)
APTT PPP: 84.4 SEC (ref 24.7–36)
AST SERPL-CCNC: 18 U/L (ref 12–45)
BASOPHILS # BLD AUTO: 0.3 % (ref 0–1.8)
BASOPHILS # BLD: 0.02 K/UL (ref 0–0.12)
BILIRUB SERPL-MCNC: 0.6 MG/DL (ref 0.1–1.5)
BLD GP AB SCN SERPL QL: NORMAL
BUN SERPL-MCNC: 36 MG/DL (ref 8–22)
CALCIUM ALBUM COR SERPL-MCNC: 9.5 MG/DL (ref 8.5–10.5)
CALCIUM SERPL-MCNC: 8.5 MG/DL (ref 8.5–10.5)
CHLORIDE SERPL-SCNC: 93 MMOL/L (ref 96–112)
CO2 SERPL-SCNC: 23 MMOL/L (ref 20–33)
CREAT SERPL-MCNC: 1.45 MG/DL (ref 0.5–1.4)
EOSINOPHIL # BLD AUTO: 0 K/UL (ref 0–0.51)
EOSINOPHIL NFR BLD: 0 % (ref 0–6.9)
ERYTHROCYTE [DISTWIDTH] IN BLOOD BY AUTOMATED COUNT: 56.3 FL (ref 35.9–50)
GFR SERPLBLD CREATININE-BSD FMLA CKD-EPI: 36 ML/MIN/1.73 M 2
GLOBULIN SER CALC-MCNC: 3.5 G/DL (ref 1.9–3.5)
GLUCOSE SERPL-MCNC: 94 MG/DL (ref 65–99)
HCT VFR BLD AUTO: 28.8 % (ref 37–47)
HGB BLD-MCNC: 8.9 G/DL (ref 12–16)
IMM GRANULOCYTES # BLD AUTO: 0.03 K/UL (ref 0–0.11)
IMM GRANULOCYTES NFR BLD AUTO: 0.4 % (ref 0–0.9)
INR PPP: 2.64 (ref 0.87–1.13)
LIPASE SERPL-CCNC: 46 U/L (ref 11–82)
LYMPHOCYTES # BLD AUTO: 0.55 K/UL (ref 1–4.8)
LYMPHOCYTES NFR BLD: 8 % (ref 22–41)
MCH RBC QN AUTO: 24.8 PG (ref 27–33)
MCHC RBC AUTO-ENTMCNC: 30.9 G/DL (ref 32.2–35.5)
MCV RBC AUTO: 80.2 FL (ref 81.4–97.8)
MONOCYTES # BLD AUTO: 0.5 K/UL (ref 0–0.85)
MONOCYTES NFR BLD AUTO: 7.2 % (ref 0–13.4)
NEUTROPHILS # BLD AUTO: 5.81 K/UL (ref 1.82–7.42)
NEUTROPHILS NFR BLD: 84.1 % (ref 44–72)
NRBC # BLD AUTO: 0 K/UL
NRBC BLD-RTO: 0 /100 WBC (ref 0–0.2)
PLATELET # BLD AUTO: 242 K/UL (ref 164–446)
PMV BLD AUTO: 9.8 FL (ref 9–12.9)
POTASSIUM SERPL-SCNC: 4.3 MMOL/L (ref 3.6–5.5)
PROT SERPL-MCNC: 6.3 G/DL (ref 6–8.2)
PROTHROMBIN TIME: 28.6 SEC (ref 12–14.6)
RBC # BLD AUTO: 3.59 M/UL (ref 4.2–5.4)
RH BLD: NORMAL
SODIUM SERPL-SCNC: 129 MMOL/L (ref 135–145)
WBC # BLD AUTO: 6.9 K/UL (ref 4.8–10.8)

## 2023-12-15 PROCEDURE — 85730 THROMBOPLASTIN TIME PARTIAL: CPT

## 2023-12-15 PROCEDURE — 85610 PROTHROMBIN TIME: CPT

## 2023-12-15 PROCEDURE — 99284 EMERGENCY DEPT VISIT MOD MDM: CPT

## 2023-12-15 PROCEDURE — 86901 BLOOD TYPING SEROLOGIC RH(D): CPT

## 2023-12-15 PROCEDURE — 83690 ASSAY OF LIPASE: CPT

## 2023-12-15 PROCEDURE — 85025 COMPLETE CBC W/AUTO DIFF WBC: CPT

## 2023-12-15 PROCEDURE — 80053 COMPREHEN METABOLIC PANEL: CPT

## 2023-12-15 PROCEDURE — 71045 X-RAY EXAM CHEST 1 VIEW: CPT

## 2023-12-15 PROCEDURE — 36415 COLL VENOUS BLD VENIPUNCTURE: CPT

## 2023-12-15 PROCEDURE — 86850 RBC ANTIBODY SCREEN: CPT

## 2023-12-15 PROCEDURE — 86900 BLOOD TYPING SEROLOGIC ABO: CPT

## 2023-12-15 ASSESSMENT — FIBROSIS 4 INDEX: FIB4 SCORE: 2.56

## 2023-12-15 NOTE — ED TRIAGE NOTES
Pt ambulated to triage with grandson  Chief Complaint   Patient presents with    Bloody Stools     This morning, reports that she had blood coming out of rectum but normal brown stool after blood came out.  Possible hemorrhoid  per grandson, pt denies any lumps.  Pt on Pradaxa.       Protocol ordered.  Pt Informed regarding triage process and verbalized understanding to inform triage tech or RN for any changes in condition. Placed in lobby.

## 2023-12-15 NOTE — ED PROVIDER NOTES
ED Provider Note    CHIEF COMPLAINT  Chief Complaint   Patient presents with    Bloody Stools     This morning, reports that she had blood coming out of rectum but normal brown stool after blood came out.  Possible hemorrhoid  per grandson, pt denies any lumps.  Pt on Pradaxa.       EXTERNAL RECORDS REVIEWED  Inpatient Notes   and Outpatient Notes and labs she is followed in the cardiology clinic for chronic A-fib.  As well as for CHF.  She was admitted for CHF back in October.  I see records a CBC back in October which shows hemoglobin of 9.  They have outpatient labs with similar results from a few days ago.    HPI/ROS  LIMITATION TO HISTORY   Select: : None  OUTSIDE HISTORIAN(S):  Family wonders may be from a hemorrhoid.  They point out that the patient had a normal bowel movement after the bloody bowel movement.    Daya Woods is a 83 y.o. female who presents with rectal bleeding.  She is anticoagulated for atrial fibrillation.  She has been feeling fine.  This morning she got up and use the bathroom, bowel movement which was normal.  She was sitting on the bed and had a sensation she had to pass gas, in fact she passed blood.  That stopped and she had normal bowel movement again without blood.  She feels just fine now.  She does suffer from pretty severe hemorrhoids.  These are not bothering her out of the ordinary presently.  There is no fever.  No belly pain.  There is no other complaint.    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    SURGICAL HISTORY   has a past surgical history that includes appendectomy; multiple coronary artery bypass (); tonsillectomy and adenoidectomy; tubal ligation; other (Bilateral, 2014); recovery (2016); gyn surgery; transcatheter mitral valve repair (10/15/2018); carmina (10/15/2018); z cardiac cath (10/2018); mitral valve replace; upper gi endoscopy,diagnosis (N/A, 10/9/2023); colonoscopy,diagnostic (N/A, 10/9/2023); upper gi endoscopy,ctrl bleed (N/A, 10/9/2023); gastroscopy with endostat (N/A, 10/9/2023); and colonoscopy with endostat (N/A, 10/9/2023).    FAMILY HISTORY  Family History   Problem Relation Age of Onset    Heart Attack Brother 55        heart attack       SOCIAL HISTORY  Social History     Tobacco Use    Smoking status: Former     Current packs/day: 0.00     Average packs/day: 0.5 packs/day for 40.0 years (20.0 ttl pk-yrs)     Types: Cigarettes     Start date: 1962     Quit date: 2002     Years since quittin.8    Smokeless tobacco: Never   Vaping Use    Vaping Use: Never used   Substance and Sexual Activity    Alcohol use: Not Currently     Comment: occ    Drug use: No    Sexual activity: Not on file       CURRENT MEDICATIONS  Home Medications       Reviewed by Sania Grant R.N. (Registered Nurse) on 12/15/23 at 0931  Med List Status: Partial     Medication Last Dose Status   acetaminophen (TYLENOL) 500 MG Tab  Active   atorvastatin (LIPITOR) 40 MG Tab  Active   bumetanide (BUMEX) 2 MG tablet  Active   Cyanocobalamin (VITAMIN B-12) 1000 MCG Tab  Active   dabigatran (PRADAXA) 150 MG Cap capsule  Active   ferrous sulfate 325 (65 Fe) MG EC tablet  Active   levothyroxine (SYNTHROID) 75 MCG Tab  Active   lisinopril (PRINIVIL) 2.5 MG Tab  Active   magnesium oxide (MAG-OX) 400 MG Tab tablet  Active   metoprolol SR (TOPROL XL) 25 MG TABLET SR 24  "HR  Active   potassium chloride SA (K-DUR) 10 MEQ Tab CR  Active   spironolactone (ALDACTONE) 25 MG Tab  Active                    ALLERGIES  Allergies   Allergen Reactions    Codeine Unspecified     \"I just don't like it. It does weird things to me.\"    Isosorbide Mononitrate Rash     Rash    Keflex Rash     Rash    Sulfa Drugs Rash     rash    Tape Unspecified     Tears skin off    Xarelto [Rivaroxaban] Rash     rash    Eggs Or Egg-Derived Products [Chicken-Derived Products] Diarrhea     Pt's daughter reports diarrhea    Hydrochlorothiazide Unspecified     Unsure of reaction    Lactose      Lactose intolerant       PHYSICAL EXAM  VITAL SIGNS: /50   Pulse 92   Temp 36.6 °C (97.9 °F) (Temporal)   Resp 19   Ht 1.626 m (5' 4\")   Wt 49.2 kg (108 lb 7.5 oz)   LMP  (LMP Unknown)   SpO2 98%   BMI 18.62 kg/m²    Constitutional: Well appearing patient in no acute distress.  HENT: Head is without trauma.  Oropharynx is clear.  Mucous membranes are moist.  Eyes: Sclerae are nonicteric, pupils are equally round.  Neck: Supple with grossly normal range of motion.  Cardiovascular: Irregularly irregular between 90 and 105.  Peripheral pulses are intact and symmetric throughout.  Thorax & Lungs: Breathing easily.  Good air movement.  There is no wheeze, rhonchi or rales.  Abdomen: Bowel sounds normal, soft, non-distended, nontender, no mass nor pulsatile mass. I do not appreciate hepatosplenomegaly.  Sharee Coronado, RN as chaperone.  She has pretty pronounced hemorrhoids.  The 1 at about 12:00 is in particular somewhat inflamed although not thrombosed.  I see no active bleeding presently.  On anoscopy, there is brown stool in the vault.  No abnormalities were seen.  The stool and mucus is tested by myself and is guaiac negative.  Skin: No apparent rash.  I do not see petechiae or purpura.  Extremities: No evidence of acute trauma.  No clubbing, cyanosis, edema, no Homans or cords.  Neurologic: Alert. Moving all " extremities.  Intact sensation and strength throughout.    Psychiatric: Normal for situation      DIAGNOSTIC STUDIES / PROCEDURES      LABS  Labs Reviewed   CBC WITH DIFFERENTIAL - Abnormal; Notable for the following components:       Result Value    RBC 3.59 (*)     Hemoglobin 8.9 (*)     Hematocrit 28.8 (*)     MCV 80.2 (*)     MCH 24.8 (*)     MCHC 30.9 (*)     RDW 56.3 (*)     Neutrophils-Polys 84.10 (*)     Lymphocytes 8.00 (*)     Lymphs (Absolute) 0.55 (*)     All other components within normal limits    Narrative:     Indicate which anticoagulants the patient is on:->UNKNOWN   COMP METABOLIC PANEL - Abnormal; Notable for the following components:    Sodium 129 (*)     Chloride 93 (*)     Bun 36 (*)     Creatinine 1.45 (*)     Albumin 2.8 (*)     All other components within normal limits    Narrative:     Indicate which anticoagulants the patient is on:->UNKNOWN   PROTHROMBIN TIME - Abnormal; Notable for the following components:    PT 28.6 (*)     INR 2.64 (*)     All other components within normal limits    Narrative:     Indicate which anticoagulants the patient is on:->UNKNOWN   APTT - Abnormal; Notable for the following components:    APTT 84.4 (*)     All other components within normal limits    Narrative:     Indicate which anticoagulants the patient is on:->UNKNOWN   ESTIMATED GFR - Abnormal; Notable for the following components:    GFR (CKD-EPI) 36 (*)     All other components within normal limits    Narrative:     Indicate which anticoagulants the patient is on:->UNKNOWN   COD (ADULT)   LIPASE    Narrative:     Indicate which anticoagulants the patient is on:->UNKNOWN         RADIOLOGY  I have independently interpreted the diagnostic imaging associated with this visit and am waiting the final reading from the radiologist.   My preliminary interpretation is as follows: Cardiomegaly similar to prior  Radiologist interpretation:   DX-CHEST-PORTABLE (1 VIEW)   Final Result      1.  Left basilar  atelectasis and/or consolidation.   2.  Moderate to marked enlargement of the cardiomediastinal silhouette, similar to previous exam.            COURSE & MEDICAL DECISION MAKING    ED Observation Status? No; Patient does not meet criteria for ED Observation.     INITIAL ASSESSMENT, COURSE AND PLAN  Care Narrative: This a patient who presents with hematochezia in the setting of anticoagulation.  She is anticoagulated for atrial fibrillation.  Interestingly she had a normal bowel movement after that episode hematochezia.  She has a benign belly.  She does have some significant hemorrhoids, 1 of which is somewhat inflamed, not bleeding presently.  Her laboratories were obtained and her hemoglobin is at baseline.  Hemodynamically, she has normal blood pressure, and her heart rate is controlled with her current regimen.  I discussed the patient's case with Dr. López GI, who knows her from colonoscopy recently we talked about her anticoagulation and that bleeding today.  She points out that with stable hemoglobin, and no further bleeding,.  As well as a fairly unremarkable colonoscopy just 2 months ago, there is likely no benefit to put her in the hospital.  I discussed this with the family.  At this point, my working diagnosis she has bleeding hemorrhoids.  I want her to do sitz bath's 1 or 2 times per day, and if if she does have more bleeding, I recommended increasing that frequency.  Although her stools were not particular hard this morning, I recommended stool softeners to make sure it stays that way.  If the bleeding does not go away in a short time like it did this morning, I want to see her back here.  Patient and the family are in agreement and comfortable with this plan.  I will be discharging her home in their care.  Instructions on hemorrhoids and rectal bleeding.        DISPOSITION AND DISCUSSIONS  I have discussed management of the patient with the following physicians and JOSIAS's:  Gastroenterology    Escalation of care considered, and ultimately not performed:diagnostic imaging and acute inpatient care management, however at this time, the patient is most appropriate for outpatient management imaging would not be helpful in this case.  And as discussed outpatient management seems to be the appropriate choice.      FINAL DIAGNOSIS  1. Rectal bleeding    2. Hemorrhoids, unspecified hemorrhoid type    3.  Anticoagulated       Electronically signed by: Ernst Gordon M.D., 12/15/2023 11:30 AM

## 2023-12-15 NOTE — ED NOTES
Pt wheeled to room, transferred to [facility], connected to monitor. Son states pt had one bloody stool likely from a hemorrhoid, pt recently started on Pradaxa, family told to come to ED if any bleeding occurred.

## 2023-12-15 NOTE — DISCHARGE INSTRUCTIONS
Sitz baths 1 or 2 times a day.  More frequent if you have another episode of bleeding.  Stool softeners.  Return here for bleeding that returns and does not go away, belly pain, fever, or for any turn for the worse.

## 2023-12-15 NOTE — ED NOTES
"Pt discharged home.  pt in possession of belongings. Pt provided discharge education and information pertaining to medications and follow up appointments. Pt received copy of discharge instructions and verbalized understanding. /57   Pulse 91   Temp 36.6 °C (97.8 °F) (Temporal)   Resp 20   Ht 1.626 m (5' 4\")   Wt 49.2 kg (108 lb 7.5 oz)   LMP  (LMP Unknown)   SpO2 95%   BMI 18.62 kg/m²     "

## 2023-12-20 ENCOUNTER — TELEPHONE (OUTPATIENT)
Dept: CARDIOLOGY | Facility: MEDICAL CENTER | Age: 83
End: 2023-12-20
Payer: COMMERCIAL

## 2023-12-20 ENCOUNTER — DOCUMENTATION (OUTPATIENT)
Dept: CARDIOLOGY | Facility: MEDICAL CENTER | Age: 83
End: 2023-12-20
Payer: COMMERCIAL

## 2023-12-20 NOTE — TELEPHONE ENCOUNTER
Caller: Genesis Woods - daughter    Topic/issue: Patients daughter called to report a few symptoms the patient is having that they suspect is coming from her new medication dabigatran (PRADAXA) 150 MG Cap capsule . She explained that the patient has been very tired and having rectal bleeding for the last couple days. She reports the patient is not feeling well, but did not report any other specific symptoms. Genesis or her son Josh would like a callback.     Please advise.    Callback Number: [phone] Genesis [phone] - Nevaeh    Thank you,     Ashley DENNIS

## 2023-12-20 NOTE — TELEPHONE ENCOUNTER
Returned call to Genesis. She states that pt is now coughing up blood and her son is on his way to the pt. Advised that pt go to the ER, she verbalizes understanding.

## 2023-12-20 NOTE — PROGRESS NOTES
This patient has been flagged through our echocardiogram surveillance with the following echocardiogram results:    Moderate Mitral Regurgitation    Cardiologist: Dr. Durbin    Current Plan of Care for Structural Heart Disease: Added to surveillance tracking list    Next Echo date needed by: 10/21/2024    Next Follow up appointment needed by: 12/8/2024

## 2023-12-21 ENCOUNTER — TELEPHONE (OUTPATIENT)
Dept: VASCULAR LAB | Facility: MEDICAL CENTER | Age: 83
End: 2023-12-21
Payer: COMMERCIAL

## 2023-12-21 NOTE — TELEPHONE ENCOUNTER
[facility]    Reached out to family to follow up with lab work.  Pt's grandson informed me the pt had passed away.  I expressed our condolences.    Jeramie Smith, PharmD

## (undated) DEVICE — Device

## (undated) DEVICE — CHLORAPREP 26 ML APPLICATOR - ORANGE TINT(25/CA)

## (undated) DEVICE — SET LEADWIRE 5 LEAD BEDSIDE DISPOSABLE ECG (1SET OF 5/EA)

## (undated) DEVICE — GOWN WARMING STANDARD FLEX - (30/CA)

## (undated) DEVICE — SUTURE 4-0 VICRYL PLUS SH - UNDYED 27 INCH (36PK/BX)

## (undated) DEVICE — KIT ANESTHESIA W/CIRCUIT & 3/LT BAG W/FILTER (20EA/CA)

## (undated) DEVICE — KIT ULTRASND COVER - (20EA/CA)

## (undated) DEVICE — STOPCOCK IV 400 PSI 3W ROT (50EA/BX)

## (undated) DEVICE — TUBING CLEARLINK DUO-VENT - C-FLO (48EA/CA)

## (undated) DEVICE — BLANKET UNDERBODY FULL ACCES - (5/CA)

## (undated) DEVICE — PROTECTOR ULNA NERVE - (36PR/CA)

## (undated) DEVICE — LACTATED RINGERS INJ 1000 ML - (14EA/CA 60CA/PF)

## (undated) DEVICE — TUBING PRSS MNTR 48IN NAMIC - (25/CA)

## (undated) DEVICE — SET FLUID WARMING STANDARD FLOW - (10/CA)

## (undated) DEVICE — MASK ANESTHESIA ADULT  - (100/CA)

## (undated) DEVICE — TRANSDUCER ADULT DISP. SINGLE BONDED STERILE - (20EA/CA)

## (undated) DEVICE — MASK OXYGEN VNYL ADLT MED CONC WITH 7 FOOT TUBING  - (50EA/CA)

## (undated) DEVICE — ELECTRODE DUAL RETURN W/ CORD - (50/PK)

## (undated) DEVICE — TUBING PRSS MNTR 72IN M/ M LL - (25/BX) MONIT. LINE W/MALE L/L

## (undated) DEVICE — INTRODUCER SHEATH 6FR 2.5CM - DILATOR PROTRUDING (10/BX)

## (undated) DEVICE — TOWEL STOP TIMEOUT SAFETY FLAG (40EA/CA)

## (undated) DEVICE — SENSOR SPO2 NEO LNCS ADHESIVE (20/BX) SEE USER NOTES

## (undated) DEVICE — GLOVE BIOGEL SZ 7.5 SURGICAL PF LTX - (50PR/BX 4BX/CA)

## (undated) DEVICE — STOPCOCK 3-WAY W/SWIVEL LEVER LOCK (50EA/CA)

## (undated) DEVICE — ELECTRODE RADIOLUCNT SOLID GEL DEFIB PADS (12EA/CA)

## (undated) DEVICE — DILATOR VASC 18FR 20CM STD - JCD18.038-20

## (undated) DEVICE — KIT NRG RF TRANSSEPTAL WITH STANDARD CURVE NEEDLE

## (undated) DEVICE — GOWN SURGEONS X-LARGE - DISP. (30/CA)

## (undated) DEVICE — TOWELS CLOTH SURGICAL - (4/PK 20PK/CA)

## (undated) DEVICE — SLEEVE, VASO, THIGH, MED

## (undated) DEVICE — DEVICE CLOSER PERCLOSE - (10/BX) PROGLIDE SYSTEM

## (undated) DEVICE — BLOCK BITE MAXI DENTAL RETENTION RIM (100EA/BX)

## (undated) DEVICE — TUBE CONNECT SUCTION CLEAR 120 X 1/4" (50EA/CA)"

## (undated) DEVICE — TRANSDUCER BIFURCATED MONITORING KIT (10EA/CA)

## (undated) DEVICE — CATHETER INTRO 63CM 8.5FR SL1

## (undated) DEVICE — MASK PANORAMIC OXYGEN PRO2 (30EA/CA)

## (undated) DEVICE — SODIUM CHL IRRIGATION 0.9% 1000ML (12EA/CA)

## (undated) DEVICE — PACK SINGLE BASIN - (6/CA)

## (undated) DEVICE — COVER LIGHT HANDLE FLEXIBLE - SOFT (2EA/PK 80PK/CA)

## (undated) DEVICE — HEAD HOLDER JUNIOR/ADULT

## (undated) DEVICE — ARMBOARD  SMALL IV 9 INLONG - (25EA/CA)

## (undated) DEVICE — BAG RESUSCITATION DISPOSABLE - WITH MASK (10 EA/CA)

## (undated) DEVICE — PACK TAVR (3EA/CA)

## (undated) DEVICE — KIT PROCEDURE DOUBLE ENDO ONLY (5/CA)

## (undated) DEVICE — GOLD PROBE 7FR (5/BX)

## (undated) DEVICE — DRESSING TRANSPARENT FILM TEGADERM 4 X 4.75" (50EA/BX)"

## (undated) DEVICE — CANISTER SUCTION 3000ML MECHANICAL FILTER AUTO SHUTOFF MEDI-VAC NONSTERILE LF DISP  (40EA/CA)

## (undated) DEVICE — SOD. CHL. INJ. 0.9% 1000 ML - (14EA/CA 60CA/PF)

## (undated) DEVICE — GLOVE BIOGEL INDICATOR SZ 8 SURGICAL PF LTX - (50/BX 4BX/CA)

## (undated) DEVICE — BLADE SURGICAL CLIPPER - (50EA/CA)

## (undated) DEVICE — SYS DLV COST CLS RM TEMP - INJECTATE (CO-SET II) (10EA/CA)

## (undated) DEVICE — MICRODRIP PRIMARY VENTED 60 (48EA/CA) THIS WAS PART #2C8428 WHICH WAS DISCONTINUED

## (undated) DEVICE — KIT RADIAL ARTERY 20GA W/MAX BARRIER AND BIOPATCH  (5EA/CA) #10740 IS FOR THE SET RADIAL ARTERIAL

## (undated) DEVICE — ELECTRODE 850 FOAM ADHESIVE - HYDROGEL RADIOTRNSPRNT (50/PK)

## (undated) DEVICE — SET EXTENSION WITH 2 PORTS (48EA/CA) ***PART #2C8610 IS A SUBSTITUTE*****

## (undated) DEVICE — SUCTION INSTRUMENT YANKAUER BULBOUS TIP W/O VENT (50EA/CA)

## (undated) DEVICE — IV SET, NON-VENT PLUMB PUMP

## (undated) DEVICE — FILM CASSETTE ENDO

## (undated) DEVICE — KIT ROOM DECONTAMINATION

## (undated) DEVICE — SENSOR OXIMETER ADULT SPO2 RD SET (20EA/BX)

## (undated) DEVICE — DECANTER FLD BLS - (50/CA)

## (undated) DEVICE — DRAPE MAYO STAND - (30/CA)

## (undated) DEVICE — MANIFOLD NEPTUNE 1 PORT (20/PK)

## (undated) DEVICE — WATER IRRIGATION STERILE 1000ML (12EA/CA)

## (undated) DEVICE — SUCTION INSTRUMENT YANKAUER OPEN TIP W/O VENT (50EA/CA)

## (undated) DEVICE — BLADE SURGICAL #11 - (50/BX)

## (undated) DEVICE — SUTURE OHS

## (undated) DEVICE — DERMABOND ADVANCED - (12EA/BX)

## (undated) DEVICE — SET BIFURCATED BLOOD - (48EA/CS)

## (undated) DEVICE — TUBE CONNECTING SUCTION - CLEAR PLASTIC STERILE 72 IN (50EA/CA)